# Patient Record
Sex: FEMALE | Race: BLACK OR AFRICAN AMERICAN | Employment: OTHER | URBAN - METROPOLITAN AREA
[De-identification: names, ages, dates, MRNs, and addresses within clinical notes are randomized per-mention and may not be internally consistent; named-entity substitution may affect disease eponyms.]

---

## 2017-01-31 ENCOUNTER — CARE COORDINATION (OUTPATIENT)
Dept: INTERNAL MEDICINE | Age: 82
End: 2017-01-31

## 2017-02-02 ENCOUNTER — OFFICE VISIT (OUTPATIENT)
Dept: INTERNAL MEDICINE | Age: 82
End: 2017-02-02

## 2017-02-02 ENCOUNTER — HOSPITAL ENCOUNTER (OUTPATIENT)
Dept: OTHER | Age: 82
Discharge: HOME | End: 2017-02-02
Attending: INTERNAL MEDICINE | Admitting: INTERNAL MEDICINE

## 2017-02-02 VITALS
HEART RATE: 63 BPM | DIASTOLIC BLOOD PRESSURE: 60 MMHG | SYSTOLIC BLOOD PRESSURE: 124 MMHG | BODY MASS INDEX: 26.93 KG/M2 | WEIGHT: 152 LBS | OXYGEN SATURATION: 85 %

## 2017-02-02 DIAGNOSIS — R44.1 VISUAL HALLUCINATIONS: Chronic | ICD-10-CM

## 2017-02-02 DIAGNOSIS — I10 ESSENTIAL HYPERTENSION: Primary | Chronic | ICD-10-CM

## 2017-02-02 DIAGNOSIS — F17.219 CIGARETTE NICOTINE DEPENDENCE WITH NICOTINE-INDUCED DISORDER: Chronic | ICD-10-CM

## 2017-02-02 DIAGNOSIS — M25.511 ACUTE PAIN OF RIGHT SHOULDER: ICD-10-CM

## 2017-02-02 DIAGNOSIS — R41.3 MEMORY LOSS: Chronic | ICD-10-CM

## 2017-02-02 DIAGNOSIS — R09.02 HYPOXIA: Chronic | ICD-10-CM

## 2017-02-02 DIAGNOSIS — E78.5 HYPERLIPIDEMIA, UNSPECIFIED HYPERLIPIDEMIA TYPE: Chronic | ICD-10-CM

## 2017-02-02 DIAGNOSIS — J43.9 PULMONARY EMPHYSEMA, UNSPECIFIED EMPHYSEMA TYPE (HCC): Chronic | ICD-10-CM

## 2017-02-02 PROCEDURE — G8420 CALC BMI NORM PARAMETERS: HCPCS | Performed by: INTERNAL MEDICINE

## 2017-02-02 PROCEDURE — 3023F SPIROM DOC REV: CPT | Performed by: INTERNAL MEDICINE

## 2017-02-02 PROCEDURE — 1123F ACP DISCUSS/DSCN MKR DOCD: CPT | Performed by: INTERNAL MEDICINE

## 2017-02-02 PROCEDURE — 4004F PT TOBACCO SCREEN RCVD TLK: CPT | Performed by: INTERNAL MEDICINE

## 2017-02-02 PROCEDURE — G8400 PT W/DXA NO RESULTS DOC: HCPCS | Performed by: INTERNAL MEDICINE

## 2017-02-02 PROCEDURE — G8484 FLU IMMUNIZE NO ADMIN: HCPCS | Performed by: INTERNAL MEDICINE

## 2017-02-02 PROCEDURE — 99214 OFFICE O/P EST MOD 30 MIN: CPT | Performed by: INTERNAL MEDICINE

## 2017-02-02 PROCEDURE — G8598 ASA/ANTIPLAT THER USED: HCPCS | Performed by: INTERNAL MEDICINE

## 2017-02-02 PROCEDURE — 1090F PRES/ABSN URINE INCON ASSESS: CPT | Performed by: INTERNAL MEDICINE

## 2017-02-02 PROCEDURE — G8427 DOCREV CUR MEDS BY ELIG CLIN: HCPCS | Performed by: INTERNAL MEDICINE

## 2017-02-02 PROCEDURE — G8926 SPIRO NO PERF OR DOC: HCPCS | Performed by: INTERNAL MEDICINE

## 2017-02-02 PROCEDURE — 4040F PNEUMOC VAC/ADMIN/RCVD: CPT | Performed by: INTERNAL MEDICINE

## 2017-02-02 ASSESSMENT — ENCOUNTER SYMPTOMS
BACK PAIN: 0
GASTROINTESTINAL NEGATIVE: 1
EYES NEGATIVE: 1
RESPIRATORY NEGATIVE: 1

## 2017-02-27 ENCOUNTER — CARE COORDINATION (OUTPATIENT)
Dept: INTERNAL MEDICINE | Age: 82
End: 2017-02-27

## 2017-03-06 RX ORDER — LORAZEPAM 2 MG/1
TABLET ORAL
Qty: 90 TABLET | Refills: 0 | OUTPATIENT
Start: 2017-03-06 | End: 2017-06-05 | Stop reason: DRUGHIGH

## 2017-03-06 RX ORDER — LORAZEPAM 2 MG/1
TABLET ORAL
Qty: 90 TABLET | Refills: 1 | OUTPATIENT
Start: 2017-03-06 | End: 2017-03-06 | Stop reason: SDUPTHER

## 2017-03-13 ENCOUNTER — CARE COORDINATION (OUTPATIENT)
Dept: INTERNAL MEDICINE | Age: 82
End: 2017-03-13

## 2017-03-21 ENCOUNTER — CARE COORDINATION (OUTPATIENT)
Dept: INTERNAL MEDICINE | Age: 82
End: 2017-03-21

## 2017-04-26 ENCOUNTER — CARE COORDINATION (OUTPATIENT)
Dept: INTERNAL MEDICINE | Age: 82
End: 2017-04-26

## 2017-06-05 ENCOUNTER — OFFICE VISIT (OUTPATIENT)
Dept: INTERNAL MEDICINE | Age: 82
End: 2017-06-05

## 2017-06-05 ENCOUNTER — CARE COORDINATOR VISIT (OUTPATIENT)
Dept: INTERNAL MEDICINE | Age: 82
End: 2017-06-05

## 2017-06-05 VITALS — BODY MASS INDEX: 25.86 KG/M2 | SYSTOLIC BLOOD PRESSURE: 132 MMHG | WEIGHT: 146 LBS | DIASTOLIC BLOOD PRESSURE: 68 MMHG

## 2017-06-05 DIAGNOSIS — I10 ESSENTIAL HYPERTENSION: Primary | Chronic | ICD-10-CM

## 2017-06-05 DIAGNOSIS — M15.9 PRIMARY OSTEOARTHRITIS INVOLVING MULTIPLE JOINTS: Chronic | ICD-10-CM

## 2017-06-05 DIAGNOSIS — F51.01 PRIMARY INSOMNIA: ICD-10-CM

## 2017-06-05 DIAGNOSIS — F02.818 LATE ONSET ALZHEIMER'S DISEASE WITH BEHAVIORAL DISTURBANCE (HCC): ICD-10-CM

## 2017-06-05 DIAGNOSIS — J43.2 CENTRILOBULAR EMPHYSEMA (HCC): ICD-10-CM

## 2017-06-05 DIAGNOSIS — G30.1 LATE ONSET ALZHEIMER'S DISEASE WITH BEHAVIORAL DISTURBANCE (HCC): ICD-10-CM

## 2017-06-05 DIAGNOSIS — F41.1 GENERALIZED ANXIETY DISORDER: ICD-10-CM

## 2017-06-05 LAB
SEDIMENTATION RATE, ERYTHROCYTE: 17 MM/HR (ref 0–30)
TSH SERPL DL<=0.05 MIU/L-ACNC: 2.39 UIU/ML (ref 0.27–4.2)
VITAMIN B-12: 294 PG/ML (ref 211–911)

## 2017-06-05 PROCEDURE — 4004F PT TOBACCO SCREEN RCVD TLK: CPT | Performed by: HOSPITALIST

## 2017-06-05 PROCEDURE — 99214 OFFICE O/P EST MOD 30 MIN: CPT | Performed by: HOSPITALIST

## 2017-06-05 PROCEDURE — 1090F PRES/ABSN URINE INCON ASSESS: CPT | Performed by: HOSPITALIST

## 2017-06-05 PROCEDURE — G8400 PT W/DXA NO RESULTS DOC: HCPCS | Performed by: HOSPITALIST

## 2017-06-05 PROCEDURE — G8926 SPIRO NO PERF OR DOC: HCPCS | Performed by: HOSPITALIST

## 2017-06-05 PROCEDURE — 4040F PNEUMOC VAC/ADMIN/RCVD: CPT | Performed by: HOSPITALIST

## 2017-06-05 PROCEDURE — 3023F SPIROM DOC REV: CPT | Performed by: HOSPITALIST

## 2017-06-05 PROCEDURE — G8598 ASA/ANTIPLAT THER USED: HCPCS | Performed by: HOSPITALIST

## 2017-06-05 PROCEDURE — G8420 CALC BMI NORM PARAMETERS: HCPCS | Performed by: HOSPITALIST

## 2017-06-05 PROCEDURE — 1123F ACP DISCUSS/DSCN MKR DOCD: CPT | Performed by: HOSPITALIST

## 2017-06-05 PROCEDURE — G8427 DOCREV CUR MEDS BY ELIG CLIN: HCPCS | Performed by: HOSPITALIST

## 2017-06-05 RX ORDER — LORAZEPAM 1 MG/1
1 TABLET ORAL EVERY 8 HOURS PRN
Qty: 90 TABLET | Refills: 0 | Status: SHIPPED | OUTPATIENT
Start: 2017-06-05 | End: 2017-06-12 | Stop reason: SDUPTHER

## 2017-06-05 ASSESSMENT — PATIENT HEALTH QUESTIONNAIRE - PHQ9
SUM OF ALL RESPONSES TO PHQ9 QUESTIONS 1 & 2: 2
SUM OF ALL RESPONSES TO PHQ QUESTIONS 1-9: 2
2. FEELING DOWN, DEPRESSED OR HOPELESS: 1
1. LITTLE INTEREST OR PLEASURE IN DOING THINGS: 1

## 2017-06-08 ENCOUNTER — TELEPHONE (OUTPATIENT)
Dept: INTERNAL MEDICINE | Age: 82
End: 2017-06-08

## 2017-06-12 DIAGNOSIS — F41.1 GENERALIZED ANXIETY DISORDER: ICD-10-CM

## 2017-06-12 RX ORDER — LORAZEPAM 1 MG/1
1 TABLET ORAL EVERY 8 HOURS PRN
Qty: 90 TABLET | Refills: 0 | Status: SHIPPED | OUTPATIENT
Start: 2017-06-12 | End: 2017-07-17 | Stop reason: SDUPTHER

## 2017-07-03 ENCOUNTER — OFFICE VISIT (OUTPATIENT)
Dept: INTERNAL MEDICINE | Age: 82
End: 2017-07-03

## 2017-07-03 VITALS
WEIGHT: 148 LBS | DIASTOLIC BLOOD PRESSURE: 58 MMHG | BODY MASS INDEX: 26.22 KG/M2 | HEART RATE: 82 BPM | SYSTOLIC BLOOD PRESSURE: 120 MMHG | OXYGEN SATURATION: 89 %

## 2017-07-03 DIAGNOSIS — J96.11 CHRONIC RESPIRATORY FAILURE WITH HYPOXIA (HCC): ICD-10-CM

## 2017-07-03 DIAGNOSIS — R25.2 CRAMP OF BOTH LOWER EXTREMITIES: ICD-10-CM

## 2017-07-03 DIAGNOSIS — R42 DIZZINESS: Primary | ICD-10-CM

## 2017-07-03 LAB
ANION GAP SERPL CALCULATED.3IONS-SCNC: 14 MMOL/L (ref 3–16)
CHLORIDE BLD-SCNC: 100 MMOL/L (ref 99–110)
CO2: 30 MMOL/L (ref 21–32)
MAGNESIUM: 2.2 MG/DL (ref 1.8–2.4)
POTASSIUM SERPL-SCNC: 3.2 MMOL/L (ref 3.5–5.1)
SODIUM BLD-SCNC: 144 MMOL/L (ref 136–145)

## 2017-07-03 PROCEDURE — 1123F ACP DISCUSS/DSCN MKR DOCD: CPT | Performed by: HOSPITALIST

## 2017-07-03 PROCEDURE — 1090F PRES/ABSN URINE INCON ASSESS: CPT | Performed by: HOSPITALIST

## 2017-07-03 PROCEDURE — G8400 PT W/DXA NO RESULTS DOC: HCPCS | Performed by: HOSPITALIST

## 2017-07-03 PROCEDURE — 4040F PNEUMOC VAC/ADMIN/RCVD: CPT | Performed by: HOSPITALIST

## 2017-07-03 PROCEDURE — G8598 ASA/ANTIPLAT THER USED: HCPCS | Performed by: HOSPITALIST

## 2017-07-03 PROCEDURE — 4004F PT TOBACCO SCREEN RCVD TLK: CPT | Performed by: HOSPITALIST

## 2017-07-03 PROCEDURE — G8419 CALC BMI OUT NRM PARAM NOF/U: HCPCS | Performed by: HOSPITALIST

## 2017-07-03 PROCEDURE — G8428 CUR MEDS NOT DOCUMENT: HCPCS | Performed by: HOSPITALIST

## 2017-07-03 PROCEDURE — 99214 OFFICE O/P EST MOD 30 MIN: CPT | Performed by: HOSPITALIST

## 2017-07-04 DIAGNOSIS — I10 ESSENTIAL HYPERTENSION: Chronic | ICD-10-CM

## 2017-07-04 DIAGNOSIS — E87.6 HYPOKALEMIA: Primary | ICD-10-CM

## 2017-07-04 RX ORDER — TRIAMTERENE AND HYDROCHLOROTHIAZIDE 37.5; 25 MG/1; MG/1
1 TABLET ORAL DAILY
Qty: 30 TABLET | Refills: 3 | Status: SHIPPED | OUTPATIENT
Start: 2017-07-04 | End: 2017-10-27 | Stop reason: SDUPTHER

## 2017-07-04 RX ORDER — POTASSIUM CHLORIDE 20 MEQ/1
20 TABLET, EXTENDED RELEASE ORAL DAILY
Qty: 30 TABLET | Refills: 3 | Status: SHIPPED | OUTPATIENT
Start: 2017-07-04 | End: 2017-11-06 | Stop reason: SDUPTHER

## 2017-07-17 ENCOUNTER — TELEPHONE (OUTPATIENT)
Dept: INTERNAL MEDICINE | Age: 82
End: 2017-07-17

## 2017-07-17 DIAGNOSIS — F41.1 GENERALIZED ANXIETY DISORDER: ICD-10-CM

## 2017-07-18 ENCOUNTER — CARE COORDINATION (OUTPATIENT)
Dept: INTERNAL MEDICINE | Age: 82
End: 2017-07-18

## 2017-07-18 RX ORDER — LORAZEPAM 1 MG/1
1 TABLET ORAL EVERY 8 HOURS PRN
Qty: 90 TABLET | Refills: 0 | Status: SHIPPED | OUTPATIENT
Start: 2017-07-18 | End: 2017-09-08 | Stop reason: SDUPTHER

## 2017-07-26 ENCOUNTER — TELEPHONE (OUTPATIENT)
Dept: INTERNAL MEDICINE | Age: 82
End: 2017-07-26

## 2017-08-02 ENCOUNTER — OFFICE VISIT (OUTPATIENT)
Dept: INTERNAL MEDICINE | Age: 82
End: 2017-08-02

## 2017-08-02 VITALS
WEIGHT: 149 LBS | DIASTOLIC BLOOD PRESSURE: 64 MMHG | BODY MASS INDEX: 26.4 KG/M2 | HEIGHT: 63 IN | SYSTOLIC BLOOD PRESSURE: 120 MMHG

## 2017-08-02 DIAGNOSIS — G30.1 LATE ONSET ALZHEIMER'S DISEASE WITHOUT BEHAVIORAL DISTURBANCE (HCC): ICD-10-CM

## 2017-08-02 DIAGNOSIS — M25.552 LEFT HIP PAIN: ICD-10-CM

## 2017-08-02 DIAGNOSIS — F17.200 TOBACCO DEPENDENCE: ICD-10-CM

## 2017-08-02 DIAGNOSIS — F02.80 LATE ONSET ALZHEIMER'S DISEASE WITHOUT BEHAVIORAL DISTURBANCE (HCC): ICD-10-CM

## 2017-08-02 DIAGNOSIS — M15.9 PRIMARY OSTEOARTHRITIS INVOLVING MULTIPLE JOINTS: Chronic | ICD-10-CM

## 2017-08-02 DIAGNOSIS — I10 ESSENTIAL HYPERTENSION: Primary | Chronic | ICD-10-CM

## 2017-08-02 PROCEDURE — 99214 OFFICE O/P EST MOD 30 MIN: CPT | Performed by: HOSPITALIST

## 2017-08-02 PROCEDURE — 4040F PNEUMOC VAC/ADMIN/RCVD: CPT | Performed by: HOSPITALIST

## 2017-08-02 PROCEDURE — G8427 DOCREV CUR MEDS BY ELIG CLIN: HCPCS | Performed by: HOSPITALIST

## 2017-08-02 PROCEDURE — 1123F ACP DISCUSS/DSCN MKR DOCD: CPT | Performed by: HOSPITALIST

## 2017-08-02 PROCEDURE — 1090F PRES/ABSN URINE INCON ASSESS: CPT | Performed by: HOSPITALIST

## 2017-08-02 PROCEDURE — G8400 PT W/DXA NO RESULTS DOC: HCPCS | Performed by: HOSPITALIST

## 2017-08-02 PROCEDURE — 4004F PT TOBACCO SCREEN RCVD TLK: CPT | Performed by: HOSPITALIST

## 2017-08-02 PROCEDURE — G8598 ASA/ANTIPLAT THER USED: HCPCS | Performed by: HOSPITALIST

## 2017-08-02 PROCEDURE — G8419 CALC BMI OUT NRM PARAM NOF/U: HCPCS | Performed by: HOSPITALIST

## 2017-08-02 RX ORDER — AMLODIPINE BESYLATE 2.5 MG/1
2.5 TABLET ORAL NIGHTLY
Qty: 30 TABLET | Refills: 3 | Status: SHIPPED | OUTPATIENT
Start: 2017-08-02 | End: 2017-10-23 | Stop reason: SDUPTHER

## 2017-08-02 RX ORDER — MELOXICAM 7.5 MG/1
7.5 TABLET ORAL DAILY
Qty: 30 TABLET | Refills: 3 | Status: SHIPPED | OUTPATIENT
Start: 2017-08-02 | End: 2017-09-10

## 2017-08-22 ENCOUNTER — CARE COORDINATION (OUTPATIENT)
Dept: INTERNAL MEDICINE | Age: 82
End: 2017-08-22

## 2017-09-07 DIAGNOSIS — F41.1 GENERALIZED ANXIETY DISORDER: ICD-10-CM

## 2017-09-07 RX ORDER — LORAZEPAM 1 MG/1
1 TABLET ORAL EVERY 8 HOURS PRN
Qty: 90 TABLET | Refills: 0 | Status: CANCELLED | OUTPATIENT
Start: 2017-09-07 | End: 2017-10-07

## 2017-09-08 ENCOUNTER — TELEPHONE (OUTPATIENT)
Dept: INTERNAL MEDICINE | Age: 82
End: 2017-09-08

## 2017-09-08 DIAGNOSIS — F41.1 GENERALIZED ANXIETY DISORDER: ICD-10-CM

## 2017-09-08 RX ORDER — LORAZEPAM 1 MG/1
1 TABLET ORAL EVERY 8 HOURS PRN
Qty: 90 TABLET | Refills: 0 | Status: SHIPPED | OUTPATIENT
Start: 2017-09-08 | End: 2017-10-06 | Stop reason: DRUGHIGH

## 2017-09-11 ENCOUNTER — TELEPHONE (OUTPATIENT)
Dept: INTERNAL MEDICINE | Age: 82
End: 2017-09-11

## 2017-09-13 ENCOUNTER — CARE COORDINATION (OUTPATIENT)
Dept: INTERNAL MEDICINE | Age: 82
End: 2017-09-13

## 2017-09-23 DIAGNOSIS — F32.A DEPRESSION: Chronic | ICD-10-CM

## 2017-09-26 RX ORDER — PAROXETINE HYDROCHLORIDE 20 MG/1
TABLET, FILM COATED ORAL
Qty: 30 TABLET | Refills: 5 | Status: SHIPPED | OUTPATIENT
Start: 2017-09-26 | End: 2017-10-23 | Stop reason: SDUPTHER

## 2017-10-02 RX ORDER — QUETIAPINE FUMARATE 50 MG/1
50 TABLET, FILM COATED ORAL NIGHTLY
Qty: 30 TABLET | Refills: 12 | Status: SHIPPED | OUTPATIENT
Start: 2017-10-02 | End: 2018-05-17 | Stop reason: SDUPTHER

## 2017-10-06 ENCOUNTER — OFFICE VISIT (OUTPATIENT)
Dept: INTERNAL MEDICINE | Age: 82
End: 2017-10-06

## 2017-10-06 ENCOUNTER — HOSPITAL ENCOUNTER (OUTPATIENT)
Dept: OTHER | Age: 82
Discharge: HOME | End: 2017-10-06
Attending: HOSPITALIST | Admitting: HOSPITALIST

## 2017-10-06 VITALS
BODY MASS INDEX: 26.08 KG/M2 | OXYGEN SATURATION: 98 % | RESPIRATION RATE: 18 BRPM | WEIGHT: 147.2 LBS | SYSTOLIC BLOOD PRESSURE: 152 MMHG | HEIGHT: 63 IN | HEART RATE: 78 BPM | DIASTOLIC BLOOD PRESSURE: 70 MMHG

## 2017-10-06 DIAGNOSIS — F41.1 GENERALIZED ANXIETY DISORDER: ICD-10-CM

## 2017-10-06 DIAGNOSIS — M70.62 TROCHANTERIC BURSITIS OF LEFT HIP: ICD-10-CM

## 2017-10-06 DIAGNOSIS — Z23 NEED FOR INFLUENZA VACCINATION: ICD-10-CM

## 2017-10-06 DIAGNOSIS — L30.9 ECZEMA, UNSPECIFIED TYPE: ICD-10-CM

## 2017-10-06 DIAGNOSIS — R10.30 LOWER ABDOMINAL PAIN: ICD-10-CM

## 2017-10-06 DIAGNOSIS — I10 ESSENTIAL HYPERTENSION: Chronic | ICD-10-CM

## 2017-10-06 DIAGNOSIS — R10.30 LOWER ABDOMINAL PAIN: Primary | ICD-10-CM

## 2017-10-06 PROCEDURE — 4040F PNEUMOC VAC/ADMIN/RCVD: CPT | Performed by: HOSPITALIST

## 2017-10-06 PROCEDURE — 90662 IIV NO PRSV INCREASED AG IM: CPT | Performed by: HOSPITALIST

## 2017-10-06 PROCEDURE — 4004F PT TOBACCO SCREEN RCVD TLK: CPT | Performed by: HOSPITALIST

## 2017-10-06 PROCEDURE — G8598 ASA/ANTIPLAT THER USED: HCPCS | Performed by: HOSPITALIST

## 2017-10-06 PROCEDURE — G8427 DOCREV CUR MEDS BY ELIG CLIN: HCPCS | Performed by: HOSPITALIST

## 2017-10-06 PROCEDURE — 99214 OFFICE O/P EST MOD 30 MIN: CPT | Performed by: HOSPITALIST

## 2017-10-06 PROCEDURE — 1090F PRES/ABSN URINE INCON ASSESS: CPT | Performed by: HOSPITALIST

## 2017-10-06 PROCEDURE — G8400 PT W/DXA NO RESULTS DOC: HCPCS | Performed by: HOSPITALIST

## 2017-10-06 PROCEDURE — G8417 CALC BMI ABV UP PARAM F/U: HCPCS | Performed by: HOSPITALIST

## 2017-10-06 PROCEDURE — G0008 ADMIN INFLUENZA VIRUS VAC: HCPCS | Performed by: HOSPITALIST

## 2017-10-06 PROCEDURE — 1123F ACP DISCUSS/DSCN MKR DOCD: CPT | Performed by: HOSPITALIST

## 2017-10-06 PROCEDURE — G8484 FLU IMMUNIZE NO ADMIN: HCPCS | Performed by: HOSPITALIST

## 2017-10-06 RX ORDER — LORAZEPAM 0.5 MG/1
0.5 TABLET ORAL EVERY 8 HOURS PRN
Qty: 42 TABLET | Refills: 0 | Status: SHIPPED | OUTPATIENT
Start: 2017-10-06 | End: 2017-10-30 | Stop reason: SDUPTHER

## 2017-10-06 NOTE — PROGRESS NOTES
Follow Up Visit Established Patient Visit    Patient:  Jennifer Reyes                                               : 1934  Age: 80 y.o. MRN: [id number]  Date : 10/6/2017    Jennifer Reyes is a 80 y.o. female who presents with complaints of abdominal pain and a rash. The pain starts in the right lower quadrant and moves to the left and down the back of her left leg. Last for about 10 minutes and occurs randomly and when eating. Pain is crampy and relieved with a BM. Experiencing this pain for about 3-4 weeks. Stool has been loose during this time. Denies any blood or mucous in stool. Denies nausea and vomiting. Denies weight loss. Also complaining of a erythematous rash on her lower abdomen that has been intermittent for a few years. Has used triamcinolone cream in the past that has helped but ran out. Doesn't check her BP at home. No headaches/blurred vision. Chief Complaint   Patient presents with    Hypertension    Abdominal Pain     x a few months. she states any time she eats/drinks, she has stomach pain.  Rash     on stomach x a few months.        Past Medical History:   Diagnosis Date    Abdominal pain, unspecified site     Adrenal hyperplasia ([address] 2013    Left - CT scan -     Ankle Fracture, Right     Anxiety     Aortic atherosclerosis ([address] 10/3/2015    Arteriosclerosis     Contusion of unspecified site     COPD (chronic obstructive pulmonary disease) ([address] 3/1/2013    Coronary artery disease     Depression 10/8/2010    DJD (degenerative joint disease)     Eczema 2013    Essential hypertension 2015    Fatty liver     Hyperlipidemia     Hypertension     Hypoxia 2015    Insomnia     Leukocytosis     Memory loss     Nicotine addiction     Osteoarthritis     Pain in joint, pelvic region and thigh     Primary osteoarthritis involving multiple joints 2015    Pulmonary hypertension 10/3/2015    Senile reticular degeneration of peripheral retina     Skin rash     Syncope     Thoracic or lumbosacral neuritis or radiculitis, unspecified     Urinary incontinence     Visual hallucinations 9/16/2014       Past Surgical History:   Procedure Laterality Date    CATARACT REMOVAL WITH IMPLANT Left December 5, 2012    Dr. Pam Hutson       Current Outpatient Prescriptions   Medication Sig Dispense Refill    LORazepam (ATIVAN) 0.5 MG tablet Take 1 tablet by mouth every 8 hours as needed for Anxiety 42 tablet 0    triamcinolone (KENALOG) 0.1 % ointment Apply topically 2 times daily for 7 days 30 g 2    QUEtiapine (SEROQUEL) 50 MG tablet Take 1 tablet by mouth nightly 30 tablet 12    PARoxetine (PAXIL) 20 MG tablet TAKE ONE TABLET BY MOUTH DAILY 30 tablet 5    amLODIPine (NORVASC) 2.5 MG tablet Take 1 tablet by mouth nightly 30 tablet 3    triamterene-hydrochlorothiazide (MAXZIDE-25) 37.5-25 MG per tablet Take 1 tablet by mouth daily 30 tablet 3    potassium chloride (KLOR-CON M) 20 MEQ extended release tablet Take 1 tablet by mouth daily 30 tablet 3    ondansetron (ZOFRAN ODT) 4 MG disintegrating tablet Take 1 tablet by mouth every 8 hours as needed for Nausea 20 tablet 0    simvastatin (ZOCOR) 40 MG tablet Take 1 tablet by mouth nightly 30 tablet 12    donepezil (ARICEPT) 10 MG tablet Take 1 tablet by mouth nightly 30 tablet 12    betamethasone dipropionate (DIPROLENE) 0.05 % ointment apply to the affected area twice a day until healed 45 g 3    Placebo CAPS Take 1 capsule by mouth daily 30 capsule 12    Umeclidinium-Vilanterol 62.5-25 MCG/INH AEPB Inhale 1 puff into the lungs daily 1 each 12    aspirin EC 81 MG EC tablet Take 1 tablet by mouth daily with food.  OXYGEN Inhale 2 L/min into the lungs continuous       No current facility-administered medications for this visit.       BP (!) 152/70  Pulse 78  Resp 18  Ht 5' 3\" (1.6 m)  Wt 147 lb 3.2 oz (66.8 kg)  SpO2 98%  BMI 26.08 kg/m2      Physical Exam   Constitutional: She is oriented to person, place, and time. She appears well-developed and well-nourished. HENT:   Head: Normocephalic and atraumatic. Cardiovascular: Normal rate, regular rhythm and normal heart sounds. Exam reveals no gallop and no friction rub. No murmur heard. Pulmonary/Chest: Effort normal and breath sounds normal.   Abdominal: Soft. Bowel sounds are normal. There is tenderness in the suprapubic area and left lower quadrant. Musculoskeletal: Normal range of motion. She exhibits tenderness (over L greater trochanter to deep palpation). Left hip: She exhibits tenderness. Neurological: She is alert and oriented to person, place, and time. Skin: Skin is warm and dry. Rash (large erythematous rash around the umbilicus) noted. Psychiatric: She has a normal mood and affect. Thought content normal.         Assessment/ plan:     Linda Washburn was seen today for hypertension, abdominal pain and rash. Diagnoses and all orders for this visit:    Lower abdominal pain  -     Possibly related to diet, advised pt to avoid soda and use imodium as needed. Will obtain xray, pt w/ surgical hx, concerned for adhesions. Will f/u in 2 weeks and refer to GI if not improved. -     XR ABDOMEN (complete, including decubitus and/or erect views); Future    Trochanteric bursitis of left hip        -     Point tenderness along trochanteric bursa, control pain w/ OTC meds        -     Local dry heat applications    Generalized anxiety disorder  -     Stable, tapering ativan down  -   Will change  LORazepam (ATIVAN) 0.5 MG tablet; Take 1 tablet by mouth every 8 hours as needed for Anxiety    Eczema, unspecified type  -     Present for a few weeks, relieved with kenalog cream before, will give another prescription  -     triamcinolone (KENALOG) 0.1 % ointment;  Apply topically 2 times daily for 7 days    Essential hypertension        -     Stable, continue current meds    Need for influenza

## 2017-10-10 ENCOUNTER — CARE COORDINATION (OUTPATIENT)
Dept: INTERNAL MEDICINE | Age: 82
End: 2017-10-10

## 2017-10-11 NOTE — CARE COORDINATION
Ambulatory Care Coordination Note  10/10/2017  CM Risk Score: 6  Jose Mortality Risk Score: 29.7    ACC: Merline Medico, RN     Hx: Hyperlipidemia, HTN, Pulm HTN, Aortic Atherosclerosis, Pulm Emphysema, Memory Loss, Osteoarthritis, Anxiety, CAD, Depression. COPD, Adrenal Hyperplasia,Hypoxia, DJD, Mood Disorder    Summary Note: The pt's daughter stated the pt's abdominal pain and diarrhea comes and goes. The pt is taking Imodium for the diarrhea. The daughter stated the pt still c/o of left leg pain but the daughter stated she believes it is arthritis. The daughter stated the pt takes Aleve once a day. The pt still has the rash on her abdomin but the pt has only been using the Kenalog for 1 day. Plan:  The daughter continues to wean the pt off the Lorazepam. The pt is now taking 1/2 tab 3 times a day. The pt was instructed to keep a journal of the foods that she eats when she experiences abdominal pain. The [address] instructed the daughter to have the pt continue with the Kenalog Cream to the pt's rash and to call if the rash does not resolve. The daughter stated she had faxed forms that need to be completed so the pt can go to Adult Day Care. The [address] will see if the PCP's MA received the forms and if they were faxed back to the Day Care. Care Coordination Interventions    Program Enrollment:  Rising Risk  Referral from Primary Care Provider:  Yes  Suggested Interventions and Community Resources  Adult Day Program:  In Process  Fall Risk Prevention: In Process  Home Health Services:  Completed  Medi Set or Pill Pack: In Process  Other Services: In Process  Zone Management Tools:  Not Started  Other Services or Interventions:  COA         Goals Addressed             Most Recent     Care Coordination Self Management   On track (10/10/2017)             CC Self Management Goal: Remain safely in her home  Patient Goal (What steps will patient take to achieve goal?): Pt has home health.  The pt live downstairs from her daughter. Patient is able to discuss self-management of condition(s):  Pt demonstrates adherence to medications  Pt demonstrates understanding of self-monitoring  Patient is able to identify Red Flags:  Alert to potential adverse drug reactions(s) or side effects and actions to take should they arise  Discuss target symptoms and actions to take should they arise  Identify problems that require immediate PCP or specialist visit  Patient demonstrates understanding of access to PCP/Specialist:  Understands about scheduling routine Follow Up appointments   Understands about sick day appointment options for worsening of symptoms/progression (Same Day, E Visits)       Wellness Goal   On track (10/10/2017)             Patient Self-Management Goal for Health Maintenance  Goal: Pt will go to Adult Day Care a few days a week  Barriers: impairment:  cognitive and financial  Plan for overcoming my barriers: Daughter assisting the pt in utilizing community resources. Confidence: 6/10  Anticipated Goal Completion Date: 08/30/17            Prior to Admission medications    Medication Sig Start Date End Date Taking?  Authorizing Provider   LORazepam (ATIVAN) 0.5 MG tablet Take 1 tablet by mouth every 8 hours as needed for Anxiety 10/6/17 11/5/17  Janet Rodriguez MD   triamcinolone (KENALOG) 0.1 % ointment Apply topically 2 times daily for 7 days 10/6/17 10/13/17  Janet Rodriguez MD   QUEtiapine (SEROQUEL) 50 MG tablet Take 1 tablet by mouth nightly 10/2/17   Janet Rodriguez MD   PARoxetine (PAXIL) 20 MG tablet TAKE ONE TABLET BY MOUTH DAILY 9/26/17   Janet Rodriguez MD   amLODIPine (NORVASC) 2.5 MG tablet Take 1 tablet by mouth nightly 8/2/17   Janet Rodriguez MD   triamterene-hydrochlorothiazide [facility]) 37.5-25 MG per tablet Take 1 tablet by mouth daily 7/4/17   Janet Rodriguez MD   potassium chloride (KLOR-CON M) 20 MEQ extended release tablet Take 1 tablet by mouth daily 7/4/17   Janet Rodriguez MD   ondansetron (ZOFRAN ODT) 4 MG disintegrating tablet Take 1 tablet by mouth every 8 hours as needed for Nausea 3/11/17   Efraín Marinelli MD   simvastatin (ZOCOR) 40 MG tablet Take 1 tablet by mouth nightly 9/27/16   Porfirio Ramos MD   donepezil (ARICEPT) 10 MG tablet Take 1 tablet by mouth nightly 9/27/16   Porfirio Ramos MD   betamethasone dipropionate (DIPROLENE) 0.05 % ointment apply to the affected area twice a day until healed 8/29/16   Porfirio Ramos MD   Placebo CAPS Take 1 capsule by mouth daily 6/7/16   Porfirio Ramos MD   Umeclidinium-Vilanterol 62.5-25 MCG/INH AEPB Inhale 1 puff into the lungs daily 1/28/16   Porfirio Ramos MD   aspirin EC 81 MG EC tablet Take 1 tablet by mouth daily with food.  9/9/15   Porfirio Ramos MD   OXYGEN Inhale 2 L/min into the lungs continuous 8/19/15   Porfirio Ramos MD       Future Appointments  Date Time Provider Melo Clemens   10/20/2017 9:15 AM Bertin Clark MD [facility]     General Assessment    Do you have any symptoms that are causing concern?:  Yes  Progression since Onset:  Gradually Improving  Reported Symptoms:  Rash, Abdominal Pain (Comment: Diarrhea)      and   COPD Assessment    Does the patient understand envrionmental exposure?:  Yes  Is the patient able to verbalize Rescue vs. Long Acting medications?:  No  Does the patient have a nebulizer?:  No  Does the patient use a space with inhaled medications?:  No     No patient-reported symptoms, Other symptoms causing concern         Symptoms:      Have you had a recent diagnosis of pneumonia either by PCP or at a hospital?:  No     ,

## 2017-10-20 ENCOUNTER — CARE COORDINATION (OUTPATIENT)
Dept: INTERNAL MEDICINE | Age: 82
End: 2017-10-20

## 2017-10-20 NOTE — CARE COORDINATION
Phone Note:  The pt's daughter called and stated the pt's pharmacy has been trying to get refills for the pt's Amlodipine and Paroxetine for about a week. The daughter stated the pt is now out of both medications.   The [facility] will forward this note to the PCP's MA

## 2017-10-23 ENCOUNTER — TELEPHONE (OUTPATIENT)
Dept: INTERNAL MEDICINE | Age: 82
End: 2017-10-23

## 2017-10-23 DIAGNOSIS — I10 ESSENTIAL HYPERTENSION: Chronic | ICD-10-CM

## 2017-10-23 DIAGNOSIS — F32.A DEPRESSION, UNSPECIFIED DEPRESSION TYPE: ICD-10-CM

## 2017-10-23 RX ORDER — AMLODIPINE BESYLATE 2.5 MG/1
2.5 TABLET ORAL NIGHTLY
Qty: 30 TABLET | Refills: 3 | Status: SHIPPED | OUTPATIENT
Start: 2017-10-23 | End: 2018-03-07 | Stop reason: SDUPTHER

## 2017-10-23 RX ORDER — PAROXETINE HYDROCHLORIDE 20 MG/1
TABLET, FILM COATED ORAL
Qty: 30 TABLET | Refills: 3 | Status: SHIPPED | OUTPATIENT
Start: 2017-10-23 | End: 2017-10-30 | Stop reason: SDUPTHER

## 2017-10-23 RX ORDER — PAROXETINE HYDROCHLORIDE 20 MG/1
TABLET, FILM COATED ORAL
Qty: 30 TABLET | Refills: 5 | Status: CANCELLED | OUTPATIENT
Start: 2017-10-23

## 2017-10-24 ENCOUNTER — TELEPHONE (OUTPATIENT)
Dept: INTERNAL MEDICINE | Age: 82
End: 2017-10-24

## 2017-10-25 NOTE — TELEPHONE ENCOUNTER
Spoke with patients EC. The medication listed were for refills. Medications were called in on 10/23/17. Aware pharmacy has refills.

## 2017-10-26 ENCOUNTER — TELEPHONE (OUTPATIENT)
Dept: INTERNAL MEDICINE | Age: 82
End: 2017-10-26

## 2017-10-26 DIAGNOSIS — R41.3 MEMORY LOSS: Chronic | ICD-10-CM

## 2017-10-26 DIAGNOSIS — I10 ESSENTIAL HYPERTENSION: Chronic | ICD-10-CM

## 2017-10-26 DIAGNOSIS — L30.9 ECZEMA, UNSPECIFIED TYPE: ICD-10-CM

## 2017-10-26 NOTE — TELEPHONE ENCOUNTER
Patient needs a refill on triamterene-hydrochlorothiazide (MAXZIDE-25) 37.5-25 MG per tablet     . They need a 90 day supply. Mail order or local pharmacy: local     Pharmacy: [facility]    Patient  or mail to patient(If mail order):    Patient needs a refill on LORazepam (ATIVAN) 0.5 MG tablet     . They need a 30 day supply. Mail order or local pharmacy: local     Pharmacy:     Patient  or mail to patient(If mail order):patient      Patient needs a refill on donepezil (ARICEPT) 10 MG tablet     . They need a 90 day supply.      Mail order or local pharmacy: local     Pharmacy: [facility]     Patient  or mail to patient(If mail order):  Needs refill on betamethasone dipropionate (DIPROLENE) 0.05 % ointment

## 2017-10-29 RX ORDER — DONEPEZIL HYDROCHLORIDE 10 MG/1
10 TABLET, FILM COATED ORAL NIGHTLY
Qty: 90 TABLET | Refills: 1 | Status: SHIPPED | OUTPATIENT
Start: 2017-10-29 | End: 2017-10-30 | Stop reason: SDUPTHER

## 2017-10-29 RX ORDER — BETAMETHASONE DIPROPIONATE 0.05 %
OINTMENT (GRAM) TOPICAL
Qty: 45 G | Refills: 3 | Status: SHIPPED | OUTPATIENT
Start: 2017-10-29 | End: 2018-07-31 | Stop reason: ALTCHOICE

## 2017-10-29 RX ORDER — TRIAMTERENE AND HYDROCHLOROTHIAZIDE 37.5; 25 MG/1; MG/1
1 TABLET ORAL DAILY
Qty: 90 TABLET | Refills: 1 | Status: SHIPPED | OUTPATIENT
Start: 2017-10-29 | End: 2018-06-11 | Stop reason: SDUPTHER

## 2017-10-30 ENCOUNTER — TELEPHONE (OUTPATIENT)
Dept: INTERNAL MEDICINE | Age: 82
End: 2017-10-30

## 2017-10-30 DIAGNOSIS — R41.3 MEMORY LOSS: Chronic | ICD-10-CM

## 2017-10-30 DIAGNOSIS — E78.5 HYPERLIPIDEMIA, UNSPECIFIED HYPERLIPIDEMIA TYPE: ICD-10-CM

## 2017-10-30 DIAGNOSIS — F32.A DEPRESSION, UNSPECIFIED DEPRESSION TYPE: ICD-10-CM

## 2017-10-30 DIAGNOSIS — F41.1 GENERALIZED ANXIETY DISORDER: ICD-10-CM

## 2017-10-30 RX ORDER — PAROXETINE HYDROCHLORIDE 20 MG/1
TABLET, FILM COATED ORAL
Qty: 90 TABLET | Refills: 3 | Status: SHIPPED | OUTPATIENT
Start: 2017-10-30 | End: 2018-11-26 | Stop reason: SDUPTHER

## 2017-10-30 RX ORDER — DONEPEZIL HYDROCHLORIDE 10 MG/1
10 TABLET, FILM COATED ORAL NIGHTLY
Qty: 90 TABLET | Refills: 3 | Status: SHIPPED | OUTPATIENT
Start: 2017-10-30 | End: 2018-11-26 | Stop reason: SDUPTHER

## 2017-10-30 RX ORDER — LORAZEPAM 0.5 MG/1
0.5 TABLET ORAL EVERY 8 HOURS PRN
Qty: 42 TABLET | Refills: 0 | Status: SHIPPED | OUTPATIENT
Start: 2017-10-30 | End: 2017-11-06 | Stop reason: SDUPTHER

## 2017-10-30 RX ORDER — SIMVASTATIN 40 MG
40 TABLET ORAL NIGHTLY
Qty: 90 TABLET | Refills: 3 | Status: SHIPPED | OUTPATIENT
Start: 2017-10-30 | End: 2018-11-04 | Stop reason: SDUPTHER

## 2017-11-03 ENCOUNTER — CARE COORDINATION (OUTPATIENT)
Dept: INTERNAL MEDICINE | Age: 82
End: 2017-11-03

## 2017-11-03 NOTE — CARE COORDINATION
ACC Note:  The RNCC called to check on the pt. The daughter stated she is bringing the pt for an appointment on 11/6/17. The daughter stated there is still one medication that has not been refilled. The daughter stated she would discuss this with the PCP. The [address] will meet with the pt and daughter when they are in for the [address]

## 2017-11-06 ENCOUNTER — OFFICE VISIT (OUTPATIENT)
Dept: INTERNAL MEDICINE | Age: 82
End: 2017-11-06

## 2017-11-06 VITALS — BODY MASS INDEX: 25.51 KG/M2 | SYSTOLIC BLOOD PRESSURE: 144 MMHG | WEIGHT: 144 LBS | DIASTOLIC BLOOD PRESSURE: 78 MMHG

## 2017-11-06 DIAGNOSIS — E87.6 HYPOKALEMIA: ICD-10-CM

## 2017-11-06 DIAGNOSIS — F17.200 TOBACCO DEPENDENCE: ICD-10-CM

## 2017-11-06 DIAGNOSIS — M25.552 LEFT HIP PAIN: ICD-10-CM

## 2017-11-06 DIAGNOSIS — I10 ESSENTIAL HYPERTENSION: Chronic | ICD-10-CM

## 2017-11-06 DIAGNOSIS — F41.1 GENERALIZED ANXIETY DISORDER: ICD-10-CM

## 2017-11-06 DIAGNOSIS — R10.13 EPIGASTRIC ABDOMINAL PAIN: Primary | ICD-10-CM

## 2017-11-06 LAB
ALBUMIN SERPL-MCNC: 4.1 G/DL (ref 3.4–5)
ANION GAP SERPL CALCULATED.3IONS-SCNC: 14 MMOL/L (ref 3–16)
BUN BLDV-MCNC: 17 MG/DL (ref 7–20)
CALCIUM SERPL-MCNC: 9.5 MG/DL (ref 8.3–10.6)
CHLORIDE BLD-SCNC: 102 MMOL/L (ref 99–110)
CO2: 29 MMOL/L (ref 21–32)
CREAT SERPL-MCNC: 1.1 MG/DL (ref 0.6–1.2)
GFR AFRICAN AMERICAN: 57
GFR NON-AFRICAN AMERICAN: 47
GLUCOSE BLD-MCNC: 95 MG/DL (ref 70–99)
PHOSPHORUS: 2.7 MG/DL (ref 2.5–4.9)
POTASSIUM SERPL-SCNC: 4 MMOL/L (ref 3.5–5.1)
SODIUM BLD-SCNC: 145 MMOL/L (ref 136–145)

## 2017-11-06 PROCEDURE — 1123F ACP DISCUSS/DSCN MKR DOCD: CPT | Performed by: HOSPITALIST

## 2017-11-06 PROCEDURE — 4004F PT TOBACCO SCREEN RCVD TLK: CPT | Performed by: HOSPITALIST

## 2017-11-06 PROCEDURE — G8484 FLU IMMUNIZE NO ADMIN: HCPCS | Performed by: HOSPITALIST

## 2017-11-06 PROCEDURE — G8400 PT W/DXA NO RESULTS DOC: HCPCS | Performed by: HOSPITALIST

## 2017-11-06 PROCEDURE — 1090F PRES/ABSN URINE INCON ASSESS: CPT | Performed by: HOSPITALIST

## 2017-11-06 PROCEDURE — 99214 OFFICE O/P EST MOD 30 MIN: CPT | Performed by: HOSPITALIST

## 2017-11-06 PROCEDURE — G8598 ASA/ANTIPLAT THER USED: HCPCS | Performed by: HOSPITALIST

## 2017-11-06 PROCEDURE — 4040F PNEUMOC VAC/ADMIN/RCVD: CPT | Performed by: HOSPITALIST

## 2017-11-06 PROCEDURE — G8417 CALC BMI ABV UP PARAM F/U: HCPCS | Performed by: HOSPITALIST

## 2017-11-06 PROCEDURE — G8427 DOCREV CUR MEDS BY ELIG CLIN: HCPCS | Performed by: HOSPITALIST

## 2017-11-06 RX ORDER — POTASSIUM CHLORIDE 20 MEQ/1
20 TABLET, EXTENDED RELEASE ORAL DAILY
Qty: 30 TABLET | Refills: 3 | Status: SHIPPED | OUTPATIENT
Start: 2017-11-06 | End: 2018-04-05 | Stop reason: SDUPTHER

## 2017-11-06 RX ORDER — LORAZEPAM 0.5 MG/1
0.5 TABLET ORAL 2 TIMES DAILY PRN
Qty: 60 TABLET | Refills: 0 | Status: SHIPPED | OUTPATIENT
Start: 2017-11-06 | End: 2017-12-18 | Stop reason: SDUPTHER

## 2017-11-06 NOTE — PROGRESS NOTES
discussed and strongly encouraged    Left hip/ lower back pain  - Recommended local dry heat applications  - Extra strength Tylenol 500 mg orally 3 times a day as needed  - Previous x-ray of lumbar spine from 2015 consistent with at least moderate degenerative disc disease    Follow-up in one month      Fito Sorto MD

## 2017-11-06 NOTE — PATIENT INSTRUCTIONS
Patient Education        Learning About Benefits From Quitting Smoking  How does quitting smoking make you healthier? If you're thinking about quitting smoking, you may have a few reasons to be smoke-free. Your health may be one of them. · When you quit smoking, you lower your risks for cancer, lung disease, heart attack, stroke, blood vessel disease, and blindness from macular degeneration. · When you're smoke-free, you get sick less often, and you heal faster. You are less likely to get colds, flu, bronchitis, and pneumonia. · As a nonsmoker, you may find that your mood is better and you are less stressed. When and how will you feel healthier? Quitting has real health benefits that start from day 1 of being smoke-free. And the longer you stay smoke-free, the healthier you get and the better you feel. The first hours  · After just 20 minutes, your blood pressure and heart rate go down. That means there's less stress on your heart and blood vessels. · Within 12 hours, the level of carbon monoxide in your blood drops back to normal. That makes room for more oxygen. With more oxygen in your body, you may notice that you have more energy than when you smoked. After 2 weeks  · Your lungs start to work better. · Your risk of heart attack starts to drop. After 1 month  · When your lungs are clear, you cough less and breathe deeper, so it's easier to be active. · Your sense of taste and smell return. That means you can enjoy food more than you have since you started smoking. Over the years  · After 1 year, your risk of heart disease is half what it would be if you kept smoking. · After 5 years, your risk of stroke starts to shrink. Within a few years after that, it's about the same as if you'd never smoked. · After 10 years, your risk of dying from lung cancer is cut by about half. And your risk for many other types of cancer is lower too. How would quitting help others in your life?   When you quit smoking, you improve the health of everyone who now breathes in your smoke. · Their heart, lung, and cancer risks drop, much like yours. · They are sick less. For babies and small children, living smoke-free means they're less likely to have ear infections, pneumonia, and bronchitis. · If you're a woman who is or will be pregnant someday, quitting smoking means a healthier . · Children who are close to you are less likely to become adult smokers. Where can you learn more? Go to https://[employer]rillophilia.com.[employer]idder. org and sign in to your [employer] account. Enter [phone] in the [facility] box to learn more about \"Learning About Benefits From Quitting Smoking. \"     If you do not have an account, please click on the \"Sign Up Now\" link. Current as of: 2017  Content Version: 11.3  © 2068-9811 [employer]. Care instructions adapted under license by [facility] ([facility]). If you have questions about a medical condition or this instruction, always ask your healthcare professional. [address] any warranty or liability for your use of this information.

## 2017-11-06 NOTE — CARE COORDINATION
to PCP/Specialist:  Understands about scheduling routine Follow Up appointments   Understands about sick day appointment options for worsening of symptoms/progression (Same Day, E Visits)       Wellness Goal   On track (11/3/2017)             Patient Self-Management Goal for Health Maintenance  Goal: Pt will go to Adult Day Care a few days a week  Barriers: impairment:  cognitive and financial  Plan for overcoming my barriers: Daughter assisting the pt in utilizing community resources. Confidence: 6/10  Anticipated Goal Completion Date: 08/30/17            Prior to Admission medications    Medication Sig Start Date End Date Taking?  Authorizing Provider   potassium chloride (KLOR-CON M) 20 MEQ extended release tablet Take 1 tablet by mouth daily 11/6/17   Nitin Hughes MD   LORazepam (ATIVAN) 0.5 MG tablet Take 1 tablet by mouth 2 times daily as needed for Anxiety 11/6/17   Nitin Hughes MD   PARoxetine (PAXIL) 20 MG tablet TAKE ONE TABLET BY MOUTH DAILY 10/30/17   Nitin Hughes MD   simvastatin (ZOCOR) 40 MG tablet Take 1 tablet by mouth nightly 10/30/17   Nitin Hughes MD   donepezil (ARICEPT) 10 MG tablet Take 1 tablet by mouth nightly 10/30/17   Nitin Hughes MD   triamterene-hydrochlorothiazide [facility]) 37.5-25 MG per tablet Take 1 tablet by mouth daily 10/29/17   Nitin Hughes MD   betamethasone dipropionate (DIPROLENE) 0.05 % ointment apply to the affected area twice a day until healed 10/29/17   Nitin Hughes MD   amLODIPine (NORVASC) 2.5 MG tablet Take 1 tablet by mouth nightly 10/23/17   Nitin Hughes MD   QUEtiapine (SEROQUEL) 50 MG tablet Take 1 tablet by mouth nightly 10/2/17   Nitin Hughes MD   ondansetron (ZOFRAN ODT) 4 MG disintegrating tablet Take 1 tablet by mouth every 8 hours as needed for Nausea 3/11/17   Madhavi Veronica MD   Placebo CAPS Take 1 capsule by mouth daily 6/7/16   Marilee Ghosh MD   Umeclidinium-Vilanterol 62.5-25 MCG/INH AEPB Inhale 1 puff into the lungs daily 1/28/16 Fabrice Moon MD   aspirin EC 81 MG EC tablet Take 1 tablet by mouth daily with food.  9/9/15   Fabrice Moon MD   OXYGEN Inhale 2 L/min into the lungs continuous 8/19/15   Fabrice Moon MD       Future Appointments  Date Time Provider Melo Clemens   12/6/2017 9:15 AM Steve James MD [facility]   ,   COPD Assessment    Does the patient understand envrionmental exposure?:  Yes  Is the patient able to verbalize Rescue vs. Long Acting medications?:  No  Does the patient have a nebulizer?:  No  Does the patient use a space with inhaled medications?:  No     No patient-reported symptoms         Symptoms:      Have you had a recent diagnosis of pneumonia either by PCP or at a hospital?:  No      and   General Assessment    Do you have any symptoms that are causing concern?:  Yes  Progression since Onset:  Gradually Worsening  Reported Symptoms:  Abdominal Pain

## 2017-12-08 ENCOUNTER — CARE COORDINATION (OUTPATIENT)
Dept: CARE COORDINATION | Age: 82
End: 2017-12-08

## 2017-12-08 NOTE — CARE COORDINATION
(ZOFRAN ODT) 4 MG disintegrating tablet Take 1 tablet by mouth every 8 hours as needed for Nausea 3/11/17   Meagan Roque MD   Placebo CAPS Take 1 capsule by mouth daily 6/7/16   Jennifer Fairchild MD   Umeclidinium-Vilanterol 62.5-25 MCG/INH AEPB Inhale 1 puff into the lungs daily 1/28/16   Jennifer Fairchild MD   aspirin EC 81 MG EC tablet Take 1 tablet by mouth daily with food.  9/9/15   Jennifer Fairchild MD   OXYGEN Inhale 2 L/min into the lungs continuous 8/19/15   Jennifer Fairchild MD       Future Appointments  Date Time Provider Melo Clemens   12/18/2017 9:00 AM Guilherme Cooper MD [facility]     General Assessment    Do you have any symptoms that are causing concern?:  Yes  Progression since Onset:  Unchanged  Reported Symptoms:  Abdominal Pain, Pain      and

## 2017-12-12 ENCOUNTER — TELEPHONE (OUTPATIENT)
Dept: INTERNAL MEDICINE | Age: 82
End: 2017-12-12

## 2017-12-12 DIAGNOSIS — L30.9 ECZEMA, UNSPECIFIED TYPE: ICD-10-CM

## 2017-12-12 NOTE — TELEPHONE ENCOUNTER
Pt is out of refills and would like a new rx for triamcinolone (KENALOG) 0.1 % ointment. Please send in a new rx or call pt with questions. Pharmacy info above.

## 2017-12-14 ENCOUNTER — TELEPHONE (OUTPATIENT)
Dept: INTERNAL MEDICINE | Age: 82
End: 2017-12-14

## 2017-12-14 NOTE — TELEPHONE ENCOUNTER
[facility] calling about a form for pt's oxygen that she states has been faxed over multiple times.  Asking for a call to discuss

## 2017-12-18 ENCOUNTER — OFFICE VISIT (OUTPATIENT)
Dept: INTERNAL MEDICINE | Age: 82
End: 2017-12-18

## 2017-12-18 ENCOUNTER — CARE COORDINATOR VISIT (OUTPATIENT)
Dept: CARE COORDINATION | Age: 82
End: 2017-12-18

## 2017-12-18 VITALS
DIASTOLIC BLOOD PRESSURE: 62 MMHG | HEIGHT: 63 IN | WEIGHT: 147 LBS | SYSTOLIC BLOOD PRESSURE: 122 MMHG | BODY MASS INDEX: 26.05 KG/M2

## 2017-12-18 DIAGNOSIS — F41.1 GENERALIZED ANXIETY DISORDER: ICD-10-CM

## 2017-12-18 DIAGNOSIS — G25.81 RESTLESS LEG SYNDROME: ICD-10-CM

## 2017-12-18 DIAGNOSIS — I10 ESSENTIAL HYPERTENSION: Primary | Chronic | ICD-10-CM

## 2017-12-18 PROCEDURE — G8417 CALC BMI ABV UP PARAM F/U: HCPCS | Performed by: HOSPITALIST

## 2017-12-18 PROCEDURE — 4040F PNEUMOC VAC/ADMIN/RCVD: CPT | Performed by: HOSPITALIST

## 2017-12-18 PROCEDURE — 99214 OFFICE O/P EST MOD 30 MIN: CPT | Performed by: HOSPITALIST

## 2017-12-18 PROCEDURE — 4004F PT TOBACCO SCREEN RCVD TLK: CPT | Performed by: HOSPITALIST

## 2017-12-18 PROCEDURE — G8400 PT W/DXA NO RESULTS DOC: HCPCS | Performed by: HOSPITALIST

## 2017-12-18 PROCEDURE — G8484 FLU IMMUNIZE NO ADMIN: HCPCS | Performed by: HOSPITALIST

## 2017-12-18 PROCEDURE — G8427 DOCREV CUR MEDS BY ELIG CLIN: HCPCS | Performed by: HOSPITALIST

## 2017-12-18 PROCEDURE — G8598 ASA/ANTIPLAT THER USED: HCPCS | Performed by: HOSPITALIST

## 2017-12-18 PROCEDURE — 1090F PRES/ABSN URINE INCON ASSESS: CPT | Performed by: HOSPITALIST

## 2017-12-18 PROCEDURE — 1123F ACP DISCUSS/DSCN MKR DOCD: CPT | Performed by: HOSPITALIST

## 2017-12-18 RX ORDER — LORAZEPAM 0.5 MG/1
0.5 TABLET ORAL DAILY PRN
Qty: 60 TABLET | Refills: 0 | Status: SHIPPED | OUTPATIENT
Start: 2017-12-18 | End: 2018-02-06 | Stop reason: SDUPTHER

## 2017-12-18 RX ORDER — ROPINIROLE 0.25 MG/1
0.25 TABLET, FILM COATED ORAL NIGHTLY
Qty: 30 TABLET | Refills: 3 | Status: SHIPPED | OUTPATIENT
Start: 2017-12-18 | End: 2018-04-05 | Stop reason: SDUPTHER

## 2017-12-18 ASSESSMENT — PATIENT HEALTH QUESTIONNAIRE - PHQ9
1. LITTLE INTEREST OR PLEASURE IN DOING THINGS: 0
SUM OF ALL RESPONSES TO PHQ9 QUESTIONS 1 & 2: 0
2. FEELING DOWN, DEPRESSED OR HOPELESS: 0
SUM OF ALL RESPONSES TO PHQ QUESTIONS 1-9: 0

## 2017-12-18 NOTE — CARE COORDINATION
Ambulatory Care Coordination Note  12/18/2017  CM Risk Score: 6  Jose Mortality Risk Score: 31.52    ACC: Abdirahman Lacey RN    Summary Note: RNCC met with patient and daughter during office visit today. Ms. Nichole Tabares continues to c/o L hip pain that radiates down her leg as previously described. Upon further assessment, this pain is also causing her to limp. Assessed by Dr. Nidhi Alarcon and is thought to be arthritis. No further questions/concerns at this time. Visit kept short as Ms. De La Vega's daughter expressed a sense of urgency, and they were already running behind this morning. RNCC reminded both the patient and the patient's daughter of when to contact the [address]. PLAN: Take both Ativan and Requip as prescribed. Continue with current medication and symptom management. RNCC will continue to follow and provide support as needed. Care Coordination Interventions    Program Enrollment:  Rising Risk  Referral from Primary Care Provider:  Yes  Suggested Interventions and Community Resources  Adult Day Program:  In Process  Fall Risk Prevention: In Process  Home Health Services:  Completed  Medi Set or Pill Pack: In Process  Other Services: In Process  Zone Management Tools:  Not Started  Other Services or Interventions:  COA         Goals Addressed             Most Recent     Care Coordination Self Management   On track (12/18/2017)             CC Self Management Goal: Remain safely in her home  Patient Goal (What steps will patient take to achieve goal?): Pt has home health. The pt live downstairs from her daughter.   Patient is able to discuss self-management of condition(s):  Pt demonstrates adherence to medications  Pt demonstrates understanding of self-monitoring  Patient is able to identify Red Flags:  Alert to potential adverse drug reactions(s) or side effects and actions to take should they arise  Discuss target symptoms and actions to take should they arise  Identify problems that require immediate PCP or specialist visit  Patient demonstrates understanding of access to PCP/Specialist:  Understands about scheduling routine Follow Up appointments   Understands about sick day appointment options for worsening of symptoms/progression (Same Day, E Visits)       Wellness Goal   On track (12/18/2017)             Patient Self-Management Goal for Health Maintenance  Goal: Pt will go to Adult Day Care a few days a week  Barriers: impairment:  cognitive and financial  Plan for overcoming my barriers: Daughter assisting the pt in utilizing community resources. Confidence: 6/10  Anticipated Goal Completion Date: 08/30/17            Prior to Admission medications    Medication Sig Start Date End Date Taking? Authorizing Provider   rOPINIRole (REQUIP) 0.25 MG tablet Take 1 tablet by mouth nightly 12/18/17   Elham Najera MD   LORazepam (ATIVAN) 0.5 MG tablet Take 1 tablet by mouth daily as needed for Anxiety .  12/18/17   Elham Najera MD   triamcinolone (KENALOG) 0.1 % ointment Apply topically 2 times daily for 7 days 12/13/17 12/20/17  Elham Najera MD   potassium chloride (KLOR-CON M) 20 MEQ extended release tablet Take 1 tablet by mouth daily 11/6/17   Elham Najera MD   PARoxetine (PAXIL) 20 MG tablet TAKE ONE TABLET BY MOUTH DAILY 10/30/17   Elham Najera MD   simvastatin (ZOCOR) 40 MG tablet Take 1 tablet by mouth nightly 10/30/17   Elham Najera MD   donepezil (ARICEPT) 10 MG tablet Take 1 tablet by mouth nightly 10/30/17   Elham Najera MD   triamterene-hydrochlorothiazide [facility]) 37.5-25 MG per tablet Take 1 tablet by mouth daily 10/29/17   Elham Najera MD   betamethasone dipropionate (DIPROLENE) 0.05 % ointment apply to the affected area twice a day until healed 10/29/17   Elham Najera MD   amLODIPine (NORVASC) 2.5 MG tablet Take 1 tablet by mouth nightly 10/23/17   Elham Najera MD   QUEtiapine (SEROQUEL) 50 MG tablet Take 1 tablet by mouth nightly 10/2/17   Elham Najera MD   ondansetron (ZOFRAN ODT) 4 MG disintegrating tablet Take 1 tablet by mouth every 8 hours as needed for Nausea 3/11/17   Kim Cochran MD   Placebo CAPS Take 1 capsule by mouth daily 6/7/16   Shahnaz Tolbert MD   Umeclidinium-Vilanterol 62.5-25 MCG/INH AEPB Inhale 1 puff into the lungs daily 1/28/16   Shahnaz Tolbert MD   aspirin EC 81 MG EC tablet Take 1 tablet by mouth daily with food.  9/9/15   Shahnaz Tolbert MD   OXYGEN Inhale 2 L/min into the lungs continuous 8/19/15   Shahnaz Tolbert MD       Future Appointments  Date Time Provider Melo Clemens   1/26/2018 9:00 AM Aundrea Barnett MD [facility]     General Assessment        and

## 2018-01-03 ENCOUNTER — TELEPHONE (OUTPATIENT)
Dept: INTERNAL MEDICINE | Age: 83
End: 2018-01-03

## 2018-01-03 NOTE — TELEPHONE ENCOUNTER
Kalyn Born [facility] [phone] ext [phone] [facility] calling about a form for pt's oxygen that she states has been faxed over multiple times.    Yearly script and request and fax was received at [phone]   MA was notified

## 2018-01-17 ENCOUNTER — CARE COORDINATION (OUTPATIENT)
Dept: CARE COORDINATION | Age: 83
End: 2018-01-17

## 2018-01-17 NOTE — CARE COORDINATION
Services: In Process  Zone Management Tools:  Not Started  Other Services or Interventions:  COA         Goals Addressed             Most Recent     Care Coordination Self Management   On track (1/17/2018)             CC Self Management Goal: Remain safely in her home  Patient Goal (What steps will patient take to achieve goal?): Pt has home health. The pt live downstairs from her daughter. Patient is able to discuss self-management of condition(s):  Pt demonstrates adherence to medications  Pt demonstrates understanding of self-monitoring  Patient is able to identify Red Flags:  Alert to potential adverse drug reactions(s) or side effects and actions to take should they arise  Discuss target symptoms and actions to take should they arise  Identify problems that require immediate PCP or specialist visit  Patient demonstrates understanding of access to PCP/Specialist:  Understands about scheduling routine Follow Up appointments   Understands about sick day appointment options for worsening of symptoms/progression (Same Day, E Visits)       Wellness Goal   No change (1/17/2018)             Patient Self-Management Goal for Health Maintenance  Goal: Pt will go to Adult Day Care a few days a week  Barriers: impairment:  cognitive and financial  Plan for overcoming my barriers: Daughter assisting the pt in utilizing community resources. Confidence: 6/10  Anticipated Goal Completion Date: 08/30/17            Prior to Admission medications    Medication Sig Start Date End Date Taking? Authorizing Provider   rOPINIRole (REQUIP) 0.25 MG tablet Take 1 tablet by mouth nightly 12/18/17   Artemio Erickson MD   LORazepam (ATIVAN) 0.5 MG tablet Take 1 tablet by mouth daily as needed for Anxiety .  12/18/17   Artemio Erickson MD   potassium chloride (KLOR-CON M) 20 MEQ extended release tablet Take 1 tablet by mouth daily 11/6/17   Artemio Erickson MD   PARoxetine (PAXIL) 20 MG tablet TAKE ONE TABLET BY MOUTH DAILY 10/30/17   Bunny Kan Isidro Richardson MD   simvastatin (ZOCOR) 40 MG tablet Take 1 tablet by mouth nightly 10/30/17   Jose Ramon Pineda MD   donepezil (ARICEPT) 10 MG tablet Take 1 tablet by mouth nightly 10/30/17   Jose Ramon Pineda MD   triamterene-hydrochlorothiazide [facility]) 37.5-25 MG per tablet Take 1 tablet by mouth daily 10/29/17   Jose Ramon Pineda MD   betamethasone dipropionate (DIPROLENE) 0.05 % ointment apply to the affected area twice a day until healed 10/29/17   Jose Ramon Pineda MD   amLODIPine (NORVASC) 2.5 MG tablet Take 1 tablet by mouth nightly 10/23/17   Jose Ramon Pineda MD   QUEtiapine (SEROQUEL) 50 MG tablet Take 1 tablet by mouth nightly 10/2/17   Jose Ramon Pineda MD   ondansetron (ZOFRAN ODT) 4 MG disintegrating tablet Take 1 tablet by mouth every 8 hours as needed for Nausea 3/11/17   Yanick Castellano MD   Placebo CAPS Take 1 capsule by mouth daily 6/7/16   Ganesh Morrow MD   Umeclidinium-Vilanterol 62.5-25 MCG/INH AEPB Inhale 1 puff into the lungs daily 1/28/16   Ganesh Morrow MD   aspirin EC 81 MG EC tablet Take 1 tablet by mouth daily with food.  9/9/15   Ganesh Morrow MD   OXYGEN Inhale 2 L/min into the lungs continuous 8/19/15   Ganesh Morrow MD       Future Appointments  Date Time Provider Melo Jayleen   1/26/2018 9:00 AM Jose [facility]     COPD Assessment    Does the patient understand envrionmental exposure?:  Yes  Is the patient able to verbalize Rescue vs. Long Acting medications?:  No  Does the patient have a nebulizer?:  No  Does the patient use a space with inhaled medications?:  No     No patient-reported symptoms         Symptoms:          and

## 2018-02-06 ENCOUNTER — TELEPHONE (OUTPATIENT)
Dept: INTERNAL MEDICINE | Age: 83
End: 2018-02-06

## 2018-02-06 ENCOUNTER — OFFICE VISIT (OUTPATIENT)
Dept: INTERNAL MEDICINE | Age: 83
End: 2018-02-06

## 2018-02-06 VITALS — BODY MASS INDEX: 26.04 KG/M2 | WEIGHT: 147 LBS | DIASTOLIC BLOOD PRESSURE: 80 MMHG | SYSTOLIC BLOOD PRESSURE: 166 MMHG

## 2018-02-06 DIAGNOSIS — I10 ESSENTIAL HYPERTENSION: Chronic | ICD-10-CM

## 2018-02-06 DIAGNOSIS — K59.01 SLOW TRANSIT CONSTIPATION: ICD-10-CM

## 2018-02-06 DIAGNOSIS — N30.00 ACUTE CYSTITIS WITHOUT HEMATURIA: ICD-10-CM

## 2018-02-06 DIAGNOSIS — R10.30 LOWER ABDOMINAL PAIN: Primary | ICD-10-CM

## 2018-02-06 DIAGNOSIS — F41.1 GENERALIZED ANXIETY DISORDER: ICD-10-CM

## 2018-02-06 LAB
BILIRUBIN, POC: ABNORMAL
BLOOD URINE, POC: ABNORMAL
CLARITY, POC: ABNORMAL
COLOR, POC: YELLOW
GLUCOSE URINE, POC: ABNORMAL
KETONES, POC: ABNORMAL
LEUKOCYTE EST, POC: ABNORMAL
NITRITE, POC: ABNORMAL
PH, POC: 7
PROTEIN, POC: ABNORMAL
SPECIFIC GRAVITY, POC: 1.02
UROBILINOGEN, POC: 2

## 2018-02-06 PROCEDURE — G8400 PT W/DXA NO RESULTS DOC: HCPCS | Performed by: HOSPITALIST

## 2018-02-06 PROCEDURE — G8417 CALC BMI ABV UP PARAM F/U: HCPCS | Performed by: HOSPITALIST

## 2018-02-06 PROCEDURE — 81002 URINALYSIS NONAUTO W/O SCOPE: CPT | Performed by: HOSPITALIST

## 2018-02-06 PROCEDURE — 99214 OFFICE O/P EST MOD 30 MIN: CPT | Performed by: HOSPITALIST

## 2018-02-06 PROCEDURE — 4004F PT TOBACCO SCREEN RCVD TLK: CPT | Performed by: HOSPITALIST

## 2018-02-06 PROCEDURE — 3288F FALL RISK ASSESSMENT DOCD: CPT | Performed by: HOSPITALIST

## 2018-02-06 PROCEDURE — G8484 FLU IMMUNIZE NO ADMIN: HCPCS | Performed by: HOSPITALIST

## 2018-02-06 PROCEDURE — G8510 SCR DEP NEG, NO PLAN REQD: HCPCS | Performed by: HOSPITALIST

## 2018-02-06 PROCEDURE — G8427 DOCREV CUR MEDS BY ELIG CLIN: HCPCS | Performed by: HOSPITALIST

## 2018-02-06 PROCEDURE — 4040F PNEUMOC VAC/ADMIN/RCVD: CPT | Performed by: HOSPITALIST

## 2018-02-06 PROCEDURE — 1123F ACP DISCUSS/DSCN MKR DOCD: CPT | Performed by: HOSPITALIST

## 2018-02-06 PROCEDURE — 1090F PRES/ABSN URINE INCON ASSESS: CPT | Performed by: HOSPITALIST

## 2018-02-06 PROCEDURE — G8598 ASA/ANTIPLAT THER USED: HCPCS | Performed by: HOSPITALIST

## 2018-02-06 RX ORDER — NITROFURANTOIN MACROCRYSTALS 100 MG/1
100 CAPSULE ORAL 2 TIMES DAILY
Qty: 14 CAPSULE | Refills: 0 | Status: SHIPPED | OUTPATIENT
Start: 2018-02-06 | End: 2018-02-13

## 2018-02-06 RX ORDER — POLYETHYLENE GLYCOL 3350 17 G/17G
17 POWDER, FOR SOLUTION ORAL DAILY
Qty: 510 G | Refills: 0 | Status: SHIPPED | OUTPATIENT
Start: 2018-02-06 | End: 2018-03-08

## 2018-02-06 RX ORDER — LORAZEPAM 0.5 MG/1
0.5 TABLET ORAL 2 TIMES DAILY PRN
Qty: 60 TABLET | Refills: 0 | Status: SHIPPED | OUTPATIENT
Start: 2018-02-06 | End: 2018-03-07 | Stop reason: SDUPTHER

## 2018-02-06 ASSESSMENT — PATIENT HEALTH QUESTIONNAIRE - PHQ9
1. LITTLE INTEREST OR PLEASURE IN DOING THINGS: 1
SUM OF ALL RESPONSES TO PHQ QUESTIONS 1-9: 2
SUM OF ALL RESPONSES TO PHQ9 QUESTIONS 1 & 2: 2
2. FEELING DOWN, DEPRESSED OR HOPELESS: 1

## 2018-02-06 NOTE — PROGRESS NOTES
Prescriptions   Medication Sig Dispense Refill    polyethylene glycol (MIRALAX) powder Take 17 g by mouth daily 510 g 0    LORazepam (ATIVAN) 0.5 MG tablet Take 1 tablet by mouth 2 times daily as needed for Anxiety for up to 30 doses. 60 tablet 0    nitrofurantoin (MACRODANTIN) 100 MG capsule Take 1 capsule by mouth 2 times daily for 7 days 14 capsule 0    rOPINIRole (REQUIP) 0.25 MG tablet Take 1 tablet by mouth nightly 30 tablet 3    potassium chloride (KLOR-CON M) 20 MEQ extended release tablet Take 1 tablet by mouth daily 30 tablet 3    PARoxetine (PAXIL) 20 MG tablet TAKE ONE TABLET BY MOUTH DAILY 90 tablet 3    simvastatin (ZOCOR) 40 MG tablet Take 1 tablet by mouth nightly 90 tablet 3    donepezil (ARICEPT) 10 MG tablet Take 1 tablet by mouth nightly 90 tablet 3    triamterene-hydrochlorothiazide (MAXZIDE-25) 37.5-25 MG per tablet Take 1 tablet by mouth daily 90 tablet 1    betamethasone dipropionate (DIPROLENE) 0.05 % ointment apply to the affected area twice a day until healed 45 g 3    amLODIPine (NORVASC) 2.5 MG tablet Take 1 tablet by mouth nightly 30 tablet 3    QUEtiapine (SEROQUEL) 50 MG tablet Take 1 tablet by mouth nightly 30 tablet 12    ondansetron (ZOFRAN ODT) 4 MG disintegrating tablet Take 1 tablet by mouth every 8 hours as needed for Nausea 20 tablet 0    Placebo CAPS Take 1 capsule by mouth daily 30 capsule 12    Umeclidinium-Vilanterol 62.5-25 MCG/INH AEPB Inhale 1 puff into the lungs daily 1 each 12    aspirin EC 81 MG EC tablet Take 1 tablet by mouth daily with food.  OXYGEN Inhale 2 L/min into the lungs continuous       No current facility-administered medications for this visit. BP (!) 166/80   Wt 147 lb (66.7 kg)   BMI 26.04 kg/m²     Physical Exam  Constitutional: She is oriented to person, place, and time. She appears well-developed and well-nourished.    Eyes: Conjunctivae and EOM are normal.   Oropharynx: Clear, no lesions  Cardiovascular: Normal rate,

## 2018-02-09 LAB
ORGANISM: ABNORMAL
URINE CULTURE, ROUTINE: ABNORMAL
URINE CULTURE, ROUTINE: ABNORMAL

## 2018-02-13 ENCOUNTER — CARE COORDINATION (OUTPATIENT)
Dept: CARE COORDINATION | Age: 83
End: 2018-02-13

## 2018-02-13 NOTE — CARE COORDINATION
Ambulatory Care Coordination Note  2/13/2018  CM Risk Score: 6  Jose Mortality Risk Score: 31.52    ACC: Torie Mcclure RN    Summary Note: [address] placed phone call to patient to follow-up on her symptoms. Ms Ramos Abdul reports that \"everything is fine. \" She was recently in to see Dr. Heaven Landa with c/o UTI - she was prescribed Abx and has not issues with compliance. She reports that her UTI symptoms have resolved. COPD - Ms. De La Vega reports that her COPD is at baseline. She denies any inc in SOB or sputum, and continues to wear her oxygen. Continues to use inhaler without difficulty. GI - Ms. De La Vega reports that she has been experiencing diarrhea since starting the Miralax daily, so she stopped taking it. She denies any abdominal pain, fever, chills, nausea. RNCC discussed that it was acceptable for her to not take the Miralax, and use as needed. RNCC attempted to contact [facility] for her input on how the patient is doing, but unable to reach/mailbox is full. PLAN: Patient was instructed to continue with current medication and symptom management. RNCC reminded patient when to contact clinic/RNCC in terms of symptoms and concerns. RNCC will continue to follow and provide support as needed. Care Coordination Interventions    Program Enrollment:  Rising Risk  Referral from Primary Care Provider:  Yes  Suggested Interventions and Community Resources  Adult Day Program:  In Process  Fall Risk Prevention: In Process  Home Health Services:  Completed  Medi Set or Pill Pack: In Process  Other Services: In Process  Zone Management Tools:  Not Started  Other Services or Interventions:  COA         Goals Addressed             Most Recent     COMPLETED: Care Coordination Self Management   On track (1/17/2018)             CC Self Management Goal: Remain safely in her home  Patient Goal (What steps will patient take to achieve goal?): Pt has home health. The pt live downstairs from her daughter.   Patient is able to discuss self-management of condition(s):  Pt demonstrates adherence to medications  Pt demonstrates understanding of self-monitoring  Patient is able to identify Red Flags:  Alert to potential adverse drug reactions(s) or side effects and actions to take should they arise  Discuss target symptoms and actions to take should they arise  Identify problems that require immediate PCP or specialist visit  Patient demonstrates understanding of access to PCP/Specialist:  Understands about scheduling routine Follow Up appointments   Understands about sick day appointment options for worsening of symptoms/progression (Same Day, E Visits)       Wellness Goal   No change (2/13/2018)             Patient Self-Management Goal for Health Maintenance  Goal: Pt will go to Adult Day Care a few days a week  Barriers: impairment:  cognitive and financial  Plan for overcoming my barriers: Daughter assisting the pt in utilizing community resources. Confidence: 6/10  Anticipated Goal Completion Date: 08/30/17            Prior to Admission medications    Medication Sig Start Date End Date Taking? Authorizing Provider   polyethylene glycol (MIRALAX) powder Take 17 g by mouth daily 2/6/18 3/8/18  Jose Ramon Pineda MD   LORazepam (ATIVAN) 0.5 MG tablet Take 1 tablet by mouth 2 times daily as needed for Anxiety for up to 30 doses.  2/6/18 3/8/18  Jose Ramon Pineda MD   nitrofurantoin (MACRODANTIN) 100 MG capsule Take 1 capsule by mouth 2 times daily for 7 days 2/6/18 2/13/18  Jose Ramon Pineda MD   rOPINIRole (REQUIP) 0.25 MG tablet Take 1 tablet by mouth nightly 12/18/17   Jose Ramon Pineda MD   potassium chloride (KLOR-CON M) 20 MEQ extended release tablet Take 1 tablet by mouth daily 11/6/17   Jose Ramon Pineda MD   PARoxetine (PAXIL) 20 MG tablet TAKE ONE TABLET BY MOUTH DAILY 10/30/17   Jose Ramon Pineda MD   simvastatin (ZOCOR) 40 MG tablet Take 1 tablet by mouth nightly 10/30/17   Jose Ramon Pineda MD   donepezil (ARICEPT) 10 MG tablet Take 1 tablet by mouth

## 2018-03-07 DIAGNOSIS — F41.1 GENERALIZED ANXIETY DISORDER: ICD-10-CM

## 2018-03-07 DIAGNOSIS — I10 ESSENTIAL HYPERTENSION: Chronic | ICD-10-CM

## 2018-03-08 ENCOUNTER — TELEPHONE (OUTPATIENT)
Dept: INTERNAL MEDICINE | Age: 83
End: 2018-03-08

## 2018-03-08 ENCOUNTER — CARE COORDINATION (OUTPATIENT)
Dept: CARE COORDINATION | Age: 83
End: 2018-03-08

## 2018-03-08 DIAGNOSIS — F32.A DEPRESSION, UNSPECIFIED DEPRESSION TYPE: ICD-10-CM

## 2018-03-08 DIAGNOSIS — I10 ESSENTIAL HYPERTENSION: Chronic | ICD-10-CM

## 2018-03-08 RX ORDER — TRAZODONE HYDROCHLORIDE 50 MG/1
50 TABLET ORAL NIGHTLY
Qty: 30 TABLET | Refills: 12 | Status: SHIPPED | OUTPATIENT
Start: 2018-03-08 | End: 2019-03-20 | Stop reason: SDUPTHER

## 2018-03-08 RX ORDER — AMLODIPINE BESYLATE 2.5 MG/1
TABLET ORAL
Qty: 30 TABLET | Refills: 2 | Status: SHIPPED | OUTPATIENT
Start: 2018-03-08 | End: 2018-05-17 | Stop reason: SDUPTHER

## 2018-03-08 RX ORDER — LORAZEPAM 0.5 MG/1
TABLET ORAL
Qty: 60 TABLET | Refills: 1 | Status: SHIPPED | OUTPATIENT
Start: 2018-03-08 | End: 2018-04-07

## 2018-03-08 NOTE — CARE COORDINATION
Tegan left a  for this writer requesting a return phone call r/t her mother needing refills. RNCC attempted to return phone call to [facility], Detailed message left that all refill requests need to be directed to the office staff. Phone number provided.

## 2018-03-11 RX ORDER — AMLODIPINE BESYLATE 2.5 MG/1
TABLET ORAL
Qty: 30 TABLET | Refills: 2 | Status: SHIPPED | OUTPATIENT
Start: 2018-03-11 | End: 2018-05-17 | Stop reason: SDUPTHER

## 2018-03-16 ENCOUNTER — CARE COORDINATION (OUTPATIENT)
Dept: INTERNAL MEDICINE | Age: 83
End: 2018-03-16

## 2018-04-05 DIAGNOSIS — E87.6 HYPOKALEMIA: ICD-10-CM

## 2018-04-05 DIAGNOSIS — G25.81 RESTLESS LEG SYNDROME: ICD-10-CM

## 2018-04-06 RX ORDER — ROPINIROLE 0.25 MG/1
TABLET, FILM COATED ORAL
Qty: 30 TABLET | Refills: 2 | Status: SHIPPED | OUTPATIENT
Start: 2018-04-06 | End: 2018-07-11 | Stop reason: SDUPTHER

## 2018-04-06 RX ORDER — POTASSIUM CHLORIDE 1500 MG/1
TABLET, FILM COATED, EXTENDED RELEASE ORAL
Qty: 30 TABLET | Refills: 2 | Status: SHIPPED | OUTPATIENT
Start: 2018-04-06 | End: 2018-07-11 | Stop reason: SDUPTHER

## 2018-04-13 ENCOUNTER — CARE COORDINATION (OUTPATIENT)
Dept: CARE COORDINATION | Age: 83
End: 2018-04-13

## 2018-04-27 ENCOUNTER — TELEPHONE (OUTPATIENT)
Dept: INTERNAL MEDICINE | Age: 83
End: 2018-04-27

## 2018-04-27 DIAGNOSIS — F41.1 GENERALIZED ANXIETY DISORDER: ICD-10-CM

## 2018-04-27 RX ORDER — LORAZEPAM 0.5 MG/1
0.5 TABLET ORAL 2 TIMES DAILY PRN
Qty: 180 TABLET | Refills: 0 | Status: SHIPPED | OUTPATIENT
Start: 2018-04-27 | End: 2018-05-17 | Stop reason: SDUPTHER

## 2018-05-08 ENCOUNTER — TELEPHONE (OUTPATIENT)
Dept: INTERNAL MEDICINE | Age: 83
End: 2018-05-08

## 2018-05-09 ENCOUNTER — CARE COORDINATION (OUTPATIENT)
Dept: CARE COORDINATION | Age: 83
End: 2018-05-09

## 2018-05-15 DIAGNOSIS — R41.3 MEMORY LOSS: Chronic | ICD-10-CM

## 2018-05-16 RX ORDER — DONEPEZIL HYDROCHLORIDE 10 MG/1
TABLET, FILM COATED ORAL
Qty: 90 TABLET | Refills: 2 | Status: SHIPPED | OUTPATIENT
Start: 2018-05-16 | End: 2018-05-17 | Stop reason: SDUPTHER

## 2018-05-17 ENCOUNTER — OFFICE VISIT (OUTPATIENT)
Dept: INTERNAL MEDICINE | Age: 83
End: 2018-05-17

## 2018-05-17 VITALS — WEIGHT: 149 LBS | DIASTOLIC BLOOD PRESSURE: 84 MMHG | BODY MASS INDEX: 26.39 KG/M2 | SYSTOLIC BLOOD PRESSURE: 140 MMHG

## 2018-05-17 DIAGNOSIS — I10 ESSENTIAL HYPERTENSION: Primary | Chronic | ICD-10-CM

## 2018-05-17 DIAGNOSIS — F41.1 GENERALIZED ANXIETY DISORDER: ICD-10-CM

## 2018-05-17 DIAGNOSIS — R10.30 LOWER ABDOMINAL PAIN: ICD-10-CM

## 2018-05-17 DIAGNOSIS — F51.01 PRIMARY INSOMNIA: Chronic | ICD-10-CM

## 2018-05-17 DIAGNOSIS — F17.219 CIGARETTE NICOTINE DEPENDENCE WITH NICOTINE-INDUCED DISORDER: Chronic | ICD-10-CM

## 2018-05-17 DIAGNOSIS — I10 ESSENTIAL HYPERTENSION: Chronic | ICD-10-CM

## 2018-05-17 LAB
A/G RATIO: 1.2 (ref 1.1–2.2)
ALBUMIN SERPL-MCNC: 4.2 G/DL (ref 3.4–5)
ALP BLD-CCNC: 61 U/L (ref 40–129)
ALT SERPL-CCNC: 12 U/L (ref 10–40)
ANION GAP SERPL CALCULATED.3IONS-SCNC: 14 MMOL/L (ref 3–16)
AST SERPL-CCNC: 15 U/L (ref 15–37)
BASOPHILS ABSOLUTE: 0 K/UL (ref 0–0.2)
BASOPHILS RELATIVE PERCENT: 0.3 %
BILIRUB SERPL-MCNC: 0.5 MG/DL (ref 0–1)
BUN BLDV-MCNC: 17 MG/DL (ref 7–20)
CALCIUM SERPL-MCNC: 9.3 MG/DL (ref 8.3–10.6)
CHLORIDE BLD-SCNC: 101 MMOL/L (ref 99–110)
CO2: 28 MMOL/L (ref 21–32)
CREAT SERPL-MCNC: 1 MG/DL (ref 0.6–1.2)
EOSINOPHILS ABSOLUTE: 0.1 K/UL (ref 0–0.6)
EOSINOPHILS RELATIVE PERCENT: 2 %
GFR AFRICAN AMERICAN: >60
GFR NON-AFRICAN AMERICAN: 53
GLOBULIN: 3.6 G/DL
GLUCOSE BLD-MCNC: 110 MG/DL (ref 70–99)
HCT VFR BLD CALC: 45.8 % (ref 36–48)
HEMOGLOBIN: 15.2 G/DL (ref 12–16)
LYMPHOCYTES ABSOLUTE: 1.9 K/UL (ref 1–5.1)
LYMPHOCYTES RELATIVE PERCENT: 27.3 %
MCH RBC QN AUTO: 28.5 PG (ref 26–34)
MCHC RBC AUTO-ENTMCNC: 33.2 G/DL (ref 31–36)
MCV RBC AUTO: 85.9 FL (ref 80–100)
MONOCYTES ABSOLUTE: 0.7 K/UL (ref 0–1.3)
MONOCYTES RELATIVE PERCENT: 10.5 %
NEUTROPHILS ABSOLUTE: 4.2 K/UL (ref 1.7–7.7)
NEUTROPHILS RELATIVE PERCENT: 59.9 %
PDW BLD-RTO: 16.4 % (ref 12.4–15.4)
PLATELET # BLD: 207 K/UL (ref 135–450)
PMV BLD AUTO: 9.4 FL (ref 5–10.5)
POTASSIUM SERPL-SCNC: 4.3 MMOL/L (ref 3.5–5.1)
RBC # BLD: 5.33 M/UL (ref 4–5.2)
SODIUM BLD-SCNC: 143 MMOL/L (ref 136–145)
TOTAL PROTEIN: 7.8 G/DL (ref 6.4–8.2)
TSH SERPL DL<=0.05 MIU/L-ACNC: 2.98 UIU/ML (ref 0.27–4.2)
WBC # BLD: 7 K/UL (ref 4–11)

## 2018-05-17 PROCEDURE — G8417 CALC BMI ABV UP PARAM F/U: HCPCS | Performed by: HOSPITALIST

## 2018-05-17 PROCEDURE — 4040F PNEUMOC VAC/ADMIN/RCVD: CPT | Performed by: HOSPITALIST

## 2018-05-17 PROCEDURE — G8427 DOCREV CUR MEDS BY ELIG CLIN: HCPCS | Performed by: HOSPITALIST

## 2018-05-17 PROCEDURE — 1090F PRES/ABSN URINE INCON ASSESS: CPT | Performed by: HOSPITALIST

## 2018-05-17 PROCEDURE — G8400 PT W/DXA NO RESULTS DOC: HCPCS | Performed by: HOSPITALIST

## 2018-05-17 PROCEDURE — 99214 OFFICE O/P EST MOD 30 MIN: CPT | Performed by: HOSPITALIST

## 2018-05-17 PROCEDURE — G8598 ASA/ANTIPLAT THER USED: HCPCS | Performed by: HOSPITALIST

## 2018-05-17 PROCEDURE — 1123F ACP DISCUSS/DSCN MKR DOCD: CPT | Performed by: HOSPITALIST

## 2018-05-17 PROCEDURE — 4004F PT TOBACCO SCREEN RCVD TLK: CPT | Performed by: HOSPITALIST

## 2018-05-17 RX ORDER — QUETIAPINE FUMARATE 50 MG/1
50 TABLET, FILM COATED ORAL NIGHTLY
Qty: 30 TABLET | Refills: 12 | Status: SHIPPED | OUTPATIENT
Start: 2018-05-17 | End: 2018-10-22 | Stop reason: SDUPTHER

## 2018-05-17 RX ORDER — AMLODIPINE BESYLATE 2.5 MG/1
2.5 TABLET ORAL 2 TIMES DAILY
Qty: 30 TABLET | Refills: 2 | Status: SHIPPED | OUTPATIENT
Start: 2018-05-17 | End: 2018-05-21 | Stop reason: SDUPTHER

## 2018-05-17 RX ORDER — LORAZEPAM 0.5 MG/1
0.5 TABLET ORAL 2 TIMES DAILY PRN
Qty: 180 TABLET | Refills: 0 | Status: SHIPPED | OUTPATIENT
Start: 2018-05-17 | End: 2018-06-16

## 2018-05-21 DIAGNOSIS — I10 ESSENTIAL HYPERTENSION: Chronic | ICD-10-CM

## 2018-05-21 RX ORDER — AMLODIPINE BESYLATE 2.5 MG/1
2.5 TABLET ORAL 2 TIMES DAILY
Qty: 60 TABLET | Refills: 3 | Status: SHIPPED | OUTPATIENT
Start: 2018-05-21 | End: 2018-11-26 | Stop reason: SDUPTHER

## 2018-05-31 ENCOUNTER — TELEPHONE (OUTPATIENT)
Dept: INTERNAL MEDICINE | Age: 83
End: 2018-05-31

## 2018-05-31 DIAGNOSIS — R10.30 LOWER ABDOMINAL PAIN: Primary | ICD-10-CM

## 2018-06-02 ENCOUNTER — HOSPITAL ENCOUNTER (OUTPATIENT)
Dept: CT IMAGING | Age: 83
Discharge: HOME | End: 2018-06-02
Attending: FAMILY MEDICINE | Admitting: FAMILY MEDICINE

## 2018-06-02 DIAGNOSIS — R10.30 LOWER ABDOMINAL PAIN: ICD-10-CM

## 2018-06-06 ENCOUNTER — TELEPHONE (OUTPATIENT)
Dept: INTERNAL MEDICINE | Age: 83
End: 2018-06-06

## 2018-06-08 ENCOUNTER — TELEPHONE (OUTPATIENT)
Dept: INTERNAL MEDICINE | Age: 83
End: 2018-06-08

## 2018-06-11 DIAGNOSIS — I10 ESSENTIAL HYPERTENSION: Chronic | ICD-10-CM

## 2018-06-12 ENCOUNTER — CARE COORDINATION (OUTPATIENT)
Dept: CARE COORDINATION | Age: 83
End: 2018-06-12

## 2018-06-12 RX ORDER — TRIAMTERENE AND HYDROCHLOROTHIAZIDE 37.5; 25 MG/1; MG/1
TABLET ORAL
Qty: 90 TABLET | Refills: 0 | Status: SHIPPED | OUTPATIENT
Start: 2018-06-12 | End: 2018-08-19 | Stop reason: SDUPTHER

## 2018-06-13 ENCOUNTER — TELEPHONE (OUTPATIENT)
Dept: SURGERY | Age: 83
End: 2018-06-13

## 2018-06-14 ENCOUNTER — OFFICE VISIT (OUTPATIENT)
Dept: SURGERY | Age: 83
End: 2018-06-14

## 2018-06-14 VITALS
RESPIRATION RATE: 20 BRPM | HEIGHT: 63 IN | BODY MASS INDEX: 26.75 KG/M2 | SYSTOLIC BLOOD PRESSURE: 133 MMHG | WEIGHT: 151 LBS | DIASTOLIC BLOOD PRESSURE: 75 MMHG

## 2018-06-14 DIAGNOSIS — K57.32 DIVERTICULITIS OF LARGE INTESTINE WITHOUT PERFORATION OR ABSCESS WITHOUT BLEEDING: Primary | ICD-10-CM

## 2018-06-14 DIAGNOSIS — J44.9 CHRONIC OBSTRUCTIVE PULMONARY DISEASE, UNSPECIFIED COPD TYPE (HCC): ICD-10-CM

## 2018-06-14 DIAGNOSIS — N32.1 CVF (COLOVESICAL FISTULA): ICD-10-CM

## 2018-06-14 DIAGNOSIS — N82.4 COLOVAGINAL FISTULA: ICD-10-CM

## 2018-06-14 PROCEDURE — G8417 CALC BMI ABV UP PARAM F/U: HCPCS | Performed by: SURGERY

## 2018-06-14 PROCEDURE — G8926 SPIRO NO PERF OR DOC: HCPCS | Performed by: SURGERY

## 2018-06-14 PROCEDURE — 3023F SPIROM DOC REV: CPT | Performed by: SURGERY

## 2018-06-14 PROCEDURE — G8427 DOCREV CUR MEDS BY ELIG CLIN: HCPCS | Performed by: SURGERY

## 2018-06-14 PROCEDURE — 4004F PT TOBACCO SCREEN RCVD TLK: CPT | Performed by: SURGERY

## 2018-06-14 PROCEDURE — G8598 ASA/ANTIPLAT THER USED: HCPCS | Performed by: SURGERY

## 2018-06-14 PROCEDURE — 99204 OFFICE O/P NEW MOD 45 MIN: CPT | Performed by: SURGERY

## 2018-06-14 PROCEDURE — 4040F PNEUMOC VAC/ADMIN/RCVD: CPT | Performed by: SURGERY

## 2018-06-14 PROCEDURE — 1123F ACP DISCUSS/DSCN MKR DOCD: CPT | Performed by: SURGERY

## 2018-06-14 PROCEDURE — G8400 PT W/DXA NO RESULTS DOC: HCPCS | Performed by: SURGERY

## 2018-06-14 PROCEDURE — 1090F PRES/ABSN URINE INCON ASSESS: CPT | Performed by: SURGERY

## 2018-06-14 RX ORDER — CIPROFLOXACIN 500 MG/1
500 TABLET, FILM COATED ORAL 2 TIMES DAILY
Qty: 14 TABLET | Refills: 0 | Status: SHIPPED | OUTPATIENT
Start: 2018-06-14 | End: 2018-06-21

## 2018-06-14 RX ORDER — METRONIDAZOLE 500 MG/1
500 TABLET ORAL 3 TIMES DAILY
Qty: 21 TABLET | Refills: 0 | Status: SHIPPED | OUTPATIENT
Start: 2018-06-14 | End: 2018-06-21

## 2018-06-20 ENCOUNTER — CARE COORDINATION (OUTPATIENT)
Dept: CARE COORDINATION | Age: 83
End: 2018-06-20

## 2018-06-25 ENCOUNTER — TELEPHONE (OUTPATIENT)
Dept: INTERNAL MEDICINE | Age: 83
End: 2018-06-25

## 2018-06-25 DIAGNOSIS — Z12.11 SCREENING FOR COLON CANCER: Primary | ICD-10-CM

## 2018-07-03 ENCOUNTER — CARE COORDINATION (OUTPATIENT)
Dept: CARE COORDINATION | Age: 83
End: 2018-07-03

## 2018-07-03 NOTE — CARE COORDINATION
visits, as directed by my provider. I will notify my provider of any barriers to my plan of care. I will notify my provider of any symptoms that indicate a worsening of my condition. Barriers: impairment:  cognitive and overwhelmed by complexity of regimen  Plan for overcoming my barriers: engagement with [facility] and Care Coordination team  Confidence: 8/10  Anticipated Goal Completion Date: 9/20/18         Wellness Goal   No change (7/3/2018)             Patient Self-Management Goal for Health Maintenance  Goal: Pt will go to Adult Day Care a few days a week  Barriers: impairment:  cognitive and financial  Plan for overcoming my barriers: Daughter assisting the pt in utilizing community resources. Confidence: 6/10  Anticipated Goal Completion Date: 08/30/17            Prior to Admission medications    Medication Sig Start Date End Date Taking?  Authorizing Provider   triamterene-hydrochlorothiazide (MAXZIDE-25) 37.5-25 MG per tablet TAKE ONE TABLET BY MOUTH DAILY 6/12/18   Jennifer Mcneal MD   amLODIPine (NORVASC) 2.5 MG tablet Take 1 tablet by mouth 2 times daily 5/21/18   Jennifer Mcneal MD   QUEtiapine (SEROQUEL) 50 MG tablet Take 1 tablet by mouth nightly 5/17/18   Jennifer Mcneal MD   rOPINIRole (REQUIP) 0.25 MG tablet TAKE ONE TABLET BY MOUTH ONCE NIGHTLY 4/6/18   Jennifer Mcneal MD   potassium chloride (KLOR-CON M) 20 MEQ TBCR extended release tablet TAKE ONE TABLET BY MOUTH DAILY 4/6/18   Jennifer Mcneal MD   traZODone (DESYREL) 50 MG tablet Take 1 tablet by mouth nightly 3/8/18   Jennifer Mcneal MD   PARoxetine (PAXIL) 20 MG tablet TAKE ONE TABLET BY MOUTH DAILY 10/30/17   Jennifer Mcneal MD   simvastatin (ZOCOR) 40 MG tablet Take 1 tablet by mouth nightly 10/30/17   Jennifer Mcneal MD   donepezil (ARICEPT) 10 MG tablet Take 1 tablet by mouth nightly 10/30/17   Jennifer Mcneal MD   betamethasone dipropionate (DIPROLENE) 0.05 % ointment apply to the affected area twice a day until healed 10/29/17   Jennifer Mcneal MD

## 2018-07-11 ENCOUNTER — TELEPHONE (OUTPATIENT)
Dept: INTERNAL MEDICINE | Age: 83
End: 2018-07-11

## 2018-07-11 DIAGNOSIS — E87.6 HYPOKALEMIA: ICD-10-CM

## 2018-07-11 DIAGNOSIS — G25.81 RESTLESS LEG SYNDROME: ICD-10-CM

## 2018-07-12 ENCOUNTER — TELEPHONE (OUTPATIENT)
Dept: INTERNAL MEDICINE | Age: 83
End: 2018-07-12

## 2018-07-12 RX ORDER — ROPINIROLE 0.25 MG/1
TABLET, FILM COATED ORAL
Qty: 30 TABLET | Refills: 1 | Status: SHIPPED | OUTPATIENT
Start: 2018-07-12 | End: 2018-09-09 | Stop reason: SDUPTHER

## 2018-07-12 RX ORDER — POTASSIUM CHLORIDE 1500 MG/1
TABLET, FILM COATED, EXTENDED RELEASE ORAL
Qty: 30 TABLET | Refills: 1 | Status: SHIPPED | OUTPATIENT
Start: 2018-07-12 | End: 2018-07-12 | Stop reason: RX

## 2018-07-12 RX ORDER — POTASSIUM CHLORIDE 750 MG/1
20 TABLET, EXTENDED RELEASE ORAL DAILY
Qty: 60 TABLET | Refills: 2 | Status: SHIPPED | OUTPATIENT
Start: 2018-07-12 | End: 2018-11-03 | Stop reason: SDUPTHER

## 2018-07-12 NOTE — TELEPHONE ENCOUNTER
[address] is calling to see if they change potassium chloride (KLOR-CON M) 20 MEQ TBCR extended release tablet  To 10 mg the 20 mg is on back order   Please call kyara on file

## 2018-07-31 ENCOUNTER — PAT TELEPHONE (OUTPATIENT)
Dept: PREADMISSION TESTING | Age: 83
End: 2018-07-31

## 2018-07-31 VITALS — WEIGHT: 151 LBS | HEIGHT: 63 IN | BODY MASS INDEX: 26.75 KG/M2

## 2018-07-31 RX ORDER — LORAZEPAM 0.5 MG/1
0.5 TABLET ORAL 2 TIMES DAILY
COMMUNITY
End: 2018-08-17 | Stop reason: SDUPTHER

## 2018-07-31 NOTE — PROGRESS NOTES
C-Difficile admission screening and protocol:     * Admitted with diarrhea? YES____    NO__X___     *Prior history of C-Diff. In last 3 months? YES____   NO_X____     *Antibiotic use in the past 6-8 weeks? NO______YES__X-stomach pain____                 If yes which  ANTIBIOTIC AND REASON______     *Prior hospitalization or nursing home in the last month?  YES____   NO__X__

## 2018-07-31 NOTE — PRE-PROCEDURE INSTRUCTIONS
[address] [facility] time____0700________        Surgery time__0830__________    Take the following medications with a sip of water:  Triamterene HCT, amlodipine    Do not eat or drink anything after 12:00 midnight prior to your surgery. This includes water chewing gum, mints and ice chips. You may brush your teeth and gargle the morning of your surgery, but do not swallow the water     Please see your family doctor/pediatrician for a history and physical and/or concerning medications. Bring any test results/reports from your physicians office. If you are under the care of a heart doctor or specialist doctor, please be aware that you may be asked to them for clearance    You may be asked to stop blood thinners such as Coumadin, Plavix, Fragmin, Lovenox, etc., or any anti-inflammatories such as:  Aspirin, Ibuprofen, Advil, Naproxen prior to your surgery. We also ask that you stop any OTC medications such as fish oil, vitamin E, glucosamine, garlic, Multivitamins, COQ 10, etc.    We ask that you do not smoke 24 hours prior to surgery  We ask that you do not  drink any alcoholic beverages 24 hours prior to surgery     You must make arrangements for a responsible adult to take you home after your surgery. For your safety you will not be allowed to leave alone or drive yourself home. Your surgery will be cancelled if you do not have a ride home. Also for your safety, it is strongly suggested that someone stay with you the first 24 hours after your surgery. A parent or legal guardian must accompany a child scheduled for surgery and plan to stay at the hospital until the child is discharged. Please do not bring other children with you. For your comfort, please wear simple loose fitting clothing to the hospital.  Please do not bring valuables.     Do not wear any make-up or nail polish on your fingers or toes      For your safety, please do not wear any

## 2018-08-01 ENCOUNTER — TELEPHONE (OUTPATIENT)
Dept: SURGERY | Age: 83
End: 2018-08-01

## 2018-08-01 ENCOUNTER — TELEPHONE (OUTPATIENT)
Dept: INTERNAL MEDICINE | Age: 83
End: 2018-08-01

## 2018-08-01 ENCOUNTER — HOSPITAL ENCOUNTER (OUTPATIENT)
Dept: ENDOSCOPY | Age: 83
Discharge: HOME | End: 2018-08-01
Attending: INTERNAL MEDICINE | Admitting: INTERNAL MEDICINE

## 2018-08-01 VITALS
TEMPERATURE: 98 F | WEIGHT: 149.69 LBS | OXYGEN SATURATION: 94 % | HEART RATE: 68 BPM | DIASTOLIC BLOOD PRESSURE: 72 MMHG | BODY MASS INDEX: 26.52 KG/M2 | SYSTOLIC BLOOD PRESSURE: 152 MMHG | RESPIRATION RATE: 14 BRPM

## 2018-08-01 RX ORDER — SODIUM CHLORIDE 0.9 % (FLUSH) 0.9 %
10 SYRINGE (ML) INJECTION PRN
Status: DISCONTINUED | OUTPATIENT
Start: 2018-08-01 | End: 2018-08-02 | Stop reason: HOSPADM

## 2018-08-01 RX ORDER — SODIUM CHLORIDE 9 MG/ML
INJECTION, SOLUTION INTRAVENOUS CONTINUOUS
Status: DISCONTINUED | OUTPATIENT
Start: 2018-08-01 | End: 2018-08-02 | Stop reason: HOSPADM

## 2018-08-01 RX ORDER — SODIUM CHLORIDE 0.9 % (FLUSH) 0.9 %
10 SYRINGE (ML) INJECTION EVERY 12 HOURS SCHEDULED
Status: DISCONTINUED | OUTPATIENT
Start: 2018-08-01 | End: 2018-08-02 | Stop reason: HOSPADM

## 2018-08-01 ASSESSMENT — PAIN - FUNCTIONAL ASSESSMENT: PAIN_FUNCTIONAL_ASSESSMENT: 0-10

## 2018-08-01 ASSESSMENT — PAIN SCALES - GENERAL
PAINLEVEL_OUTOF10: 0
PAINLEVEL_OUTOF10: 0

## 2018-08-01 NOTE — PROCEDURES
vagina. PLAN:  The patient will continue her antibiotics. This area will have to  be followed via serial CT scans. Marie Ferreira MD    D: 08/01/2018 9:08:30       T: 08/01/2018 9:10:35     JUAN [email or web address]  Job#: [id number]     Doc#: [id number]    CC:  Yesenia Hunter.  MD Cony Reese MD Justo Lick, MD

## 2018-08-01 NOTE — BRIEF OP NOTE
Brief Postoperative Note  Louis Stanley  1934  [id number]    Previous Colonoscopy: Yes  Date: 09/10/07  Greater than 3 years?  Yes    Pre-operative Diagnosis: Colovesical/colovaginal fistula    Post-operative Diagnosis: Same    Procedure: Colonoscopy    Anesthesia: MAC    Surgeons/Assistants: Apryl    Estimated Blood Loss: None    Complications: None    Specimens: Was Not Obtained    Findings: See dictated report    Electronically signed by Lisa Silva MD, on 8/1/2018, at 8:52 AM

## 2018-08-01 NOTE — ANESTHESIA POST-OP
[facility] Department of Anesthesiology  Post-Anesthesia Note       Name:  Evert Worthy                                  Age:  80 y.o. MRN:  [id number]     Last Vitals & Oxygen Saturation: BP (!) 152/72   Pulse 68   Temp 98 °F (36.7 °C) (Temporal)   Resp 14   Wt 149 lb 11.1 oz (67.9 kg)   SpO2 94%   BMI 26.52 kg/m²   Patient Vitals for the past 4 hrs:   BP Temp Temp src Pulse Resp SpO2   08/01/18 0959 (!) 152/72 - - 68 14 94 %   08/01/18 0925 (!) 167/77 98 °F (36.7 °C) Temporal 70 14 93 %   08/01/18 0915 - 98.8 °F (37.1 °C) Temporal - - -   08/01/18 0910 111/68 - - 69 17 91 %   08/01/18 0900 (!) 95/45 - - 65 22 96 %   08/01/18 0855 (!) 95/48 99.1 °F (37.3 °C) Temporal 91 16 98 %       Level of consciousness:  Awake, alert    Respiratory: Respirations easy, no distress. Stable. Cardiovascular: Hemodynamically stable. Hydration: Adequate. PONV: Adequately managed. Post-op pain: Adequately controlled. Post-op assessment: Tolerated anesthetic well without complication. Complications:  None.     Tracy Lr MD  August 1, 2018   12:42 PM

## 2018-08-01 NOTE — H&P
daily 60 tablet 3    QUEtiapine (SEROQUEL) 50 MG tablet Take 1 tablet by mouth nightly 30 tablet 12    traZODone (DESYREL) 50 MG tablet Take 1 tablet by mouth nightly 30 tablet 12    PARoxetine (PAXIL) 20 MG tablet TAKE ONE TABLET BY MOUTH DAILY 90 tablet 3    simvastatin (ZOCOR) 40 MG tablet Take 1 tablet by mouth nightly 90 tablet 3    donepezil (ARICEPT) 10 MG tablet Take 1 tablet by mouth nightly 90 tablet 3    OXYGEN Inhale 2 L/min into the lungs continuous      LORazepam (ATIVAN) 0.5 MG tablet Take 0.5 mg by mouth 2 times daily. [place] aspirin EC 81 MG EC tablet Take 81 mg by mouth nightly with food. No current facility-administered medications on file prior to encounter. Allergies:  Enalapril    Social History:      Social History     Social History    Marital status:       Spouse name: Manju Hernández Number of children: 2    Years of education: N/A     Occupational History    retired - 1999      Social History Main Topics    Smoking status: Current Every Day Smoker     Packs/day: 1.00     Years: 65.00     Types: Cigarettes    Smokeless tobacco: Never Used      Comment: Down to 3 cigarettes a week    Alcohol use 0.0 oz/week      Comment: social    Drug use: No    Sexual activity: No      Comment:  - 1993     Other Topics Concern    Not on file     Social History Narrative        Past Medical History     Coronary Artery Disease    Hyperlipidemia    Hypertension    fatty liver    Osteoarthritis    Anxiety    COPD    Pneumonia                                Last updated by Kobi Burns MD on 05/21/2009                          Past Surgical History     Hysterectomy: 1986    Salpingo-Oophorectomy: bilateral - December 29, 1995    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

## 2018-08-01 NOTE — ANESTHESIA PRE-OP
[facility] Department of Anesthesiology  Pre-Anesthesia Evaluation/Consultation       Name:  Samira Gillespie  : 1934  Age:  80 y.o.                                            MRN:  [id number]  Date: 2018           Procedure (Scheduled):  colonoscopy  Surgeon:  Dr. Giovanni Shea Enalapril Other (See Comments)     Patient unable to recall reaction     Patient Active Problem List   Diagnosis    Contusion    Pain in joint, pelvic region and thigh    Thoracic or lumbosacral neuritis or radiculitis, unspecified    Memory loss    Senile reticular degeneration of peripheral retina    Syncope    Nicotine addiction    Leukocytosis    Skin rash    Abdominal pain    Ankle Fracture, Right    DJD (degenerative joint disease)    Arteriosclerosis    Fatty liver    Osteoarthritis    Insomnia    Anxiety    Urinary incontinence    Coronary artery disease    Hyperlipidemia    Depression    Diarrhea    Need for pneumococcal vaccination    Decreased vision    Cataract    Preoperative examination    Adrenal hyperplasia ([address]    Eczema    Hypokalemia    Need for influenza vaccination    Low back pain    Right knee pain    Visual hallucinations    Anger    Mood disorder (HCC)    Lumbar radiculopathy    Hypoxia    Abnormal weight loss    Irritable bowel syndrome    Accelerated hypertension    Essential hypertension    Primary osteoarthritis involving multiple joints    Pulmonary hypertension    Aortic atherosclerosis (HCC)    Pulmonary emphysema ([address]    Left foot pain     Past Medical History:   Diagnosis Date    Abdominal pain, unspecified site     Adrenal hyperplasia ([address] 2013    Left - CT scan -     Ankle Fracture, Right     Anxiety and depression     Aortic atherosclerosis ([address] 10/3/2015    Contusion of unspecified site     COPD (chronic obstructive pulmonary disease) ([address] 3/1/2013    Coronary artery disease Arteriosclerosis    DJD (degenerative joint disease)     Eczema 8/9/2013    Essential hypertension 9/9/2015    Hyperlipidemia     Hypertension     Hypoxia 7/16/2015    Insomnia     Leukocytosis     Memory loss     Nicotine addiction     Osteoarthritis     Pain in joint, pelvic region and thigh     Primary osteoarthritis involving multiple joints 9/9/2015    Pulmonary hypertension 10/3/2015    Senile reticular degeneration of peripheral retina     Syncope     Thoracic or lumbosacral neuritis or radiculitis, unspecified     Urinary incontinence     Visual hallucinations 9/16/2014    Wears glasses      Past Surgical History:   Procedure Laterality Date    CATARACT REMOVAL WITH IMPLANT Left December 5, 2012    Dr. Viv Yousif    COLONOSCOPY      HYSTERECTOMY       Social History   Substance Use Topics    Smoking status: Current Every Day Smoker     Packs/day: 1.00     Years: 65.00     Types: Cigarettes    Smokeless tobacco: Never Used      Comment: Down to 3 cigarettes a week    Alcohol use 0.0 oz/week      Comment: social     Medications  Current Outpatient Prescriptions on File Prior to Encounter   Medication Sig Dispense Refill    rOPINIRole (REQUIP) 0.25 MG tablet TAKE ONE TABLET BY MOUTH ONCE NIGHTLY 30 tablet 1    potassium chloride (KLOR-CON M) 10 MEQ extended release tablet Take 2 tablets by mouth daily 60 tablet 2    triamterene-hydrochlorothiazide (MAXZIDE-25) 37.5-25 MG per tablet TAKE ONE TABLET BY MOUTH DAILY 90 tablet 0    amLODIPine (NORVASC) 2.5 MG tablet Take 1 tablet by mouth 2 times daily 60 tablet 3    QUEtiapine (SEROQUEL) 50 MG tablet Take 1 tablet by mouth nightly 30 tablet 12    traZODone (DESYREL) 50 MG tablet Take 1 tablet by mouth nightly 30 tablet 12    PARoxetine (PAXIL) 20 MG tablet TAKE ONE TABLET BY MOUTH DAILY 90 tablet 3    simvastatin (ZOCOR) 40 MG tablet Take 1 tablet by mouth nightly 90 tablet 3    donepezil

## 2018-08-08 ENCOUNTER — TELEPHONE (OUTPATIENT)
Dept: INTERNAL MEDICINE | Age: 83
End: 2018-08-08

## 2018-08-09 ENCOUNTER — CARE COORDINATION (OUTPATIENT)
Dept: CARE COORDINATION | Age: 83
End: 2018-08-09

## 2018-08-17 ENCOUNTER — OFFICE VISIT (OUTPATIENT)
Dept: INTERNAL MEDICINE | Age: 83
End: 2018-08-17

## 2018-08-17 VITALS — DIASTOLIC BLOOD PRESSURE: 70 MMHG | BODY MASS INDEX: 26.57 KG/M2 | SYSTOLIC BLOOD PRESSURE: 138 MMHG | WEIGHT: 150 LBS

## 2018-08-17 DIAGNOSIS — R10.30 LOWER ABDOMINAL PAIN: ICD-10-CM

## 2018-08-17 DIAGNOSIS — F17.200 TOBACCO DEPENDENCE: ICD-10-CM

## 2018-08-17 DIAGNOSIS — Z23 NEED FOR INFLUENZA VACCINATION: ICD-10-CM

## 2018-08-17 DIAGNOSIS — I10 ESSENTIAL HYPERTENSION: Chronic | ICD-10-CM

## 2018-08-17 DIAGNOSIS — F41.1 GENERALIZED ANXIETY DISORDER: Primary | ICD-10-CM

## 2018-08-17 PROCEDURE — 90662 IIV NO PRSV INCREASED AG IM: CPT | Performed by: HOSPITALIST

## 2018-08-17 PROCEDURE — 99214 OFFICE O/P EST MOD 30 MIN: CPT | Performed by: HOSPITALIST

## 2018-08-17 PROCEDURE — 1090F PRES/ABSN URINE INCON ASSESS: CPT | Performed by: HOSPITALIST

## 2018-08-17 PROCEDURE — G8598 ASA/ANTIPLAT THER USED: HCPCS | Performed by: HOSPITALIST

## 2018-08-17 PROCEDURE — G8417 CALC BMI ABV UP PARAM F/U: HCPCS | Performed by: HOSPITALIST

## 2018-08-17 PROCEDURE — G8427 DOCREV CUR MEDS BY ELIG CLIN: HCPCS | Performed by: HOSPITALIST

## 2018-08-17 PROCEDURE — 4040F PNEUMOC VAC/ADMIN/RCVD: CPT | Performed by: HOSPITALIST

## 2018-08-17 PROCEDURE — 1123F ACP DISCUSS/DSCN MKR DOCD: CPT | Performed by: HOSPITALIST

## 2018-08-17 PROCEDURE — G0008 ADMIN INFLUENZA VIRUS VAC: HCPCS | Performed by: HOSPITALIST

## 2018-08-17 PROCEDURE — 4004F PT TOBACCO SCREEN RCVD TLK: CPT | Performed by: HOSPITALIST

## 2018-08-17 PROCEDURE — G8400 PT W/DXA NO RESULTS DOC: HCPCS | Performed by: HOSPITALIST

## 2018-08-17 PROCEDURE — 1101F PT FALLS ASSESS-DOCD LE1/YR: CPT | Performed by: HOSPITALIST

## 2018-08-17 RX ORDER — LORAZEPAM 0.5 MG/1
0.5 TABLET ORAL 2 TIMES DAILY
Qty: 60 TABLET | Refills: 2 | Status: SHIPPED | OUTPATIENT
Start: 2018-09-04 | End: 2018-11-26 | Stop reason: SDUPTHER

## 2018-08-17 ASSESSMENT — PATIENT HEALTH QUESTIONNAIRE - PHQ9
2. FEELING DOWN, DEPRESSED OR HOPELESS: 1
SUM OF ALL RESPONSES TO PHQ9 QUESTIONS 1 & 2: 2
SUM OF ALL RESPONSES TO PHQ QUESTIONS 1-9: 2
SUM OF ALL RESPONSES TO PHQ QUESTIONS 1-9: 2
1. LITTLE INTEREST OR PLEASURE IN DOING THINGS: 1

## 2018-08-17 NOTE — PROGRESS NOTES
Right     Anxiety and depression     Aortic atherosclerosis ([facility] 10/3/2015    Contusion of unspecified site     COPD (chronic obstructive pulmonary disease) ([facility] 3/1/2013    Coronary artery disease     Arteriosclerosis    DJD (degenerative joint disease)     Eczema 8/9/2013    Essential hypertension 9/9/2015    Hyperlipidemia     Hypertension     Hypoxia 7/16/2015    Insomnia     Leukocytosis     Memory loss     Nicotine addiction     Osteoarthritis     Pain in joint, pelvic region and thigh     Primary osteoarthritis involving multiple joints 9/9/2015    Pulmonary hypertension ([facility] 10/3/2015    Senile reticular degeneration of peripheral retina     Syncope     Thoracic or lumbosacral neuritis or radiculitis, unspecified     Urinary incontinence     Visual hallucinations 9/16/2014    Wears glasses        Past Surgical History:   Procedure Laterality Date    CATARACT REMOVAL WITH IMPLANT Left December 5, 2012    Dr. Mar Reddy  08/01/2018       Current Outpatient Prescriptions   Medication Sig Dispense Refill    [START ON 9/4/2018] LORazepam (ATIVAN) 0.5 MG tablet Take 1 tablet by mouth 2 times daily for 30 days. . 60 tablet 2    rOPINIRole (REQUIP) 0.25 MG tablet TAKE ONE TABLET BY MOUTH ONCE NIGHTLY 30 tablet 1    potassium chloride (KLOR-CON M) 10 MEQ extended release tablet Take 2 tablets by mouth daily 60 tablet 2    triamterene-hydrochlorothiazide (MAXZIDE-25) 37.5-25 MG per tablet TAKE ONE TABLET BY MOUTH DAILY 90 tablet 0    amLODIPine (NORVASC) 2.5 MG tablet Take 1 tablet by mouth 2 times daily 60 tablet 3    QUEtiapine (SEROQUEL) 50 MG tablet Take 1 tablet by mouth nightly 30 tablet 12    traZODone (DESYREL) 50 MG tablet Take 1 tablet by mouth nightly 30 tablet 12    PARoxetine (PAXIL) 20 MG tablet TAKE ONE TABLET BY MOUTH DAILY 90 tablet 3    simvastatin (ZOCOR) 40 MG tablet Take 1 tablet by mouth nightly 90 tablet 3    donepezil (ARICEPT) 10 MG tablet Take 1 tablet by mouth nightly 90 tablet 3    aspirin EC 81 MG EC tablet Take 81 mg by mouth nightly with food.  OXYGEN Inhale 2 L/min into the lungs continuous       No current facility-administered medications for this visit. /70   Wt 150 lb (68 kg)   BMI 26.57 kg/m²      Physical Exam  Constitutional: She is oriented to person, place, and time. She appears well-developed and well-nourished. No distress. HENT:   Head: Normocephalic and atraumatic. Mouth/Throat: No oropharyngeal exudate. Eyes: Pupils are equal, round, and reactive to light. No scleral icterus. Neck: Normal range of motion. No JVD present. Cardiovascular: Normal rate, regular rhythm and normal heart sounds. Exam reveals no gallop and no friction rub. No murmur heard. Pulmonary/Chest: Effort normal and breath sounds normal. No respiratory distress. She has no wheezes. She has no rales. Abdominal: Soft. Bowel sounds are normal. She exhibits no distension. There is no tenderness. Mid abdominal scar below and above the umbilicus   Musculoskeletal: Normal range of motion. She exhibits no edema. Neurological: She is alert and oriented to person, place, and time. No cranial nerve deficit. Skin: Skin is warm and dry. Psychiatric: She has a normal mood and affect. Nursing note and vitals reviewed. Assessment/ plan:     Neli Cagle was seen today for hypertension and medication refill. Diagnoses and all orders for this visit:    Generalized anxiety disorder  -     LORazepam (ATIVAN) 0.5 MG tablet; Take 1 tablet by mouth 2 times daily for 30 days. .    Lower abdominal pain       -     Encouraged to follow up with Dr Johann Hamlin.   She, most probably, will benefit from surgical treatment    Essential hypertension, controlled       -     Continue current meds    Tobacco dependence       -     Cigarette smoking cessation was discussed and strongly encouraged    Need for influenza vaccination  -     INFLUENZA, HIGH DOSE, 65 YRS +, IM, PF, PREFILL SYR, 0.5ML (FLUZONE HD)      Follow up in 3 months      Ruslan Solano MD

## 2018-08-19 DIAGNOSIS — I10 ESSENTIAL HYPERTENSION: Chronic | ICD-10-CM

## 2018-08-20 ENCOUNTER — CARE COORDINATION (OUTPATIENT)
Dept: CARE COORDINATION | Age: 83
End: 2018-08-20

## 2018-08-20 NOTE — CARE COORDINATION
medications    Medication Sig Start Date End Date Taking? Authorizing Provider   LORazepam (ATIVAN) 0.5 MG tablet Take 1 tablet by mouth 2 times daily for 30 days. . 9/4/18 10/4/18  Veronica Gottlieb MD   rOPINIRole (REQUIP) 0.25 MG tablet TAKE ONE TABLET BY MOUTH ONCE NIGHTLY 7/12/18   Veronica Gottlieb MD   potassium chloride (KLOR-CON M) 10 MEQ extended release tablet Take 2 tablets by mouth daily 7/12/18   Veronica Gottlieb MD   triamterene-hydrochlorothiazide (ESCNEPX-12) 37.5-25 MG per tablet TAKE ONE TABLET BY MOUTH DAILY 6/12/18   Veronica Gottlieb MD   amLODIPine (NORVASC) 2.5 MG tablet Take 1 tablet by mouth 2 times daily 5/21/18   Veronica Gottlieb MD   QUEtiapine (SEROQUEL) 50 MG tablet Take 1 tablet by mouth nightly 5/17/18   Veronica Gottlieb MD   traZODone (DESYREL) 50 MG tablet Take 1 tablet by mouth nightly 3/8/18   Veronica Gottlieb MD   PARoxetine (PAXIL) 20 MG tablet TAKE ONE TABLET BY MOUTH DAILY 10/30/17   Veronica Gottlieb MD   simvastatin (ZOCOR) 40 MG tablet Take 1 tablet by mouth nightly 10/30/17   Veronica Gottlieb MD   donepezil (ARICEPT) 10 MG tablet Take 1 tablet by mouth nightly 10/30/17   Veronica Gottlieb MD   aspirin EC 81 MG EC tablet Take 81 mg by mouth nightly with food.  7/31/18   Mya Jose MD   OXYGEN Inhale 2 L/min into the lungs continuous 8/19/15   Mya Jose MD       Future Appointments  Date Time Provider Melo Clemens   11/19/2018 8:45 AM Veronica Gottlieb MD [facility]     ,   COPD Assessment    Does the patient understand envrionmental exposure?:  Yes  Is the patient able to verbalize Rescue vs. Long Acting medications?:  No  Does the patient have a nebulizer?:  No  Does the patient use a space with inhaled medications?:  No     No patient-reported symptoms         Symptoms:      Have you had a recent diagnosis of pneumonia either by PCP or at a hospital?:  No      and   General Assessment    Do you have any symptoms that are causing concern?:  No

## 2018-08-21 RX ORDER — TRIAMTERENE AND HYDROCHLOROTHIAZIDE 37.5; 25 MG/1; MG/1
TABLET ORAL
Qty: 90 TABLET | Refills: 2 | Status: SHIPPED | OUTPATIENT
Start: 2018-08-21 | End: 2019-03-20 | Stop reason: SDUPTHER

## 2018-09-04 ENCOUNTER — TELEPHONE (OUTPATIENT)
Dept: INTERNAL MEDICINE | Age: 83
End: 2018-09-04

## 2018-09-04 DIAGNOSIS — K62.89 RECTAL PAIN: Primary | ICD-10-CM

## 2018-09-04 DIAGNOSIS — R10.9 ABDOMINAL PAIN, UNSPECIFIED ABDOMINAL LOCATION: ICD-10-CM

## 2018-09-04 NOTE — TELEPHONE ENCOUNTER
Patients daughter called. Was told to call provider when patient was ready for colon surgery. Daughter says patient is ready to proceed.

## 2018-09-04 NOTE — TELEPHONE ENCOUNTER
Spoke to [facility] , info given     Daughter would like to  know if you can give pt something for pain?

## 2018-09-06 ENCOUNTER — TELEPHONE (OUTPATIENT)
Dept: SURGERY | Age: 83
End: 2018-09-06

## 2018-09-06 RX ORDER — TRAMADOL HYDROCHLORIDE 50 MG/1
50 TABLET ORAL 2 TIMES DAILY PRN
Qty: 14 TABLET | Refills: 0 | OUTPATIENT
Start: 2018-09-06 | End: 2018-11-29 | Stop reason: SDUPTHER

## 2018-09-09 DIAGNOSIS — G25.81 RESTLESS LEG SYNDROME: ICD-10-CM

## 2018-09-10 RX ORDER — ROPINIROLE 0.25 MG/1
TABLET, FILM COATED ORAL
Qty: 30 TABLET | Refills: 2 | Status: SHIPPED | OUTPATIENT
Start: 2018-09-10 | End: 2018-09-11

## 2018-09-11 ENCOUNTER — OFFICE VISIT (OUTPATIENT)
Dept: SURGERY | Age: 83
End: 2018-09-11

## 2018-09-11 VITALS
SYSTOLIC BLOOD PRESSURE: 132 MMHG | HEIGHT: 63 IN | WEIGHT: 152 LBS | BODY MASS INDEX: 26.93 KG/M2 | DIASTOLIC BLOOD PRESSURE: 70 MMHG | RESPIRATION RATE: 16 BRPM

## 2018-09-11 DIAGNOSIS — K57.92 DIVERTICULITIS: Primary | ICD-10-CM

## 2018-09-11 DIAGNOSIS — J44.9 CHRONIC OBSTRUCTIVE PULMONARY DISEASE, UNSPECIFIED COPD TYPE (HCC): ICD-10-CM

## 2018-09-11 DIAGNOSIS — F17.200 SMOKER: ICD-10-CM

## 2018-09-11 DIAGNOSIS — N82.4 COLOVAGINAL FISTULA: ICD-10-CM

## 2018-09-11 DIAGNOSIS — N32.1 COLOVESICAL FISTULA: ICD-10-CM

## 2018-09-11 PROCEDURE — G8926 SPIRO NO PERF OR DOC: HCPCS | Performed by: SURGERY

## 2018-09-11 PROCEDURE — 1090F PRES/ABSN URINE INCON ASSESS: CPT | Performed by: SURGERY

## 2018-09-11 PROCEDURE — G8400 PT W/DXA NO RESULTS DOC: HCPCS | Performed by: SURGERY

## 2018-09-11 PROCEDURE — 1101F PT FALLS ASSESS-DOCD LE1/YR: CPT | Performed by: SURGERY

## 2018-09-11 PROCEDURE — G8417 CALC BMI ABV UP PARAM F/U: HCPCS | Performed by: SURGERY

## 2018-09-11 PROCEDURE — 1123F ACP DISCUSS/DSCN MKR DOCD: CPT | Performed by: SURGERY

## 2018-09-11 PROCEDURE — 3023F SPIROM DOC REV: CPT | Performed by: SURGERY

## 2018-09-11 PROCEDURE — 4004F PT TOBACCO SCREEN RCVD TLK: CPT | Performed by: SURGERY

## 2018-09-11 PROCEDURE — G8427 DOCREV CUR MEDS BY ELIG CLIN: HCPCS | Performed by: SURGERY

## 2018-09-11 PROCEDURE — G8598 ASA/ANTIPLAT THER USED: HCPCS | Performed by: SURGERY

## 2018-09-11 PROCEDURE — 99215 OFFICE O/P EST HI 40 MIN: CPT | Performed by: SURGERY

## 2018-09-11 PROCEDURE — 4040F PNEUMOC VAC/ADMIN/RCVD: CPT | Performed by: SURGERY

## 2018-09-11 NOTE — PROGRESS NOTES
 Ankle Fracture, Right     Anxiety and depression     Aortic atherosclerosis (HCC) 10/3/2015    Contusion of unspecified site     COPD (chronic obstructive pulmonary disease) ([facility] 3/1/2013    Coronary artery disease     Arteriosclerosis    DJD (degenerative joint disease)     Eczema 8/9/2013    Essential hypertension 9/9/2015    Hyperlipidemia     Hypertension     Hypoxia 7/16/2015    Insomnia     Leukocytosis     Memory loss     Nicotine addiction     Osteoarthritis     Pain in joint, pelvic region and thigh     Primary osteoarthritis involving multiple joints 9/9/2015    Pulmonary hypertension ([facility] 10/3/2015    Senile reticular degeneration of peripheral retina     Syncope     Thoracic or lumbosacral neuritis or radiculitis, unspecified     Urinary incontinence     Visual hallucinations 9/16/2014    Wears glasses      Past Surgical History:   Procedure Laterality Date    CATARACT REMOVAL WITH IMPLANT Left December 5, 2012    Dr. Joanne Barillas  08/01/2018       Current Outpatient Prescriptions:     traMADol (ULTRAM) 50 MG tablet, Take 1 tablet by mouth 2 times daily as needed for Pain (ABDOMINAL PAIN) for up to 7 days. Intended supply: 7 days. Take lowest dose possible to manage pain., Disp: 14 tablet, Rfl: 0    triamterene-hydrochlorothiazide (MAXZIDE-25) 37.5-25 MG per tablet, TAKE ONE TABLET BY MOUTH DAILY, Disp: 90 tablet, Rfl: 2    LORazepam (ATIVAN) 0.5 MG tablet, Take 1 tablet by mouth 2 times daily for 30 days. ., Disp: 60 tablet, Rfl: 2    potassium chloride (KLOR-CON M) 10 MEQ extended release tablet, Take 2 tablets by mouth daily, Disp: 60 tablet, Rfl: 2    amLODIPine (NORVASC) 2.5 MG tablet, Take 1 tablet by mouth 2 times daily, Disp: 60 tablet, Rfl: 3    QUEtiapine (SEROQUEL) 50 MG tablet, Take 1 tablet by mouth nightly, Disp: 30 tablet, Rfl: 12    traZODone (DESYREL) 50 MG tablet, laparoscopic, possible open sigmoid colectomy and end colostomy. She is very disappointed to hear this as she does not want \"a bag\". At this point, the only situation I would consider not giving her colostomy and attempting anastomosis would be if she quit smoking for 6 weeks, and began a healthier lifestyle and began exercising for the next 6 weeks. I did tell her that if she chooses this route we will tobacco test her in 1 month, and on the day of surgery. I did  her and her family that symptoms may continue to progress during this time. Even to the point where she may need more urgent operation. I explained to her and her family the typical perioperative risks, expectations, hospital stay for sigmoid colectomy. I explained potential need for bladder and vaginal repair. I had a discussion with the patient, daughter, son regarding the risks, benefits, and alternatives of the procedure, including, but not limited to: bleeding, infection, anastomotic leak, poor wound healing or cosmetic result, hernia, bowel obstruction, fistula, need for reoperation or additional procedures, temporary or permanent ostomy, damage to other structures, such as bowel, bladder, ureter, stomach, liver, and neurovascular structures. Need for, and risks of general anesthesia discussed. Postoperative typical bowel function, urinary function, and sexual function was discussed. Likelihood of open operation was discussed as well. Prep, preoperative testing, typical hospital stay, and perioperative expectations were addressed, as well as typical expectations regarding pain control and time away from work and daily activities. All questions were answered to patient's satisfaction. Patient understands higher risk of perioperative morbidity and mortality given: COPD on 2L, current smoking, obesity, unhealthy diet, age, severe diverticulitis, malnutrition.     Throughout the interview while I was answering her and her family's

## 2018-09-11 NOTE — PATIENT INSTRUCTIONS
Learning About Diverticulosis and Diverticulitis    What are diverticulosis and diverticulitis? In diverticulosis and diverticulitis, pouches called diverticula form in the wall of the large intestine, or colon. · In diverticulosis, the pouches do not cause any pain or other symptoms. · In diverticulitis, the pouches get inflamed or infected and cause symptoms. Doctors aren't sure what causes these pouches in the colon. But they think that a low-fiber diet may play a role. Without fiber to add bulk to the stool, the colon has to work harder than normal to push the stool forward. The pressure from this may cause pouches to form in weak spots along the colon. Some people with diverticulosis get diverticulitis. But experts don't know why this happens. What are the symptoms? · In diverticulosis, most people don't have symptoms. But pouches sometimes bleed. · In diverticulitis, symptoms may last from a few hours to a week or more. They include:  ¨ Belly pain. This is usually in the lower left side. It is sometimes worse when you move. This is the most common symptom. ¨ Fever and chills. ¨ Bloating and gas. ¨ Diarrhea or constipation. ¨ Nausea and sometimes vomiting. ¨ Not feeling like eating. How can you prevent these problems? You may be able to lower your chance of getting diverticulitis. You can do this by taking steps to prevent constipation. · Eat fruits, vegetables, beans, and whole grains every day. These foods are high in fiber. · Drink plenty of fluids (enough so that your urine is light yellow or clear like water). If you have kidney, heart, or liver disease and have to limit fluids, talk with your doctor before you increase the amount of fluids you drink. · Get at least 30 minutes of exercise on most days of the week. Walking is a good choice. You also may want to do other activities, such as running, swimming, cycling, or playing tennis or team sports.   · Take a fiber the colon and the vagina, or the colon and the skin. Other complications include stricture or obstruction due to scarring and blockage. Next steps after being treated for diverticulitis:  It is very important that you obtain a colonoscopy after being diagnosed with diverticulitis. This is usually done 6-10 weeks after you recover, and before any planned surgery. In rare circumstances, cancer may be hidden by the diverticulitis, and only a colonoscopy will be able to differentiate the two. Surgery for diverticulitis:    Surgery can be done for patients with repeated attacks of diverticulitis, those with continued diverticulitis symptoms not responsive to antibiotics, and those who develop complications such as fistula or stricture. Surgery can be done robotically, laparoscopically (key-hole incisions), and open, depending on intra-operative findings and other technical factors. Typically only a segment of colon is resected (taken out) and the colon can be reconnected. Rarely is an ostomy (bag) needed. Ostomies may be temporary or permanent. Most patients spend anywhere from 3-7 days in the hospital, depending on various factors, such as age, health, and how the surgery is done.

## 2018-09-13 ENCOUNTER — TELEPHONE (OUTPATIENT)
Dept: INTERNAL MEDICINE | Age: 83
End: 2018-09-13

## 2018-09-17 ENCOUNTER — TELEPHONE (OUTPATIENT)
Dept: INTERNAL MEDICINE | Age: 83
End: 2018-09-17

## 2018-09-25 ENCOUNTER — TELEPHONE (OUTPATIENT)
Dept: INTERNAL MEDICINE CLINIC | Age: 83
End: 2018-09-25

## 2018-09-25 NOTE — TELEPHONE ENCOUNTER
Patients daughter Tariq Flores said she received a phone call from [facility] yesterday 9/24 and was returning the call. Please call on cell [phone].

## 2018-09-28 ENCOUNTER — CARE COORDINATION (OUTPATIENT)
Dept: CARE COORDINATION | Age: 83
End: 2018-09-28

## 2018-09-28 NOTE — CARE COORDINATION
Program Enrollment:  Rising Risk  Referral from Primary Care Provider:  Yes  Suggested Interventions and Community Resources  Fall Risk Prevention:  Completed  Home Health Services:  Completed  Medi Set or Pill Pack:  Completed  Smoking Cessation: In Process  Other Services or Interventions:  COA         Goals Addressed             Most Recent     Conditions and Symptoms   On track (9/28/2018)             I will schedule office visits, as directed by my provider. I will notify my provider of any barriers to my plan of care. I will notify my provider of any symptoms that indicate a worsening of my condition. Barriers: impairment:  cognitive and overwhelmed by complexity of regimen  Plan for overcoming my barriers: engagement with [facility] and Care Coordination team  Confidence: 8/10  Anticipated Goal Completion Date: 9/20/18         smoking cessation (pt-stated)   Improving (9/28/2018)             I want to quit smoking     Barriers: lack of motivation and stress  Plan for overcoming my barriers: review smoking cessation materials for support and strategies. Engage with [facility] and care coordination team.  Confidence: 5/10  Anticipated Goal Completion Date: 3 months - 12/28/18            Prior to Admission medications    Medication Sig Start Date End Date Taking? Authorizing Provider   triamterene-hydrochlorothiazide (MAXZIDE-25) 37.5-25 MG per tablet TAKE ONE TABLET BY MOUTH DAILY 8/21/18   Leonard Lomeli MD   LORazepam (ATIVAN) 0.5 MG tablet Take 1 tablet by mouth 2 times daily for 30 days. . 9/4/18 10/4/18  Leonard Lomeli MD   potassium chloride (KLOR-CON M) 10 MEQ extended release tablet Take 2 tablets by mouth daily 7/12/18   Leonard Lomeli MD   amLODIPine [facility]) 2.5 MG tablet Take 1 tablet by mouth 2 times daily 5/21/18   Leonard Lomeli MD   QUEtiapine (SEROQUEL) 50 MG tablet Take 1 tablet by mouth nightly 5/17/18   Leonard Lomeli MD   traZODone (DESYREL) 50 MG tablet Take 1 tablet by mouth nightly 3/8/18 Adwoa Willett MD   PARoxetine (PAXIL) 20 MG tablet TAKE ONE TABLET BY MOUTH DAILY 10/30/17   Adwoa Willett MD   simvastatin (ZOCOR) 40 MG tablet Take 1 tablet by mouth nightly 10/30/17   Adwoa Willett MD   donepezil (ARICEPT) 10 MG tablet Take 1 tablet by mouth nightly 10/30/17   Adwoa Willett MD   aspirin EC 81 MG EC tablet Take 81 mg by mouth nightly with food.  7/31/18   Kulwant Marinelli MD   OXYGEN Inhale 2 L/min into the lungs continuous 8/19/15   Kulwant Marinelli MD       Future Appointments  Date Time Provider Melo Clemens   10/9/2018 3:15 PM Graciela Traylor MD [facility]   11/19/2018 8:45 AM Adwoa Willett MD [address]     ,   COPD Assessment    Does the patient understand envrionmental exposure?:  Yes  Is the patient able to verbalize Rescue vs. Long Acting medications?:  No  Does the patient have a nebulizer?:  No  Does the patient use a space with inhaled medications?:  No     No patient-reported symptoms         Symptoms:      Have you had a recent diagnosis of pneumonia either by PCP or at a hospital?:  No      and   General Assessment    Do you have any symptoms that are causing concern?:  Yes  Progression since Onset:  Gradually Worsening  Reported Symptoms:  Abdominal Pain, Other (Comment: fecal incontinence)

## 2018-10-05 ENCOUNTER — CARE COORDINATION (OUTPATIENT)
Dept: CARE COORDINATION | Age: 83
End: 2018-10-05

## 2018-10-05 NOTE — CARE COORDINATION
PRIOR TO SENDING THIS REFERRAL IN THE MANNER NOTED BELOW, PLEASE ADD ALL AMBULATORY CARE COORDINATION SOCIAL WORKERS (ACC SW'S) TO THE PATIENT'S CARE TEAM.    PLEASE ANSWER \"YES\" TO AT LEAST ONE OF THE QUESTIONS BELOW, OR, IF DESIRED,  PROVIDE A NARRATIVE DESCRIPTION FOR WHY YOU ARE MAKING THIS REFERRAL    THIS SMARTPHRASE SHOULD BE IMBEDDED IN THE MOST RECENT ENCOUNTER IN WHICH YOU DISCUSSED SW REFERRAL WITH THE PATIENT. YOU DO NOT NEED TO ROUTE THIS ENCOUNTER OR A STAFF MESSAGE REGARDING THIS REFERRAL TO THE ACC SW'S (ALTHOUGH YOU MAY IF YOU WISH). YOU MUST ADD ALL OF THE [facility] SW'S TO THE CARE TEAM PRIOR TO COMPLETING THIS SMARTPHRASE, HOWEVER. This patient is referred this date to [facility] SW'S for review, assessment, and consultation as needed regarding the following presenting concern(s). Please indicate all that apply. No      [facility] SW assessment of patient's mental health diagnosis (if any)    is indicated and requested        No [facility] SW assessment of patient's substance use disorder, if any,    is indicated and requested. No Patient has concerns about apparently deteriorating mental status. No Patient desires assistance with locating an accessible behavioral    health  treatment provider. No        Patient has questions/concerns regarding application and/or    eligibility for Medicaid. No Patient has questions/concerns regarding application and/or       eligibility for a  Medicaid Waiver program ([place], Home    Care Waiver,  Assisted Living Waiver, etc.). No     Patient has questions/concerns about the cost of prescription    drugs. No      Patient has questions/concerns regarding Medicare coverage,           Including Part D prescription drug coverage. No  Patient desires supportive services in the home regarding     activities of daily living (homemaking, transferring, bathing,     Toileting, transportation, shopping, etc.).      No  Patient desires

## 2018-10-08 ENCOUNTER — CARE COORDINATION (OUTPATIENT)
Dept: CARE COORDINATION | Age: 83
End: 2018-10-08

## 2018-10-22 DIAGNOSIS — F51.01 PRIMARY INSOMNIA: Chronic | ICD-10-CM

## 2018-10-22 RX ORDER — QUETIAPINE FUMARATE 50 MG/1
50 TABLET, FILM COATED ORAL NIGHTLY
Qty: 30 TABLET | Refills: 3 | Status: SHIPPED | OUTPATIENT
Start: 2018-10-22 | End: 2019-05-30 | Stop reason: SDUPTHER

## 2018-10-30 ENCOUNTER — PREP FOR PROCEDURE (OUTPATIENT)
Dept: SURGERY | Age: 83
End: 2018-10-30

## 2018-10-30 ENCOUNTER — OFFICE VISIT (OUTPATIENT)
Dept: SURGERY | Age: 83
End: 2018-10-30
Payer: MEDICARE

## 2018-10-30 VITALS
SYSTOLIC BLOOD PRESSURE: 130 MMHG | WEIGHT: 151 LBS | HEIGHT: 63 IN | DIASTOLIC BLOOD PRESSURE: 64 MMHG | RESPIRATION RATE: 17 BRPM | BODY MASS INDEX: 26.75 KG/M2

## 2018-10-30 DIAGNOSIS — N82.4 COLOVAGINAL FISTULA: ICD-10-CM

## 2018-10-30 DIAGNOSIS — F17.200 SMOKER: ICD-10-CM

## 2018-10-30 DIAGNOSIS — K57.92 DIVERTICULITIS: ICD-10-CM

## 2018-10-30 DIAGNOSIS — J44.9 CHRONIC OBSTRUCTIVE PULMONARY DISEASE, UNSPECIFIED COPD TYPE (HCC): ICD-10-CM

## 2018-10-30 DIAGNOSIS — K57.92 DIVERTICULITIS: Primary | ICD-10-CM

## 2018-10-30 DIAGNOSIS — N32.1 COLOVESICAL FISTULA: ICD-10-CM

## 2018-10-30 PROCEDURE — 1101F PT FALLS ASSESS-DOCD LE1/YR: CPT | Performed by: SURGERY

## 2018-10-30 PROCEDURE — 1123F ACP DISCUSS/DSCN MKR DOCD: CPT | Performed by: SURGERY

## 2018-10-30 PROCEDURE — G8400 PT W/DXA NO RESULTS DOC: HCPCS | Performed by: SURGERY

## 2018-10-30 PROCEDURE — G8926 SPIRO NO PERF OR DOC: HCPCS | Performed by: SURGERY

## 2018-10-30 PROCEDURE — 99215 OFFICE O/P EST HI 40 MIN: CPT | Performed by: SURGERY

## 2018-10-30 PROCEDURE — 4040F PNEUMOC VAC/ADMIN/RCVD: CPT | Performed by: SURGERY

## 2018-10-30 PROCEDURE — G8598 ASA/ANTIPLAT THER USED: HCPCS | Performed by: SURGERY

## 2018-10-30 PROCEDURE — 4004F PT TOBACCO SCREEN RCVD TLK: CPT | Performed by: SURGERY

## 2018-10-30 PROCEDURE — G8482 FLU IMMUNIZE ORDER/ADMIN: HCPCS | Performed by: SURGERY

## 2018-10-30 PROCEDURE — 1090F PRES/ABSN URINE INCON ASSESS: CPT | Performed by: SURGERY

## 2018-10-30 PROCEDURE — G8417 CALC BMI ABV UP PARAM F/U: HCPCS | Performed by: SURGERY

## 2018-10-30 PROCEDURE — G8427 DOCREV CUR MEDS BY ELIG CLIN: HCPCS | Performed by: SURGERY

## 2018-10-30 PROCEDURE — 3023F SPIROM DOC REV: CPT | Performed by: SURGERY

## 2018-10-30 RX ORDER — SODIUM CHLORIDE 0.9 % (FLUSH) 0.9 %
10 SYRINGE (ML) INJECTION EVERY 12 HOURS SCHEDULED
Status: CANCELLED | OUTPATIENT
Start: 2018-10-30

## 2018-10-30 RX ORDER — SODIUM CHLORIDE 0.9 % (FLUSH) 0.9 %
10 SYRINGE (ML) INJECTION PRN
Status: CANCELLED | OUTPATIENT
Start: 2018-10-30

## 2018-10-30 RX ORDER — CIPROFLOXACIN 2 MG/ML
400 INJECTION, SOLUTION INTRAVENOUS
Status: CANCELLED | OUTPATIENT
Start: 2018-10-30 | End: 2018-10-30

## 2018-10-30 NOTE — PROGRESS NOTES
[address] [address] [address] [place] [address]  [facility] [employer] [facility]  Dept: [phone]  Dept Fax: [phone]  Loc: [phone]    Visit Date: 10/30/2018    Imani Mae is a 80 y.o. female who presents today for: Follow-up (discuss surgery)      HPI:       Imani Mae is a 80 y.o. 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 We had began surgical discussion and planning about 6 weeks ago, but given her heavy smoking and overall unhealthy diet and habits, she wished to quit smoking to make the situation improved for possible colon anastomosis. Since our office visit she states that she has quit smoking over the past month. She states she has been eating healthier with more fruits and vegetables. She continues to have colovaginal symptoms such as passing air per vagina. She also continues to have intermittent left lower quadrant abdominal pain. She denies fever, chills, nausea, vomiting, food intolerance, weight loss.     Past Medical History:   Diagnosis Date    Abdominal pain, unspecified site     Adrenal hyperplasia ([address] 6/11/2013    Left - CT scan - 2011    Ankle Fracture, Right     Anxiety and depression     Aortic atherosclerosis ([address] 10/3/2015    Contusion of unspecified site     COPD (chronic obstructive pulmonary disease) ([address] 3/1/2013    Coronary artery disease     Arteriosclerosis    DJD (degenerative joint disease)     Eczema 8/9/2013    Essential hypertension 9/9/2015    Hyperlipidemia     Hypertension     Hypoxia 7/16/2015    Insomnia     Leukocytosis     Memory loss     Nicotine addiction     Osteoarthritis     Pain in joint, pelvic region and thigh     Primary osteoarthritis involving multiple joints 9/9/2015    Pulmonary hypertension ([address] 10/3/2015    Senile reticular degeneration of peripheral retina     Syncope     Thoracic or lumbosacral

## 2018-11-01 ENCOUNTER — TELEPHONE (OUTPATIENT)
Dept: SURGERY | Age: 83
End: 2018-11-01

## 2018-11-01 NOTE — TELEPHONE ENCOUNTER
Planning surgery with Dr. Andrade Reyes on 11/30. Stents needed and urologist will need to be available on standby. I [facility] with [facility]. She has to confirm with Urologist that they are available on standby and will return call. Will speak with patient once urology confirms availability.

## 2018-11-02 LAB
3-OH-COTININE: <2 NG/ML
COTININE: <2 NG/ML
NICOTINE: <2 NG/ML

## 2018-11-04 DIAGNOSIS — E78.5 HYPERLIPIDEMIA, UNSPECIFIED HYPERLIPIDEMIA TYPE: ICD-10-CM

## 2018-11-05 RX ORDER — SIMVASTATIN 40 MG
TABLET ORAL
Qty: 90 TABLET | Refills: 2 | Status: ON HOLD | OUTPATIENT
Start: 2018-11-05 | End: 2018-12-07 | Stop reason: HOSPADM

## 2018-11-05 RX ORDER — POTASSIUM CHLORIDE 750 MG/1
TABLET, EXTENDED RELEASE ORAL
Qty: 60 TABLET | Refills: 1 | Status: ON HOLD | OUTPATIENT
Start: 2018-11-05 | End: 2018-12-07 | Stop reason: HOSPADM

## 2018-11-06 DIAGNOSIS — K57.92 DIVERTICULITIS: Primary | ICD-10-CM

## 2018-11-06 RX ORDER — METRONIDAZOLE 500 MG/1
500 TABLET ORAL 3 TIMES DAILY
Qty: 3 TABLET | Refills: 0 | Status: SHIPPED | OUTPATIENT
Start: 2018-11-06 | End: 2018-11-07

## 2018-11-06 RX ORDER — NEOMYCIN SULFATE 500 MG/1
1000 TABLET ORAL 3 TIMES DAILY
Qty: 6 TABLET | Refills: 0 | Status: SHIPPED | OUTPATIENT
Start: 2018-11-06 | End: 2018-11-07

## 2018-11-23 ENCOUNTER — CARE COORDINATION (OUTPATIENT)
Dept: CARE COORDINATION | Age: 83
End: 2018-11-23

## 2018-11-26 ENCOUNTER — OFFICE VISIT (OUTPATIENT)
Dept: INTERNAL MEDICINE CLINIC | Age: 83
End: 2018-11-26
Payer: MEDICARE

## 2018-11-26 VITALS
OXYGEN SATURATION: 83 % | BODY MASS INDEX: 27.46 KG/M2 | DIASTOLIC BLOOD PRESSURE: 70 MMHG | WEIGHT: 155 LBS | SYSTOLIC BLOOD PRESSURE: 142 MMHG

## 2018-11-26 DIAGNOSIS — I10 ESSENTIAL HYPERTENSION: Chronic | ICD-10-CM

## 2018-11-26 DIAGNOSIS — R41.3 MEMORY LOSS: Chronic | ICD-10-CM

## 2018-11-26 DIAGNOSIS — J43.2 CENTRILOBULAR EMPHYSEMA (HCC): Chronic | ICD-10-CM

## 2018-11-26 DIAGNOSIS — F41.1 GENERALIZED ANXIETY DISORDER: ICD-10-CM

## 2018-11-26 DIAGNOSIS — R06.09 DYSPNEA ON EXERTION: ICD-10-CM

## 2018-11-26 DIAGNOSIS — F32.A DEPRESSION, UNSPECIFIED DEPRESSION TYPE: ICD-10-CM

## 2018-11-26 DIAGNOSIS — E78.2 MIXED HYPERLIPIDEMIA: Chronic | ICD-10-CM

## 2018-11-26 DIAGNOSIS — Z01.818 PRE-OP EXAM: Primary | ICD-10-CM

## 2018-11-26 PROCEDURE — 1123F ACP DISCUSS/DSCN MKR DOCD: CPT | Performed by: HOSPITALIST

## 2018-11-26 PROCEDURE — G8417 CALC BMI ABV UP PARAM F/U: HCPCS | Performed by: HOSPITALIST

## 2018-11-26 PROCEDURE — G8482 FLU IMMUNIZE ORDER/ADMIN: HCPCS | Performed by: HOSPITALIST

## 2018-11-26 PROCEDURE — G8926 SPIRO NO PERF OR DOC: HCPCS | Performed by: HOSPITALIST

## 2018-11-26 PROCEDURE — 4004F PT TOBACCO SCREEN RCVD TLK: CPT | Performed by: HOSPITALIST

## 2018-11-26 PROCEDURE — 3023F SPIROM DOC REV: CPT | Performed by: HOSPITALIST

## 2018-11-26 PROCEDURE — 1101F PT FALLS ASSESS-DOCD LE1/YR: CPT | Performed by: HOSPITALIST

## 2018-11-26 PROCEDURE — 4040F PNEUMOC VAC/ADMIN/RCVD: CPT | Performed by: HOSPITALIST

## 2018-11-26 PROCEDURE — 1090F PRES/ABSN URINE INCON ASSESS: CPT | Performed by: HOSPITALIST

## 2018-11-26 PROCEDURE — G8427 DOCREV CUR MEDS BY ELIG CLIN: HCPCS | Performed by: HOSPITALIST

## 2018-11-26 PROCEDURE — G8400 PT W/DXA NO RESULTS DOC: HCPCS | Performed by: HOSPITALIST

## 2018-11-26 PROCEDURE — 99214 OFFICE O/P EST MOD 30 MIN: CPT | Performed by: HOSPITALIST

## 2018-11-26 PROCEDURE — G8598 ASA/ANTIPLAT THER USED: HCPCS | Performed by: HOSPITALIST

## 2018-11-26 RX ORDER — PAROXETINE HYDROCHLORIDE 20 MG/1
TABLET, FILM COATED ORAL
Qty: 90 TABLET | Refills: 3 | Status: SHIPPED | OUTPATIENT
Start: 2018-11-26 | End: 2019-09-13 | Stop reason: SDUPTHER

## 2018-11-26 RX ORDER — LORAZEPAM 0.5 MG/1
0.5 TABLET ORAL 2 TIMES DAILY
Qty: 60 TABLET | Refills: 2 | Status: SHIPPED | OUTPATIENT
Start: 2018-11-26 | End: 2018-12-26

## 2018-11-26 RX ORDER — DONEPEZIL HYDROCHLORIDE 10 MG/1
10 TABLET, FILM COATED ORAL NIGHTLY
Qty: 90 TABLET | Refills: 3 | Status: SHIPPED | OUTPATIENT
Start: 2018-11-26 | End: 2019-08-23

## 2018-11-26 RX ORDER — AMLODIPINE BESYLATE 2.5 MG/1
2.5 TABLET ORAL 2 TIMES DAILY
Qty: 60 TABLET | Refills: 3 | Status: SHIPPED | OUTPATIENT
Start: 2018-11-26 | End: 2019-08-06

## 2018-11-26 ASSESSMENT — PATIENT HEALTH QUESTIONNAIRE - PHQ9
2. FEELING DOWN, DEPRESSED OR HOPELESS: 1
SUM OF ALL RESPONSES TO PHQ QUESTIONS 1-9: 2
SUM OF ALL RESPONSES TO PHQ9 QUESTIONS 1 & 2: 2
SUM OF ALL RESPONSES TO PHQ QUESTIONS 1-9: 2
1. LITTLE INTEREST OR PLEASURE IN DOING THINGS: 1

## 2018-11-26 ASSESSMENT — ENCOUNTER SYMPTOMS
ABDOMINAL PAIN: 1
ALLERGIC/IMMUNOLOGIC NEGATIVE: 1
SHORTNESS OF BREATH: 1
EYES NEGATIVE: 1

## 2018-11-27 ENCOUNTER — HOSPITAL ENCOUNTER (OUTPATIENT)
Dept: NON INVASIVE DIAGNOSTICS | Age: 83
Discharge: HOME | End: 2018-11-27
Payer: MEDICARE

## 2018-11-27 DIAGNOSIS — Z01.818 PRE-OP EVALUATION: Primary | ICD-10-CM

## 2018-11-27 DIAGNOSIS — R06.09 DYSPNEA ON EXERTION: ICD-10-CM

## 2018-11-27 DIAGNOSIS — Z01.818 PRE-OP EVALUATION: ICD-10-CM

## 2018-11-27 LAB
A/G RATIO: 1.2 (ref 1.1–2.2)
ABO/RH: NORMAL
ALBUMIN SERPL-MCNC: 4.1 G/DL (ref 3.4–5)
ALP BLD-CCNC: 53 U/L (ref 40–129)
ALT SERPL-CCNC: 13 U/L (ref 10–40)
ANION GAP SERPL CALCULATED.3IONS-SCNC: 10 MMOL/L (ref 3–16)
ANTIBODY SCREEN: NORMAL
AST SERPL-CCNC: 17 U/L (ref 15–37)
BILIRUB SERPL-MCNC: 0.5 MG/DL (ref 0–1)
BUN BLDV-MCNC: 17 MG/DL (ref 7–20)
CALCIUM SERPL-MCNC: 9.2 MG/DL (ref 8.3–10.6)
CHLORIDE BLD-SCNC: 106 MMOL/L (ref 99–110)
CO2: 30 MMOL/L (ref 21–32)
CREAT SERPL-MCNC: 1.1 MG/DL (ref 0.6–1.2)
EKG ATRIAL RATE: 68 BPM
EKG DIAGNOSIS: NORMAL
EKG P AXIS: 82 DEGREES
EKG P-R INTERVAL: 176 MS
EKG Q-T INTERVAL: 404 MS
EKG QRS DURATION: 62 MS
EKG QTC CALCULATION (BAZETT): 429 MS
EKG R AXIS: 20 DEGREES
EKG T AXIS: 81 DEGREES
EKG VENTRICULAR RATE: 68 BPM
GFR AFRICAN AMERICAN: 57
GFR NON-AFRICAN AMERICAN: 47
GLOBULIN: 3.3 G/DL
GLUCOSE BLD-MCNC: 111 MG/DL (ref 70–99)
HCT VFR BLD CALC: 44.6 % (ref 36–48)
HEMOGLOBIN: 14.5 G/DL (ref 12–16)
LV EF: 75 %
LVEF MODALITY: NORMAL
MCH RBC QN AUTO: 28.9 PG (ref 26–34)
MCHC RBC AUTO-ENTMCNC: 32.5 G/DL (ref 31–36)
MCV RBC AUTO: 88.8 FL (ref 80–100)
PDW BLD-RTO: 16.2 % (ref 12.4–15.4)
PLATELET # BLD: 201 K/UL (ref 135–450)
PMV BLD AUTO: 8.9 FL (ref 5–10.5)
POTASSIUM REFLEX MAGNESIUM: 4.2 MMOL/L (ref 3.5–5.1)
RBC # BLD: 5.02 M/UL (ref 4–5.2)
SODIUM BLD-SCNC: 146 MMOL/L (ref 136–145)
TOTAL PROTEIN: 7.4 G/DL (ref 6.4–8.2)
WBC # BLD: 5.1 K/UL (ref 4–11)

## 2018-11-27 PROCEDURE — 6360000002 HC RX W HCPCS: Performed by: HOSPITALIST

## 2018-11-27 PROCEDURE — 86850 RBC ANTIBODY SCREEN: CPT

## 2018-11-27 PROCEDURE — 93017 CV STRESS TEST TRACING ONLY: CPT

## 2018-11-27 PROCEDURE — 86900 BLOOD TYPING SEROLOGIC ABO: CPT

## 2018-11-27 PROCEDURE — 80053 COMPREHEN METABOLIC PANEL: CPT

## 2018-11-27 PROCEDURE — 36415 COLL VENOUS BLD VENIPUNCTURE: CPT

## 2018-11-27 PROCEDURE — 78452 HT MUSCLE IMAGE SPECT MULT: CPT

## 2018-11-27 PROCEDURE — 3430000000 HC RX DIAGNOSTIC RADIOPHARMACEUTICAL: Performed by: HOSPITALIST

## 2018-11-27 PROCEDURE — 86901 BLOOD TYPING SEROLOGIC RH(D): CPT

## 2018-11-27 PROCEDURE — 85027 COMPLETE CBC AUTOMATED: CPT

## 2018-11-27 PROCEDURE — A9502 TC99M TETROFOSMIN: HCPCS | Performed by: HOSPITALIST

## 2018-11-27 PROCEDURE — G0480 DRUG TEST DEF 1-7 CLASSES: HCPCS

## 2018-11-27 RX ADMIN — TETROFOSMIN 10 MILLICURIE: 0.23 INJECTION, POWDER, LYOPHILIZED, FOR SOLUTION INTRAVENOUS at 10:46

## 2018-11-27 RX ADMIN — TETROFOSMIN 30 MILLICURIE: 0.23 INJECTION, POWDER, LYOPHILIZED, FOR SOLUTION INTRAVENOUS at 11:56

## 2018-11-27 RX ADMIN — REGADENOSON 0.4 MG: 0.08 INJECTION, SOLUTION INTRAVENOUS at 12:00

## 2018-11-28 ENCOUNTER — TELEPHONE (OUTPATIENT)
Dept: INTERNAL MEDICINE CLINIC | Age: 83
End: 2018-11-28

## 2018-11-28 DIAGNOSIS — R94.39 POSITIVE CARDIAC STRESS TEST: Primary | ICD-10-CM

## 2018-11-28 NOTE — TELEPHONE ENCOUNTER
Call returned to patient's daughter. Results discussed. Patient needs to see cardiologist to address positive stress test.    She will not be able to have surgery at this time. Given names of physicians Torito Villagomez.

## 2018-11-28 NOTE — TELEPHONE ENCOUNTER
Pt daughter calling wondering why Stress test was cancelled for pt?    Daughter is trying to be seen by a cardiologist but they need to see results from Stress test   pls advise

## 2018-11-29 ENCOUNTER — HOSPITAL ENCOUNTER (OUTPATIENT)
Dept: PREADMISSION TESTING | Age: 83
Discharge: HOME | End: 2018-11-29
Payer: MEDICARE

## 2018-11-29 ENCOUNTER — TELEPHONE (OUTPATIENT)
Dept: INTERNAL MEDICINE CLINIC | Age: 83
End: 2018-11-29

## 2018-11-29 ENCOUNTER — TELEPHONE (OUTPATIENT)
Dept: SURGERY | Age: 83
End: 2018-11-29

## 2018-11-29 DIAGNOSIS — K62.89 RECTAL PAIN: ICD-10-CM

## 2018-11-29 DIAGNOSIS — R10.9 ABDOMINAL PAIN, UNSPECIFIED ABDOMINAL LOCATION: ICD-10-CM

## 2018-11-29 RX ORDER — TRAMADOL HYDROCHLORIDE 50 MG/1
50 TABLET ORAL 2 TIMES DAILY PRN
Qty: 14 TABLET | Refills: 0 | Status: ON HOLD | OUTPATIENT
Start: 2018-11-29 | End: 2018-12-07 | Stop reason: HOSPADM

## 2018-11-30 LAB
3-OH-COTININE: <2 NG/ML
COTININE: <2 NG/ML
NICOTINE: <2 NG/ML

## 2018-12-04 ENCOUNTER — OFFICE VISIT (OUTPATIENT)
Dept: CARDIOLOGY CLINIC | Age: 83
End: 2018-12-04
Payer: MEDICARE

## 2018-12-04 VITALS
WEIGHT: 154.2 LBS | HEART RATE: 80 BPM | SYSTOLIC BLOOD PRESSURE: 152 MMHG | DIASTOLIC BLOOD PRESSURE: 60 MMHG | BODY MASS INDEX: 27.32 KG/M2

## 2018-12-04 DIAGNOSIS — R94.39 ABNORMAL NUCLEAR STRESS TEST: ICD-10-CM

## 2018-12-04 DIAGNOSIS — I25.10 CORONARY ARTERY DISEASE INVOLVING NATIVE CORONARY ARTERY OF NATIVE HEART WITHOUT ANGINA PECTORIS: Chronic | ICD-10-CM

## 2018-12-04 DIAGNOSIS — I10 ESSENTIAL HYPERTENSION: Primary | Chronic | ICD-10-CM

## 2018-12-04 DIAGNOSIS — F17.200 SMOKING: ICD-10-CM

## 2018-12-04 DIAGNOSIS — R06.09 EXERTIONAL DYSPNEA: ICD-10-CM

## 2018-12-04 DIAGNOSIS — J43.2 CENTRILOBULAR EMPHYSEMA (HCC): Chronic | ICD-10-CM

## 2018-12-04 PROCEDURE — G8417 CALC BMI ABV UP PARAM F/U: HCPCS | Performed by: INTERNAL MEDICINE

## 2018-12-04 PROCEDURE — G8926 SPIRO NO PERF OR DOC: HCPCS | Performed by: INTERNAL MEDICINE

## 2018-12-04 PROCEDURE — 1101F PT FALLS ASSESS-DOCD LE1/YR: CPT | Performed by: INTERNAL MEDICINE

## 2018-12-04 PROCEDURE — 1036F TOBACCO NON-USER: CPT | Performed by: INTERNAL MEDICINE

## 2018-12-04 PROCEDURE — G8598 ASA/ANTIPLAT THER USED: HCPCS | Performed by: INTERNAL MEDICINE

## 2018-12-04 PROCEDURE — G8482 FLU IMMUNIZE ORDER/ADMIN: HCPCS | Performed by: INTERNAL MEDICINE

## 2018-12-04 PROCEDURE — 99204 OFFICE O/P NEW MOD 45 MIN: CPT | Performed by: INTERNAL MEDICINE

## 2018-12-04 PROCEDURE — 3023F SPIROM DOC REV: CPT | Performed by: INTERNAL MEDICINE

## 2018-12-04 PROCEDURE — G8427 DOCREV CUR MEDS BY ELIG CLIN: HCPCS | Performed by: INTERNAL MEDICINE

## 2018-12-04 PROCEDURE — G8400 PT W/DXA NO RESULTS DOC: HCPCS | Performed by: INTERNAL MEDICINE

## 2018-12-04 PROCEDURE — 1123F ACP DISCUSS/DSCN MKR DOCD: CPT | Performed by: INTERNAL MEDICINE

## 2018-12-04 PROCEDURE — 1090F PRES/ABSN URINE INCON ASSESS: CPT | Performed by: INTERNAL MEDICINE

## 2018-12-04 PROCEDURE — 4040F PNEUMOC VAC/ADMIN/RCVD: CPT | Performed by: INTERNAL MEDICINE

## 2018-12-05 NOTE — PRE-PROCEDURE INSTRUCTIONS
Pre procedure phone call completed, spoke with pt asked that she arrive by [phone] for 1400 procedure, nothing to eat or drink after midnight except morning meds with sip of water as directed by physician, bring list of meds, have someone to drive home and stay with for 24 hours if she is to be discharged    Anatoliy Jacobs RN

## 2018-12-06 ENCOUNTER — HOSPITAL ENCOUNTER (OUTPATIENT)
Dept: CARDIAC CATH/INVASIVE PROCEDURES | Age: 83
LOS: 1 days | Discharge: HOME | End: 2018-12-07
Attending: INTERNAL MEDICINE | Admitting: INTERNAL MEDICINE
Payer: MEDICARE

## 2018-12-06 PROBLEM — I25.10 CAD IN NATIVE ARTERY: Status: ACTIVE | Noted: 2018-12-06

## 2018-12-06 LAB
CHOLESTEROL, TOTAL: 184 MG/DL (ref 0–199)
HDLC SERPL-MCNC: 42 MG/DL (ref 40–60)
LDL CHOLESTEROL CALCULATED: 126 MG/DL
TRIGL SERPL-MCNC: 79 MG/DL (ref 0–150)
VLDLC SERPL CALC-MCNC: 16 MG/DL

## 2018-12-06 PROCEDURE — C1769 GUIDE WIRE: HCPCS

## 2018-12-06 PROCEDURE — 2500000003 HC RX 250 WO HCPCS

## 2018-12-06 PROCEDURE — C1894 INTRO/SHEATH, NON-LASER: HCPCS

## 2018-12-06 PROCEDURE — 93005 ELECTROCARDIOGRAM TRACING: CPT | Performed by: INTERNAL MEDICINE

## 2018-12-06 PROCEDURE — C1725 CATH, TRANSLUMIN NON-LASER: HCPCS

## 2018-12-06 PROCEDURE — 99024 POSTOP FOLLOW-UP VISIT: CPT | Performed by: INTERNAL MEDICINE

## 2018-12-06 PROCEDURE — 85347 COAGULATION TIME ACTIVATED: CPT

## 2018-12-06 PROCEDURE — 2709999900 HC NON-CHARGEABLE SUPPLY

## 2018-12-06 PROCEDURE — 92928 PRQ TCAT PLMT NTRAC ST 1 LES: CPT | Performed by: INTERNAL MEDICINE

## 2018-12-06 PROCEDURE — 80061 LIPID PANEL: CPT

## 2018-12-06 PROCEDURE — 93458 L HRT ARTERY/VENTRICLE ANGIO: CPT | Performed by: INTERNAL MEDICINE

## 2018-12-06 PROCEDURE — 99152 MOD SED SAME PHYS/QHP 5/>YRS: CPT | Performed by: INTERNAL MEDICINE

## 2018-12-06 PROCEDURE — 6360000002 HC RX W HCPCS: Performed by: INTERNAL MEDICINE

## 2018-12-06 PROCEDURE — 99153 MOD SED SAME PHYS/QHP EA: CPT | Performed by: INTERNAL MEDICINE

## 2018-12-06 PROCEDURE — 6370000000 HC RX 637 (ALT 250 FOR IP): Performed by: INTERNAL MEDICINE

## 2018-12-06 PROCEDURE — C1876 STENT, NON-COA/NON-COV W/DEL: HCPCS

## 2018-12-06 PROCEDURE — 6360000002 HC RX W HCPCS

## 2018-12-06 PROCEDURE — 6370000000 HC RX 637 (ALT 250 FOR IP)

## 2018-12-06 PROCEDURE — 36415 COLL VENOUS BLD VENIPUNCTURE: CPT

## 2018-12-06 PROCEDURE — 2580000003 HC RX 258: Performed by: INTERNAL MEDICINE

## 2018-12-06 PROCEDURE — 85610 PROTHROMBIN TIME: CPT

## 2018-12-06 PROCEDURE — C1887 CATHETER, GUIDING: HCPCS

## 2018-12-06 RX ORDER — EPTIFIBATIDE 0.75 MG/ML
2 INJECTION, SOLUTION INTRAVENOUS CONTINUOUS
Status: DISCONTINUED | OUTPATIENT
Start: 2018-12-06 | End: 2018-12-06 | Stop reason: SDUPTHER

## 2018-12-06 RX ORDER — SODIUM CHLORIDE 9 MG/ML
INJECTION, SOLUTION INTRAVENOUS CONTINUOUS
Status: ACTIVE | OUTPATIENT
Start: 2018-12-06 | End: 2018-12-07

## 2018-12-06 RX ORDER — ACETAMINOPHEN 325 MG/1
650 TABLET ORAL EVERY 4 HOURS PRN
Status: DISCONTINUED | OUTPATIENT
Start: 2018-12-06 | End: 2018-12-07 | Stop reason: HOSPADM

## 2018-12-06 RX ORDER — MORPHINE SULFATE 2 MG/ML
2 INJECTION, SOLUTION INTRAMUSCULAR; INTRAVENOUS
Status: ACTIVE | OUTPATIENT
Start: 2018-12-06 | End: 2018-12-06

## 2018-12-06 RX ORDER — SODIUM CHLORIDE 0.9 % (FLUSH) 0.9 %
10 SYRINGE (ML) INJECTION EVERY 12 HOURS SCHEDULED
Status: DISCONTINUED | OUTPATIENT
Start: 2018-12-06 | End: 2018-12-07 | Stop reason: HOSPADM

## 2018-12-06 RX ORDER — ATORVASTATIN CALCIUM 40 MG/1
40 TABLET, FILM COATED ORAL NIGHTLY
Status: DISCONTINUED | OUTPATIENT
Start: 2018-12-06 | End: 2018-12-07 | Stop reason: HOSPADM

## 2018-12-06 RX ORDER — ASPIRIN 81 MG/1
81 TABLET, CHEWABLE ORAL DAILY
Status: DISCONTINUED | OUTPATIENT
Start: 2018-12-07 | End: 2018-12-07 | Stop reason: HOSPADM

## 2018-12-06 RX ORDER — SODIUM CHLORIDE 0.9 % (FLUSH) 0.9 %
10 SYRINGE (ML) INJECTION PRN
Status: DISCONTINUED | OUTPATIENT
Start: 2018-12-06 | End: 2018-12-07 | Stop reason: HOSPADM

## 2018-12-06 RX ORDER — ATROPINE SULFATE 0.1 MG/ML
0.5 INJECTION INTRAVENOUS
Status: ACTIVE | OUTPATIENT
Start: 2018-12-06 | End: 2018-12-06

## 2018-12-06 RX ORDER — CLOPIDOGREL BISULFATE 75 MG/1
75 TABLET ORAL DAILY
Status: DISCONTINUED | OUTPATIENT
Start: 2018-12-07 | End: 2018-12-07 | Stop reason: HOSPADM

## 2018-12-06 RX ORDER — ONDANSETRON 2 MG/ML
4 INJECTION INTRAMUSCULAR; INTRAVENOUS EVERY 6 HOURS PRN
Status: DISCONTINUED | OUTPATIENT
Start: 2018-12-06 | End: 2018-12-07 | Stop reason: HOSPADM

## 2018-12-06 RX ORDER — ATROPINE SULFATE 0.4 MG/ML
0.5 AMPUL (ML) INJECTION
Status: DISCONTINUED | OUTPATIENT
Start: 2018-12-06 | End: 2018-12-06

## 2018-12-06 RX ORDER — LISINOPRIL 2.5 MG/1
2.5 TABLET ORAL DAILY
Status: DISCONTINUED | OUTPATIENT
Start: 2018-12-06 | End: 2018-12-07

## 2018-12-06 RX ADMIN — Medication 10 ML: at 22:16

## 2018-12-06 RX ADMIN — SODIUM CHLORIDE: 9 INJECTION, SOLUTION INTRAVENOUS at 18:11

## 2018-12-06 RX ADMIN — METOPROLOL TARTRATE 25 MG: 25 TABLET ORAL at 22:16

## 2018-12-06 RX ADMIN — TIROFIBAN 0.15 MCG/KG/MIN: 5 INJECTION, SOLUTION INTRAVENOUS at 22:36

## 2018-12-06 RX ADMIN — ATORVASTATIN CALCIUM 40 MG: 40 TABLET, FILM COATED ORAL at 22:16

## 2018-12-06 ASSESSMENT — PAIN SCALES - GENERAL
PAINLEVEL_OUTOF10: 0
PAINLEVEL_OUTOF10: 0

## 2018-12-06 ASSESSMENT — PAIN - FUNCTIONAL ASSESSMENT: PAIN_FUNCTIONAL_ASSESSMENT: 0-10

## 2018-12-06 NOTE — ANESTHESIA PRE-OP
OXYGEN Inhale 2 L/min into the lungs continuous       No current facility-administered medications for this encounter. Pre-Sedation:    Pre-Sedation Documentation and Exam:  I have personally completed a history, physical exam & review of systems for this patient (see notes). Prior History of Anesthesia Complications:   none    Modified Mallampati:  I (soft palate, uvula, fauces, tonsillar pillars visible)    ASA Classification:  Class 2 - A normal healthy patient with mild systemic disease and Class 2 -- A normal healthy patient with mild systemic disease    Amalia Scale: Activity:  2 - Able to move 4 extremities voluntarily on command  Respiration:  2 - Able to breathe deeply and cough freely  Circulation:  2 - BP+/- 20mmHg of normal  Consciousness:  2 - Fully awake  Oxygen Saturation (color):  2 - Able to maintain oxygen saturation >92% on room air    Sedation/Anesthesia Plan:  Guard the patient's safety and welfare. Minimize physical discomfort and pain. Minimize negative psychological responses to treatment by providing sedation and analgesia and maximize the potential amnesia. Patient to meet pre-procedure discharge plan.     Medication Planned:  midazolam intravenously, fentanyl intravenously    Patient is an appropriate candidate for plan of sedation: yes      Electronically signed by Donald Toribio MD on 12/6/2018 at 2:28 PM

## 2018-12-06 NOTE — PROCEDURES
[id number] [facility]      Cardiology Procedure Note/ Cath and PCI  Karina Jackson,12/6/2018,4:48 PM        Patient ID  Frank Abbasi  80 y.o.  [id number]  Referring MD : Kalyan Richardson MD  12/6/2018 4:48 PM  Procedure : Cardiac Cath and PCI with stent to the LAD proximal and mid. Stents to the right coronary artery and proximal and distal.  All stents placed were bare metal in anticipation of future surgery. 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 Indication: Typical Angina with history of CAD, positive stress test.  Anesthesia: Moderate sedation with Versed and Fentanyl IV  Estimated blood loss :minimal  Specimen removed: None        After informed consent was obtained and  History and exam reviewed the patient was taken to the cardiac cath lab. The approach the patient from the right radial artery after a negative Gabriel's test.  We initially placed a sheath and followed with a Harshad catheter. The Harshad catheter was able to access the left coronary artery and we injected that impairs degrees of obliquity. We then removed that catheter and turned it and was able to came at the right coronary artery and injected that impairs degrees of obliquity. We then manipulated the Harshad catheter across the aortic valve into the left ventricle where he had a power injection of 24 cc of contrast was made. After pullback across the valve and finding there was no gradient we then made an attempt to perform intervention. At this time for the intervention we then placed in it to be 3.5 guide catheter through the right radial sheath. We accessed the left main coronary artery. We will then able to place a BMW guidewire down the left anterior descending vessel.   We did do right stenting

## 2018-12-07 VITALS
OXYGEN SATURATION: 94 % | BODY MASS INDEX: 27.29 KG/M2 | DIASTOLIC BLOOD PRESSURE: 77 MMHG | SYSTOLIC BLOOD PRESSURE: 154 MMHG | HEIGHT: 63 IN | WEIGHT: 154 LBS | RESPIRATION RATE: 16 BRPM | TEMPERATURE: 98 F | HEART RATE: 69 BPM

## 2018-12-07 PROBLEM — Z92.89 H/O ANGIOGRAPHY: Status: ACTIVE | Noted: 2018-12-07

## 2018-12-07 LAB
ANION GAP SERPL CALCULATED.3IONS-SCNC: 9 MMOL/L (ref 3–16)
BUN BLDV-MCNC: 14 MG/DL (ref 7–20)
CALCIUM SERPL-MCNC: 8.6 MG/DL (ref 8.3–10.6)
CHLORIDE BLD-SCNC: 103 MMOL/L (ref 99–110)
CO2: 27 MMOL/L (ref 21–32)
CREAT SERPL-MCNC: 0.8 MG/DL (ref 0.6–1.2)
EKG ATRIAL RATE: 66 BPM
EKG ATRIAL RATE: 72 BPM
EKG DIAGNOSIS: NORMAL
EKG DIAGNOSIS: NORMAL
EKG P AXIS: 65 DEGREES
EKG P AXIS: 68 DEGREES
EKG P-R INTERVAL: 178 MS
EKG P-R INTERVAL: 184 MS
EKG Q-T INTERVAL: 404 MS
EKG Q-T INTERVAL: 414 MS
EKG QRS DURATION: 66 MS
EKG QRS DURATION: 76 MS
EKG QTC CALCULATION (BAZETT): 423 MS
EKG QTC CALCULATION (BAZETT): 453 MS
EKG R AXIS: 66 DEGREES
EKG R AXIS: 77 DEGREES
EKG T AXIS: 211 DEGREES
EKG T AXIS: 68 DEGREES
EKG VENTRICULAR RATE: 66 BPM
EKG VENTRICULAR RATE: 72 BPM
GFR AFRICAN AMERICAN: >60
GFR NON-AFRICAN AMERICAN: >60
GLUCOSE BLD-MCNC: 118 MG/DL (ref 70–99)
INR BLD: 1.1 (ref 0.8–1.4)
MAGNESIUM: 1.9 MG/DL (ref 1.8–2.4)
POTASSIUM REFLEX MAGNESIUM: 3.8 MMOL/L (ref 3.5–5.1)
POTASSIUM SERPL-SCNC: 3.9 MMOL/L (ref 3.5–5.1)
SODIUM BLD-SCNC: 139 MMOL/L (ref 136–145)

## 2018-12-07 PROCEDURE — G0238 OTH RESP PROC, INDIV: HCPCS

## 2018-12-07 PROCEDURE — 93005 ELECTROCARDIOGRAM TRACING: CPT | Performed by: INTERNAL MEDICINE

## 2018-12-07 PROCEDURE — 80048 BASIC METABOLIC PNL TOTAL CA: CPT

## 2018-12-07 PROCEDURE — 83735 ASSAY OF MAGNESIUM: CPT

## 2018-12-07 PROCEDURE — 99217 PR OBSERVATION CARE DISCHARGE MANAGEMENT: CPT | Performed by: INTERNAL MEDICINE

## 2018-12-07 PROCEDURE — 6370000000 HC RX 637 (ALT 250 FOR IP): Performed by: INTERNAL MEDICINE

## 2018-12-07 PROCEDURE — 84132 ASSAY OF SERUM POTASSIUM: CPT

## 2018-12-07 PROCEDURE — 2580000003 HC RX 258: Performed by: INTERNAL MEDICINE

## 2018-12-07 PROCEDURE — 36415 COLL VENOUS BLD VENIPUNCTURE: CPT

## 2018-12-07 PROCEDURE — APPSS30 APP SPLIT SHARED TIME 16-30 MINUTES: Performed by: NURSE PRACTITIONER

## 2018-12-07 RX ORDER — ATORVASTATIN CALCIUM 40 MG/1
40 TABLET, FILM COATED ORAL NIGHTLY
Qty: 30 TABLET | Refills: 3 | Status: SHIPPED | OUTPATIENT
Start: 2018-12-07 | End: 2019-03-20 | Stop reason: SDUPTHER

## 2018-12-07 RX ORDER — CLOPIDOGREL BISULFATE 75 MG/1
75 TABLET ORAL DAILY
Qty: 30 TABLET | Refills: 3 | Status: ON HOLD | OUTPATIENT
Start: 2018-12-08 | End: 2019-03-06 | Stop reason: HOSPADM

## 2018-12-07 RX ORDER — LISINOPRIL 10 MG/1
10 TABLET ORAL DAILY
Status: DISCONTINUED | OUTPATIENT
Start: 2018-12-08 | End: 2018-12-07

## 2018-12-07 RX ADMIN — METOPROLOL TARTRATE 25 MG: 25 TABLET ORAL at 08:09

## 2018-12-07 RX ADMIN — Medication 10 ML: at 08:09

## 2018-12-07 RX ADMIN — LISINOPRIL 2.5 MG: 2.5 TABLET ORAL at 08:09

## 2018-12-07 RX ADMIN — ASPIRIN 81 MG: 81 TABLET, CHEWABLE ORAL at 08:09

## 2018-12-07 RX ADMIN — CLOPIDOGREL BISULFATE 75 MG: 75 TABLET ORAL at 08:09

## 2018-12-07 ASSESSMENT — PAIN SCALES - GENERAL
PAINLEVEL_OUTOF10: 0

## 2018-12-07 NOTE — CONSULTS
Pharmacy Progress Note    Admit date: 12/6/2018     Consult entered by Dr. Lauren Vega stating that patient will need Clopidogrel via the Meds To Beds program at discharge. Anticipated discharge date:  12/8/18    1)  Meds To Beds General Rules:   Can only be done Mon-Fri between 8:30am-5pm   Prescription(s) must be in pharmacy by 3pm to be filled the same day   Copy of patient's insurance / prescription drug card and patient Face Sheet must be sent along with the prescription   Cost of Rx cannot be added to the hospital bill. If financial assistance is required, Case Management / SW should be consulted.    Patients can then  the prescription on their way out of the hospital at discharge (payment due at time of pick-up or delivery--cash, check, or card accepted)    Tyshawn FernandezD, [facility]  Wireless: [id number]  or [phone]  12/7/2018 7:29 AM

## 2018-12-07 NOTE — DISCHARGE SUMMARY
Cardiology  Discharge Summary  Dr. Bella Curran MD  [facility] APR FN CVNP   12/7/18    Patient :Tavon Aguilar  Room/Bed: 3701/3099-56  Medical Record: [id number]  YOB: 1934    Admit date: 12/6/2018  Discharge date and time: 12/07/18     Admitting Physician: Catia Frazier MD   Discharge Physician: Catia Frazier MD    Admission Diagnoses: CAD in native artery [I25.10]  Discharge Diagnoses: CAD    Discharged Condition: good  Hospital Course:    Assessment:     CAD   12/6/18 LHC / PCI with stent to the LAD proximal and mid. Stents to the right coronary artery and proximal and distal.  All stents placed were bare metal in anticipation of future surgery. HTN:   On higher side   cont BB and CCB at home      LDL   Statin    12/6/18 / suboptimal     hosp card meds: lopressor  plavix asa Lipitor     Plan:   dc today / home care discussed   Stop lisinopril allergy in epic / on Norvasc at home   Fu with Dr. Jesus Diaz / Miriam Davis in approx a week     [facility] FN CVNP    Discharge Exam: see progress note     Disposition: home      Prior to Admission medications    Medication Sig Start Date End Date Taking? Authorizing Provider   atorvastatin (LIPITOR) 40 MG tablet Take 1 tablet by mouth nightly 12/7/18  Yes [facility], APRN - CNP   clopidogrel (PLAVIX) 75 MG tablet Take 1 tablet by mouth daily 12/8/18  Yes [facility], APRN - CNP   metoprolol tartrate (LOPRESSOR) 25 MG tablet Take 1 tablet by mouth 2 times daily 12/7/18  Yes [facility]kt [facility], APRN - CNP   amLODIPine (NORVASC) 2.5 MG tablet Take 1 tablet by mouth 2 times daily 11/26/18  Yes Gianni Uribe MD   PARoxetine (PAXIL) 20 MG tablet TAKE ONE TABLET BY MOUTH DAILY 11/26/18  Yes Gianni Uribe MD   donepezil (ARICEPT) 10 MG tablet Take 1 tablet by mouth nightly 11/26/18  Yes Gianni Uribe MD   LORazepam (ATIVAN) 0.5 MG tablet Take 1 tablet by mouth 2 times daily for 30 days. Fill the prescription on or after 12/04/18. suicidal attempt

## 2018-12-09 LAB
POC ACT LR: 269 SEC (ref 113–149)
POC ACT LR: 399 SEC (ref 113–149)

## 2018-12-10 ENCOUNTER — TELEPHONE (OUTPATIENT)
Dept: INTERNAL MEDICINE CLINIC | Age: 83
End: 2018-12-10

## 2018-12-10 ENCOUNTER — CARE COORDINATION (OUTPATIENT)
Dept: CARE COORDINATION | Age: 83
End: 2018-12-10

## 2018-12-11 ENCOUNTER — OFFICE VISIT (OUTPATIENT)
Dept: INTERNAL MEDICINE CLINIC | Age: 83
End: 2018-12-11
Payer: MEDICARE

## 2018-12-11 VITALS
WEIGHT: 150 LBS | HEIGHT: 63 IN | SYSTOLIC BLOOD PRESSURE: 112 MMHG | DIASTOLIC BLOOD PRESSURE: 60 MMHG | BODY MASS INDEX: 26.58 KG/M2

## 2018-12-11 DIAGNOSIS — R10.32 LLQ ABDOMINAL PAIN: ICD-10-CM

## 2018-12-11 DIAGNOSIS — I25.83 CORONARY ARTERY DISEASE DUE TO LIPID RICH PLAQUE: ICD-10-CM

## 2018-12-11 DIAGNOSIS — I25.10 CORONARY ARTERY DISEASE DUE TO LIPID RICH PLAQUE: ICD-10-CM

## 2018-12-11 DIAGNOSIS — E86.1 HYPOTENSION DUE TO HYPOVOLEMIA: Primary | ICD-10-CM

## 2018-12-11 DIAGNOSIS — I95.89 HYPOTENSION DUE TO HYPOVOLEMIA: Primary | ICD-10-CM

## 2018-12-11 PROCEDURE — 1111F DSCHRG MED/CURRENT MED MERGE: CPT | Performed by: HOSPITALIST

## 2018-12-11 PROCEDURE — 1101F PT FALLS ASSESS-DOCD LE1/YR: CPT | Performed by: HOSPITALIST

## 2018-12-11 PROCEDURE — 1036F TOBACCO NON-USER: CPT | Performed by: HOSPITALIST

## 2018-12-11 PROCEDURE — 1123F ACP DISCUSS/DSCN MKR DOCD: CPT | Performed by: HOSPITALIST

## 2018-12-11 PROCEDURE — G8598 ASA/ANTIPLAT THER USED: HCPCS | Performed by: HOSPITALIST

## 2018-12-11 PROCEDURE — G8482 FLU IMMUNIZE ORDER/ADMIN: HCPCS | Performed by: HOSPITALIST

## 2018-12-11 PROCEDURE — G8400 PT W/DXA NO RESULTS DOC: HCPCS | Performed by: HOSPITALIST

## 2018-12-11 PROCEDURE — G8417 CALC BMI ABV UP PARAM F/U: HCPCS | Performed by: HOSPITALIST

## 2018-12-11 PROCEDURE — 4040F PNEUMOC VAC/ADMIN/RCVD: CPT | Performed by: HOSPITALIST

## 2018-12-11 PROCEDURE — G8427 DOCREV CUR MEDS BY ELIG CLIN: HCPCS | Performed by: HOSPITALIST

## 2018-12-11 PROCEDURE — 99213 OFFICE O/P EST LOW 20 MIN: CPT | Performed by: HOSPITALIST

## 2018-12-11 PROCEDURE — 1090F PRES/ABSN URINE INCON ASSESS: CPT | Performed by: HOSPITALIST

## 2018-12-11 NOTE — PROGRESS NOTES
Follow Up Visit Established Patient Visit    Patient:  Nerissa Stevenson                                               : 1934  Age: 80 y.o. MRN: [id number]  Date : 2018    Nerissa Stevenson is a 80 y.o. female who presents for :  Follow-up appointment. Chief Complaint   Patient presents with    Diverticulitis     colon surgery scheduled    Nausea & Vomiting     started saturday     Reports nausea, vomiting, diarrhea, feeling dizzy x 3 days. She also reports increasing LLQ abdominal pain. She underwent coronary angiography with stent placements into mid and distal LAD and mid and distal RCA ( total 4 bare metal stents) on 18 by Dr Fransisco Dickinson. He didn't take any tramadol in the last 4 days. Doesn't report fevers/chills, no dysuria.     Past Medical History:   Diagnosis Date    Abdominal pain, unspecified site     Adrenal hyperplasia ([address] 2013    Left - CT scan -     Ankle Fracture, Right     Anxiety and depression     Aortic atherosclerosis ([address] 10/3/2015    Contusion of unspecified site     COPD (chronic obstructive pulmonary disease) ([address] 3/1/2013    Coronary artery disease     Arteriosclerosis    DJD (degenerative joint disease)     Eczema 2013    Essential hypertension 2015    Hyperlipidemia     Hypertension     Hypoxia 2015    Insomnia     Leukocytosis     Memory loss     Nicotine addiction     Osteoarthritis     Pain in joint, pelvic region and thigh     Primary osteoarthritis involving multiple joints 2015    Pulmonary hypertension ([address] 10/3/2015    Senile reticular degeneration of peripheral retina     Syncope     Thoracic or lumbosacral neuritis or radiculitis, unspecified     Urinary incontinence     Visual hallucinations 2014    Wears glasses        Past Surgical History:   Procedure Laterality Date    CATARACT REMOVAL WITH IMPLANT Left 2012    Dr. Gayle Alba

## 2018-12-17 ENCOUNTER — TELEPHONE (OUTPATIENT)
Dept: SURGERY | Age: 83
End: 2018-12-17

## 2018-12-17 NOTE — TELEPHONE ENCOUNTER
The patients daughter would like to discuss the appointment that is scheduled for tomorrow. She is not sure if she should keep this appointment. She mentioned that if she needs surgery it would not be able to take place for 4-6 weeks due to needing stents. Please call.

## 2018-12-20 DIAGNOSIS — G25.81 RESTLESS LEG SYNDROME: ICD-10-CM

## 2018-12-21 RX ORDER — ROPINIROLE 0.25 MG/1
TABLET, FILM COATED ORAL
Qty: 30 TABLET | Refills: 1 | Status: SHIPPED | OUTPATIENT
Start: 2018-12-21 | End: 2019-03-12 | Stop reason: SDUPTHER

## 2019-01-10 ENCOUNTER — TELEPHONE (OUTPATIENT)
Dept: INTERNAL MEDICINE CLINIC | Age: 84
End: 2019-01-10

## 2019-01-10 ENCOUNTER — OFFICE VISIT (OUTPATIENT)
Dept: CARDIOLOGY CLINIC | Age: 84
End: 2019-01-10
Payer: MEDICARE

## 2019-01-10 VITALS
WEIGHT: 152.6 LBS | BODY MASS INDEX: 27.03 KG/M2 | DIASTOLIC BLOOD PRESSURE: 58 MMHG | SYSTOLIC BLOOD PRESSURE: 120 MMHG | HEART RATE: 68 BPM

## 2019-01-10 DIAGNOSIS — J43.2 CENTRILOBULAR EMPHYSEMA (HCC): Chronic | ICD-10-CM

## 2019-01-10 DIAGNOSIS — I70.0 AORTIC ATHEROSCLEROSIS (HCC): Chronic | ICD-10-CM

## 2019-01-10 DIAGNOSIS — Z95.5 S/P BARE METAL CORONARY ARTERY STENT: ICD-10-CM

## 2019-01-10 DIAGNOSIS — I10 ESSENTIAL HYPERTENSION: Primary | Chronic | ICD-10-CM

## 2019-01-10 DIAGNOSIS — R10.32 ABDOMINAL PAIN, CHRONIC, LEFT LOWER QUADRANT: ICD-10-CM

## 2019-01-10 DIAGNOSIS — G89.29 ABDOMINAL PAIN, CHRONIC, LEFT LOWER QUADRANT: ICD-10-CM

## 2019-01-10 DIAGNOSIS — R10.9 ABDOMINAL PAIN, UNSPECIFIED ABDOMINAL LOCATION: Primary | ICD-10-CM

## 2019-01-10 DIAGNOSIS — I25.10 CORONARY ARTERY DISEASE DUE TO LIPID RICH PLAQUE: Chronic | ICD-10-CM

## 2019-01-10 DIAGNOSIS — I25.83 CORONARY ARTERY DISEASE DUE TO LIPID RICH PLAQUE: Chronic | ICD-10-CM

## 2019-01-10 PROCEDURE — G8427 DOCREV CUR MEDS BY ELIG CLIN: HCPCS | Performed by: INTERNAL MEDICINE

## 2019-01-10 PROCEDURE — G8417 CALC BMI ABV UP PARAM F/U: HCPCS | Performed by: INTERNAL MEDICINE

## 2019-01-10 PROCEDURE — 1090F PRES/ABSN URINE INCON ASSESS: CPT | Performed by: INTERNAL MEDICINE

## 2019-01-10 PROCEDURE — 1036F TOBACCO NON-USER: CPT | Performed by: INTERNAL MEDICINE

## 2019-01-10 PROCEDURE — 1101F PT FALLS ASSESS-DOCD LE1/YR: CPT | Performed by: INTERNAL MEDICINE

## 2019-01-10 PROCEDURE — 3023F SPIROM DOC REV: CPT | Performed by: INTERNAL MEDICINE

## 2019-01-10 PROCEDURE — G8598 ASA/ANTIPLAT THER USED: HCPCS | Performed by: INTERNAL MEDICINE

## 2019-01-10 PROCEDURE — 1123F ACP DISCUSS/DSCN MKR DOCD: CPT | Performed by: INTERNAL MEDICINE

## 2019-01-10 PROCEDURE — G8926 SPIRO NO PERF OR DOC: HCPCS | Performed by: INTERNAL MEDICINE

## 2019-01-10 PROCEDURE — 4040F PNEUMOC VAC/ADMIN/RCVD: CPT | Performed by: INTERNAL MEDICINE

## 2019-01-10 PROCEDURE — 99215 OFFICE O/P EST HI 40 MIN: CPT | Performed by: INTERNAL MEDICINE

## 2019-01-10 PROCEDURE — G8400 PT W/DXA NO RESULTS DOC: HCPCS | Performed by: INTERNAL MEDICINE

## 2019-01-10 PROCEDURE — G8482 FLU IMMUNIZE ORDER/ADMIN: HCPCS | Performed by: INTERNAL MEDICINE

## 2019-01-10 RX ORDER — PANTOPRAZOLE SODIUM 40 MG/1
40 TABLET, DELAYED RELEASE ORAL
Qty: 30 TABLET | Refills: 5 | Status: SHIPPED | OUTPATIENT
Start: 2019-01-10 | End: 2019-08-04 | Stop reason: SDUPTHER

## 2019-01-11 RX ORDER — HYDROCODONE BITARTRATE AND ACETAMINOPHEN 5; 325 MG/1; MG/1
1 TABLET ORAL 2 TIMES DAILY PRN
Qty: 60 TABLET | Refills: 0 | Status: SHIPPED | OUTPATIENT
Start: 2019-01-11 | End: 2019-02-13 | Stop reason: SDUPTHER

## 2019-01-15 ENCOUNTER — CARE COORDINATION (OUTPATIENT)
Dept: CARE COORDINATION | Age: 84
End: 2019-01-15

## 2019-02-01 ENCOUNTER — TELEPHONE (OUTPATIENT)
Dept: INTERNAL MEDICINE CLINIC | Age: 84
End: 2019-02-01

## 2019-02-01 DIAGNOSIS — R05.9 COUGH IN ADULT: Primary | ICD-10-CM

## 2019-02-01 RX ORDER — GUAIFENESIN AND CODEINE PHOSPHATE 100; 10 MG/5ML; MG/5ML
5 SOLUTION ORAL 4 TIMES DAILY PRN
Qty: 180 ML | Refills: 0 | Status: SHIPPED | OUTPATIENT
Start: 2019-02-01 | End: 2019-02-08

## 2019-02-13 DIAGNOSIS — R10.9 ABDOMINAL PAIN, UNSPECIFIED ABDOMINAL LOCATION: ICD-10-CM

## 2019-02-13 DIAGNOSIS — G89.29 ABDOMINAL PAIN, CHRONIC, LEFT LOWER QUADRANT: ICD-10-CM

## 2019-02-13 DIAGNOSIS — R10.32 ABDOMINAL PAIN, CHRONIC, LEFT LOWER QUADRANT: ICD-10-CM

## 2019-02-14 RX ORDER — HYDROCODONE BITARTRATE AND ACETAMINOPHEN 5; 325 MG/1; MG/1
1 TABLET ORAL 2 TIMES DAILY PRN
Qty: 60 TABLET | Refills: 0 | Status: SHIPPED | OUTPATIENT
Start: 2019-02-14 | End: 2019-03-20 | Stop reason: SDUPTHER

## 2019-02-15 ENCOUNTER — TELEPHONE (OUTPATIENT)
Dept: INTERNAL MEDICINE CLINIC | Age: 84
End: 2019-02-15

## 2019-02-18 ENCOUNTER — CARE COORDINATION (OUTPATIENT)
Dept: CARE COORDINATION | Age: 84
End: 2019-02-18

## 2019-02-19 ENCOUNTER — TELEPHONE (OUTPATIENT)
Dept: SURGERY | Age: 84
End: 2019-02-19

## 2019-02-21 ENCOUNTER — CARE COORDINATION (OUTPATIENT)
Dept: CARE COORDINATION | Age: 84
End: 2019-02-21

## 2019-03-04 ENCOUNTER — TELEPHONE (OUTPATIENT)
Dept: SURGERY | Age: 84
End: 2019-03-04

## 2019-03-04 ENCOUNTER — TELEPHONE (OUTPATIENT)
Dept: INTERNAL MEDICINE CLINIC | Age: 84
End: 2019-03-04

## 2019-03-04 ENCOUNTER — APPOINTMENT (OUTPATIENT)
Dept: GENERAL RADIOLOGY | Age: 84
DRG: 175 | End: 2019-03-04
Payer: MEDICARE

## 2019-03-04 ENCOUNTER — APPOINTMENT (OUTPATIENT)
Dept: CT IMAGING | Age: 84
DRG: 175 | End: 2019-03-04
Payer: MEDICARE

## 2019-03-04 ENCOUNTER — HOSPITAL ENCOUNTER (INPATIENT)
Age: 84
LOS: 2 days | Discharge: HOME | DRG: 175 | End: 2019-03-06
Attending: EMERGENCY MEDICINE | Admitting: EMERGENCY MEDICINE
Payer: MEDICARE

## 2019-03-04 DIAGNOSIS — I50.9 ACUTE CONGESTIVE HEART FAILURE, UNSPECIFIED HEART FAILURE TYPE (HCC): ICD-10-CM

## 2019-03-04 DIAGNOSIS — R06.09 DYSPNEA ON EXERTION: Primary | ICD-10-CM

## 2019-03-04 DIAGNOSIS — I26.99 OTHER ACUTE PULMONARY EMBOLISM WITHOUT ACUTE COR PULMONALE (HCC): ICD-10-CM

## 2019-03-04 LAB
A/G RATIO: 1 (ref 1.1–2.2)
ALBUMIN SERPL-MCNC: 3.4 G/DL (ref 3.4–5)
ALP BLD-CCNC: 56 U/L (ref 40–129)
ALT SERPL-CCNC: 35 U/L (ref 10–40)
ANION GAP SERPL CALCULATED.3IONS-SCNC: 12 MMOL/L (ref 3–16)
APTT: 36.6 SEC (ref 26–36)
APTT: 44.4 SEC (ref 26–36)
AST SERPL-CCNC: 31 U/L (ref 15–37)
BASOPHILS ABSOLUTE: 0 K/UL (ref 0–0.2)
BASOPHILS RELATIVE PERCENT: 0.6 %
BILIRUB SERPL-MCNC: 0.5 MG/DL (ref 0–1)
BILIRUBIN URINE: NEGATIVE
BLOOD, URINE: NEGATIVE
BUN BLDV-MCNC: 19 MG/DL (ref 7–20)
CALCIUM SERPL-MCNC: 8.8 MG/DL (ref 8.3–10.6)
CHLORIDE BLD-SCNC: 111 MMOL/L (ref 99–110)
CLARITY: CLEAR
CO2: 24 MMOL/L (ref 21–32)
COLOR: YELLOW
CREAT SERPL-MCNC: 1.1 MG/DL (ref 0.6–1.2)
D DIMER: 1900 NG/ML DDU (ref 0–229)
EOSINOPHILS ABSOLUTE: 0.2 K/UL (ref 0–0.6)
EOSINOPHILS RELATIVE PERCENT: 2.9 %
EPITHELIAL CELLS, UA: 0 /HPF (ref 0–5)
GFR AFRICAN AMERICAN: 57
GFR NON-AFRICAN AMERICAN: 47
GLOBULIN: 3.4 G/DL
GLUCOSE BLD-MCNC: 128 MG/DL (ref 70–99)
GLUCOSE URINE: NEGATIVE MG/DL
HCT VFR BLD CALC: 38.7 % (ref 36–48)
HCT VFR BLD CALC: 42 % (ref 36–48)
HEMOGLOBIN: 12.4 G/DL (ref 12–16)
HEMOGLOBIN: 13.9 G/DL (ref 12–16)
HYALINE CASTS: 0 /LPF (ref 0–8)
KETONES, URINE: NEGATIVE MG/DL
LACTIC ACID: 1.6 MMOL/L (ref 0.4–2)
LEUKOCYTE ESTERASE, URINE: ABNORMAL
LIPASE: 15 U/L (ref 13–60)
LYMPHOCYTES ABSOLUTE: 1.3 K/UL (ref 1–5.1)
LYMPHOCYTES RELATIVE PERCENT: 18.7 %
MCH RBC QN AUTO: 29 PG (ref 26–34)
MCH RBC QN AUTO: 29.7 PG (ref 26–34)
MCHC RBC AUTO-ENTMCNC: 32.2 G/DL (ref 31–36)
MCHC RBC AUTO-ENTMCNC: 33.1 G/DL (ref 31–36)
MCV RBC AUTO: 89.6 FL (ref 80–100)
MCV RBC AUTO: 90.1 FL (ref 80–100)
MICROSCOPIC EXAMINATION: YES
MONOCYTES ABSOLUTE: 0.6 K/UL (ref 0–1.3)
MONOCYTES RELATIVE PERCENT: 8 %
NEUTROPHILS ABSOLUTE: 5 K/UL (ref 1.7–7.7)
NEUTROPHILS RELATIVE PERCENT: 69.8 %
NITRITE, URINE: NEGATIVE
PDW BLD-RTO: 15.9 % (ref 12.4–15.4)
PDW BLD-RTO: 16.1 % (ref 12.4–15.4)
PH UA: 6 (ref 5–8)
PLATELET # BLD: 196 K/UL (ref 135–450)
PLATELET # BLD: 227 K/UL (ref 135–450)
PMV BLD AUTO: 8.9 FL (ref 5–10.5)
PMV BLD AUTO: 8.9 FL (ref 5–10.5)
POTASSIUM SERPL-SCNC: 3.7 MMOL/L (ref 3.5–5.1)
PRO-BNP: 5027 PG/ML (ref 0–449)
PROTEIN UA: NEGATIVE MG/DL
RBC # BLD: 4.29 M/UL (ref 4–5.2)
RBC # BLD: 4.69 M/UL (ref 4–5.2)
RBC UA: 1 /HPF (ref 0–4)
REASON FOR REJECTION: NORMAL
REASON FOR REJECTION: NORMAL
REJECTED TEST: NORMAL
REJECTED TEST: NORMAL
SODIUM BLD-SCNC: 147 MMOL/L (ref 136–145)
SPECIFIC GRAVITY UA: 1.01 (ref 1–1.03)
TOTAL PROTEIN: 6.8 G/DL (ref 6.4–8.2)
TROPONIN: <0.01 NG/ML
TSH SERPL DL<=0.05 MIU/L-ACNC: 1.41 UIU/ML (ref 0.27–4.2)
URINE REFLEX TO CULTURE: YES
URINE TYPE: ABNORMAL
UROBILINOGEN, URINE: 0.2 E.U./DL
WBC # BLD: 7.1 K/UL (ref 4–11)
WBC # BLD: 7.6 K/UL (ref 4–11)
WBC UA: 0 /HPF (ref 0–5)

## 2019-03-04 PROCEDURE — 1200000000 HC SEMI PRIVATE

## 2019-03-04 PROCEDURE — 6370000000 HC RX 637 (ALT 250 FOR IP): Performed by: NURSE PRACTITIONER

## 2019-03-04 PROCEDURE — 6360000002 HC RX W HCPCS: Performed by: INTERNAL MEDICINE

## 2019-03-04 PROCEDURE — 71260 CT THORAX DX C+: CPT

## 2019-03-04 PROCEDURE — 94664 DEMO&/EVAL PT USE INHALER: CPT

## 2019-03-04 PROCEDURE — 83880 ASSAY OF NATRIURETIC PEPTIDE: CPT

## 2019-03-04 PROCEDURE — 6360000002 HC RX W HCPCS: Performed by: EMERGENCY MEDICINE

## 2019-03-04 PROCEDURE — 2700000000 HC OXYGEN THERAPY PER DAY

## 2019-03-04 PROCEDURE — 80053 COMPREHEN METABOLIC PANEL: CPT

## 2019-03-04 PROCEDURE — 85025 COMPLETE CBC W/AUTO DIFF WBC: CPT

## 2019-03-04 PROCEDURE — 6360000004 HC RX CONTRAST MEDICATION: Performed by: EMERGENCY MEDICINE

## 2019-03-04 PROCEDURE — 36415 COLL VENOUS BLD VENIPUNCTURE: CPT

## 2019-03-04 PROCEDURE — 81001 URINALYSIS AUTO W/SCOPE: CPT

## 2019-03-04 PROCEDURE — 87086 URINE CULTURE/COLONY COUNT: CPT

## 2019-03-04 PROCEDURE — 93005 ELECTROCARDIOGRAM TRACING: CPT | Performed by: EMERGENCY MEDICINE

## 2019-03-04 PROCEDURE — 99285 EMERGENCY DEPT VISIT HI MDM: CPT

## 2019-03-04 PROCEDURE — 83605 ASSAY OF LACTIC ACID: CPT

## 2019-03-04 PROCEDURE — 6370000000 HC RX 637 (ALT 250 FOR IP): Performed by: INTERNAL MEDICINE

## 2019-03-04 PROCEDURE — 85027 COMPLETE CBC AUTOMATED: CPT

## 2019-03-04 PROCEDURE — 94760 N-INVAS EAR/PLS OXIMETRY 1: CPT

## 2019-03-04 PROCEDURE — 85379 FIBRIN DEGRADATION QUANT: CPT

## 2019-03-04 PROCEDURE — 83690 ASSAY OF LIPASE: CPT

## 2019-03-04 PROCEDURE — 2580000003 HC RX 258: Performed by: INTERNAL MEDICINE

## 2019-03-04 PROCEDURE — 84484 ASSAY OF TROPONIN QUANT: CPT

## 2019-03-04 PROCEDURE — 84443 ASSAY THYROID STIM HORMONE: CPT

## 2019-03-04 PROCEDURE — 85730 THROMBOPLASTIN TIME PARTIAL: CPT

## 2019-03-04 PROCEDURE — 6370000000 HC RX 637 (ALT 250 FOR IP): Performed by: EMERGENCY MEDICINE

## 2019-03-04 PROCEDURE — 71046 X-RAY EXAM CHEST 2 VIEWS: CPT

## 2019-03-04 PROCEDURE — 94640 AIRWAY INHALATION TREATMENT: CPT

## 2019-03-04 PROCEDURE — 96374 THER/PROPH/DIAG INJ IV PUSH: CPT

## 2019-03-04 RX ORDER — SODIUM CHLORIDE 0.9 % (FLUSH) 0.9 %
10 SYRINGE (ML) INJECTION EVERY 12 HOURS SCHEDULED
Status: DISCONTINUED | OUTPATIENT
Start: 2019-03-04 | End: 2019-03-06 | Stop reason: HOSPADM

## 2019-03-04 RX ORDER — METHYLPREDNISOLONE SODIUM SUCCINATE 40 MG/ML
40 INJECTION, POWDER, LYOPHILIZED, FOR SOLUTION INTRAMUSCULAR; INTRAVENOUS EVERY 8 HOURS
Status: DISCONTINUED | OUTPATIENT
Start: 2019-03-04 | End: 2019-03-05

## 2019-03-04 RX ORDER — HEPARIN SODIUM 10000 [USP'U]/100ML
11 INJECTION, SOLUTION INTRAVENOUS CONTINUOUS
Status: DISCONTINUED | OUTPATIENT
Start: 2019-03-04 | End: 2019-03-05 | Stop reason: ALTCHOICE

## 2019-03-04 RX ORDER — ASPIRIN 325 MG
325 TABLET ORAL ONCE
Status: DISCONTINUED | OUTPATIENT
Start: 2019-03-04 | End: 2019-03-04 | Stop reason: ALTCHOICE

## 2019-03-04 RX ORDER — HYDRALAZINE HYDROCHLORIDE 20 MG/ML
10 INJECTION INTRAMUSCULAR; INTRAVENOUS EVERY 6 HOURS PRN
Status: DISCONTINUED | OUTPATIENT
Start: 2019-03-04 | End: 2019-03-06 | Stop reason: HOSPADM

## 2019-03-04 RX ORDER — QUETIAPINE FUMARATE 25 MG/1
50 TABLET, FILM COATED ORAL NIGHTLY
Status: DISCONTINUED | OUTPATIENT
Start: 2019-03-04 | End: 2019-03-06 | Stop reason: HOSPADM

## 2019-03-04 RX ORDER — PAROXETINE HYDROCHLORIDE 20 MG/1
20 TABLET, FILM COATED ORAL DAILY
Status: DISCONTINUED | OUTPATIENT
Start: 2019-03-05 | End: 2019-03-06 | Stop reason: HOSPADM

## 2019-03-04 RX ORDER — SODIUM CHLORIDE 0.9 % (FLUSH) 0.9 %
10 SYRINGE (ML) INJECTION PRN
Status: DISCONTINUED | OUTPATIENT
Start: 2019-03-04 | End: 2019-03-06 | Stop reason: HOSPADM

## 2019-03-04 RX ORDER — ONDANSETRON 2 MG/ML
4 INJECTION INTRAMUSCULAR; INTRAVENOUS EVERY 6 HOURS PRN
Status: DISCONTINUED | OUTPATIENT
Start: 2019-03-04 | End: 2019-03-06 | Stop reason: HOSPADM

## 2019-03-04 RX ORDER — PANTOPRAZOLE SODIUM 40 MG/1
40 TABLET, DELAYED RELEASE ORAL
Status: DISCONTINUED | OUTPATIENT
Start: 2019-03-05 | End: 2019-03-06 | Stop reason: HOSPADM

## 2019-03-04 RX ORDER — DONEPEZIL HYDROCHLORIDE 10 MG/1
10 TABLET, FILM COATED ORAL NIGHTLY
Status: DISCONTINUED | OUTPATIENT
Start: 2019-03-04 | End: 2019-03-06 | Stop reason: HOSPADM

## 2019-03-04 RX ORDER — ASPIRIN 81 MG/1
81 TABLET ORAL DAILY
Status: DISCONTINUED | OUTPATIENT
Start: 2019-03-05 | End: 2019-03-06

## 2019-03-04 RX ORDER — ROPINIROLE 0.5 MG/1
0.25 TABLET, FILM COATED ORAL NIGHTLY
Status: DISCONTINUED | OUTPATIENT
Start: 2019-03-04 | End: 2019-03-06 | Stop reason: HOSPADM

## 2019-03-04 RX ORDER — ASPIRIN 81 MG/1
81 TABLET ORAL NIGHTLY
Status: DISCONTINUED | OUTPATIENT
Start: 2019-03-05 | End: 2019-03-04

## 2019-03-04 RX ORDER — FUROSEMIDE 10 MG/ML
40 INJECTION INTRAMUSCULAR; INTRAVENOUS 2 TIMES DAILY
Status: DISCONTINUED | OUTPATIENT
Start: 2019-03-04 | End: 2019-03-05

## 2019-03-04 RX ORDER — HEPARIN SODIUM 1000 [USP'U]/ML
4900 INJECTION, SOLUTION INTRAVENOUS; SUBCUTANEOUS ONCE
Status: COMPLETED | OUTPATIENT
Start: 2019-03-04 | End: 2019-03-04

## 2019-03-04 RX ORDER — HEPARIN SODIUM 1000 [USP'U]/ML
40 INJECTION, SOLUTION INTRAVENOUS; SUBCUTANEOUS PRN
Status: DISCONTINUED | OUTPATIENT
Start: 2019-03-04 | End: 2019-03-04 | Stop reason: CLARIF

## 2019-03-04 RX ORDER — CLOPIDOGREL BISULFATE 75 MG/1
75 TABLET ORAL DAILY
Status: DISCONTINUED | OUTPATIENT
Start: 2019-03-05 | End: 2019-03-05

## 2019-03-04 RX ORDER — FUROSEMIDE 10 MG/ML
40 INJECTION INTRAMUSCULAR; INTRAVENOUS ONCE
Status: DISCONTINUED | OUTPATIENT
Start: 2019-03-04 | End: 2019-03-04 | Stop reason: ALTCHOICE

## 2019-03-04 RX ORDER — FUROSEMIDE 10 MG/ML
40 INJECTION INTRAMUSCULAR; INTRAVENOUS ONCE
Status: COMPLETED | OUTPATIENT
Start: 2019-03-04 | End: 2019-03-04

## 2019-03-04 RX ORDER — IPRATROPIUM BROMIDE AND ALBUTEROL SULFATE 2.5; .5 MG/3ML; MG/3ML
1 SOLUTION RESPIRATORY (INHALATION) EVERY 4 HOURS PRN
Status: DISCONTINUED | OUTPATIENT
Start: 2019-03-04 | End: 2019-03-06 | Stop reason: HOSPADM

## 2019-03-04 RX ORDER — ATORVASTATIN CALCIUM 40 MG/1
40 TABLET, FILM COATED ORAL NIGHTLY
Status: DISCONTINUED | OUTPATIENT
Start: 2019-03-04 | End: 2019-03-06 | Stop reason: HOSPADM

## 2019-03-04 RX ORDER — IPRATROPIUM BROMIDE AND ALBUTEROL SULFATE 2.5; .5 MG/3ML; MG/3ML
1 SOLUTION RESPIRATORY (INHALATION) ONCE
Status: COMPLETED | OUTPATIENT
Start: 2019-03-04 | End: 2019-03-04

## 2019-03-04 RX ORDER — IPRATROPIUM BROMIDE AND ALBUTEROL SULFATE 2.5; .5 MG/3ML; MG/3ML
1 SOLUTION RESPIRATORY (INHALATION) EVERY 4 HOURS
Status: DISCONTINUED | OUTPATIENT
Start: 2019-03-04 | End: 2019-03-06 | Stop reason: HOSPADM

## 2019-03-04 RX ORDER — TRAZODONE HYDROCHLORIDE 50 MG/1
50 TABLET ORAL NIGHTLY
Status: DISCONTINUED | OUTPATIENT
Start: 2019-03-04 | End: 2019-03-06 | Stop reason: HOSPADM

## 2019-03-04 RX ORDER — LORAZEPAM 0.5 MG/1
0.5 TABLET ORAL 2 TIMES DAILY PRN
Status: DISCONTINUED | OUTPATIENT
Start: 2019-03-04 | End: 2019-03-06 | Stop reason: HOSPADM

## 2019-03-04 RX ORDER — HYDROCODONE BITARTRATE AND ACETAMINOPHEN 5; 325 MG/1; MG/1
1 TABLET ORAL 2 TIMES DAILY PRN
Status: DISCONTINUED | OUTPATIENT
Start: 2019-03-04 | End: 2019-03-06 | Stop reason: HOSPADM

## 2019-03-04 RX ORDER — HEPARIN SODIUM 1000 [USP'U]/ML
80 INJECTION, SOLUTION INTRAVENOUS; SUBCUTANEOUS PRN
Status: DISCONTINUED | OUTPATIENT
Start: 2019-03-04 | End: 2019-03-04 | Stop reason: CLARIF

## 2019-03-04 RX ORDER — AMLODIPINE BESYLATE 5 MG/1
2.5 TABLET ORAL 2 TIMES DAILY
Status: DISCONTINUED | OUTPATIENT
Start: 2019-03-04 | End: 2019-03-06 | Stop reason: HOSPADM

## 2019-03-04 RX ADMIN — HEPARIN SODIUM AND DEXTROSE 1100 UNITS/HR: 10000; 5 INJECTION INTRAVENOUS at 17:08

## 2019-03-04 RX ADMIN — LORAZEPAM 0.5 MG: 0.5 TABLET ORAL at 21:24

## 2019-03-04 RX ADMIN — FUROSEMIDE 40 MG: 10 INJECTION, SOLUTION INTRAMUSCULAR; INTRAVENOUS at 14:04

## 2019-03-04 RX ADMIN — ATORVASTATIN CALCIUM 40 MG: 40 TABLET, FILM COATED ORAL at 20:38

## 2019-03-04 RX ADMIN — IPRATROPIUM BROMIDE AND ALBUTEROL SULFATE 1 AMPULE: .5; 3 SOLUTION RESPIRATORY (INHALATION) at 20:42

## 2019-03-04 RX ADMIN — METHYLPREDNISOLONE SODIUM SUCCINATE 40 MG: 40 INJECTION, POWDER, FOR SOLUTION INTRAMUSCULAR; INTRAVENOUS at 17:08

## 2019-03-04 RX ADMIN — Medication 10 ML: at 18:07

## 2019-03-04 RX ADMIN — METHYLPREDNISOLONE SODIUM SUCCINATE 40 MG: 40 INJECTION, POWDER, FOR SOLUTION INTRAMUSCULAR; INTRAVENOUS at 22:00

## 2019-03-04 RX ADMIN — METOPROLOL TARTRATE 25 MG: 25 TABLET ORAL at 20:37

## 2019-03-04 RX ADMIN — FUROSEMIDE 40 MG: 10 INJECTION, SOLUTION INTRAMUSCULAR; INTRAVENOUS at 18:07

## 2019-03-04 RX ADMIN — IOPAMIDOL 75 ML: 755 INJECTION, SOLUTION INTRAVENOUS at 14:37

## 2019-03-04 RX ADMIN — IPRATROPIUM BROMIDE AND ALBUTEROL SULFATE 1 AMPULE: .5; 3 SOLUTION RESPIRATORY (INHALATION) at 13:02

## 2019-03-04 RX ADMIN — DONEPEZIL HYDROCHLORIDE 10 MG: 10 TABLET, FILM COATED ORAL at 20:37

## 2019-03-04 RX ADMIN — IPRATROPIUM BROMIDE AND ALBUTEROL SULFATE 1 AMPULE: .5; 3 SOLUTION RESPIRATORY (INHALATION) at 16:14

## 2019-03-04 RX ADMIN — AMLODIPINE BESYLATE 2.5 MG: 5 TABLET ORAL at 20:37

## 2019-03-04 RX ADMIN — TRAZODONE HYDROCHLORIDE 50 MG: 50 TABLET ORAL at 20:37

## 2019-03-04 RX ADMIN — QUETIAPINE FUMARATE 50 MG: 25 TABLET ORAL at 20:39

## 2019-03-04 RX ADMIN — ROPINIROLE HYDROCHLORIDE 0.25 MG: 0.5 TABLET, FILM COATED ORAL at 20:38

## 2019-03-04 RX ADMIN — HYDROCODONE BITARTRATE AND ACETAMINOPHEN 1 TABLET: 5; 325 TABLET ORAL at 21:24

## 2019-03-04 RX ADMIN — HEPARIN SODIUM 4900 UNITS: 1000 INJECTION INTRAVENOUS; SUBCUTANEOUS at 17:08

## 2019-03-04 ASSESSMENT — ENCOUNTER SYMPTOMS
COUGH: 0
VOMITING: 0
ABDOMINAL PAIN: 0
WHEEZING: 0
CHEST TIGHTNESS: 1
SHORTNESS OF BREATH: 1
NAUSEA: 0
PHOTOPHOBIA: 0
TROUBLE SWALLOWING: 0
COLOR CHANGE: 0

## 2019-03-04 ASSESSMENT — PAIN SCALES - GENERAL
PAINLEVEL_OUTOF10: 0
PAINLEVEL_OUTOF10: 0
PAINLEVEL_OUTOF10: 6

## 2019-03-05 LAB
ANION GAP SERPL CALCULATED.3IONS-SCNC: 16 MMOL/L (ref 3–16)
BASOPHILS ABSOLUTE: 0 K/UL (ref 0–0.2)
BASOPHILS RELATIVE PERCENT: 0.3 %
BUN BLDV-MCNC: 20 MG/DL (ref 7–20)
CALCIUM SERPL-MCNC: 9.3 MG/DL (ref 8.3–10.6)
CHLORIDE BLD-SCNC: 96 MMOL/L (ref 99–110)
CO2: 29 MMOL/L (ref 21–32)
CREAT SERPL-MCNC: 1.2 MG/DL (ref 0.6–1.2)
EOSINOPHILS ABSOLUTE: 0 K/UL (ref 0–0.6)
EOSINOPHILS RELATIVE PERCENT: 0 %
GFR AFRICAN AMERICAN: 52
GFR NON-AFRICAN AMERICAN: 43
GLUCOSE BLD-MCNC: 144 MG/DL (ref 70–99)
GLUCOSE BLD-MCNC: 147 MG/DL (ref 70–99)
HCT VFR BLD CALC: 40.2 % (ref 36–48)
HEMOGLOBIN: 13.2 G/DL (ref 12–16)
LV EF: 58 %
LVEF MODALITY: NORMAL
LYMPHOCYTES ABSOLUTE: 0.8 K/UL (ref 1–5.1)
LYMPHOCYTES RELATIVE PERCENT: 12.2 %
MAGNESIUM: 2 MG/DL (ref 1.8–2.4)
MCH RBC QN AUTO: 28.9 PG (ref 26–34)
MCHC RBC AUTO-ENTMCNC: 32.8 G/DL (ref 31–36)
MCV RBC AUTO: 88.2 FL (ref 80–100)
MONOCYTES ABSOLUTE: 0.2 K/UL (ref 0–1.3)
MONOCYTES RELATIVE PERCENT: 3.2 %
NEUTROPHILS ABSOLUTE: 5.8 K/UL (ref 1.7–7.7)
NEUTROPHILS RELATIVE PERCENT: 84.3 %
PDW BLD-RTO: 15.8 % (ref 12.4–15.4)
PERFORMED ON: ABNORMAL
PLATELET # BLD: 236 K/UL (ref 135–450)
PMV BLD AUTO: 8.7 FL (ref 5–10.5)
POTASSIUM REFLEX MAGNESIUM: 3.7 MMOL/L (ref 3.5–5.1)
RBC # BLD: 4.56 M/UL (ref 4–5.2)
SODIUM BLD-SCNC: 141 MMOL/L (ref 136–145)
WBC # BLD: 6.9 K/UL (ref 4–11)

## 2019-03-05 PROCEDURE — 80048 BASIC METABOLIC PNL TOTAL CA: CPT

## 2019-03-05 PROCEDURE — 6370000000 HC RX 637 (ALT 250 FOR IP): Performed by: NURSE PRACTITIONER

## 2019-03-05 PROCEDURE — 6370000000 HC RX 637 (ALT 250 FOR IP): Performed by: INTERNAL MEDICINE

## 2019-03-05 PROCEDURE — 99223 1ST HOSP IP/OBS HIGH 75: CPT | Performed by: INTERNAL MEDICINE

## 2019-03-05 PROCEDURE — 94640 AIRWAY INHALATION TREATMENT: CPT

## 2019-03-05 PROCEDURE — 36415 COLL VENOUS BLD VENIPUNCTURE: CPT

## 2019-03-05 PROCEDURE — 83735 ASSAY OF MAGNESIUM: CPT

## 2019-03-05 PROCEDURE — 93010 ELECTROCARDIOGRAM REPORT: CPT | Performed by: INTERNAL MEDICINE

## 2019-03-05 PROCEDURE — 1200000000 HC SEMI PRIVATE

## 2019-03-05 PROCEDURE — 85025 COMPLETE CBC W/AUTO DIFF WBC: CPT

## 2019-03-05 PROCEDURE — 93970 EXTREMITY STUDY: CPT

## 2019-03-05 PROCEDURE — 94760 N-INVAS EAR/PLS OXIMETRY 1: CPT

## 2019-03-05 PROCEDURE — 2700000000 HC OXYGEN THERAPY PER DAY

## 2019-03-05 PROCEDURE — 6360000002 HC RX W HCPCS: Performed by: INTERNAL MEDICINE

## 2019-03-05 PROCEDURE — 93306 TTE W/DOPPLER COMPLETE: CPT

## 2019-03-05 RX ORDER — HEPARIN SODIUM 1000 [USP'U]/ML
2400 INJECTION, SOLUTION INTRAVENOUS; SUBCUTANEOUS ONCE
Status: DISCONTINUED | OUTPATIENT
Start: 2019-03-05 | End: 2019-03-05

## 2019-03-05 RX ORDER — PREDNISONE 20 MG/1
40 TABLET ORAL DAILY
Status: DISCONTINUED | OUTPATIENT
Start: 2019-03-06 | End: 2019-03-06 | Stop reason: HOSPADM

## 2019-03-05 RX ORDER — FUROSEMIDE 10 MG/ML
40 INJECTION INTRAMUSCULAR; INTRAVENOUS ONCE
Status: DISCONTINUED | OUTPATIENT
Start: 2019-03-05 | End: 2019-03-05

## 2019-03-05 RX ORDER — FUROSEMIDE 40 MG/1
40 TABLET ORAL ONCE
Status: COMPLETED | OUTPATIENT
Start: 2019-03-05 | End: 2019-03-05

## 2019-03-05 RX ADMIN — DONEPEZIL HYDROCHLORIDE 10 MG: 10 TABLET, FILM COATED ORAL at 20:35

## 2019-03-05 RX ADMIN — RIVAROXABAN 15 MG: 15 TABLET, FILM COATED ORAL at 10:41

## 2019-03-05 RX ADMIN — IPRATROPIUM BROMIDE AND ALBUTEROL SULFATE 1 AMPULE: .5; 3 SOLUTION RESPIRATORY (INHALATION) at 17:15

## 2019-03-05 RX ADMIN — ASPIRIN 81 MG: 81 TABLET, COATED ORAL at 10:34

## 2019-03-05 RX ADMIN — AMLODIPINE BESYLATE 2.5 MG: 5 TABLET ORAL at 10:34

## 2019-03-05 RX ADMIN — IPRATROPIUM BROMIDE AND ALBUTEROL SULFATE 1 AMPULE: .5; 3 SOLUTION RESPIRATORY (INHALATION) at 04:08

## 2019-03-05 RX ADMIN — METOPROLOL TARTRATE 25 MG: 25 TABLET ORAL at 10:34

## 2019-03-05 RX ADMIN — ROPINIROLE HYDROCHLORIDE 0.25 MG: 0.5 TABLET, FILM COATED ORAL at 20:37

## 2019-03-05 RX ADMIN — QUETIAPINE FUMARATE 50 MG: 25 TABLET ORAL at 20:36

## 2019-03-05 RX ADMIN — METOPROLOL TARTRATE 25 MG: 25 TABLET ORAL at 20:36

## 2019-03-05 RX ADMIN — METHYLPREDNISOLONE SODIUM SUCCINATE 40 MG: 40 INJECTION, POWDER, FOR SOLUTION INTRAMUSCULAR; INTRAVENOUS at 06:24

## 2019-03-05 RX ADMIN — PANTOPRAZOLE SODIUM 40 MG: 40 TABLET, DELAYED RELEASE ORAL at 06:24

## 2019-03-05 RX ADMIN — IPRATROPIUM BROMIDE AND ALBUTEROL SULFATE 1 AMPULE: .5; 3 SOLUTION RESPIRATORY (INHALATION) at 11:37

## 2019-03-05 RX ADMIN — IPRATROPIUM BROMIDE AND ALBUTEROL SULFATE 1 AMPULE: .5; 3 SOLUTION RESPIRATORY (INHALATION) at 01:17

## 2019-03-05 RX ADMIN — IPRATROPIUM BROMIDE AND ALBUTEROL SULFATE 1 AMPULE: .5; 3 SOLUTION RESPIRATORY (INHALATION) at 20:59

## 2019-03-05 RX ADMIN — RIVAROXABAN 15 MG: 15 TABLET, FILM COATED ORAL at 17:34

## 2019-03-05 RX ADMIN — PAROXETINE HYDROCHLORIDE 20 MG: 20 TABLET, FILM COATED ORAL at 10:34

## 2019-03-05 RX ADMIN — IPRATROPIUM BROMIDE AND ALBUTEROL SULFATE 1 AMPULE: .5; 3 SOLUTION RESPIRATORY (INHALATION) at 08:04

## 2019-03-05 RX ADMIN — FUROSEMIDE 40 MG: 40 TABLET ORAL at 17:34

## 2019-03-05 RX ADMIN — LORAZEPAM 0.5 MG: 0.5 TABLET ORAL at 10:41

## 2019-03-05 RX ADMIN — ATORVASTATIN CALCIUM 40 MG: 40 TABLET, FILM COATED ORAL at 20:35

## 2019-03-05 RX ADMIN — CLOPIDOGREL BISULFATE 75 MG: 75 TABLET ORAL at 10:34

## 2019-03-05 RX ADMIN — TRAZODONE HYDROCHLORIDE 50 MG: 50 TABLET ORAL at 20:36

## 2019-03-05 RX ADMIN — AMLODIPINE BESYLATE 2.5 MG: 5 TABLET ORAL at 20:37

## 2019-03-05 ASSESSMENT — PAIN SCALES - GENERAL
PAINLEVEL_OUTOF10: 0

## 2019-03-06 VITALS
WEIGHT: 155.59 LBS | RESPIRATION RATE: 18 BRPM | HEART RATE: 61 BPM | TEMPERATURE: 97.6 F | HEIGHT: 63 IN | BODY MASS INDEX: 27.57 KG/M2 | OXYGEN SATURATION: 92 % | DIASTOLIC BLOOD PRESSURE: 60 MMHG | SYSTOLIC BLOOD PRESSURE: 145 MMHG

## 2019-03-06 LAB
HCT VFR BLD CALC: 41.8 % (ref 36–48)
HEMOGLOBIN: 13.8 G/DL (ref 12–16)
PLATELET # BLD: 250 K/UL (ref 135–450)
URINE CULTURE, ROUTINE: NORMAL

## 2019-03-06 PROCEDURE — 94640 AIRWAY INHALATION TREATMENT: CPT

## 2019-03-06 PROCEDURE — 36415 COLL VENOUS BLD VENIPUNCTURE: CPT

## 2019-03-06 PROCEDURE — 2700000000 HC OXYGEN THERAPY PER DAY

## 2019-03-06 PROCEDURE — 6370000000 HC RX 637 (ALT 250 FOR IP): Performed by: NURSE PRACTITIONER

## 2019-03-06 PROCEDURE — 85014 HEMATOCRIT: CPT

## 2019-03-06 PROCEDURE — 85049 AUTOMATED PLATELET COUNT: CPT

## 2019-03-06 PROCEDURE — 6370000000 HC RX 637 (ALT 250 FOR IP): Performed by: INTERNAL MEDICINE

## 2019-03-06 PROCEDURE — 2580000003 HC RX 258: Performed by: INTERNAL MEDICINE

## 2019-03-06 PROCEDURE — 85018 HEMOGLOBIN: CPT

## 2019-03-06 RX ADMIN — IPRATROPIUM BROMIDE AND ALBUTEROL SULFATE 1 AMPULE: .5; 3 SOLUTION RESPIRATORY (INHALATION) at 12:44

## 2019-03-06 RX ADMIN — PAROXETINE HYDROCHLORIDE 20 MG: 20 TABLET, FILM COATED ORAL at 09:47

## 2019-03-06 RX ADMIN — PANTOPRAZOLE SODIUM 40 MG: 40 TABLET, DELAYED RELEASE ORAL at 05:23

## 2019-03-06 RX ADMIN — PREDNISONE 40 MG: 20 TABLET ORAL at 09:47

## 2019-03-06 RX ADMIN — ASPIRIN 81 MG: 81 TABLET, COATED ORAL at 09:47

## 2019-03-06 RX ADMIN — IPRATROPIUM BROMIDE AND ALBUTEROL SULFATE 1 AMPULE: .5; 3 SOLUTION RESPIRATORY (INHALATION) at 08:41

## 2019-03-06 RX ADMIN — AMLODIPINE BESYLATE 2.5 MG: 5 TABLET ORAL at 09:47

## 2019-03-06 RX ADMIN — IPRATROPIUM BROMIDE AND ALBUTEROL SULFATE 1 AMPULE: .5; 3 SOLUTION RESPIRATORY (INHALATION) at 01:15

## 2019-03-06 RX ADMIN — RIVAROXABAN 15 MG: 15 TABLET, FILM COATED ORAL at 09:47

## 2019-03-06 RX ADMIN — IPRATROPIUM BROMIDE AND ALBUTEROL SULFATE 1 AMPULE: .5; 3 SOLUTION RESPIRATORY (INHALATION) at 04:12

## 2019-03-06 RX ADMIN — Medication 10 ML: at 09:48

## 2019-03-06 RX ADMIN — METOPROLOL TARTRATE 25 MG: 25 TABLET ORAL at 09:48

## 2019-03-06 ASSESSMENT — PULMONARY FUNCTION TESTS: PEFR_L/MIN: 18

## 2019-03-06 ASSESSMENT — PAIN SCALES - GENERAL: PAINLEVEL_OUTOF10: 0

## 2019-03-07 ENCOUNTER — CARE COORDINATION (OUTPATIENT)
Dept: CASE MANAGEMENT | Age: 84
End: 2019-03-07

## 2019-03-07 ENCOUNTER — CARE COORDINATION (OUTPATIENT)
Dept: CARE COORDINATION | Age: 84
End: 2019-03-07

## 2019-03-12 DIAGNOSIS — G25.81 RESTLESS LEG SYNDROME: ICD-10-CM

## 2019-03-12 DIAGNOSIS — G47.09 OTHER INSOMNIA: Primary | Chronic | ICD-10-CM

## 2019-03-13 RX ORDER — ROPINIROLE 0.25 MG/1
TABLET, FILM COATED ORAL
Qty: 30 TABLET | Refills: 0 | Status: SHIPPED | OUTPATIENT
Start: 2019-03-13 | End: 2019-03-20 | Stop reason: SDUPTHER

## 2019-03-13 RX ORDER — LORAZEPAM 0.5 MG/1
TABLET ORAL
Qty: 60 TABLET | Refills: 1 | Status: SHIPPED | OUTPATIENT
Start: 2019-03-13 | End: 2019-03-20 | Stop reason: SDUPTHER

## 2019-03-19 LAB
EKG ATRIAL RATE: 65 BPM
EKG DIAGNOSIS: NORMAL
EKG P AXIS: 55 DEGREES
EKG P-R INTERVAL: 168 MS
EKG Q-T INTERVAL: 448 MS
EKG QRS DURATION: 60 MS
EKG QTC CALCULATION (BAZETT): 465 MS
EKG R AXIS: 0 DEGREES
EKG T AXIS: -10 DEGREES
EKG VENTRICULAR RATE: 65 BPM

## 2019-03-20 ENCOUNTER — OFFICE VISIT (OUTPATIENT)
Dept: INTERNAL MEDICINE CLINIC | Age: 84
End: 2019-03-20
Payer: MEDICARE

## 2019-03-20 VITALS
HEIGHT: 63 IN | OXYGEN SATURATION: 90 % | HEART RATE: 57 BPM | SYSTOLIC BLOOD PRESSURE: 130 MMHG | DIASTOLIC BLOOD PRESSURE: 70 MMHG | WEIGHT: 155 LBS | BODY MASS INDEX: 27.46 KG/M2

## 2019-03-20 DIAGNOSIS — G89.29 ABDOMINAL PAIN, CHRONIC, LEFT LOWER QUADRANT: ICD-10-CM

## 2019-03-20 DIAGNOSIS — E78.2 MIXED HYPERLIPIDEMIA: ICD-10-CM

## 2019-03-20 DIAGNOSIS — G47.09 OTHER INSOMNIA: Chronic | ICD-10-CM

## 2019-03-20 DIAGNOSIS — F32.A DEPRESSION, UNSPECIFIED DEPRESSION TYPE: ICD-10-CM

## 2019-03-20 DIAGNOSIS — R10.32 ABDOMINAL PAIN, CHRONIC, LEFT LOWER QUADRANT: ICD-10-CM

## 2019-03-20 DIAGNOSIS — I10 ESSENTIAL HYPERTENSION: ICD-10-CM

## 2019-03-20 DIAGNOSIS — Z09 HOSPITAL DISCHARGE FOLLOW-UP: Primary | ICD-10-CM

## 2019-03-20 DIAGNOSIS — G25.81 RESTLESS LEG SYNDROME: ICD-10-CM

## 2019-03-20 PROCEDURE — 99214 OFFICE O/P EST MOD 30 MIN: CPT | Performed by: HOSPITALIST

## 2019-03-20 RX ORDER — ROPINIROLE 0.25 MG/1
TABLET, FILM COATED ORAL
Qty: 30 TABLET | Refills: 1 | Status: SHIPPED | OUTPATIENT
Start: 2019-03-20 | End: 2019-05-30 | Stop reason: SDUPTHER

## 2019-03-20 RX ORDER — LORAZEPAM 0.5 MG/1
TABLET ORAL
Qty: 60 TABLET | Refills: 1 | Status: SHIPPED | OUTPATIENT
Start: 2019-03-20 | End: 2019-04-25 | Stop reason: SDUPTHER

## 2019-03-20 RX ORDER — TRAZODONE HYDROCHLORIDE 50 MG/1
50 TABLET ORAL NIGHTLY
Qty: 30 TABLET | Refills: 12 | Status: SHIPPED | OUTPATIENT
Start: 2019-03-20 | End: 2019-10-22 | Stop reason: SDUPTHER

## 2019-03-20 RX ORDER — HYDROCODONE BITARTRATE AND ACETAMINOPHEN 5; 325 MG/1; MG/1
1 TABLET ORAL 2 TIMES DAILY PRN
Qty: 60 TABLET | Refills: 0 | Status: SHIPPED | OUTPATIENT
Start: 2019-03-20 | End: 2019-04-25 | Stop reason: SDUPTHER

## 2019-03-20 RX ORDER — TRIAMTERENE AND HYDROCHLOROTHIAZIDE 37.5; 25 MG/1; MG/1
TABLET ORAL
Qty: 90 TABLET | Refills: 2 | Status: SHIPPED | OUTPATIENT
Start: 2019-03-20 | End: 2019-07-05 | Stop reason: ALTCHOICE

## 2019-03-20 RX ORDER — ATORVASTATIN CALCIUM 40 MG/1
40 TABLET, FILM COATED ORAL NIGHTLY
Qty: 30 TABLET | Refills: 3 | Status: SHIPPED | OUTPATIENT
Start: 2019-03-20 | End: 2019-08-05 | Stop reason: SDUPTHER

## 2019-04-04 ENCOUNTER — CARE COORDINATION (OUTPATIENT)
Dept: CASE MANAGEMENT | Age: 84
End: 2019-04-04

## 2019-04-04 NOTE — CARE COORDINATION
[facility] Transitions Follow Up Call    2019    Patient: Gretchen Rg  Patient : 1934   MRN: [id number]  Reason for Admission:   Discharge Date: 3/6/19 RARS: Readmission Risk Score: 12         Spoke with: Gretchen Rg (patient)    Care Transitions Subsequent and Final Call    Subsequent and Final Calls  Do you have any ongoing symptoms?:  No  Have your medications changed?:  No  Do you have any questions related to your medications?:  No  Do you currently have any active services?:  No  Do you have any needs or concerns that I can assist you with?:  No  Identified Barriers:  None  Care Transitions Interventions  Other Interventions: Follow Up: Final CTC outreach call. Spoke with Milad Campo. She is extremely [place]. Milad Campo states she is doing fair. Her weight today = 153.00lbs - this is up from 151.00lbs yesterday. Milad Campo denies having any SOB, chest pain, or edema. She states she is still using her home oxygen therapy at 2 lpm continuously. Milad Campo denies any needs today. Informed her this would be the final CTC call. Encouraged her to call her PCP with any future issues or concerns.   Future Appointments   Date Time Provider Melo Clemens   2019  9:15 AM Wayne President, MD [facility] [address] [facility]       Harshad Valera RN

## 2019-04-12 ENCOUNTER — CARE COORDINATION (OUTPATIENT)
Dept: CARE COORDINATION | Age: 84
End: 2019-04-12

## 2019-04-12 NOTE — CARE COORDINATION
Ambulatory Care Coordination Note  4/12/2019  CM Risk Score: 3  Jose Mortality Risk Score: 34    ACC: Segundo Benjamin, RN    Summary Note: Nurse Care Coordinator called and spoke to pt on the phone today. She says she was napping, doing okay. Says she is using her O2 2 lpm continuously. States she is taking all prescribed medications, her daughter sets up her pill box. Patient denies worsening of breathing, SOB, Cough or chest pain. Her daughter drives to her appointments picks up her medication from pharmacy. Patient denies need for additional information or resources. Encouraged patient to call PCP office for any questions, concerns or worsening symptoms. PLAN: Patient was instructed to continue with current medication and symptom management. RNCC reminded patient when to contact clinic/RNCC in terms of symptoms and concerns. RNCC will follow-up next month to check on symptoms and provide support as needed in the interim.         Care Coordination Interventions    Program Enrollment:  Rising Risk  Referral from Primary Care Provider:  Yes  Suggested Interventions and Community Resources  Fall Risk Prevention:  Completed  Disease Specific Clinic:  In Process (Comment: colorectal surgery)  Home Health Services:  Completed  Medi Set or Pill Pack:  Completed  Smoking Cessation:  Completed (Comment: stopped smoking 9/12/18)  Other Services or Interventions:  COA         Goals Addressed                 This Visit's Progress     Conditions and Symptoms   Improving     I will schedule office visits, as directed by my provider. I will notify my provider of any barriers to my plan of care. I will notify my provider of any symptoms that indicate a worsening of my condition.     Barriers: impairment:  cognitive and overwhelmed by complexity of regimen  Plan for overcoming my barriers: engagement with [facility] and Care Coordination team  Confidence: 8/10  Anticipated Goal Completion Date: 6/20/19            Prior to Admission Assessment    Do you understand a low sodium diet?:  Yes  Do you understand how to read food labels?:  No  How many restaurant meals do you eat per week?:  0  Do you salt your food before tasting it?:  No     No patient-reported symptoms      Symptoms:   CHF associated dyspnea on exertion: Pos      Symptom course:  stable  Weight trend:  stable  Salt intake watch compared to last visit:  stable     ,   COPD Assessment    Does the patient understand envrionmental exposure?:  Yes  Is the patient able to verbalize Rescue vs. Long Acting medications?:  No  Does the patient have a nebulizer?:  No  Does the patient use a space with inhaled medications?:  No     No patient-reported symptoms         Symptoms:      Have you had a recent diagnosis of pneumonia either by PCP or at a hospital?:  No      and   General Assessment    Do you have any symptoms that are causing concern?:  No

## 2019-04-25 DIAGNOSIS — G89.29 ABDOMINAL PAIN, CHRONIC, LEFT LOWER QUADRANT: ICD-10-CM

## 2019-04-25 DIAGNOSIS — R10.32 ABDOMINAL PAIN, CHRONIC, LEFT LOWER QUADRANT: ICD-10-CM

## 2019-04-25 DIAGNOSIS — G47.09 OTHER INSOMNIA: Chronic | ICD-10-CM

## 2019-04-25 RX ORDER — LORAZEPAM 0.5 MG/1
TABLET ORAL
Qty: 60 TABLET | Refills: 0 | Status: SHIPPED | OUTPATIENT
Start: 2019-04-25 | End: 2019-05-31 | Stop reason: SDUPTHER

## 2019-04-25 RX ORDER — HYDROCODONE BITARTRATE AND ACETAMINOPHEN 5; 325 MG/1; MG/1
1 TABLET ORAL 2 TIMES DAILY PRN
Qty: 60 TABLET | Refills: 0 | Status: SHIPPED | OUTPATIENT
Start: 2019-04-25 | End: 2019-05-31 | Stop reason: SDUPTHER

## 2019-05-14 DIAGNOSIS — I10 ESSENTIAL HYPERTENSION: ICD-10-CM

## 2019-05-15 ENCOUNTER — CARE COORDINATION (OUTPATIENT)
Dept: INTERNAL MEDICINE CLINIC | Age: 84
End: 2019-05-15

## 2019-05-23 ENCOUNTER — CARE COORDINATION (OUTPATIENT)
Dept: INTERNAL MEDICINE CLINIC | Age: 84
End: 2019-05-23

## 2019-05-23 NOTE — CARE COORDINATION
Attempted care coordination follow up call to patient. Line rang several times then disconnected. Unable to leave a message. No other phone number for patient listed in [address] [place] [address] Rd.

## 2019-05-24 ENCOUNTER — OFFICE VISIT (OUTPATIENT)
Dept: CARDIOLOGY CLINIC | Age: 84
End: 2019-05-24
Payer: MEDICARE

## 2019-05-24 VITALS
HEART RATE: 58 BPM | DIASTOLIC BLOOD PRESSURE: 70 MMHG | HEIGHT: 63 IN | WEIGHT: 155.6 LBS | BODY MASS INDEX: 27.57 KG/M2 | OXYGEN SATURATION: 91 % | SYSTOLIC BLOOD PRESSURE: 130 MMHG

## 2019-05-24 DIAGNOSIS — J43.2 CENTRILOBULAR EMPHYSEMA (HCC): Chronic | ICD-10-CM

## 2019-05-24 DIAGNOSIS — E78.2 MIXED HYPERLIPIDEMIA: Chronic | ICD-10-CM

## 2019-05-24 DIAGNOSIS — I25.10 CAD IN NATIVE ARTERY: Primary | ICD-10-CM

## 2019-05-24 DIAGNOSIS — Z95.5 S/P BARE METAL CORONARY ARTERY STENT: ICD-10-CM

## 2019-05-24 DIAGNOSIS — I50.32 CHRONIC DIASTOLIC HEART FAILURE (HCC): ICD-10-CM

## 2019-05-24 DIAGNOSIS — I27.20 PULMONARY HYPERTENSION (HCC): Chronic | ICD-10-CM

## 2019-05-24 DIAGNOSIS — I10 ESSENTIAL HYPERTENSION: Chronic | ICD-10-CM

## 2019-05-24 DIAGNOSIS — I50.31 ACUTE DIASTOLIC HEART FAILURE (HCC): ICD-10-CM

## 2019-05-24 LAB
ANION GAP SERPL CALCULATED.3IONS-SCNC: 12 MMOL/L (ref 3–16)
BUN BLDV-MCNC: 18 MG/DL (ref 7–20)
CALCIUM SERPL-MCNC: 9.4 MG/DL (ref 8.3–10.6)
CHLORIDE BLD-SCNC: 104 MMOL/L (ref 99–110)
CO2: 28 MMOL/L (ref 21–32)
CREAT SERPL-MCNC: 1 MG/DL (ref 0.6–1.2)
GFR AFRICAN AMERICAN: >60
GFR NON-AFRICAN AMERICAN: 53
GLUCOSE BLD-MCNC: 123 MG/DL (ref 70–99)
POTASSIUM SERPL-SCNC: 3.7 MMOL/L (ref 3.5–5.1)
SODIUM BLD-SCNC: 144 MMOL/L (ref 136–145)

## 2019-05-24 PROCEDURE — 3023F SPIROM DOC REV: CPT | Performed by: INTERNAL MEDICINE

## 2019-05-24 PROCEDURE — G8926 SPIRO NO PERF OR DOC: HCPCS | Performed by: INTERNAL MEDICINE

## 2019-05-24 PROCEDURE — 4040F PNEUMOC VAC/ADMIN/RCVD: CPT | Performed by: INTERNAL MEDICINE

## 2019-05-24 PROCEDURE — G8400 PT W/DXA NO RESULTS DOC: HCPCS | Performed by: INTERNAL MEDICINE

## 2019-05-24 PROCEDURE — G8417 CALC BMI ABV UP PARAM F/U: HCPCS | Performed by: INTERNAL MEDICINE

## 2019-05-24 PROCEDURE — G8598 ASA/ANTIPLAT THER USED: HCPCS | Performed by: INTERNAL MEDICINE

## 2019-05-24 PROCEDURE — G8427 DOCREV CUR MEDS BY ELIG CLIN: HCPCS | Performed by: INTERNAL MEDICINE

## 2019-05-24 PROCEDURE — 1090F PRES/ABSN URINE INCON ASSESS: CPT | Performed by: INTERNAL MEDICINE

## 2019-05-24 PROCEDURE — 1123F ACP DISCUSS/DSCN MKR DOCD: CPT | Performed by: INTERNAL MEDICINE

## 2019-05-24 PROCEDURE — 99214 OFFICE O/P EST MOD 30 MIN: CPT | Performed by: INTERNAL MEDICINE

## 2019-05-24 PROCEDURE — 1036F TOBACCO NON-USER: CPT | Performed by: INTERNAL MEDICINE

## 2019-05-24 RX ORDER — FUROSEMIDE 20 MG/1
20 TABLET ORAL DAILY
Qty: 90 TABLET | Refills: 5 | Status: SHIPPED | OUTPATIENT
Start: 2019-05-24 | End: 2020-06-02 | Stop reason: SDUPTHER

## 2019-05-24 RX ORDER — ASPIRIN 81 MG/1
81 TABLET ORAL DAILY
Qty: 90 TABLET | Refills: 1 | COMMUNITY
Start: 2019-05-24 | End: 2019-10-22 | Stop reason: SDUPTHER

## 2019-05-24 NOTE — PROGRESS NOTES
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 She has no prior cardiac history but had exertional dyspnea and fatigue. As part of cardiac clearance for the anticipated colovesicular fistula repair by Dr. Julius Clemente, she had a lexiscan nuclear stress test on 11/27/18 which was abnormal (large, moderate intensity mixed defect in basal to mid inferolateral and apical inferior walls with normal LVEF).    Cath 12/6/18 c/w severe prox and mid LAD and prox and mid RCA stenoses s/p BMS x 4 (no RICHY due to urgent need for surgery). Normal LVEF and LVEDP. Per Dr. Gurwinder Bernstein, she needs to remain on Plavix for at least 6 weeks after PCI (at least till 1/17/19) and then may hold it for up to 7 days prior to surgery, restart as soon as possible after surgery per her surgeon. Obviously she needs to remain on asa 81 daily throughout her surgery unless significant bleeding or other contraindication. Patient was admitted at [facility] 3/4 - 3/6/19 for AHF and acute multifocal small PE's. She was started on Xarelto and her asa and plavix were stopped. Echo 03/2019: normal LV, EF 55-60%, mild HK of mid inferior wall, diastolic I. Patient returns to the clinic today reporting increasing dyspnea and weight increase from 151 lbs to 155 lbs, denies any chest pains. Her surgery to repair the fistula has been postponed for at least 6 months after her diagnosis of multifocal PE's for which she also follows with Hematology.      Histories     Past Medical History:   has a past medical history of Abdominal pain, unspecified site, Adrenal hyperplasia ([address] Ankle Fracture, Right, Anxiety and depression, Aortic atherosclerosis ([address] Contusion of unspecified site, COPD (chronic obstructive pulmonary disease) ([facility] Coronary artery disease, DJD (degenerative joint disease), Eczema, Essential hypertension, Hyperlipidemia, Hypertension, Hypoxia, Insomnia, Leukocytosis, Memory loss, Nicotine addiction, Osteoarthritis, Pain in joint, pelvic region and thigh, Primary osteoarthritis involving multiple joints, Pulmonary hypertension ([facility] [address] Senile reticular degeneration of peripheral retina, Syncope, Thoracic or lumbosacral neuritis or radiculitis, unspecified, Urinary incontinence, Visual hallucinations, and Wears glasses. Surgical History:   has a past surgical history that includes Cataract removal with implant (Left, December 5, 2012); Hysterectomy; Colonoscopy; and Sigmoidoscopy (08/01/2018). Social History:   reports that she quit smoking about 8 months ago. Her smoking use included cigarettes. She has a 65.00 pack-year smoking history. She has never used smokeless tobacco. She reports that she drinks alcohol. She reports that she does not use drugs. Family History:  No evidence for sudden cardiac death or premature CAD      Medications:     Home medications were reviewed and are listed below    Prior to Admission medications    Medication Sig Start Date End Date Taking? Authorizing Provider   aspirin EC 81 MG EC tablet Take 1 tablet by mouth daily 5/24/19  Yes Raisa Gaines MD   furosemide (LASIX) 20 MG tablet Take 1 tablet by mouth daily 5/24/19  Yes Raisa Gaines MD   metoprolol tartrate (LOPRESSOR) 25 MG tablet Take 1 tablet by mouth 2 times daily 5/14/19  Yes Komal Andino MD   LORazepam (ATIVAN) 0.5 MG tablet TAKE ONE TABLET BY MOUTH TWICE A DAY 4/25/19 6/5/19 Yes Komal Andino MD   HYDROcodone-acetaminophen (NORCO) 5-325 MG per tablet Take 1 tablet by mouth 2 times daily as needed for Pain for up to 30 days.  Take lowest dose possible to manage pain 4/25/19 5/25/19 Yes Komal Andino MD   rivaroxaban (XARELTO) 20 MG TABS tablet Take 1 tablet by mouth daily (with breakfast) 4/4/19  Yes Wayne Hernandez MD   traZODone (DESYREL) 50 MG tablet Take 1 tablet by mouth nightly 3/20/19  Yes Wayne Hernandez MD   rOPINIRole (REQUIP) 0.25 MG tablet TAKE ONE TABLET BY MOUTH ONCE NIGHTLY 3/20/19  Yes Wayne Hernandez MD   atorvastatin (LIPITOR) 40 MG tablet Take 1 tablet by mouth nightly 3/20/19  Yes Wayne Hernandez MD   triamterene-hydrochlorothiazide (MAXZIDE-25) 37.5-25 MG per tablet TAKE ONE TABLET BY MOUTH DAILY 3/20/19  Yes Wayne Hernandez MD   rivaroxaban (XARELTO STARTER PACK) 15 & 20 MG Starter Pack Pulmonary emboli 3/5/19  Yes JAME Isbell CNP   pantoprazole (PROTONIX) 40 MG tablet Take 1 tablet by mouth every morning (before breakfast) 1/10/19  Yes Quan Henning MD   amLODIPine (NORVASC) 2.5 MG tablet Take 1 tablet by mouth 2 times daily 11/26/18  Yes Wayne Hernandez MD   PARoxetine (PAXIL) 20 MG tablet TAKE ONE TABLET BY MOUTH DAILY 11/26/18  Yes Wayne Hernandez MD   donepezil (ARICEPT) 10 MG tablet Take 1 tablet by mouth nightly 11/26/18  Yes Wayne Hernandez MD   QUEtiapine (SEROQUEL) 50 MG tablet Take 1 tablet by mouth nightly 10/22/18  Yes Wayne Hernandez MD   OXYGEN Inhale 2 L/min into the lungs continuous 8/19/15  Yes Bharati Jose MD          Allergy:     Enalapril       Review of Systems:     All 12 point review of symptoms completed. Pertinent positives identified in the HPI, all other review of symptoms negative as below. CONSTITUTIONAL: + fatigue  SKIN: No rash or pruritis. EYES: No visual changes or diplopia. No scleral icterus. ENT: No Headaches, hearing loss or vertigo. No mouth sores or sore throat. CARDIOVASCULAR: No chest pain/chest pressure/chest discomfort. No palpitations. + edema. RESPIRATORY: + dyspnea. No cough or wheezing, no sputum production. GASTROINTESTINAL: No N/V/D. No abdominal pain, appetite loss, blood in stools. GENITOURINARY: No dysuria, trouble voiding, or hematuria.   MUSCULOSKELETAL:  No gait disturbance, weakness or joint complaints. NEUROLOGICAL: No headache, diplopia, change in muscle strength, numbness or tingling. No change in gait, balance, coordination, mood, affect, memory, mentation, behavior. PSHYCH: No anxiety, loss of interest, change in sexual behavior, feelings of self-harm, or confusion. ENDOCRINE: No excessive thirst, fluid intake, or urination. No tremor. HEMATOLOGIC: No abnormal bruising or bleeding. ALLERGY: No nasal congestion or hives.       Physical Examination:     Vitals:    05/24/19 0908   BP: 130/70   Site: Left Upper Arm   Position: Sitting   Cuff Size: Medium Adult   Pulse: 58   SpO2: 91%   Weight: 155 lb 9.6 oz (70.6 kg)   Height: 5' 3\" (1.6 m)       Wt Readings from Last 3 Encounters:   05/24/19 155 lb 9.6 oz (70.6 kg)   03/20/19 155 lb (70.3 kg)   03/06/19 155 lb 9.5 oz (70.6 kg)         General Appearance:  Alert, cooperative, no distress, appears stated age Appropriate weight   Head:  Normocephalic, without obvious abnormality, atraumatic   Eyes:  PERRL, conjunctiva/corneas clear EOM intact  Ears normal   Throat no lesions       Nose: Nares normal, no drainage or sinus tenderness   Throat: Lips, mucosa, and tongue normal   Neck: Supple, symmetrical, trachea midline, no adenopathy, thyroid: not enlarged, symmetric, no tenderness/mass/nodules, no carotid bruit       Lungs:   Clear to auscultation bilaterally, respirations unlabored   Chest Wall:  No tenderness or deformity   Heart:  Regular rhythm, rate is controlled, S1, S2 normal, there is no murmur, there is no rub or gallop, no jvd, 1+ bilateral lower extremity edema   Abdomen:   Soft, non-tender, bowel sounds active all four quadrants,  no masses, no organomegaly       Extremities: Extremities normal, atraumatic, no cyanosis   Pulses: 2+ and symmetric   Skin: Skin color, texture, turgor normal, no rashes or lesions   Pysch: Normal mood and affect   Neurologic: Normal gross motor and sensory exam.  Cranial nerves intact        Labs:     Lab Results   Component Value Date    WBC 6.9 03/05/2019    HGB 13.8 03/06/2019    HCT 41.8 03/06/2019    MCV 88.2 03/05/2019     03/06/2019     Lab Results   Component Value Date     03/05/2019    K 3.7 03/05/2019    CL 96 (L) 03/05/2019    CO2 29 03/05/2019    BUN 20 03/05/2019    CREATININE 1.2 03/05/2019    GLUCOSE 147 (H) 03/05/2019    CALCIUM 9.3 03/05/2019    PROT 6.8 03/04/2019    LABALBU 3.4 03/04/2019    BILITOT 0.5 03/04/2019    ALKPHOS 56 03/04/2019    AST 31 03/04/2019    ALT 35 03/04/2019    LABGLOM 43 (A) 03/05/2019    GFRAA 52 (A) 03/05/2019    AGRATIO 1.0 (L) 03/04/2019    GLOB 3.4 03/04/2019         Lab Results   Component Value Date    CHOL 184 12/06/2018    CHOL 150 10/27/2015    CHOL 127 06/22/2013     Lab Results   Component Value Date    TRIG 79 12/06/2018    TRIG 115 10/27/2015    TRIG 111 06/22/2013     Lab Results   Component Value Date    HDL 42 12/06/2018    HDL 55 10/27/2015    HDL 38 (L) 06/22/2013     Lab Results   Component Value Date    LDLCALC 126 (H) 12/06/2018    LDLCALC 72 10/27/2015    LDLCALC 67 06/22/2013     Lab Results   Component Value Date    LABVLDL 16 12/06/2018    LABVLDL 23 10/27/2015    LABVLDL 22 06/22/2013     No results found for: [facility]    Lab Results   Component Value Date    INR 1.1 12/06/2018    INR 1.03 03/11/2017    PROTIME 11.6 03/11/2017       The ASCVD Risk score (López Salguero., et al., 2013) failed to calculate for the following reasons: The 2013 ASCVD risk score is only valid for ages 36 to 78    Imaging:         Assessment / Plan:      Diagnosis Orders   1. CAD in native artery     2. Chronic diastolic heart failure (HCC)  Basic Metabolic Panel   3. Essential hypertension     4. Pulmonary hypertension ([address]     5. Mixed hyperlipidemia     6. Centrilobular emphysema ([address]     7. S/p bare metal coronary artery stent          1. Acute on chronic HFpEF:   Patient appears mildly volume overloaded.      -Will give lasix 20 daily and check a BMP today and in 2 weeks. -C/w other meds including Maxzide 37.5/25 one daily. 2. CAD s/p PCI with BMS:   Patient's symptoms are not likely due to new ischemia. -C/w lopressor 25 bid and lipitor 40.   -Will restart aspirin 81 daily; no plavix due to concomitant Xarelto for PE's.      3. HTN:   BP is controlled.      4. HLP:   On a statin. Return in about 3 weeks (around 6/14/2019). I have personally reviewed the reports and images of labs, radiological studies, cardiac studies including ECG's and telemetry, current and old medical records. The note was completed using EMR. Every effort was made to ensure accuracy; however, inadvertent computerized transcription errors may be present. All questions and concerns were addressed to the patient/family. Alternatives to my treatment were discussed. I would like to thank you for providing me the opportunity to participate in the care of your patient. If you have any questions, please do not hesitate to contact me.      Shelly Wynn MD, [facility] [place], [address]  The [address]  [address] [place] [address]  Ph: [phone]  Fax: [phone]

## 2019-05-30 DIAGNOSIS — G25.81 RESTLESS LEG SYNDROME: ICD-10-CM

## 2019-05-30 DIAGNOSIS — F51.01 PRIMARY INSOMNIA: Chronic | ICD-10-CM

## 2019-05-31 ENCOUNTER — CARE COORDINATION (OUTPATIENT)
Dept: INTERNAL MEDICINE CLINIC | Age: 84
End: 2019-05-31

## 2019-05-31 ENCOUNTER — TELEPHONE (OUTPATIENT)
Dept: INTERNAL MEDICINE CLINIC | Age: 84
End: 2019-05-31

## 2019-05-31 DIAGNOSIS — G89.29 ABDOMINAL PAIN, CHRONIC, LEFT LOWER QUADRANT: ICD-10-CM

## 2019-05-31 DIAGNOSIS — R10.32 ABDOMINAL PAIN, CHRONIC, LEFT LOWER QUADRANT: ICD-10-CM

## 2019-05-31 DIAGNOSIS — G47.09 OTHER INSOMNIA: Chronic | ICD-10-CM

## 2019-05-31 RX ORDER — QUETIAPINE FUMARATE 50 MG/1
TABLET, FILM COATED ORAL
Qty: 30 TABLET | Refills: 3 | Status: SHIPPED | OUTPATIENT
Start: 2019-05-31 | End: 2019-06-03 | Stop reason: SDUPTHER

## 2019-05-31 RX ORDER — LORAZEPAM 0.5 MG/1
TABLET ORAL
Qty: 60 TABLET | Refills: 0 | Status: SHIPPED | OUTPATIENT
Start: 2019-05-31 | End: 2019-07-05 | Stop reason: SDUPTHER

## 2019-05-31 RX ORDER — HYDROCODONE BITARTRATE AND ACETAMINOPHEN 5; 325 MG/1; MG/1
1 TABLET ORAL 2 TIMES DAILY PRN
Qty: 60 TABLET | Refills: 0 | Status: SHIPPED | OUTPATIENT
Start: 2019-05-31 | End: 2019-06-30

## 2019-05-31 RX ORDER — ROPINIROLE 0.25 MG/1
TABLET, FILM COATED ORAL
Qty: 30 TABLET | Refills: 3 | Status: SHIPPED | OUTPATIENT
Start: 2019-05-31 | End: 2019-09-02 | Stop reason: SDUPTHER

## 2019-05-31 NOTE — CARE COORDINATION
Ambulatory Care Coordination Note  5/31/2019  CM Risk Score: 6  Jose Mortality Risk Score: 41    ACC: Keren Rose RN    Summary Note: Nurse Care Coordinator called and spoke to pt on the phone today. She says she is doing \"pretty good\". States she gets dizzy sometimes when standing up. Encouraged her to change position slowly, sit on side of bed for few minutes before standing. She verbalized understanding. Patient states she is drinking water all day long, drinks OJ with breakfast. Her daughter prepares meals for her. Patient living in lower level of her daughters home. States her daughter takes care of her, gets her to her medical appointments and makes sure she has all of her medications. Patient denies need for additional information or resources. Encouraged patient to call PCP office for any questions, concerns or worsening symptoms. No further care coordination needs at this time. Patient agrees to call [facility] if needs change. Care Coordination Interventions    Program Enrollment:  Rising Risk  Referral from Primary Care Provider:  Yes  Suggested Interventions and Community Resources  Fall Risk Prevention:  Completed  Disease Specific Clinic:  Completed (Comment: colorectal surgery)  Home Health Services:  Completed  Medi Set or Pill Pack:  Completed  Smoking Cessation:  Completed (Comment: stopped smoking 9/12/18)  Other Services or Interventions:  COA         Goals Addressed                 This Visit's Progress     COMPLETED: Conditions and Symptoms   On track     I will schedule office visits, as directed by my provider. I will notify my provider of any barriers to my plan of care. I will notify my provider of any symptoms that indicate a worsening of my condition.     Barriers: impairment:  cognitive and overwhelmed by complexity of regimen  Plan for overcoming my barriers: engagement with [facility] and Care Coordination team  Confidence: 8/10  Anticipated Goal Completion Date: 6/20/19            Prior to Admission medications    Medication Sig Start Date End Date Taking? Authorizing Provider   QUEtiapine (SEROQUEL) 50 MG tablet TAKE ONE TABLET BY MOUTH ONCE NIGHTLY 5/31/19   Samara Mendes MD   rOPINIRole (REQUIP) 0.25 MG tablet TAKE ONE TABLET BY MOUTH ONCE NIGHTLY 5/31/19   Samara Mnedes MD   LORazepam (ATIVAN) 0.5 MG tablet TAKE ONE TABLET BY MOUTH TWICE A DAY 5/31/19 7/11/19  Samara Mendes MD   HYDROcodone-acetaminophen (NORCO) 5-325 MG per tablet Take 1 tablet by mouth 2 times daily as needed for Pain for up to 30 days.  Take lowest dose possible to manage pain 5/31/19 6/30/19  Samara Mendes MD   aspirin EC 81 MG EC tablet Take 1 tablet by mouth daily 5/24/19   Kenda Bumpers, MD   furosemide (LASIX) 20 MG tablet Take 1 tablet by mouth daily 5/24/19   Kenda Bumpers, MD   metoprolol tartrate (LOPRESSOR) 25 MG tablet Take 1 tablet by mouth 2 times daily 5/14/19   Samara Mendes MD   rivaroxaban Salvadore Hodgkin) 20 MG TABS tablet Take 1 tablet by mouth daily (with breakfast) 4/4/19   Samara Mendes MD   traZODone (DESYREL) 50 MG tablet Take 1 tablet by mouth nightly 3/20/19   Samara Mendes MD   atorvastatin (LIPITOR) 40 MG tablet Take 1 tablet by mouth nightly 3/20/19   Samara Mendes MD   triamterene-hydrochlorothiazide (MAXZIDE-25) 37.5-25 MG per tablet TAKE ONE TABLET BY MOUTH DAILY 3/20/19   Samara Mendes MD   rivaroxaban (XARELTO STARTER PACK) 15 & 20 MG Starter Pack Pulmonary emboli 3/5/19   JAME Valerio CNP   pantoprazole (PROTONIX) 40 MG tablet Take 1 tablet by mouth every morning (before breakfast) 1/10/19   Kenda Bumpers, MD   amLODIPine (NORVASC) 2.5 MG tablet Take 1 tablet by mouth 2 times daily 11/26/18   Samara Mendes MD   PARoxetine (PAXIL) 20 MG tablet TAKE ONE TABLET BY MOUTH DAILY 11/26/18   Samara Mendes MD   donepezil (ARICEPT) 10 MG tablet Take 1 tablet by mouth nightly 11/26/18   Samara Mendes MD   OXYGEN Inhale 2 L/min into the lungs continuous 8/19/15   Aleksander Boykin MD       Future Appointments   Date Time Provider Melo Clemens   6/14/2019  9:30 AM Danielle Prasad MD [place] [facility]   6/20/2019  9:15 AM [facility]     ,   Congestive Heart Failure Assessment    Do you understand a low sodium diet?:  Yes  Do you understand how to read food labels?:  No  How many restaurant meals do you eat per week?:  0  Do you salt your food before tasting it?:  No     No patient-reported symptoms      Symptoms:         ,   COPD Assessment    Does the patient understand envrionmental exposure?:  Yes  Is the patient able to verbalize Rescue vs. Long Acting medications?:  No  Does the patient have a nebulizer?:  No  Does the patient use a space with inhaled medications?:  No     No patient-reported symptoms         Symptoms:      Have you had a recent diagnosis of pneumonia either by PCP or at a hospital?:  No      and   General Assessment    Do you have any symptoms that are causing concern?:  No

## 2019-05-31 NOTE — TELEPHONE ENCOUNTER
LORazepam (ATIVAN) 0.5 MG tablet   HYDROcodone-acetaminophen (NORCO) 5-325 MG per tablet     Pt is completely out would like a call once ready for .  Would like to  the scripts today

## 2019-06-03 ENCOUNTER — TELEPHONE (OUTPATIENT)
Dept: INTERNAL MEDICINE CLINIC | Age: 84
End: 2019-06-03

## 2019-06-03 DIAGNOSIS — F51.01 PRIMARY INSOMNIA: Chronic | ICD-10-CM

## 2019-06-03 RX ORDER — QUETIAPINE FUMARATE 50 MG/1
TABLET, FILM COATED ORAL
Qty: 30 TABLET | Refills: 3 | Status: SHIPPED | OUTPATIENT
Start: 2019-06-03 | End: 2019-10-22 | Stop reason: SDUPTHER

## 2019-06-03 RX ORDER — MECLIZINE HCL 12.5 MG/1
12.5 TABLET ORAL 2 TIMES DAILY PRN
Qty: 60 TABLET | Refills: 0 | Status: SHIPPED | OUTPATIENT
Start: 2019-06-03 | End: 2019-06-13

## 2019-06-06 ENCOUNTER — OFFICE VISIT (OUTPATIENT)
Dept: INTERNAL MEDICINE CLINIC | Age: 84
End: 2019-06-06
Payer: MEDICARE

## 2019-06-06 VITALS
BODY MASS INDEX: 26.22 KG/M2 | OXYGEN SATURATION: 94 % | WEIGHT: 148 LBS | DIASTOLIC BLOOD PRESSURE: 62 MMHG | SYSTOLIC BLOOD PRESSURE: 107 MMHG | HEART RATE: 60 BPM

## 2019-06-06 DIAGNOSIS — J43.2 CENTRILOBULAR EMPHYSEMA (HCC): ICD-10-CM

## 2019-06-06 DIAGNOSIS — F41.1 GENERALIZED ANXIETY DISORDER: ICD-10-CM

## 2019-06-06 DIAGNOSIS — R42 ORTHOSTATIC DIZZINESS: Primary | ICD-10-CM

## 2019-06-06 PROCEDURE — 3023F SPIROM DOC REV: CPT | Performed by: HOSPITALIST

## 2019-06-06 PROCEDURE — G8598 ASA/ANTIPLAT THER USED: HCPCS | Performed by: HOSPITALIST

## 2019-06-06 PROCEDURE — G8926 SPIRO NO PERF OR DOC: HCPCS | Performed by: HOSPITALIST

## 2019-06-06 PROCEDURE — 99214 OFFICE O/P EST MOD 30 MIN: CPT | Performed by: HOSPITALIST

## 2019-06-06 PROCEDURE — 1036F TOBACCO NON-USER: CPT | Performed by: HOSPITALIST

## 2019-06-06 PROCEDURE — 4040F PNEUMOC VAC/ADMIN/RCVD: CPT | Performed by: HOSPITALIST

## 2019-06-06 PROCEDURE — 1123F ACP DISCUSS/DSCN MKR DOCD: CPT | Performed by: HOSPITALIST

## 2019-06-06 PROCEDURE — 1090F PRES/ABSN URINE INCON ASSESS: CPT | Performed by: HOSPITALIST

## 2019-06-06 PROCEDURE — G8427 DOCREV CUR MEDS BY ELIG CLIN: HCPCS | Performed by: HOSPITALIST

## 2019-06-06 PROCEDURE — G8417 CALC BMI ABV UP PARAM F/U: HCPCS | Performed by: HOSPITALIST

## 2019-06-06 PROCEDURE — G8400 PT W/DXA NO RESULTS DOC: HCPCS | Performed by: HOSPITALIST

## 2019-06-06 NOTE — PROGRESS NOTES
Follow Up Visit Established Patient Visit    Patient:  Rome Villareal                                               : 1934  Age: 80 y.o. MRN: [id number]  Date : 2019    Rome Villareal is a 80 y.o. female who presents for :      Chief Complaint   Patient presents with    Fatigue     Stopped Furosemide due to causing weakness and fatigue. Reports lightheadedness/ dizziness. Was recommended to start Lasix 20 mg PO daily by Dr Erik Damon, cardiology, on 19 because of SOB and leg swelling. Lasix helps to improve SOB and leg swelling, but after couple of day of using Lasix she became lightheaded and dizzy. She stopped taking Lasix last Monday and felt better. PAtient's anxiety is well controlled. May be depressed.  Will refer to psychologist.     Past Medical History:   Diagnosis Date    Abdominal pain, unspecified site     Adrenal hyperplasia ([address] 2013    Left - CT scan -     Ankle Fracture, Right     Anxiety and depression     Aortic atherosclerosis ([address] 10/3/2015    Contusion of unspecified site     COPD (chronic obstructive pulmonary disease) ([address] 3/1/2013    Coronary artery disease     Arteriosclerosis    DJD (degenerative joint disease)     Eczema 2013    Essential hypertension 2015    Hyperlipidemia     Hypertension     Hypoxia 2015    Insomnia     Leukocytosis     Memory loss     Nicotine addiction     Osteoarthritis     Pain in joint, pelvic region and thigh     Primary osteoarthritis involving multiple joints 2015    Pulmonary hypertension ([address] 10/3/2015    Senile reticular degeneration of peripheral retina     Syncope     Thoracic or lumbosacral neuritis or radiculitis, unspecified     Urinary incontinence     Visual hallucinations 2014    Wears glasses        Past Surgical History:   Procedure Laterality Date    CATARACT REMOVAL WITH IMPLANT Left 2012    Dr. Donte Montes Pulse 60   Wt 148 lb (67.1 kg)   SpO2 94%   BMI 26.22 kg/m²     Orthostatic vital signs were performed. Supine - /76 HR 58  Sitting - /61 HR 57  Standing - /62 HR 60    Physical Exam   Physical Exam   Constitutional: She is oriented to person, place, and time. She appears well-developed and well-nourished. No distress. HENT:   Head: Normocephalic and atraumatic. Eyes: Pupils are equal, round, and reactive to light. Neck: No JVD present. No tracheal deviation present. No thyromegaly present. Cardiovascular: Normal rate, regular rhythm, normal heart sounds and intact distal pulses. Pulmonary/Chest: Effort normal and breath sounds normal.   Abdominal: Soft. Bowel sounds are normal.   Musculoskeletal: She exhibits no edema. Neurological: She is alert and oriented to person, place, and time. Skin: Skin is warm and dry. Capillary refill takes less than 2 seconds. No rash noted. She is not diaphoretic. No erythema. No pallor. Vitals reviewed. Assessment/ plan:     Christine Mccullough was seen today for fatigue.     Diagnoses and all orders for this visit:    Orthostatic dizziness       -     Instructed patient to take amlodipine If SBP >130       -     Instructed When taking lasix - don't take maxide       -     Encouraged fluid intake        -     Take lasix 20mg daily if weight is >145lbs       -     Hold donepezil    Centrilobular emphysema ([address]       -     Encouraged regular use of oxygen     Generalized anxiety disorder       -     Continue ativan       -     Will refer to psychologist     Follow up in 1 month         Bernardino Wagner MD

## 2019-06-13 ENCOUNTER — TELEPHONE (OUTPATIENT)
Dept: INTERNAL MEDICINE CLINIC | Age: 84
End: 2019-06-13

## 2019-06-13 NOTE — TELEPHONE ENCOUNTER
Tonie from  [facility]  Pt complaining of being dizzy a lot would like to know if Cheyanne Or would like to prescribe Tonie can be reached at [phone]

## 2019-07-05 ENCOUNTER — OFFICE VISIT (OUTPATIENT)
Dept: INTERNAL MEDICINE CLINIC | Age: 84
End: 2019-07-05
Payer: MEDICARE

## 2019-07-05 VITALS — BODY MASS INDEX: 25.86 KG/M2 | SYSTOLIC BLOOD PRESSURE: 124 MMHG | DIASTOLIC BLOOD PRESSURE: 48 MMHG | WEIGHT: 146 LBS

## 2019-07-05 DIAGNOSIS — G89.29 ABDOMINAL PAIN, CHRONIC, LEFT LOWER QUADRANT: ICD-10-CM

## 2019-07-05 DIAGNOSIS — I10 ESSENTIAL HYPERTENSION: Primary | Chronic | ICD-10-CM

## 2019-07-05 DIAGNOSIS — R10.32 ABDOMINAL PAIN, CHRONIC, LEFT LOWER QUADRANT: ICD-10-CM

## 2019-07-05 DIAGNOSIS — F51.01 PRIMARY INSOMNIA: ICD-10-CM

## 2019-07-05 DIAGNOSIS — F41.9 ANXIETY: Chronic | ICD-10-CM

## 2019-07-05 PROCEDURE — G8427 DOCREV CUR MEDS BY ELIG CLIN: HCPCS | Performed by: HOSPITALIST

## 2019-07-05 PROCEDURE — G8417 CALC BMI ABV UP PARAM F/U: HCPCS | Performed by: HOSPITALIST

## 2019-07-05 PROCEDURE — 1036F TOBACCO NON-USER: CPT | Performed by: HOSPITALIST

## 2019-07-05 PROCEDURE — 4040F PNEUMOC VAC/ADMIN/RCVD: CPT | Performed by: HOSPITALIST

## 2019-07-05 PROCEDURE — 1090F PRES/ABSN URINE INCON ASSESS: CPT | Performed by: HOSPITALIST

## 2019-07-05 PROCEDURE — G8400 PT W/DXA NO RESULTS DOC: HCPCS | Performed by: HOSPITALIST

## 2019-07-05 PROCEDURE — 99214 OFFICE O/P EST MOD 30 MIN: CPT | Performed by: HOSPITALIST

## 2019-07-05 PROCEDURE — G8598 ASA/ANTIPLAT THER USED: HCPCS | Performed by: HOSPITALIST

## 2019-07-05 PROCEDURE — 1123F ACP DISCUSS/DSCN MKR DOCD: CPT | Performed by: HOSPITALIST

## 2019-07-05 RX ORDER — HYDROCODONE BITARTRATE AND ACETAMINOPHEN 5; 325 MG/1; MG/1
1 TABLET ORAL 2 TIMES DAILY PRN
Qty: 60 TABLET | Refills: 0 | Status: SHIPPED | OUTPATIENT
Start: 2019-07-05 | End: 2019-07-29 | Stop reason: SDUPTHER

## 2019-07-05 RX ORDER — LORAZEPAM 0.5 MG/1
TABLET ORAL
Qty: 60 TABLET | Refills: 0 | Status: SHIPPED | OUTPATIENT
Start: 2019-07-05 | End: 2019-07-29 | Stop reason: SDUPTHER

## 2019-07-05 NOTE — PROGRESS NOTES
oriented to person, place, and time. She appears well-developed and well-nourished. No distress. HENT:   Head: Normocephalic and atraumatic. Right Ear: External ear normal.   Left Ear: External ear normal.   Eyes: Pupils are equal, round, and reactive to light. EOM are normal.   Neck: Normal range of motion. No thyromegaly present. Cardiovascular: Normal rate, regular rhythm and normal heart sounds. Exam reveals no gallop and no friction rub. No murmur heard. Pulmonary/Chest: Effort normal and breath sounds normal. No respiratory distress. She has no wheezes. Abdominal: Soft. Bowel sounds are normal. She exhibits no distension. There is tenderness (Lower quadrants). Musculoskeletal: Normal range of motion. She exhibits no edema or deformity. Neurological: She is alert and oriented to person, place, and time. Assessment/ plan:     Saundra Laureano was seen today for blood pressure check. Diagnoses and all orders for this visit:    Essential hypertension        -     Continue Norvasc        -     Discussed taking lasix 20mg everyday        -     Patient was still taking Maxzide, discussed with patient and daughter to stop taking and start taking lasix    Anxiety  -     LORazepam (ATIVAN) 0.5 MG tablet; TAKE ONE TABLET BY MOUTH TWICE A DAY    Primary insomnia  -     LORazepam (ATIVAN) 0.5 MG tablet; TAKE ONE TABLET BY MOUTH TWICE A DAY    Abdominal pain, chronic, left lower quadrant  -     HYDROcodone-acetaminophen (NORCO) 5-325 MG per tablet; Take 1 tablet by mouth 2 times daily as needed for Pain for up to 30 days.  Take lowest dose possible to manage pain    Follow up in 3 months    Kike Crawford MD

## 2019-07-25 ENCOUNTER — TELEPHONE (OUTPATIENT)
Dept: INTERNAL MEDICINE CLINIC | Age: 84
End: 2019-07-25

## 2019-07-25 DIAGNOSIS — J43.2 CENTRILOBULAR EMPHYSEMA (HCC): Chronic | ICD-10-CM

## 2019-07-25 DIAGNOSIS — R10.32 ABDOMINAL PAIN, CHRONIC, LEFT LOWER QUADRANT: ICD-10-CM

## 2019-07-25 DIAGNOSIS — F41.9 ANXIETY: Chronic | ICD-10-CM

## 2019-07-25 DIAGNOSIS — F51.01 PRIMARY INSOMNIA: ICD-10-CM

## 2019-07-25 DIAGNOSIS — R09.02 HYPOXIA: Primary | Chronic | ICD-10-CM

## 2019-07-25 DIAGNOSIS — G89.29 ABDOMINAL PAIN, CHRONIC, LEFT LOWER QUADRANT: ICD-10-CM

## 2019-07-26 RX ORDER — HYDROCODONE BITARTRATE AND ACETAMINOPHEN 5; 325 MG/1; MG/1
1 TABLET ORAL 2 TIMES DAILY PRN
Qty: 60 TABLET | Refills: 0 | Status: CANCELLED | OUTPATIENT
Start: 2019-07-26 | End: 2019-08-25

## 2019-07-26 RX ORDER — LORAZEPAM 0.5 MG/1
TABLET ORAL
Qty: 60 TABLET | Refills: 0 | Status: CANCELLED | OUTPATIENT
Start: 2019-07-26 | End: 2019-09-05

## 2019-07-29 ENCOUNTER — TELEPHONE (OUTPATIENT)
Dept: INTERNAL MEDICINE CLINIC | Age: 84
End: 2019-07-29

## 2019-07-29 DIAGNOSIS — G89.29 ABDOMINAL PAIN, CHRONIC, LEFT LOWER QUADRANT: ICD-10-CM

## 2019-07-29 DIAGNOSIS — F51.01 PRIMARY INSOMNIA: ICD-10-CM

## 2019-07-29 DIAGNOSIS — F41.9 ANXIETY: Chronic | ICD-10-CM

## 2019-07-29 DIAGNOSIS — R10.32 ABDOMINAL PAIN, CHRONIC, LEFT LOWER QUADRANT: ICD-10-CM

## 2019-08-01 RX ORDER — LORAZEPAM 0.5 MG/1
TABLET ORAL
Qty: 60 TABLET | Refills: 0 | Status: SHIPPED | OUTPATIENT
Start: 2019-08-01 | End: 2019-10-09 | Stop reason: SDUPTHER

## 2019-08-01 RX ORDER — HYDROCODONE BITARTRATE AND ACETAMINOPHEN 5; 325 MG/1; MG/1
1 TABLET ORAL 2 TIMES DAILY PRN
Qty: 60 TABLET | Refills: 0 | Status: SHIPPED | OUTPATIENT
Start: 2019-08-01 | End: 2019-09-05 | Stop reason: SDUPTHER

## 2019-08-02 RX ORDER — MECLIZINE HCL 12.5 MG/1
12.5 TABLET ORAL 2 TIMES DAILY PRN
Qty: 60 TABLET | Refills: 1 | Status: CANCELLED | OUTPATIENT
Start: 2019-08-02 | End: 2019-08-12

## 2019-08-02 RX ORDER — PANTOPRAZOLE SODIUM 40 MG/1
40 TABLET, DELAYED RELEASE ORAL
Qty: 30 TABLET | Refills: 3 | Status: CANCELLED | OUTPATIENT
Start: 2019-08-02

## 2019-08-02 RX ORDER — ATORVASTATIN CALCIUM 40 MG/1
40 TABLET, FILM COATED ORAL NIGHTLY
Qty: 30 TABLET | Refills: 3 | Status: CANCELLED | OUTPATIENT
Start: 2019-08-02

## 2019-08-03 DIAGNOSIS — L30.9 ECZEMA, UNSPECIFIED TYPE: ICD-10-CM

## 2019-08-05 ENCOUNTER — TELEPHONE (OUTPATIENT)
Dept: INTERNAL MEDICINE CLINIC | Age: 84
End: 2019-08-05

## 2019-08-05 DIAGNOSIS — I10 ESSENTIAL HYPERTENSION: Chronic | ICD-10-CM

## 2019-08-05 RX ORDER — ATORVASTATIN CALCIUM 40 MG/1
TABLET, FILM COATED ORAL
Qty: 90 TABLET | Refills: 2 | Status: SHIPPED | OUTPATIENT
Start: 2019-08-05 | End: 2020-05-11

## 2019-08-05 RX ORDER — RIVAROXABAN 20 MG/1
TABLET, FILM COATED ORAL
Qty: 30 TABLET | Refills: 2 | Status: SHIPPED | OUTPATIENT
Start: 2019-08-05 | End: 2019-11-09 | Stop reason: SDUPTHER

## 2019-08-05 RX ORDER — MECLIZINE HCL 12.5 MG/1
TABLET ORAL
Qty: 60 TABLET | Refills: 0 | Status: SHIPPED | OUTPATIENT
Start: 2019-08-05 | End: 2019-09-15 | Stop reason: SDUPTHER

## 2019-08-06 RX ORDER — PANTOPRAZOLE SODIUM 40 MG/1
TABLET, DELAYED RELEASE ORAL
Qty: 30 TABLET | Refills: 1 | Status: SHIPPED | OUTPATIENT
Start: 2019-08-06 | End: 2019-10-05 | Stop reason: SDUPTHER

## 2019-08-06 RX ORDER — AMLODIPINE BESYLATE 2.5 MG/1
5 TABLET ORAL 2 TIMES DAILY
Qty: 60 TABLET | Refills: 3
Start: 2019-08-06 | End: 2019-12-05 | Stop reason: SDUPTHER

## 2019-08-06 NOTE — TELEPHONE ENCOUNTER
Patient has been out of Norco elevated B/P could be related to pain, prescription is ready to be picked per Dr. Arslan Russo , patient will continue norvasc 5mg BID and metoprolol

## 2019-08-13 ENCOUNTER — HOSPITAL ENCOUNTER (INPATIENT)
Age: 84
LOS: 2 days | Discharge: HOME | DRG: 690 | End: 2019-08-16
Attending: EMERGENCY MEDICINE | Admitting: INTERNAL MEDICINE
Payer: MEDICARE

## 2019-08-13 DIAGNOSIS — N17.9 AKI (ACUTE KIDNEY INJURY) (HCC): ICD-10-CM

## 2019-08-13 DIAGNOSIS — R31.9 URINARY TRACT INFECTION WITH HEMATURIA, SITE UNSPECIFIED: Primary | ICD-10-CM

## 2019-08-13 DIAGNOSIS — N39.0 URINARY TRACT INFECTION WITH HEMATURIA, SITE UNSPECIFIED: Primary | ICD-10-CM

## 2019-08-13 PROCEDURE — 99284 EMERGENCY DEPT VISIT MOD MDM: CPT

## 2019-08-14 PROBLEM — N39.0 UTI (URINARY TRACT INFECTION): Status: ACTIVE | Noted: 2019-08-14

## 2019-08-14 LAB
A/G RATIO: 0.9 (ref 1.1–2.2)
ALBUMIN SERPL-MCNC: 3.1 G/DL (ref 3.4–5)
ALP BLD-CCNC: 50 U/L (ref 40–129)
ALT SERPL-CCNC: 7 U/L (ref 10–40)
ANION GAP SERPL CALCULATED.3IONS-SCNC: 13 MMOL/L (ref 3–16)
AST SERPL-CCNC: 22 U/L (ref 15–37)
BACTERIA: ABNORMAL /HPF
BANDED NEUTROPHILS RELATIVE PERCENT: 12 % (ref 0–7)
BASOPHILS ABSOLUTE: 0 K/UL (ref 0–0.2)
BASOPHILS RELATIVE PERCENT: 0 %
BILIRUB SERPL-MCNC: 0.6 MG/DL (ref 0–1)
BILIRUBIN URINE: ABNORMAL
BLOOD, URINE: ABNORMAL
BUN BLDV-MCNC: 17 MG/DL (ref 7–20)
CALCIUM SERPL-MCNC: 8.5 MG/DL (ref 8.3–10.6)
CASTS: ABNORMAL /LPF
CHLORIDE BLD-SCNC: 102 MMOL/L (ref 99–110)
CLARITY: ABNORMAL
CO2: 23 MMOL/L (ref 21–32)
COLOR: ABNORMAL
COMMENT UA: ABNORMAL
CREAT SERPL-MCNC: 1.3 MG/DL (ref 0.6–1.2)
EOSINOPHILS ABSOLUTE: 0 K/UL (ref 0–0.6)
EOSINOPHILS RELATIVE PERCENT: 0 %
EPITHELIAL CELLS, UA: 6 /HPF (ref 0–5)
GFR AFRICAN AMERICAN: 47
GFR NON-AFRICAN AMERICAN: 39
GLOBULIN: 3.6 G/DL
GLUCOSE BLD-MCNC: 142 MG/DL (ref 70–99)
GLUCOSE URINE: NEGATIVE MG/DL
HCT VFR BLD CALC: 34.6 % (ref 36–48)
HEMOGLOBIN: 11.2 G/DL (ref 12–16)
KETONES, URINE: ABNORMAL MG/DL
LEUKOCYTE ESTERASE, URINE: ABNORMAL
LIPASE: 9 U/L (ref 13–60)
LYMPHOCYTES ABSOLUTE: 0.6 K/UL (ref 1–5.1)
LYMPHOCYTES RELATIVE PERCENT: 4 %
MCH RBC QN AUTO: 27 PG (ref 26–34)
MCHC RBC AUTO-ENTMCNC: 32.3 G/DL (ref 31–36)
MCV RBC AUTO: 83.5 FL (ref 80–100)
MICROSCOPIC EXAMINATION: YES
MONOCYTES ABSOLUTE: 0.4 K/UL (ref 0–1.3)
MONOCYTES RELATIVE PERCENT: 3 %
NEUTROPHILS ABSOLUTE: 13.5 K/UL (ref 1.7–7.7)
NEUTROPHILS RELATIVE PERCENT: 81 %
NITRITE, URINE: ABNORMAL
PDW BLD-RTO: 17.3 % (ref 12.4–15.4)
PH UA: 5.5 (ref 5–8)
PLATELET # BLD: 155 K/UL (ref 135–450)
PLATELET SLIDE REVIEW: ADEQUATE
PMV BLD AUTO: 9.2 FL (ref 5–10.5)
POTASSIUM REFLEX MAGNESIUM: 4.9 MMOL/L (ref 3.5–5.1)
PROTEIN UA: 100 MG/DL
RBC # BLD: 4.15 M/UL (ref 4–5.2)
RBC UA: >100 /HPF (ref 0–2)
SLIDE REVIEW: ABNORMAL
SODIUM BLD-SCNC: 138 MMOL/L (ref 136–145)
SPECIFIC GRAVITY UA: 1.02 (ref 1–1.03)
TOTAL PROTEIN: 6.7 G/DL (ref 6.4–8.2)
URINE REFLEX TO CULTURE: YES
URINE TYPE: ABNORMAL
UROBILINOGEN, URINE: 1 E.U./DL
WBC # BLD: 14.5 K/UL (ref 4–11)
WBC UA: >900 /HPF (ref 0–5)
YEAST: PRESENT /HPF

## 2019-08-14 PROCEDURE — 81001 URINALYSIS AUTO W/SCOPE: CPT

## 2019-08-14 PROCEDURE — 6370000000 HC RX 637 (ALT 250 FOR IP): Performed by: INTERNAL MEDICINE

## 2019-08-14 PROCEDURE — 1200000000 HC SEMI PRIVATE

## 2019-08-14 PROCEDURE — 87186 SC STD MICRODIL/AGAR DIL: CPT

## 2019-08-14 PROCEDURE — 96365 THER/PROPH/DIAG IV INF INIT: CPT

## 2019-08-14 PROCEDURE — 80053 COMPREHEN METABOLIC PANEL: CPT

## 2019-08-14 PROCEDURE — 87077 CULTURE AEROBIC IDENTIFY: CPT

## 2019-08-14 PROCEDURE — 87086 URINE CULTURE/COLONY COUNT: CPT

## 2019-08-14 PROCEDURE — 2580000003 HC RX 258: Performed by: NURSE PRACTITIONER

## 2019-08-14 PROCEDURE — 6360000002 HC RX W HCPCS: Performed by: NURSE PRACTITIONER

## 2019-08-14 PROCEDURE — 85025 COMPLETE CBC W/AUTO DIFF WBC: CPT

## 2019-08-14 PROCEDURE — 2700000000 HC OXYGEN THERAPY PER DAY

## 2019-08-14 PROCEDURE — 36415 COLL VENOUS BLD VENIPUNCTURE: CPT

## 2019-08-14 PROCEDURE — 94760 N-INVAS EAR/PLS OXIMETRY 1: CPT

## 2019-08-14 PROCEDURE — 2580000003 HC RX 258: Performed by: INTERNAL MEDICINE

## 2019-08-14 PROCEDURE — 83690 ASSAY OF LIPASE: CPT

## 2019-08-14 PROCEDURE — 6360000002 HC RX W HCPCS: Performed by: INTERNAL MEDICINE

## 2019-08-14 PROCEDURE — 6370000000 HC RX 637 (ALT 250 FOR IP): Performed by: NURSE PRACTITIONER

## 2019-08-14 RX ORDER — SODIUM CHLORIDE 9 MG/ML
INJECTION, SOLUTION INTRAVENOUS CONTINUOUS
Status: DISCONTINUED | OUTPATIENT
Start: 2019-08-14 | End: 2019-08-15

## 2019-08-14 RX ORDER — HYDROCODONE BITARTRATE AND ACETAMINOPHEN 5; 325 MG/1; MG/1
1 TABLET ORAL 2 TIMES DAILY PRN
Status: DISCONTINUED | OUTPATIENT
Start: 2019-08-14 | End: 2019-08-16 | Stop reason: HOSPADM

## 2019-08-14 RX ORDER — LACTOBACILLUS RHAMNOSUS GG 10B CELL
1 CAPSULE ORAL 2 TIMES DAILY WITH MEALS
Status: DISCONTINUED | OUTPATIENT
Start: 2019-08-14 | End: 2019-08-16 | Stop reason: HOSPADM

## 2019-08-14 RX ORDER — SODIUM CHLORIDE 0.9 % (FLUSH) 0.9 %
10 SYRINGE (ML) INJECTION PRN
Status: DISCONTINUED | OUTPATIENT
Start: 2019-08-14 | End: 2019-08-16 | Stop reason: HOSPADM

## 2019-08-14 RX ORDER — AMLODIPINE BESYLATE 5 MG/1
5 TABLET ORAL 2 TIMES DAILY
Status: DISCONTINUED | OUTPATIENT
Start: 2019-08-14 | End: 2019-08-16 | Stop reason: HOSPADM

## 2019-08-14 RX ORDER — ATORVASTATIN CALCIUM 40 MG/1
40 TABLET, FILM COATED ORAL NIGHTLY
Status: DISCONTINUED | OUTPATIENT
Start: 2019-08-14 | End: 2019-08-16 | Stop reason: HOSPADM

## 2019-08-14 RX ORDER — ONDANSETRON 2 MG/ML
4 INJECTION INTRAMUSCULAR; INTRAVENOUS EVERY 6 HOURS PRN
Status: DISCONTINUED | OUTPATIENT
Start: 2019-08-14 | End: 2019-08-16 | Stop reason: HOSPADM

## 2019-08-14 RX ORDER — PANTOPRAZOLE SODIUM 40 MG/1
40 TABLET, DELAYED RELEASE ORAL
Status: DISCONTINUED | OUTPATIENT
Start: 2019-08-14 | End: 2019-08-16 | Stop reason: HOSPADM

## 2019-08-14 RX ORDER — ASPIRIN 81 MG/1
81 TABLET ORAL DAILY
Status: DISCONTINUED | OUTPATIENT
Start: 2019-08-14 | End: 2019-08-16 | Stop reason: HOSPADM

## 2019-08-14 RX ORDER — QUETIAPINE FUMARATE 25 MG/1
50 TABLET, FILM COATED ORAL 2 TIMES DAILY
Status: DISCONTINUED | OUTPATIENT
Start: 2019-08-14 | End: 2019-08-14 | Stop reason: SDUPTHER

## 2019-08-14 RX ORDER — PAROXETINE HYDROCHLORIDE 20 MG/1
20 TABLET, FILM COATED ORAL DAILY
Status: DISCONTINUED | OUTPATIENT
Start: 2019-08-14 | End: 2019-08-16 | Stop reason: HOSPADM

## 2019-08-14 RX ORDER — QUETIAPINE FUMARATE 25 MG/1
50 TABLET, FILM COATED ORAL 2 TIMES DAILY
Status: DISCONTINUED | OUTPATIENT
Start: 2019-08-14 | End: 2019-08-16 | Stop reason: HOSPADM

## 2019-08-14 RX ORDER — LORAZEPAM 0.5 MG/1
0.5 TABLET ORAL 2 TIMES DAILY
Status: DISCONTINUED | OUTPATIENT
Start: 2019-08-14 | End: 2019-08-16 | Stop reason: HOSPADM

## 2019-08-14 RX ORDER — SODIUM CHLORIDE 0.9 % (FLUSH) 0.9 %
10 SYRINGE (ML) INJECTION EVERY 12 HOURS SCHEDULED
Status: DISCONTINUED | OUTPATIENT
Start: 2019-08-14 | End: 2019-08-16 | Stop reason: HOSPADM

## 2019-08-14 RX ORDER — ACETAMINOPHEN 325 MG/1
650 TABLET ORAL EVERY 6 HOURS PRN
Status: DISCONTINUED | OUTPATIENT
Start: 2019-08-14 | End: 2019-08-16 | Stop reason: HOSPADM

## 2019-08-14 RX ORDER — 0.9 % SODIUM CHLORIDE 0.9 %
500 INTRAVENOUS SOLUTION INTRAVENOUS ONCE
Status: COMPLETED | OUTPATIENT
Start: 2019-08-14 | End: 2019-08-14

## 2019-08-14 RX ORDER — DONEPEZIL HYDROCHLORIDE 10 MG/1
10 TABLET, FILM COATED ORAL NIGHTLY
Status: DISCONTINUED | OUTPATIENT
Start: 2019-08-14 | End: 2019-08-16 | Stop reason: HOSPADM

## 2019-08-14 RX ADMIN — AMLODIPINE BESYLATE 5 MG: 5 TABLET ORAL at 10:32

## 2019-08-14 RX ADMIN — Medication 10 ML: at 10:33

## 2019-08-14 RX ADMIN — Medication 1 CAPSULE: at 18:44

## 2019-08-14 RX ADMIN — LORAZEPAM 0.5 MG: 0.5 TABLET ORAL at 20:35

## 2019-08-14 RX ADMIN — Medication 10 ML: at 20:35

## 2019-08-14 RX ADMIN — PAROXETINE HYDROCHLORIDE 20 MG: 20 TABLET, FILM COATED ORAL at 10:31

## 2019-08-14 RX ADMIN — SODIUM CHLORIDE: 9 INJECTION, SOLUTION INTRAVENOUS at 04:48

## 2019-08-14 RX ADMIN — ENOXAPARIN SODIUM 40 MG: 40 INJECTION SUBCUTANEOUS at 10:32

## 2019-08-14 RX ADMIN — ACETAMINOPHEN 650 MG: 325 TABLET ORAL at 18:44

## 2019-08-14 RX ADMIN — DONEPEZIL HYDROCHLORIDE 10 MG: 10 TABLET, FILM COATED ORAL at 20:35

## 2019-08-14 RX ADMIN — CEFTRIAXONE 1 G: 1 INJECTION, POWDER, FOR SOLUTION INTRAMUSCULAR; INTRAVENOUS at 01:15

## 2019-08-14 RX ADMIN — PANTOPRAZOLE SODIUM 40 MG: 40 TABLET, DELAYED RELEASE ORAL at 05:03

## 2019-08-14 RX ADMIN — QUETIAPINE FUMARATE 50 MG: 25 TABLET ORAL at 10:32

## 2019-08-14 RX ADMIN — QUETIAPINE FUMARATE 50 MG: 25 TABLET ORAL at 20:35

## 2019-08-14 RX ADMIN — SODIUM CHLORIDE: 9 INJECTION, SOLUTION INTRAVENOUS at 20:35

## 2019-08-14 RX ADMIN — METOPROLOL TARTRATE 25 MG: 25 TABLET ORAL at 20:35

## 2019-08-14 RX ADMIN — METOPROLOL TARTRATE 25 MG: 25 TABLET ORAL at 10:32

## 2019-08-14 RX ADMIN — ASPIRIN 81 MG: 81 TABLET ORAL at 10:31

## 2019-08-14 RX ADMIN — AMLODIPINE BESYLATE 5 MG: 5 TABLET ORAL at 20:35

## 2019-08-14 RX ADMIN — SODIUM CHLORIDE 500 ML: 9 INJECTION, SOLUTION INTRAVENOUS at 01:16

## 2019-08-14 RX ADMIN — ATORVASTATIN CALCIUM 40 MG: 40 TABLET, FILM COATED ORAL at 20:35

## 2019-08-14 RX ADMIN — SODIUM CHLORIDE: 9 INJECTION, SOLUTION INTRAVENOUS at 14:33

## 2019-08-14 RX ADMIN — LORAZEPAM 0.5 MG: 0.5 TABLET ORAL at 10:32

## 2019-08-14 ASSESSMENT — PAIN SCALES - GENERAL: PAINLEVEL_OUTOF10: 0

## 2019-08-14 NOTE — PROGRESS NOTES
REMOVAL WITH IMPLANT Left December 5, 2012    Dr. Jeimy Mahoney  08/01/2018       Allergies:  Enalapril    Past medical and surgical history reviewed. Any changes have been noted. Scheduled and prn Medications:    Scheduled Meds:   amLODIPine  5 mg Oral BID    aspirin EC  81 mg Oral Daily    atorvastatin  40 mg Oral Nightly    donepezil  10 mg Oral Nightly    LORazepam  0.5 mg Oral BID    metoprolol tartrate  25 mg Oral BID    pantoprazole  40 mg Oral QAM AC    PARoxetine  20 mg Oral Daily    QUEtiapine  50 mg Oral BID    sodium chloride flush  10 mL Intravenous 2 times per day    enoxaparin  40 mg Subcutaneous Daily    [START ON 8/15/2019] cefTRIAXone (ROCEPHIN) IV  1 g Intravenous Q24H     Continuous Infusions:   sodium chloride 100 mL/hr at 08/14/19 0448     PRN Meds:. HYDROcodone-acetaminophen, sodium chloride flush, magnesium hydroxide, ondansetron    PHYSICAL EXAM:  BP (!) 137/59   Pulse 83   Temp 99.5 °F (37.5 °C) (Oral)   Resp 20   Ht 5' 3\" (1.6 m)   Wt 156 lb 4.9 oz (70.9 kg)   SpO2 91%   BMI 27.69 kg/m²       Intake/Output Summary (Last 24 hours) at 8/14/2019 0933  Last data filed at 8/14/2019 0530  Gross per 24 hour   Intake 120 ml   Output 200 ml   Net -80 ml       General: Alert and oriented. Resting in bed in no apparent distress. Pleasant and cooperative. HEENT: Normocephalic. Atraumatic. Pupils equal and reactive. EOM intact. Oral mucosa pink/moist/intact. Neck: Supple. Symmetrical. Trachea midline. Lungs: Clear to auscultation bilaterally. Respirations even and unlabored. Chest: Exam unremarkable. Cardiac: S1/S2 noted. Regular Rhythm and rate. Abdomen/GI: Soft. Non-tender. Non-distended. BS+. Extremities: PP+. Atraumatic. No redness/cyanosis/edema noted. Brisk cap refill. Skin: Dry and intact. No lesions noted. Neuro: Grossly intact. No focal deficits noted.      LABS:    Lab Results   Component Value Date    WBC 14.5 (H) 08/14/2019    HGB 11.2 (L) 08/14/2019    HCT 34.6 (L) 08/14/2019    MCV 83.5 08/14/2019     08/14/2019    LYMPHOPCT 4.0 08/14/2019    RBC 4.15 08/14/2019    MCH 27.0 08/14/2019    MCHC 32.3 08/14/2019    RDW 17.3 (H) 08/14/2019       Lab Results   Component Value Date    CREATININE 1.3 (H) 08/14/2019    BUN 17 08/14/2019     08/14/2019    K 4.9 08/14/2019     08/14/2019    CO2 23 08/14/2019       Lab Results   Component Value Date    MG 2.00 03/05/2019       Lab Results   Component Value Date    ALT 7 (L) 08/14/2019    AST 22 08/14/2019    ALKPHOS 50 08/14/2019    BILITOT 0.6 08/14/2019        No flowsheet data found. No results found for: LABA1C    Imaging:  No orders to display       Assessment & Plan:        Urinary Tract Infection, POA - organism unspecified  UA +. UC in process. Continue Rocephin. Follow UC results and adjust abx as indicated by culture results    Leukocytosis  2/2 the above  Treat underlying cause  Follow labs    Acute Kidney Injury - prerenal   2/2 dehydration   IVF  Avoid nephrotoxic medications as able. Renally dose medications. Monitor for electrolyte abnormalities   Follow labs    HTN  Continue current medications  Monitor BP  Titrate medications as needed. HLD  Statin  Monitor for S/S of Rhabdomyolysis     CAD  Continue medical management and risk factor modification    Tobacco abuse  Encourage smoking cessation. Health risks of smoking have been discussed with the patient. Provide supportive care    Dementia with behavioral disturbance   Aricept, paxil, seroquel    Body mass index is 27.69 kg/m². The patient and / or the family were informed of the results of any tests, a time was given to answer questions, a plan was proposed and they agreed with plan.     DVT prophylaxis: [x] Lovenox  [] SQ Heparin  [] SCDs because of  [] warfarin/oral direct thrombin inhibitor [] Encourage ambulation    GI

## 2019-08-14 NOTE — PLAN OF CARE
Problem: Safety:  Goal: Free from accidental physical injury  Description  Free from accidental physical injury  Outcome: Ongoing     Problem: Daily Care:  Goal: Daily care needs are met  Description  Daily care needs are met  Outcome: Ongoing     Problem: Pain:  Goal: Patient's pain/discomfort is manageable  Description  Patient's pain/discomfort is manageable  Outcome: Ongoing     Problem: Skin Integrity:  Goal: Skin integrity will stabilize  Description  Skin integrity will stabilize  Outcome: Ongoing     Problem: Falls - Risk of:  Goal: Will remain free from falls  Description  Will remain free from falls  Outcome: Ongoing   Patient remains free of falls, new skin issues, or any new injury. No complaints of pain or discomfort to this nurse. Resting quietly with eyes closed majority of shift.

## 2019-08-14 NOTE — PROGRESS NOTES
Attempted to contact the patient's daughter, Jean Bumpers, at the number listed in the chart to discuss whether or not she would feel comfortable with her mother discharging, as they live together. UC is still in process and therefore I am not yet certain what bacteria is growing or what the best antibiotic would be. I did find a prior UC from 2/2018 growing e coli, sensitive to cipro, levaquin, tetracycline, bactrim and nitrofurantoin. The patient is very anxious to be discharged TODAY and if her daughter felt comfortable with it, abx could be prescribed based on the prior culture, so long as she would f/u with her PCP ASAP and so long as she would have labs drawn to see if WBC is improving and if ANGEL is improving. Unfortunately I was unable to reach her daughter. For now, will continue the current plan of care. JAME Hunt NP  08/14/19  3:15 PM    Was notified by the patient's RN that the patient is now having fevers. Tylenol ordered. Vitals otherwise stable. Would prefer that the patient is afebrile for 24 hours prior to discharge.      JAME Hunt NP  08/14/19  4:31 PM

## 2019-08-14 NOTE — CARE COORDINATION
Reviewed chart, met with patient to assess possible discharge needs. Explained Case Management role/services. DEMOGRAPHICS: Verified as accurate    CURRENT TYPE OF DWELLING: First floor apartment     LIVING ARRANGEMENTS: Alone    LEVEL OF FUNCTION/SUPPORT: Pt stated that she is independent with all aspects of personal care. Pt stated that her dtr lives in the apartment building above her and will assist rula with her medication management/set up and transportation needs. PCP: Dr. Sam Barbosa PCP: 7/05/219    DME: When SW asked pt about DME, pt replied\"what do I need that for, aint nothing wrong with my body! \"  Pt did stated that she does have a shower chair as it is difficult to get down in the tub. OXYGEN/HD: Yes, [facility] is the provider and pt is on 3lpm    ACTIVE COMMUNITY RESOURCES/AGENCIES/SKILLED HOME CARE: Pt is active with [employer] and has an aide that comes to the home Q Friday to assist with the general cleaning. Pt also has home delivered meals and a lifeline. LIMITATIONS TO MEDICATION ADHERENCE: Pt stated that her dtr assists with setting up her medication in her [address] set. Pt stated that her meds are filled at [facility] on [address]. TRANSPORTATION IN THE COMMUNITY: Pt stated that her dtr transports her to where she needs to go. TRANSPORTATION UPON D/C: Pt stated that her dtr will transport her home upon d/c.    TENTATIVE D/C PLAN:  Pt plans to return home upon d/c and denied any needs at this time. SW provided contact information for patient or family to call with any questions. SW will follow and assist as needed.     OSMIN Andres    [phone]  Electronically signed by Nona Roger on 8/14/2019 at 10:13 AM

## 2019-08-14 NOTE — H&P
History and Physical    Chief Complaint:   dysurea  History of Present Illness:    80years old lady with history of CAD had a PTCA of RCA and LAD in the past, pulmonary hypertension, hypertension, COPD, and smoking history. Patient presented emergency room with suprapubic discomfort,  urinary frequency and lethargy denies any abdominal pain no fever chills       evaluation consistent with UTI and acute kidney injury creatinine 1.3 from baseline of 1.0 patient be admitted treated with the Rocephin and started on IV hydration. White count is 14. Has low-grade fever of 99   has a past medical history of Abdominal pain, unspecified site, Adrenal hyperplasia ([address] Ankle Fracture, Right, Anxiety and depression, Aortic atherosclerosis ([address] Contusion of unspecified site, COPD (chronic obstructive pulmonary disease) ([address] Coronary artery disease, DJD (degenerative joint disease), Eczema, Essential hypertension, Hyperlipidemia, Hypertension, Hypoxia, Insomnia, Leukocytosis, Memory loss, Nicotine addiction, Osteoarthritis, Pain in joint, pelvic region and thigh, Primary osteoarthritis involving multiple joints, Pulmonary hypertension ([address] Senile reticular degeneration of peripheral retina, Syncope, Thoracic or lumbosacral neuritis or radiculitis, unspecified, Urinary incontinence, Visual hallucinations, and Wears glasses. has a past surgical history that includes Cataract removal with implant (Left, December 5, 2012); Hysterectomy; Colonoscopy; and Sigmoidoscopy (08/01/2018). reports that she quit smoking about 11 months ago. Her smoking use included cigarettes. She has a 65.00 pack-year smoking history. She has never used smokeless tobacco. She reports that she drinks alcohol. She reports that she does not use drugs. family history includes Heart Attack in her brother and father.     Review of Systems:   · Constitutional: No Fever or Weight Loss  · Eyes: No Decreased Vision  · ENT: No Headaches, Hearing Loss

## 2019-08-15 ENCOUNTER — APPOINTMENT (OUTPATIENT)
Dept: GENERAL RADIOLOGY | Age: 84
DRG: 690 | End: 2019-08-15
Payer: MEDICARE

## 2019-08-15 LAB
A/G RATIO: 0.8 (ref 1.1–2.2)
ALBUMIN SERPL-MCNC: 2.9 G/DL (ref 3.4–5)
ALP BLD-CCNC: 48 U/L (ref 40–129)
ALT SERPL-CCNC: 7 U/L (ref 10–40)
ANION GAP SERPL CALCULATED.3IONS-SCNC: 11 MMOL/L (ref 3–16)
AST SERPL-CCNC: 11 U/L (ref 15–37)
BASOPHILS ABSOLUTE: 0 K/UL (ref 0–0.2)
BASOPHILS RELATIVE PERCENT: 0.2 %
BILIRUB SERPL-MCNC: 0.5 MG/DL (ref 0–1)
BUN BLDV-MCNC: 10 MG/DL (ref 7–20)
CALCIUM SERPL-MCNC: 8.5 MG/DL (ref 8.3–10.6)
CHLORIDE BLD-SCNC: 108 MMOL/L (ref 99–110)
CO2: 25 MMOL/L (ref 21–32)
CREAT SERPL-MCNC: 1 MG/DL (ref 0.6–1.2)
EOSINOPHILS ABSOLUTE: 0 K/UL (ref 0–0.6)
EOSINOPHILS RELATIVE PERCENT: 0.4 %
GFR AFRICAN AMERICAN: >60
GFR NON-AFRICAN AMERICAN: 53
GLOBULIN: 3.7 G/DL
GLUCOSE BLD-MCNC: 117 MG/DL (ref 70–99)
HCT VFR BLD CALC: 34.1 % (ref 36–48)
HEMOGLOBIN: 11.3 G/DL (ref 12–16)
LYMPHOCYTES ABSOLUTE: 0.8 K/UL (ref 1–5.1)
LYMPHOCYTES RELATIVE PERCENT: 8.6 %
MAGNESIUM: 1.8 MG/DL (ref 1.8–2.4)
MCH RBC QN AUTO: 27.5 PG (ref 26–34)
MCHC RBC AUTO-ENTMCNC: 33.2 G/DL (ref 31–36)
MCV RBC AUTO: 82.7 FL (ref 80–100)
MONOCYTES ABSOLUTE: 1.2 K/UL (ref 0–1.3)
MONOCYTES RELATIVE PERCENT: 12.3 %
NEUTROPHILS ABSOLUTE: 7.5 K/UL (ref 1.7–7.7)
NEUTROPHILS RELATIVE PERCENT: 78.5 %
PDW BLD-RTO: 16.8 % (ref 12.4–15.4)
PLATELET # BLD: 154 K/UL (ref 135–450)
PMV BLD AUTO: 9.1 FL (ref 5–10.5)
POTASSIUM REFLEX MAGNESIUM: 3.2 MMOL/L (ref 3.5–5.1)
RBC # BLD: 4.12 M/UL (ref 4–5.2)
SODIUM BLD-SCNC: 144 MMOL/L (ref 136–145)
TOTAL PROTEIN: 6.6 G/DL (ref 6.4–8.2)
WBC # BLD: 9.5 K/UL (ref 4–11)

## 2019-08-15 PROCEDURE — 6360000002 HC RX W HCPCS: Performed by: INTERNAL MEDICINE

## 2019-08-15 PROCEDURE — 36415 COLL VENOUS BLD VENIPUNCTURE: CPT

## 2019-08-15 PROCEDURE — 85025 COMPLETE CBC W/AUTO DIFF WBC: CPT

## 2019-08-15 PROCEDURE — 6360000002 HC RX W HCPCS: Performed by: NURSE PRACTITIONER

## 2019-08-15 PROCEDURE — 97161 PT EVAL LOW COMPLEX 20 MIN: CPT

## 2019-08-15 PROCEDURE — 71046 X-RAY EXAM CHEST 2 VIEWS: CPT

## 2019-08-15 PROCEDURE — 80053 COMPREHEN METABOLIC PANEL: CPT

## 2019-08-15 PROCEDURE — 83735 ASSAY OF MAGNESIUM: CPT

## 2019-08-15 PROCEDURE — 6370000000 HC RX 637 (ALT 250 FOR IP): Performed by: INTERNAL MEDICINE

## 2019-08-15 PROCEDURE — 2580000003 HC RX 258: Performed by: INTERNAL MEDICINE

## 2019-08-15 PROCEDURE — 6370000000 HC RX 637 (ALT 250 FOR IP): Performed by: NURSE PRACTITIONER

## 2019-08-15 PROCEDURE — 97530 THERAPEUTIC ACTIVITIES: CPT

## 2019-08-15 PROCEDURE — 1200000000 HC SEMI PRIVATE

## 2019-08-15 PROCEDURE — 94761 N-INVAS EAR/PLS OXIMETRY MLT: CPT

## 2019-08-15 PROCEDURE — 2700000000 HC OXYGEN THERAPY PER DAY

## 2019-08-15 RX ORDER — POTASSIUM CHLORIDE AND SODIUM CHLORIDE 450; 150 MG/100ML; MG/100ML
INJECTION, SOLUTION INTRAVENOUS CONTINUOUS
Status: DISCONTINUED | OUTPATIENT
Start: 2019-08-15 | End: 2019-08-16 | Stop reason: HOSPADM

## 2019-08-15 RX ADMIN — ATORVASTATIN CALCIUM 40 MG: 40 TABLET, FILM COATED ORAL at 20:36

## 2019-08-15 RX ADMIN — Medication 1 CAPSULE: at 08:40

## 2019-08-15 RX ADMIN — AMLODIPINE BESYLATE 5 MG: 5 TABLET ORAL at 08:40

## 2019-08-15 RX ADMIN — DONEPEZIL HYDROCHLORIDE 10 MG: 10 TABLET, FILM COATED ORAL at 20:36

## 2019-08-15 RX ADMIN — AMLODIPINE BESYLATE 5 MG: 5 TABLET ORAL at 20:36

## 2019-08-15 RX ADMIN — QUETIAPINE FUMARATE 50 MG: 25 TABLET ORAL at 20:36

## 2019-08-15 RX ADMIN — METOPROLOL TARTRATE 25 MG: 25 TABLET ORAL at 08:41

## 2019-08-15 RX ADMIN — Medication 10 ML: at 20:37

## 2019-08-15 RX ADMIN — METOPROLOL TARTRATE 25 MG: 25 TABLET ORAL at 20:36

## 2019-08-15 RX ADMIN — PANTOPRAZOLE SODIUM 40 MG: 40 TABLET, DELAYED RELEASE ORAL at 08:40

## 2019-08-15 RX ADMIN — PAROXETINE HYDROCHLORIDE 20 MG: 20 TABLET, FILM COATED ORAL at 08:40

## 2019-08-15 RX ADMIN — POTASSIUM CHLORIDE AND SODIUM CHLORIDE: 450; 150 INJECTION, SOLUTION INTRAVENOUS at 20:36

## 2019-08-15 RX ADMIN — LORAZEPAM 0.5 MG: 0.5 TABLET ORAL at 08:42

## 2019-08-15 RX ADMIN — QUETIAPINE FUMARATE 50 MG: 25 TABLET ORAL at 08:40

## 2019-08-15 RX ADMIN — LORAZEPAM 0.5 MG: 0.5 TABLET ORAL at 20:36

## 2019-08-15 RX ADMIN — POTASSIUM CHLORIDE AND SODIUM CHLORIDE: 450; 150 INJECTION, SOLUTION INTRAVENOUS at 10:37

## 2019-08-15 RX ADMIN — CEFTRIAXONE 1 G: 1 INJECTION, POWDER, FOR SOLUTION INTRAMUSCULAR; INTRAVENOUS at 00:19

## 2019-08-15 RX ADMIN — ENOXAPARIN SODIUM 40 MG: 40 INJECTION SUBCUTANEOUS at 08:41

## 2019-08-15 RX ADMIN — ASPIRIN 81 MG: 81 TABLET ORAL at 08:40

## 2019-08-15 RX ADMIN — Medication 1 CAPSULE: at 17:13

## 2019-08-15 ASSESSMENT — PAIN SCALES - GENERAL
PAINLEVEL_OUTOF10: 0

## 2019-08-15 NOTE — PROGRESS NOTES
Physical Therapy    Facility/Department: [address] PROGRESSIVE CARE  Initial Assessment/Treatment session  This note serves as D/C summary if patient is discharged prior to next treatment session. NAME: Zaina Knowles  : 1934  MRN: [id number]    Date of Service: 8/15/2019    Discharge Recommendations:  Patient would benefit from continued therapy after discharge, Home with Home health PT, 24 hour supervision or assist   PT Equipment Recommendations  Equipment Needed: No    Assessment   Body structures, Functions, Activity limitations: Decreased functional mobility   Assessment: Pt is an 80 y.o. F. admitted  for UTI. She presents oriented to self, place, and situation, and demonstrated functional (B) UE/LE strength/ROM. She did well to complete mobility tasks in room with CGA-SBA, no AD support. Family was present for evaluation, and stated that they will be able to provide initial 24/7 assist.  PT anticipates safe return home with this level of assist, and home PT level 1. Zaina Knowles scored a 18/24 on the AM-PAC short mobility form. Current research shows that an AM-PAC score of 18 or greater is typically associated with a discharge to the patient's home setting. Based on the patients AM-PAC score and their current functional mobility deficits, it is recommended that the patient have 2-3 sessions per week of Physical Therapy at d/c to increase the patients independence. Treatment Diagnosis: Decreased functional mobility  Specific instructions for Next Treatment: Practice stair climb when able; ambulate  Prognosis: Good  Decision Making: Low Complexity  History: See below  Exam: Strength; ROM; Balance;  Ambulation  Clinical Presentation: Stable  Barriers to Learning: None  REQUIRES PT FOLLOW UP: Yes  Activity Tolerance  Activity Tolerance: Patient Tolerated treatment well       Patient Diagnosis(es): The primary encounter diagnosis was Urinary tract infection with hematuria, site unspecified. A diagnosis of ANGEL (acute kidney injury) ([address] was also pertinent to this visit. has a past medical history of Abdominal pain, unspecified site, Adrenal hyperplasia ([address] Ankle Fracture, Right, Anxiety and depression, Aortic atherosclerosis ([address] Contusion of unspecified site, COPD (chronic obstructive pulmonary disease) ([address] Coronary artery disease, DJD (degenerative joint disease), Eczema, Essential hypertension, Hyperlipidemia, Hypertension, Hypoxia, Insomnia, Leukocytosis, Memory loss, Nicotine addiction, Osteoarthritis, Pain in joint, pelvic region and thigh, Primary osteoarthritis involving multiple joints, Pulmonary hypertension ([address] Senile reticular degeneration of peripheral retina, Syncope, Thoracic or lumbosacral neuritis or radiculitis, unspecified, Urinary incontinence, Visual hallucinations, and Wears glasses. has a past surgical history that includes Cataract removal with implant (Left, December 5, 2012); Hysterectomy; Colonoscopy; and Sigmoidoscopy (08/01/2018). Restrictions  Restrictions/Precautions  Restrictions/Precautions: Fall Risk     Vision/Hearing  Vision: Impaired  Vision Exceptions: Wears glasses at all times  Hearing: Within functional limits       Subjective  General  Chart Reviewed: Yes  Additional Pertinent Hx: Per JAME Sun NP, 8/14, \"The patient is an 80year old female with a history of CAD s/p PTCA of RCA and LAD in the past, pulmonary hypertension, hypertension, COPD, and tobacco abuse. She presented to [facility] ER for evaluation of suprapubic discomfort,  urinary frequency and increased fatigue. \"  Response To Previous Treatment: Not applicable  Referring Practitioner: JAME Sun NP  Referral Date : 08/15/19  Diagnosis: UTI  Follows Commands: Within Functional Limits  Subjective  Subjective: Pt on 3 L. O2 (same as baseline). Agreeable to therapy evaluation.    Pain Screening  Patient Currently in Pain: Denies

## 2019-08-15 NOTE — PLAN OF CARE
Problem: Infection:  Goal: Will remain free from infection  Description  Will remain free from infection  8/15/2019 1134 by Mindy Soler RN  Outcome: Ongoing  Note:   Labs will return to normal limits, patient will be free of painful urination     Problem: Safety:  Goal: Free from accidental physical injury  Description  Free from accidental physical injury  8/15/2019 1134 by Mindy Soler RN  Outcome: Ongoing  Note:   Patient educated on fall prevention. Call light is within reach, bed locked in lowest position, personal items within reach, and bed alarm is on. Will round on patient per unit guidelines. Problem: Safety:  Goal: Free from intentional harm  Description  Free from intentional harm  8/15/2019 1134 by Mindy Soler RN  Outcome: Ongoing  Note:   Patient remains free from intentional harm, no signs or symptoms of intention to harm noted. Will continue to monitor patient throughout shift. Problem: Daily Care:  Goal: Daily care needs are met  Description  Daily care needs are met  8/15/2019 1134 by Mindy Soler RN  Outcome: Ongoing  Note:   Patient assessed to determine ability to perform daily needs and ADLs. Patient assisted with any daily needs that were not able to be met individually by patient. Deuel encouraged, although patient educated to ask for assistance when needed. Will continue to monitor and assist patient with meeting daily care needs as needed. Problem: Pain:  Goal: Patient's pain/discomfort is manageable  Description  Patient's pain/discomfort is manageable  8/15/2019 1134 by Mindy Soler RN  Outcome: Ongoing  Note:   Educated patient on pain management. Will assess patients pain level per unit protocol, and provide pain management measures as needed.          Problem: Skin Integrity:  Goal: Skin integrity will stabilize  Description  Skin integrity will stabilize  8/15/2019 1134 by Mindy Soler RN  Outcome: Ongoing  Note:   Will monitor skin and mucous members. Will turn patient every 2 hours, monitor for friction and sheering, and change dressings as needed. Will preform skin assessment every shift. Problem: Discharge Planning:  Goal: Patients continuum of care needs are met  Description  Patients continuum of care needs are met  8/15/2019 1134 by Elpidio Sorenson RN  Outcome: Ongoing  Note:   Assessed patients knowledge of discharge. Will continue to work with patient on discharge planning and answer patient questions. Will consult case management and  as necessary. Problem: Falls - Risk of:  Goal: Will remain free from falls  Description  Will remain free from falls  8/15/2019 1134 by Elpidio Sorenson RN  Outcome: Ongoing  Note:   Patient educated on fall prevention. Call light is within reach, bed locked in lowest position, personal items within reach, and bed alarm is on. Will round on patient per unit guidelines. Problem: Falls - Risk of:  Goal: Absence of physical injury  Description  Absence of physical injury  8/15/2019 1134 by Elpidio Sorenson RN  Outcome: Ongoing  Note:   Pt assessed for fall risk and fall precautions put into place. Bed in lowest position and wheels locked, call light within reach. Nonskid footwear in place. Patient educated on appropriate method of transfer and to call for assistance. Problem: Sensory:  Goal: General experience of comfort will improve  Description  General experience of comfort will improve  8/15/2019 1134 by Elpidio Sorenson RN  Outcome: Ongoing  Note:   Patient will have no reports of pain while urinating, and urinary frequency will return to normal for patient.

## 2019-08-15 NOTE — PROGRESS NOTES
infection symptomatic  Was started on Rocephin and send urine culture and blood culture     2 leukocytosis secondary to UTI     3 CAD history of PTCA of RCA and LAD denies chest pain     4 hypertension well controlled resume home medication     5 acute kidney injury creatinine 1.3 from baseline of 1.0 likely due to hypovolemia and dehydration     6 COPD and smoking history no active wheezing resume home medication     7 hyperlipidemia resume home medication  Plan  Rocephin,  Iv  Hydration  Resume  Home  medications     Zunilda Jerry MD, 8/14/2019 3:38 AM     IMAGING-Relevant:    MY REVIEW:  80  Female  UTI  Leucocytosis  14.5  Hematuria  Anemia ? Blood loss 11.2  11.3  Renal dysfunction  1.3  CAD and procedures  COPD  On O2 2L for hypoxia  Hypertension  Hyperlipidemia on Rx  Hypoalbuminemia  3.1  2.9  Tobacco use  IV antibiotics  I V fluids    The Utilization Review Committee members, including its physician members, have reviewed this case and agree that this patient does NOT YET  meet evidence based criteria for inpatient services. Medical care will continue to be provided by Beverly Padilla MD, who concurs with this decision and has documented his/her concurrence in the patient's medical record. Casper Armas MD, ANIBAL, [place] [address], [address] [place] [address]  Cardiac, Vascular & Thoracic Surgeon  [address] [place] [address]    Office [phone]   [phone]  [email or web address]    8/15/2019  2:53 PM

## 2019-08-15 NOTE — PROGRESS NOTES
Patient's daughter, Reuben Corral, would like to speak to the hospitalist for updates and questions at [phone].

## 2019-08-15 NOTE — PROGRESS NOTES
LYMPHOPCT 8.6 08/15/2019    RBC 4.12 08/15/2019    MCH 27.5 08/15/2019    MCHC 33.2 08/15/2019    RDW 16.8 (H) 08/15/2019       Lab Results   Component Value Date    CREATININE 1.0 08/15/2019    BUN 10 08/15/2019     08/15/2019    K 3.2 (L) 08/15/2019     08/15/2019    CO2 25 08/15/2019       Lab Results   Component Value Date    MG 1.80 08/15/2019       Lab Results   Component Value Date    ALT 7 (L) 08/15/2019    AST 11 (L) 08/15/2019    ALKPHOS 48 08/15/2019    BILITOT 0.5 08/15/2019        No flowsheet data found. No results found for: LABA1C    Imaging:  No orders to display       Assessment & Plan:        Urinary Tract Infection, POA - organism unspecified  UA +. UC in process. Continue Rocephin. Follow UC results and adjust abx as indicated by culture results    Leukocytosis - resolved  2/2 the above  Treat underlying cause  Follow labs    Acute Kidney Injury - prerenal - resolved  2/2 dehydration   IVF  Avoid nephrotoxic medications as able. Renally dose medications. Monitor for electrolyte abnormalities   Follow labs    SOB/KENNEY/weakness  - Noted per the patient's daughter  Cxr negative. Did not appear fluid overloaded on exam. At the time of my exam, did not have increased WOB or dyspnea. I suspect that her SOB/KENNEY is 2/2 ongoing acute illness and some component of physical deconditioning from illness and hospitalization. PT/OT    Hypokalemia  2/2 poor PO intake  Replete mag as needed  IVFs with K+ ordered  Monitor labs and replete as needed    HTN  Continue current medications  Monitor BP  Titrate medications as needed. HLD  Statin  Monitor for S/S of Rhabdomyolysis     CAD  Continue medical management and risk factor modification    Tobacco abuse  Encourage smoking cessation. Health risks of smoking have been discussed with the patient.   Provide supportive care    Dementia with behavioral disturbance   Aricept, paxil, seroquel    I contacted the patient's daughter, Reuben Corral, today after examining her mother. Diane and I discussed in great detail the patient's current clinical presentation, diagnosis, treatment, and prognosis. Discussed treatment of UTI and that this can cause alterations in mental status, fatigue poor appetite, etc. The patient had evidently been eating very little for a handful of days prior to hospitalization. Discussed that urine cultures require that bacteria is grown, which is why they can take several days to result but that at least for the time being, she is on the correct abx. Discussed that SOB/KENNEY/Fatigue is probably the result of current illness. Cxr was checked to be certain there was not another process causing her SOB. PT/OT was initiated. The patient's daughter verbalized understanding and stated that she had no further questions. I am not entirely convinced she completely understands, however if needed, I will review again. Once the patient is afebrile x 24 hours and final UC is available, will plan for discharge. Body mass index is 27.65 kg/m². The patient and / or the family were informed of the results of any tests, a time was given to answer questions, a plan was proposed and they agreed with plan. DVT prophylaxis: [x] Lovenox  [] SQ Heparin  [] SCDs because of  [] warfarin/oral direct thrombin inhibitor [] Encourage ambulation    GI prophylaxis: [x] [id number]  [] not indicated    Probiotic if on abx: [x] Yes [] No [] Not Indicated    Diet: DIET GENERAL;    Consults:  None    Disposition:  [] Home  [] Home with home health [] Rehab [] Psych [] SNF  [] LTAC  [] Long term nursing home or group home [] Transfer to ICU  [] Transfer to PCU [x] Other: TBD    Code Status: Full Code    ELOS: Tomorrow?       JAME Morton NP  08/15/19

## 2019-08-16 VITALS
BODY MASS INDEX: 28.71 KG/M2 | HEART RATE: 63 BPM | OXYGEN SATURATION: 97 % | RESPIRATION RATE: 23 BRPM | HEIGHT: 63 IN | WEIGHT: 162.04 LBS | SYSTOLIC BLOOD PRESSURE: 152 MMHG | TEMPERATURE: 97.7 F | DIASTOLIC BLOOD PRESSURE: 66 MMHG

## 2019-08-16 LAB
ANION GAP SERPL CALCULATED.3IONS-SCNC: 11 MMOL/L (ref 3–16)
ATYPICAL LYMPHOCYTE RELATIVE PERCENT: 1 % (ref 0–6)
BANDED NEUTROPHILS RELATIVE PERCENT: 14 % (ref 0–7)
BASOPHILS ABSOLUTE: 0 K/UL (ref 0–0.2)
BASOPHILS RELATIVE PERCENT: 0 %
BUN BLDV-MCNC: 9 MG/DL (ref 7–20)
CALCIUM SERPL-MCNC: 8.1 MG/DL (ref 8.3–10.6)
CHLORIDE BLD-SCNC: 106 MMOL/L (ref 99–110)
CO2: 23 MMOL/L (ref 21–32)
CREAT SERPL-MCNC: 0.9 MG/DL (ref 0.6–1.2)
CRENATED RBC'S: ABNORMAL
EOSINOPHILS ABSOLUTE: 0.3 K/UL (ref 0–0.6)
EOSINOPHILS RELATIVE PERCENT: 4 %
GFR AFRICAN AMERICAN: >60
GFR NON-AFRICAN AMERICAN: 60
GLUCOSE BLD-MCNC: 104 MG/DL (ref 70–99)
HCT VFR BLD CALC: 31 % (ref 36–48)
HEMOGLOBIN: 10 G/DL (ref 12–16)
LYMPHOCYTES ABSOLUTE: 0.7 K/UL (ref 1–5.1)
LYMPHOCYTES RELATIVE PERCENT: 9 %
MAGNESIUM: 1.8 MG/DL (ref 1.8–2.4)
MCH RBC QN AUTO: 26.6 PG (ref 26–34)
MCHC RBC AUTO-ENTMCNC: 32.4 G/DL (ref 31–36)
MCV RBC AUTO: 82.3 FL (ref 80–100)
MONOCYTES ABSOLUTE: 0.6 K/UL (ref 0–1.3)
MONOCYTES RELATIVE PERCENT: 9 %
NEUTROPHILS ABSOLUTE: 5.5 K/UL (ref 1.7–7.7)
NEUTROPHILS RELATIVE PERCENT: 63 %
ORGANISM: ABNORMAL
OVALOCYTES: ABNORMAL
PDW BLD-RTO: 17.1 % (ref 12.4–15.4)
PLATELET # BLD: 150 K/UL (ref 135–450)
PLATELET SLIDE REVIEW: ADEQUATE
PMV BLD AUTO: 9.5 FL (ref 5–10.5)
POIKILOCYTES: ABNORMAL
POTASSIUM REFLEX MAGNESIUM: 3.1 MMOL/L (ref 3.5–5.1)
RBC # BLD: 3.76 M/UL (ref 4–5.2)
SLIDE REVIEW: ABNORMAL
SODIUM BLD-SCNC: 140 MMOL/L (ref 136–145)
URINE CULTURE, ROUTINE: ABNORMAL
WBC # BLD: 7.1 K/UL (ref 4–11)

## 2019-08-16 PROCEDURE — 97116 GAIT TRAINING THERAPY: CPT

## 2019-08-16 PROCEDURE — 85025 COMPLETE CBC W/AUTO DIFF WBC: CPT

## 2019-08-16 PROCEDURE — 80048 BASIC METABOLIC PNL TOTAL CA: CPT

## 2019-08-16 PROCEDURE — 36415 COLL VENOUS BLD VENIPUNCTURE: CPT

## 2019-08-16 PROCEDURE — 83735 ASSAY OF MAGNESIUM: CPT

## 2019-08-16 PROCEDURE — 94760 N-INVAS EAR/PLS OXIMETRY 1: CPT

## 2019-08-16 PROCEDURE — 2580000003 HC RX 258: Performed by: INTERNAL MEDICINE

## 2019-08-16 PROCEDURE — 97530 THERAPEUTIC ACTIVITIES: CPT

## 2019-08-16 PROCEDURE — 6360000002 HC RX W HCPCS: Performed by: INTERNAL MEDICINE

## 2019-08-16 PROCEDURE — 6360000002 HC RX W HCPCS: Performed by: NURSE PRACTITIONER

## 2019-08-16 PROCEDURE — 97166 OT EVAL MOD COMPLEX 45 MIN: CPT

## 2019-08-16 PROCEDURE — 6370000000 HC RX 637 (ALT 250 FOR IP): Performed by: NURSE PRACTITIONER

## 2019-08-16 PROCEDURE — 6370000000 HC RX 637 (ALT 250 FOR IP): Performed by: INTERNAL MEDICINE

## 2019-08-16 PROCEDURE — 2700000000 HC OXYGEN THERAPY PER DAY

## 2019-08-16 RX ORDER — POTASSIUM CHLORIDE 20 MEQ/1
40 TABLET, EXTENDED RELEASE ORAL
Qty: 60 TABLET | Refills: 0 | Status: SHIPPED | OUTPATIENT
Start: 2019-08-16 | End: 2019-08-23 | Stop reason: SDUPTHER

## 2019-08-16 RX ORDER — POTASSIUM CHLORIDE 20 MEQ/1
40 TABLET, EXTENDED RELEASE ORAL
Status: DISCONTINUED | OUTPATIENT
Start: 2019-08-16 | End: 2019-08-16 | Stop reason: HOSPADM

## 2019-08-16 RX ADMIN — CEFTRIAXONE 1 G: 1 INJECTION, POWDER, FOR SOLUTION INTRAMUSCULAR; INTRAVENOUS at 00:34

## 2019-08-16 RX ADMIN — LORAZEPAM 0.5 MG: 0.5 TABLET ORAL at 08:39

## 2019-08-16 RX ADMIN — QUETIAPINE FUMARATE 50 MG: 25 TABLET ORAL at 08:39

## 2019-08-16 RX ADMIN — POTASSIUM CHLORIDE 40 MEQ: 20 TABLET, EXTENDED RELEASE ORAL at 11:09

## 2019-08-16 RX ADMIN — ENOXAPARIN SODIUM 40 MG: 40 INJECTION SUBCUTANEOUS at 08:39

## 2019-08-16 RX ADMIN — METOPROLOL TARTRATE 25 MG: 25 TABLET ORAL at 08:39

## 2019-08-16 RX ADMIN — PAROXETINE HYDROCHLORIDE 20 MG: 20 TABLET, FILM COATED ORAL at 08:39

## 2019-08-16 RX ADMIN — AMLODIPINE BESYLATE 5 MG: 5 TABLET ORAL at 08:39

## 2019-08-16 RX ADMIN — POTASSIUM CHLORIDE AND SODIUM CHLORIDE: 450; 150 INJECTION, SOLUTION INTRAVENOUS at 08:39

## 2019-08-16 RX ADMIN — Medication 1 CAPSULE: at 08:39

## 2019-08-16 RX ADMIN — ASPIRIN 81 MG: 81 TABLET ORAL at 08:39

## 2019-08-16 RX ADMIN — PANTOPRAZOLE SODIUM 40 MG: 40 TABLET, DELAYED RELEASE ORAL at 05:44

## 2019-08-16 NOTE — PROGRESS NOTES
FOLLOW UP: Yes  Activity Tolerance  Activity Tolerance: Patient Tolerated treatment well;Patient limited by fatigue  Safety Devices  Safety Devices in place: Yes  Type of devices: Left in chair;Gait belt;Call light within reach;Nurse notified           Patient Diagnosis(es): The primary encounter diagnosis was Urinary tract infection with hematuria, site unspecified. A diagnosis of ANGEL (acute kidney injury) ([address] was also pertinent to this visit. has a past medical history of Abdominal pain, unspecified site, Adrenal hyperplasia ([address] Ankle Fracture, Right, Anxiety and depression, Aortic atherosclerosis ([address] Contusion of unspecified site, COPD (chronic obstructive pulmonary disease) ([address] Coronary artery disease, DJD (degenerative joint disease), Eczema, Essential hypertension, Hyperlipidemia, Hypertension, Hypoxia, Insomnia, Leukocytosis, Memory loss, Nicotine addiction, Osteoarthritis, Pain in joint, pelvic region and thigh, Primary osteoarthritis involving multiple joints, Pulmonary hypertension ([address] Senile reticular degeneration of peripheral retina, Syncope, Thoracic or lumbosacral neuritis or radiculitis, unspecified, Urinary incontinence, Visual hallucinations, and Wears glasses. has a past surgical history that includes Cataract removal with implant (Left, December 5, 2012); Hysterectomy; Colonoscopy; and Sigmoidoscopy (08/01/2018). Restrictions  Restrictions/Precautions  Restrictions/Precautions: Fall Risk  Position Activity Restriction  Other position/activity restrictions: IV; 02    Subjective   General  Chart Reviewed: Yes  Patient assessed for rehabilitation services?: Yes  Additional Pertinent Hx: Per H&P: \"80 years old lady with history of CAD had a PTCA of RCA and LAD in the past, pulmonary hypertension, hypertension, COPD, and smoking history.   Patient presented emergency room with suprapubic discomfort,  urinary frequency and lethargy denies any abdominal pain no fever

## 2019-08-16 NOTE — DISCHARGE SUMMARY
Hospital Medicine Discharge Summary      Patient ID: Riesa Ort      Patient's PCP: Mort Hodgkins, MD    Admit Date: 8/13/2019     Discharge Date: 8/16/19    Admitting Physician: Shannon Paul MD     Discharge Provider: JAME Ritchie NP     Discharge Diagnoses: Active Hospital Problems    Diagnosis Date Noted    UTI (urinary tract infection) [N39.0] 08/14/2019     Disposition:  [] Home  [x] Home with home health [] Rehab [] Psych [] SNF  [] LTAC  [] Long term nursing home or group home [] Transfer to ICU  [] Transfer to PCU [] Other:    Discharge Instructions/Follow-up:    Please call and schedule an appointment with your Primary Care Provider Dr. Mort Hodgkins, MD at [phone] for a follow up visit within 1 week. - Please check cbc, bmp    HH for continued PT/OT    Take medications as prescribed. - If you have questions about your medications and/or changes to your medications, please ask your hospital provider and/or your primary care provider. Return to the emergency room for fever, cough, chest pain or worsening symptoms.     PCP/SNF to follow up: As above    Discharge Condition: Stable      Code Status:  Full Code     Activity: activity as tolerated    Diet: DIET GENERAL;     Discharge Medications:     Current Discharge Medication List           Details   potassium chloride (KLOR-CON M) 20 MEQ extended release tablet Take 2 tablets by mouth daily (with breakfast)  Qty: 60 tablet, Refills: 0              Details   pantoprazole (PROTONIX) 40 MG tablet TAKE ONE TABLET BY MOUTH EVERY MORNING BEFORE BREAKFAST  Qty: 30 tablet, Refills: 1    Comments: Left message on patient's VM to make appt for future refills      amLODIPine (NORVASC) 2.5 MG tablet Take 2 tablets by mouth 2 times daily  Qty: 60 tablet, Refills: 3    Associated Diagnoses: Essential hypertension      triamcinolone (KENALOG) 0.1 % ointment APPLY TOPICALLY TWO TIMES A DAY FOR 7 DAYS  Qty: 1 Tube, Refills: 1

## 2019-08-16 NOTE — DISCHARGE INSTR - COC
Continuity of Care Form    Patient Name: Radha Shafer   :  1934  MRN:  [id number]    Admit date:  2019  Discharge date:  19    Code Status Order: Full Code   Advance Directives:   [facility] Documentation     Date/Time Healthcare Directive Type of Healthcare Directive Copy in [address] Agent's Name Healthcare Agent's Phone Number    19 [phone]  --  --  --  Healthcare power of    Gama Anabelle  [phone]    19 [phone]  Yes, patient has an advance directive for healthcare treatment  Living will;Health care treatment directive  Yes, copy in chart  --  --  --          Admitting Physician:  Zunilda Jerry MD  PCP: Gabby Ovalle MD    Discharging Nurse: [facility] Unit/Room#: I6M-2135/7126-20  Discharging Unit Phone Number: [phone]    Emergency Contact:   Extended Emergency Contact Information  Primary Emergency Contact: Diane Weeks   [address] Phone: [phone]  Mobile Phone: [phone]  Relation: Child    Past Surgical History:  Past Surgical History:   Procedure Laterality Date    CATARACT REMOVAL WITH IMPLANT Left 2012    Dr. Arteaga Ache  2018       Immunization History:   Immunization History   Administered Date(s) Administered    Influenza 10/18/2013    Influenza, High Dose (Fluzone 65 yrs and older) 10/05/2012, 2014, 2015, 10/06/2017, 2018    Pneumococcal Conjugate 13-valent ([id number]) 2015    Pneumococcal Polysaccharide ([id number]) 2012    Tetanus 2009       Active Problems:  Patient Active Problem List   Diagnosis Code    Contusion T14. 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    Syncope R55    Nicotine addiction F17.200    Skin rash R21    Abdominal pain R10.9    Ankle Fracture, Right S82.899A    DJD (degenerative joint disease) M19.90    Fatty liver K76.0    Osteoarthritis M19.90    Insomnia G47.00    Anxiety F41.9    Urinary incontinence R32    Coronary artery disease I25.10    Hyperlipidemia E78.5    Depression F32.9    Decreased vision H54.7    Cataract H26.9    Adrenal hyperplasia (HCC) E27.8    Eczema L30.9    Hypokalemia E87.6    Visual hallucinations R44.1    Anger R45.4    Mood disorder (HCC) F39    Lumbar radiculopathy M54.16    Hypoxia R09.02    Abnormal weight loss R63.4    Irritable bowel syndrome K58.9    Accelerated hypertension I10    Essential hypertension I10    Primary osteoarthritis involving multiple joints M15.0    Pulmonary hypertension ([facility]) I27.20    Aortic atherosclerosis ([facility]) I70.0    COPD (chronic obstructive pulmonary disease) ([facility]) J44.9    Abnormal nuclear stress test R94.39    Exertional dyspnea R06.09    Smoking F17.200    CAD in native artery I25.10    S/p bare metal coronary artery stent Z95.5    Heart failure ([facility]) I50.9    UTI (urinary tract infection) N39.0       Isolation/Infection:   Isolation          No Isolation            Nurse Assessment:  Last Vital Signs: /70   Pulse 68   Temp 98.4 °F (36.9 °C) (Oral)   Resp 18   Ht 5' 3\" (1.6 m)   Wt 162 lb 0.6 oz (73.5 kg)   SpO2 91%   BMI 28.70 kg/m²     Last documented pain score (0-10 scale): Pain Level: 0  Last Weight:   Wt Readings from Last 1 Encounters:   08/16/19 162 lb 0.6 oz (73.5 kg)     Mental Status:  oriented, alert, coherent and logical    IV Access:  - None    Nursing Mobility/ADLs:  Walking   Assisted  Transfer  Assisted  Bathing  Assisted  Dressing  Assisted  Toileting  Assisted  Feeding  Independent  Med Admin  Assisted  Med Delivery   whole    Wound Care Documentation and Therapy:        Elimination:  Continence:   · Bowel:  Yes  · Bladder: Yes  Urinary Catheter: None   Colostomy/Ileostomy/Ileal Conduit: No       Date of Last BM: 8/16/19    Intake/Output Summary (Last 24 hours) at 8/16/2019 0955  Last data filed at 8/16/2019 0600  Gross per 24 hour   Intake 1270 ml   Output 200 ml   Net 1070 ml     I/O last 3 completed shifts: In: 26 [P.O.:120; I.V.:1100; IV Piggyback:50]  Out: 200 [Urine:200]    Safety Concerns:     None    Impairments/Disabilities:      None    Nutrition Therapy:  Current Nutrition Therapy:   - Oral Diet:  General    Routes of Feeding: Oral  Liquids: Thin liquids  Daily Fluid Restriction: no  Last Modified Barium Swallow with Video (Video Swallowing Test): not done    Treatments at the Time of Hospital Discharge:   Respiratory Treatments: N/A  Oxygen Therapy:  Home 02 therapy at 2LPM nasal cannula. Ventilator:    - No ventilator support    Rehab Therapies: Physical Therapy and Occupational Therapy  Weight Bearing Status/Restrictions: No weight bearing restirctions  Other Medical Equipment (for information only, NOT a DME order):  walker  Other Treatments: N/A    Patient's personal belongings (please select all that are sent with patient):  None    RN SIGNATURE:  Electronically signed by Kendell Seay RN on 8/16/19 at 11:01 AM    CASE MANAGEMENT/SOCIAL WORK SECTION    Inpatient Status Date: ***    Readmission Risk Assessment Score:  Readmission Risk              Risk of Unplanned Readmission:        24           Discharging to Facility/ Agency   · Name:   · Address:  · Phone:  · Fax:    Dialysis Facility (if applicable)   · Name:  · Address:  · Dialysis Schedule:  · Phone:  · Fax:    / signature: {Esignature:[id number]}    PHYSICIAN SECTION    Prognosis: Fair    Condition at Discharge: Stable    Rehab Potential (if transferring to Rehab): Fair    Recommended Labs or Other Treatments After Discharge:     Please call and schedule an appointment with your Primary Care Provider Dr. Tanvi Tilley MD at [phone] for a follow up visit within 1 week.    - Please check cbc, bmp    HH for continued

## 2019-08-17 ENCOUNTER — CARE COORDINATION (OUTPATIENT)
Dept: CASE MANAGEMENT | Age: 84
End: 2019-08-17

## 2019-08-17 NOTE — CARE COORDINATION
[facility]    Call within 2 business days of discharge: Yes    Patient: Desiree Ortega Patient : 1934   MRN: [id number]  Reason for Admission: UTI with hematuria  Discharge Date: 19 RARS: Readmission Risk Score: 25      Last Discharge [address] [address]       Complaint Diagnosis Description Type Department Provider    19 Abdominal Pain Urinary tract infection with hematuria, site unspecified . .. ED to Hosp-Admission (Discharged) (ADMITTED) AV 5N Namrata Parr MD; Car Akhtar. .. Spoke with: Desiree Ortega (patient)    [facility]    Follow Up: Spoke very briefly with Sebastián Bryan. She states writer woke her up. She requests a return call later or tomorrow.   Future Appointments   Date Time Provider Melo Clemens   2019  8:15 AM Frantz Spencer MD [facility]   2019  8:45 AM MD CARLOS Rivera [address] [facility]   10/4/2019  8:45 AM MD CARLOS Rivera [address] [facility]       Kirti Bowman RN

## 2019-08-18 ENCOUNTER — CARE COORDINATION (OUTPATIENT)
Dept: CASE MANAGEMENT | Age: 84
End: 2019-08-18

## 2019-08-18 DIAGNOSIS — N39.0 URINARY TRACT INFECTION WITHOUT HEMATURIA, SITE UNSPECIFIED: Primary | ICD-10-CM

## 2019-08-18 PROCEDURE — 1111F DSCHRG MED/CURRENT MED MERGE: CPT | Performed by: HOSPITALIST

## 2019-08-23 ENCOUNTER — OFFICE VISIT (OUTPATIENT)
Dept: CARDIOLOGY CLINIC | Age: 84
End: 2019-08-23
Payer: MEDICARE

## 2019-08-23 VITALS
SYSTOLIC BLOOD PRESSURE: 138 MMHG | DIASTOLIC BLOOD PRESSURE: 66 MMHG | WEIGHT: 149.2 LBS | BODY MASS INDEX: 26.43 KG/M2 | HEART RATE: 80 BPM

## 2019-08-23 DIAGNOSIS — Z95.5 S/P BARE METAL CORONARY ARTERY STENT: Primary | ICD-10-CM

## 2019-08-23 DIAGNOSIS — I50.32 CHRONIC DIASTOLIC HEART FAILURE WITH PRESERVED EJECTION FRACTION (HCC): ICD-10-CM

## 2019-08-23 DIAGNOSIS — I10 ESSENTIAL HYPERTENSION: Chronic | ICD-10-CM

## 2019-08-23 PROCEDURE — 4040F PNEUMOC VAC/ADMIN/RCVD: CPT | Performed by: INTERNAL MEDICINE

## 2019-08-23 PROCEDURE — G8427 DOCREV CUR MEDS BY ELIG CLIN: HCPCS | Performed by: INTERNAL MEDICINE

## 2019-08-23 PROCEDURE — G8400 PT W/DXA NO RESULTS DOC: HCPCS | Performed by: INTERNAL MEDICINE

## 2019-08-23 PROCEDURE — G8417 CALC BMI ABV UP PARAM F/U: HCPCS | Performed by: INTERNAL MEDICINE

## 2019-08-23 PROCEDURE — 99213 OFFICE O/P EST LOW 20 MIN: CPT | Performed by: INTERNAL MEDICINE

## 2019-08-23 PROCEDURE — 1036F TOBACCO NON-USER: CPT | Performed by: INTERNAL MEDICINE

## 2019-08-23 PROCEDURE — 1111F DSCHRG MED/CURRENT MED MERGE: CPT | Performed by: INTERNAL MEDICINE

## 2019-08-23 PROCEDURE — G8598 ASA/ANTIPLAT THER USED: HCPCS | Performed by: INTERNAL MEDICINE

## 2019-08-23 PROCEDURE — 1123F ACP DISCUSS/DSCN MKR DOCD: CPT | Performed by: INTERNAL MEDICINE

## 2019-08-23 PROCEDURE — 1090F PRES/ABSN URINE INCON ASSESS: CPT | Performed by: INTERNAL MEDICINE

## 2019-08-23 RX ORDER — POTASSIUM CHLORIDE 20 MEQ/1
20 TABLET, EXTENDED RELEASE ORAL 2 TIMES DAILY
Qty: 180 TABLET | Refills: 3 | Status: SHIPPED | OUTPATIENT
Start: 2019-08-23 | End: 2020-09-18 | Stop reason: SDUPTHER

## 2019-08-23 NOTE — PROGRESS NOTES
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 She has no prior cardiac history but had exertional dyspnea and fatigue. As part of cardiac clearance for the anticipated colovesicular fistula repair by Dr. Christian Lezama, she had a lexiscan nuclear stress test on 11/27/18 which was abnormal (large, moderate intensity mixed defect in basal to mid inferolateral and apical inferior walls with normal LVEF).       Cath 12/6/18 c/w severe prox and mid LAD and prox and mid RCA stenoses s/p BMS x 4 (no RICHY due to urgent need for surgery). Normal LVEF and LVEDP. Per Dr. Fletcher Mckeon, she needs to remain on Plavix for at least 6 weeks after PCI (at least till 1/17/19) and then may hold it for up to 7 days prior to surgery, restart as soon as possible after surgery per her surgeon. Obviously she needs to remain on asa 81 daily throughout her surgery unless significant bleeding or other contraindication.      Patient was admitted at [facility] 3/4 - 3/6/19 for AHF and acute multifocal small PE's. She was started on Xarelto and her asa and plavix were stopped.      Echo 03/2019: normal LV, EF 55-60%, mild HK of mid inferior wall, diastolic I, mild AI.      Patient returns to clinic for a follow up. She was admitted at [facility] 8/13 - 8/16 with UTI. She takes lasix 20 daily only prn leg swelling. Her weight is stable around 148-149 lbs, reports no symptoms.        Histories     Past Medical History:   has a past medical history of Abdominal pain, unspecified site, Adrenal hyperplasia ([address] Ankle Fracture, Right, Anxiety and depression, Aortic atherosclerosis ([address] Contusion of unspecified site, COPD (chronic obstructive pulmonary (ATIVAN) 0.5 MG tablet TAKE ONE TABLET BY MOUTH TWICE A DAY 8/1/19 9/8/19 Yes Mark Strickland MD   HYDROcodone-acetaminophen (NORCO) 5-325 MG per tablet Take 1 tablet by mouth 2 times daily as needed for Pain for up to 30 days. Take lowest dose possible to manage pain 8/1/19 8/31/19 Yes Mark Strickland MD   QUEtiapine (SEROQUEL) 50 MG tablet TAKE ONE TABLET BY MOUTH ONCE NIGHTLY 6/3/19  Yes Mark Strickland MD   rOPINIRole (REQUIP) 0.25 MG tablet TAKE ONE TABLET BY MOUTH ONCE NIGHTLY 5/31/19  Yes Mark Strickland MD   aspirin EC 81 MG EC tablet Take 1 tablet by mouth daily 5/24/19  Yes Shanthi Draek MD   furosemide (LASIX) 20 MG tablet Take 1 tablet by mouth daily 5/24/19  Yes Shanthi Drake MD   metoprolol tartrate (LOPRESSOR) 25 MG tablet Take 1 tablet by mouth 2 times daily 5/14/19  Yes Mark Strickland MD   traZODone (DESYREL) 50 MG tablet Take 1 tablet by mouth nightly 3/20/19  Yes Mark Strickland MD   PARoxetine (PAXIL) 20 MG tablet TAKE ONE TABLET BY MOUTH DAILY 11/26/18  Yes Mark Strickland MD   OXYGEN Inhale 2 L/min into the lungs continuous 8/19/15  Yes Radha Garcia MD   triamcinolone (KENALOG) 0.1 % ointment APPLY TOPICALLY TWO TIMES A DAY FOR 7 DAYS 8/5/19   Mark Strickland MD          Allergy:     Enalapril       Review of Systems:     All 12 point review of symptoms completed. Pertinent positives identified in the HPI, all other review of symptoms negative as below. CONSTITUTIONAL: No fatigue  SKIN: No rash or pruritis. EYES: No visual changes or diplopia. No scleral icterus. ENT: No Headaches, hearing loss or vertigo. No mouth sores or sore throat. CARDIOVASCULAR: No chest pain/chest pressure/chest discomfort. No palpitations. No edema. RESPIRATORY: No dyspnea. No cough or wheezing, no sputum production. GASTROINTESTINAL: No N/V/D. No abdominal pain, appetite loss, blood in stools. GENITOURINARY: No dysuria, trouble voiding, or hematuria.   MUSCULOSKELETAL:  No gait disturbance, weakness or joint and lipitor 40.   -C/w aspirin 81 daily; no plavix due to concomitant Xarelto for PE's.      3. HTN:   BP is controlled.      4.  HLP:   On a statin.         Return in about 6 months (around 2/23/2020). I have personally reviewed the reports and images of labs, radiological studies, cardiac studies including ECG's and telemetry, current and old medical records. The note was completed using EMR. Every effort was made to ensure accuracy; however, inadvertent computerized transcription errors may be present. All questions and concerns were addressed to the patient/family. Alternatives to my treatment were discussed. I would like to thank you for providing me the opportunity to participate in the care of your patient. If you have any questions, please do not hesitate to contact me.      Lubna Dodd MD, [facility] - [address] [address] [id number] [address] Phone: [phone]  Heart Failure Hotline: [phone]  Fax: [phone]

## 2019-08-26 ENCOUNTER — CARE COORDINATION (OUTPATIENT)
Dept: CASE MANAGEMENT | Age: 84
End: 2019-08-26

## 2019-08-27 ENCOUNTER — TELEPHONE (OUTPATIENT)
Dept: CARDIOLOGY CLINIC | Age: 84
End: 2019-08-27

## 2019-08-27 ENCOUNTER — TELEPHONE (OUTPATIENT)
Dept: INTERNAL MEDICINE CLINIC | Age: 84
End: 2019-08-27

## 2019-09-02 DIAGNOSIS — G25.81 RESTLESS LEG SYNDROME: ICD-10-CM

## 2019-09-03 RX ORDER — ROPINIROLE 0.25 MG/1
TABLET, FILM COATED ORAL
Qty: 90 TABLET | Refills: 2 | Status: SHIPPED | OUTPATIENT
Start: 2019-09-03 | End: 2019-09-05 | Stop reason: SDUPTHER

## 2019-09-05 DIAGNOSIS — G89.29 ABDOMINAL PAIN, CHRONIC, LEFT LOWER QUADRANT: ICD-10-CM

## 2019-09-05 DIAGNOSIS — R10.32 ABDOMINAL PAIN, CHRONIC, LEFT LOWER QUADRANT: ICD-10-CM

## 2019-09-05 DIAGNOSIS — G25.81 RESTLESS LEG SYNDROME: ICD-10-CM

## 2019-09-10 RX ORDER — ROPINIROLE 0.25 MG/1
TABLET, FILM COATED ORAL
Qty: 90 TABLET | Refills: 0 | Status: SHIPPED | OUTPATIENT
Start: 2019-09-10 | End: 2021-07-26

## 2019-09-10 RX ORDER — HYDROCODONE BITARTRATE AND ACETAMINOPHEN 5; 325 MG/1; MG/1
1 TABLET ORAL 2 TIMES DAILY PRN
Qty: 60 TABLET | Refills: 0 | Status: SHIPPED | OUTPATIENT
Start: 2019-09-10 | End: 2019-10-09 | Stop reason: SDUPTHER

## 2019-09-13 DIAGNOSIS — F32.A DEPRESSION, UNSPECIFIED DEPRESSION TYPE: ICD-10-CM

## 2019-09-13 PROBLEM — N39.0 UTI (URINARY TRACT INFECTION): Status: RESOLVED | Noted: 2019-08-14 | Resolved: 2019-09-13

## 2019-09-13 RX ORDER — PAROXETINE HYDROCHLORIDE 20 MG/1
TABLET, FILM COATED ORAL
Qty: 90 TABLET | Refills: 3 | Status: SHIPPED | OUTPATIENT
Start: 2019-09-13 | End: 2019-10-22 | Stop reason: SDUPTHER

## 2019-09-16 RX ORDER — MECLIZINE HCL 12.5 MG/1
TABLET ORAL
Qty: 60 TABLET | Refills: 2 | Status: SHIPPED | OUTPATIENT
Start: 2019-09-16 | End: 2019-12-31

## 2019-09-25 ENCOUNTER — TELEPHONE (OUTPATIENT)
Dept: CARDIOLOGY CLINIC | Age: 84
End: 2019-09-25

## 2019-09-30 ENCOUNTER — CARE COORDINATION (OUTPATIENT)
Dept: CASE MANAGEMENT | Age: 84
End: 2019-09-30

## 2019-10-04 NOTE — PROGRESS NOTES
Follow Up Visit Established Patient Visit    Patient:  Morales Andre                                               : 1934  Age: 80 y.o. MRN: [id number]  Date : 2017    Morales Andre is a 80 y.o. female who presents for follow up of chronic medical conditions:    Anxiety- Lorazepam taper continues, she is now taking 0.5mg of Lorazepam twice daily. She would like to discontinue altogether eventually. Feels that her anxiety is well controlled on lorazepam and paxil. HTN-Well controlled on current regimen. Does not check BP at home. Her daughter helps her arrange her medications. Insomnia- Complains that her legs cramp up at night and when she gets up and walks she feels better and is then able to fall asleep.     Chief Complaint   Patient presents with    Hypertension    Hyperlipidemia       Past Medical History:   Diagnosis Date    Abdominal pain, unspecified site     Adrenal hyperplasia ([address] 2013    Left - CT scan -     Ankle Fracture, Right     Anxiety     Aortic atherosclerosis ([address] 10/3/2015    Arteriosclerosis     Contusion of unspecified site     COPD (chronic obstructive pulmonary disease) ([address] 3/1/2013    Coronary artery disease     Depression 10/8/2010    DJD (degenerative joint disease)     Eczema 2013    Essential hypertension 2015    Fatty liver     Hyperlipidemia     Hypertension     Hypoxia 2015    Insomnia     Leukocytosis     Memory loss     Nicotine addiction     Osteoarthritis     Pain in joint, pelvic region and thigh     Primary osteoarthritis involving multiple joints 2015    Pulmonary hypertension 10/3/2015    Senile reticular degeneration of peripheral retina     Skin rash     Syncope     Thoracic or lumbosacral neuritis or radiculitis, unspecified     Urinary incontinence     Visual hallucinations 2014       Past Surgical History:   Procedure Laterality Date    CATARACT REMOVAL WITH IMPLANT Left December Negative

## 2019-10-05 DIAGNOSIS — I10 ESSENTIAL HYPERTENSION: Chronic | ICD-10-CM

## 2019-10-08 RX ORDER — PANTOPRAZOLE SODIUM 40 MG/1
TABLET, DELAYED RELEASE ORAL
Qty: 90 TABLET | Refills: 3 | Status: SHIPPED | OUTPATIENT
Start: 2019-10-08 | End: 2021-07-26

## 2019-10-08 RX ORDER — AMLODIPINE BESYLATE 2.5 MG/1
TABLET ORAL
Qty: 180 TABLET | Refills: 2 | Status: SHIPPED | OUTPATIENT
Start: 2019-10-08 | End: 2019-12-05 | Stop reason: SDUPTHER

## 2019-10-09 ENCOUNTER — TELEPHONE (OUTPATIENT)
Dept: INTERNAL MEDICINE CLINIC | Age: 84
End: 2019-10-09

## 2019-10-09 DIAGNOSIS — F51.01 PRIMARY INSOMNIA: ICD-10-CM

## 2019-10-09 DIAGNOSIS — R10.32 ABDOMINAL PAIN, CHRONIC, LEFT LOWER QUADRANT: ICD-10-CM

## 2019-10-09 DIAGNOSIS — G89.29 ABDOMINAL PAIN, CHRONIC, LEFT LOWER QUADRANT: ICD-10-CM

## 2019-10-09 DIAGNOSIS — F41.9 ANXIETY: Chronic | ICD-10-CM

## 2019-10-11 RX ORDER — HYDROCODONE BITARTRATE AND ACETAMINOPHEN 5; 325 MG/1; MG/1
1 TABLET ORAL 2 TIMES DAILY PRN
Qty: 60 TABLET | Refills: 0 | Status: SHIPPED | OUTPATIENT
Start: 2019-10-11 | End: 2019-11-15 | Stop reason: SDUPTHER

## 2019-10-11 RX ORDER — LORAZEPAM 0.5 MG/1
TABLET ORAL
Qty: 60 TABLET | Refills: 0 | Status: SHIPPED | OUTPATIENT
Start: 2019-10-11 | End: 2019-11-03 | Stop reason: SDUPTHER

## 2019-10-11 RX ORDER — POTASSIUM CHLORIDE 20 MEQ/1
40 TABLET, EXTENDED RELEASE ORAL
Qty: 60 TABLET | Refills: 0 | Status: SHIPPED | OUTPATIENT
Start: 2019-10-11 | End: 2020-06-02

## 2019-10-22 ENCOUNTER — OFFICE VISIT (OUTPATIENT)
Dept: INTERNAL MEDICINE CLINIC | Age: 84
End: 2019-10-22
Payer: MEDICARE

## 2019-10-22 VITALS — BODY MASS INDEX: 26.93 KG/M2 | SYSTOLIC BLOOD PRESSURE: 178 MMHG | DIASTOLIC BLOOD PRESSURE: 68 MMHG | WEIGHT: 152 LBS

## 2019-10-22 DIAGNOSIS — Z00.00 ROUTINE GENERAL MEDICAL EXAMINATION AT A HEALTH CARE FACILITY: ICD-10-CM

## 2019-10-22 DIAGNOSIS — Z23 NEED FOR INFLUENZA VACCINATION: ICD-10-CM

## 2019-10-22 DIAGNOSIS — G89.29 ABDOMINAL PAIN, CHRONIC, LEFT LOWER QUADRANT: ICD-10-CM

## 2019-10-22 DIAGNOSIS — R10.32 ABDOMINAL PAIN, CHRONIC, LEFT LOWER QUADRANT: ICD-10-CM

## 2019-10-22 DIAGNOSIS — F51.01 PRIMARY INSOMNIA: Chronic | ICD-10-CM

## 2019-10-22 DIAGNOSIS — F32.A DEPRESSION, UNSPECIFIED DEPRESSION TYPE: ICD-10-CM

## 2019-10-22 DIAGNOSIS — E87.6 HYPOKALEMIA: Primary | ICD-10-CM

## 2019-10-22 DIAGNOSIS — F41.9 ANXIETY: Chronic | ICD-10-CM

## 2019-10-22 PROCEDURE — G0444 DEPRESSION SCREEN ANNUAL: HCPCS | Performed by: HOSPITALIST

## 2019-10-22 PROCEDURE — G0008 ADMIN INFLUENZA VIRUS VAC: HCPCS | Performed by: HOSPITALIST

## 2019-10-22 PROCEDURE — 1123F ACP DISCUSS/DSCN MKR DOCD: CPT | Performed by: HOSPITALIST

## 2019-10-22 PROCEDURE — 4040F PNEUMOC VAC/ADMIN/RCVD: CPT | Performed by: HOSPITALIST

## 2019-10-22 PROCEDURE — G8598 ASA/ANTIPLAT THER USED: HCPCS | Performed by: HOSPITALIST

## 2019-10-22 PROCEDURE — G8482 FLU IMMUNIZE ORDER/ADMIN: HCPCS | Performed by: HOSPITALIST

## 2019-10-22 PROCEDURE — G0439 PPPS, SUBSEQ VISIT: HCPCS | Performed by: HOSPITALIST

## 2019-10-22 PROCEDURE — 90653 IIV ADJUVANT VACCINE IM: CPT | Performed by: HOSPITALIST

## 2019-10-22 RX ORDER — PAROXETINE HYDROCHLORIDE 20 MG/1
TABLET, FILM COATED ORAL
Qty: 90 TABLET | Refills: 3 | Status: SHIPPED | OUTPATIENT
Start: 2019-10-22 | End: 2020-01-02 | Stop reason: SDUPTHER

## 2019-10-22 RX ORDER — ASPIRIN 81 MG/1
81 TABLET ORAL DAILY
Qty: 90 TABLET | Refills: 1 | Status: SHIPPED | OUTPATIENT
Start: 2019-10-22

## 2019-10-22 RX ORDER — QUETIAPINE FUMARATE 50 MG/1
TABLET, FILM COATED ORAL
Qty: 30 TABLET | Refills: 3 | Status: SHIPPED | OUTPATIENT
Start: 2019-10-22 | End: 2020-03-11

## 2019-10-22 RX ORDER — TRAZODONE HYDROCHLORIDE 50 MG/1
50 TABLET ORAL NIGHTLY
Qty: 30 TABLET | Refills: 12 | Status: SHIPPED | OUTPATIENT
Start: 2019-10-22 | End: 2021-06-15 | Stop reason: ALTCHOICE

## 2019-10-22 ASSESSMENT — PATIENT HEALTH QUESTIONNAIRE - PHQ9
SUM OF ALL RESPONSES TO PHQ QUESTIONS 1-9: 7
SUM OF ALL RESPONSES TO PHQ QUESTIONS 1-9: 6

## 2019-10-22 ASSESSMENT — LIFESTYLE VARIABLES
AUDIT TOTAL SCORE: 2
HOW OFTEN DURING THE LAST YEAR HAVE YOU FOUND THAT YOU WERE NOT ABLE TO STOP DRINKING ONCE YOU HAD STARTED: 0
HAS A RELATIVE, FRIEND, DOCTOR, OR ANOTHER HEALTH PROFESSIONAL EXPRESSED CONCERN ABOUT YOUR DRINKING OR SUGGESTED YOU CUT DOWN: 0
HOW MANY STANDARD DRINKS CONTAINING ALCOHOL DO YOU HAVE ON A TYPICAL DAY: 0
HOW OFTEN DURING THE LAST YEAR HAVE YOU NEEDED AN ALCOHOLIC DRINK FIRST THING IN THE MORNING TO GET YOURSELF GOING AFTER A NIGHT OF HEAVY DRINKING: 0
HOW OFTEN DURING THE LAST YEAR HAVE YOU HAD A FEELING OF GUILT OR REMORSE AFTER DRINKING: 0
HOW OFTEN DURING THE LAST YEAR HAVE YOU BEEN UNABLE TO REMEMBER WHAT HAPPENED THE NIGHT BEFORE BECAUSE YOU HAD BEEN DRINKING: 0
HAVE YOU OR SOMEONE ELSE BEEN INJURED AS A RESULT OF YOUR DRINKING: 0
HOW OFTEN DO YOU HAVE A DRINK CONTAINING ALCOHOL: 1
AUDIT-C TOTAL SCORE: 2
HOW OFTEN DURING THE LAST YEAR HAVE YOU FAILED TO DO WHAT WAS NORMALLY EXPECTED FROM YOU BECAUSE OF DRINKING: 0
HOW OFTEN DO YOU HAVE SIX OR MORE DRINKS ON ONE OCCASION: 1

## 2019-11-03 DIAGNOSIS — F41.9 ANXIETY: Chronic | ICD-10-CM

## 2019-11-03 DIAGNOSIS — F51.01 PRIMARY INSOMNIA: ICD-10-CM

## 2019-11-11 RX ORDER — RIVAROXABAN 20 MG/1
TABLET, FILM COATED ORAL
Qty: 15 TABLET | Refills: 1 | Status: SHIPPED | OUTPATIENT
Start: 2019-11-11 | End: 2020-01-02 | Stop reason: SDUPTHER

## 2019-11-12 ENCOUNTER — TELEPHONE (OUTPATIENT)
Dept: INTERNAL MEDICINE CLINIC | Age: 84
End: 2019-11-12

## 2019-11-13 RX ORDER — LORAZEPAM 0.5 MG/1
TABLET ORAL
Qty: 60 TABLET | Refills: 0 | Status: SHIPPED | OUTPATIENT
Start: 2019-11-13 | End: 2019-12-16 | Stop reason: SDUPTHER

## 2019-11-15 DIAGNOSIS — G89.29 ABDOMINAL PAIN, CHRONIC, LEFT LOWER QUADRANT: ICD-10-CM

## 2019-11-15 DIAGNOSIS — R10.32 ABDOMINAL PAIN, CHRONIC, LEFT LOWER QUADRANT: ICD-10-CM

## 2019-11-15 RX ORDER — HYDROCODONE BITARTRATE AND ACETAMINOPHEN 5; 325 MG/1; MG/1
1 TABLET ORAL 2 TIMES DAILY PRN
Qty: 60 TABLET | Refills: 0 | Status: SHIPPED | OUTPATIENT
Start: 2019-11-15 | End: 2019-12-16 | Stop reason: SDUPTHER

## 2019-12-05 RX ORDER — AMLODIPINE BESYLATE 5 MG/1
5 TABLET ORAL 2 TIMES DAILY
Qty: 60 TABLET | Refills: 2 | Status: SHIPPED | OUTPATIENT
Start: 2019-12-05 | End: 2020-03-09

## 2019-12-12 DIAGNOSIS — F41.9 ANXIETY: Chronic | ICD-10-CM

## 2019-12-12 DIAGNOSIS — F51.01 PRIMARY INSOMNIA: ICD-10-CM

## 2019-12-16 DIAGNOSIS — R10.32 ABDOMINAL PAIN, CHRONIC, LEFT LOWER QUADRANT: ICD-10-CM

## 2019-12-16 DIAGNOSIS — F41.9 ANXIETY: Chronic | ICD-10-CM

## 2019-12-16 DIAGNOSIS — F51.01 PRIMARY INSOMNIA: ICD-10-CM

## 2019-12-16 DIAGNOSIS — G89.29 ABDOMINAL PAIN, CHRONIC, LEFT LOWER QUADRANT: ICD-10-CM

## 2019-12-16 RX ORDER — HYDROCODONE BITARTRATE AND ACETAMINOPHEN 5; 325 MG/1; MG/1
1 TABLET ORAL 2 TIMES DAILY PRN
Qty: 60 TABLET | Refills: 0 | Status: SHIPPED | OUTPATIENT
Start: 2019-12-16 | End: 2020-01-13 | Stop reason: SDUPTHER

## 2019-12-16 RX ORDER — LORAZEPAM 0.5 MG/1
TABLET ORAL
Qty: 60 TABLET | Refills: 0 | Status: SHIPPED | OUTPATIENT
Start: 2019-12-16 | End: 2020-01-13 | Stop reason: SDUPTHER

## 2019-12-16 RX ORDER — LORAZEPAM 0.5 MG/1
TABLET ORAL
Qty: 60 TABLET | Refills: 0 | OUTPATIENT
Start: 2019-12-16 | End: 2020-01-17

## 2019-12-27 DIAGNOSIS — I10 ESSENTIAL HYPERTENSION: ICD-10-CM

## 2019-12-31 RX ORDER — MECLIZINE HCL 12.5 MG/1
TABLET ORAL
Qty: 60 TABLET | Refills: 1 | Status: SHIPPED | OUTPATIENT
Start: 2019-12-31 | End: 2020-01-02 | Stop reason: SDUPTHER

## 2020-01-02 ENCOUNTER — TELEPHONE (OUTPATIENT)
Dept: INTERNAL MEDICINE CLINIC | Age: 85
End: 2020-01-02

## 2020-01-02 RX ORDER — PAROXETINE HYDROCHLORIDE 20 MG/1
TABLET, FILM COATED ORAL
Qty: 90 TABLET | Refills: 3 | Status: SHIPPED | OUTPATIENT
Start: 2020-01-02 | End: 2020-10-16 | Stop reason: SDUPTHER

## 2020-01-02 RX ORDER — MECLIZINE HCL 12.5 MG/1
TABLET ORAL
Qty: 60 TABLET | Refills: 1 | Status: SHIPPED | OUTPATIENT
Start: 2020-01-02 | End: 2020-04-15 | Stop reason: SDUPTHER

## 2020-01-02 NOTE — TELEPHONE ENCOUNTER
Pt daughter calling to get refill for pt of       PARoxetine (PAXIL) 20 MG tablet     meclizine (ANTIVERT) 12.5 MG tablet     metoprolol tartrate (LOPRESSOR) 25 MG tablet      XARELTO 20 MG TABS tablet     Please advise   Pharmacy on file verified

## 2020-01-13 RX ORDER — HYDROCODONE BITARTRATE AND ACETAMINOPHEN 5; 325 MG/1; MG/1
1 TABLET ORAL 2 TIMES DAILY PRN
Qty: 60 TABLET | Refills: 0 | Status: SHIPPED | OUTPATIENT
Start: 2020-01-13 | End: 2020-02-13 | Stop reason: SDUPTHER

## 2020-01-13 RX ORDER — LORAZEPAM 0.5 MG/1
TABLET ORAL
Qty: 60 TABLET | Refills: 0 | Status: SHIPPED | OUTPATIENT
Start: 2020-01-13 | End: 2020-02-03

## 2020-01-15 ENCOUNTER — TELEPHONE (OUTPATIENT)
Dept: INTERNAL MEDICINE CLINIC | Age: 85
End: 2020-01-15

## 2020-01-16 NOTE — TELEPHONE ENCOUNTER
Dr. Eid July /cardilogist ordered Xarelto originally , patient has never been on coumadin, family was wondering if this could be an alternative due to cost , will address with Dr. Sahni Foil

## 2020-01-21 NOTE — TELEPHONE ENCOUNTER
Can order warfarin and do protimes at coumadin clinic , left message for daughter to call if she would like this set up

## 2020-01-22 NOTE — TELEPHONE ENCOUNTER
Pt's daughter would like a call from Rollo Pick to further discuss mothers medication.     Please advise

## 2020-01-23 ENCOUNTER — TELEPHONE (OUTPATIENT)
Dept: INTERNAL MEDICINE CLINIC | Age: 85
End: 2020-01-23

## 2020-01-29 NOTE — TELEPHONE ENCOUNTER
xarelto not covered and with taking coumadin instead , patient will not be able to get transportation for protime, any other suggestions?

## 2020-01-31 NOTE — TELEPHONE ENCOUNTER
Samples given , told daughter there is no other alternative other then the coumadin and doing protimes

## 2020-02-03 RX ORDER — LORAZEPAM 0.5 MG/1
TABLET ORAL
Qty: 60 TABLET | Refills: 0 | Status: SHIPPED | OUTPATIENT
Start: 2020-02-03 | End: 2020-02-13 | Stop reason: SDUPTHER

## 2020-02-12 ENCOUNTER — TELEPHONE (OUTPATIENT)
Dept: INTERNAL MEDICINE CLINIC | Age: 85
End: 2020-02-12

## 2020-02-12 NOTE — TELEPHONE ENCOUNTER
Patient's daughter calling in regards to patient's prescriptions for   LORazepam (ATIVAN) 0.5 MG tablet   HYDROcodone-acetaminophen (NORCO) 5-325 MG per tablet     Patient's daughter informed that the Lorazepam was sent electronically on 02/03/2020 but not the Gricelda Arrant.   Patient's daughter wanting to know if all prescriptions are being sent electronically now or if she still has to come in and get certain ones as hard copies. Request for      HYDROcodone-acetaminophen ([address]) 5-325 MG per tablet    to be sent to pharmacy as well.      [facility] [address] [place] [address] Drive

## 2020-02-13 RX ORDER — HYDROCODONE BITARTRATE AND ACETAMINOPHEN 5; 325 MG/1; MG/1
1 TABLET ORAL 2 TIMES DAILY PRN
Qty: 60 TABLET | Refills: 0 | Status: SHIPPED | OUTPATIENT
Start: 2020-02-13 | End: 2020-03-09 | Stop reason: SDUPTHER

## 2020-02-13 RX ORDER — LORAZEPAM 0.5 MG/1
TABLET ORAL
Qty: 60 TABLET | Refills: 0 | Status: SHIPPED | OUTPATIENT
Start: 2020-02-13 | End: 2020-03-09 | Stop reason: SDUPTHER

## 2020-02-13 NOTE — TELEPHONE ENCOUNTER
OARRS REVIEWED  LAST REFILL 01/14/20 on both medications  Can e-prescribe both per Dr. Jennifer Berrios , left message for daughter

## 2020-02-19 ENCOUNTER — OFFICE VISIT (OUTPATIENT)
Dept: INTERNAL MEDICINE CLINIC | Age: 85
End: 2020-02-19
Payer: MEDICARE

## 2020-02-19 VITALS
BODY MASS INDEX: 27.11 KG/M2 | HEIGHT: 63 IN | DIASTOLIC BLOOD PRESSURE: 58 MMHG | SYSTOLIC BLOOD PRESSURE: 144 MMHG | WEIGHT: 153 LBS

## 2020-02-19 DIAGNOSIS — D64.9 NORMOCYTIC ANEMIA: ICD-10-CM

## 2020-02-19 DIAGNOSIS — I10 ESSENTIAL HYPERTENSION: ICD-10-CM

## 2020-02-19 DIAGNOSIS — E87.6 HYPOKALEMIA: ICD-10-CM

## 2020-02-19 LAB
A/G RATIO: 1.2 (ref 1.1–2.2)
ALBUMIN SERPL-MCNC: 4 G/DL (ref 3.4–5)
ALP BLD-CCNC: 54 U/L (ref 40–129)
ALT SERPL-CCNC: 7 U/L (ref 10–40)
ANION GAP SERPL CALCULATED.3IONS-SCNC: 12 MMOL/L (ref 3–16)
AST SERPL-CCNC: 13 U/L (ref 15–37)
BASOPHILS ABSOLUTE: 0 K/UL (ref 0–0.2)
BASOPHILS RELATIVE PERCENT: 0.3 %
BILIRUB SERPL-MCNC: 0.4 MG/DL (ref 0–1)
BUN BLDV-MCNC: 13 MG/DL (ref 7–20)
CALCIUM SERPL-MCNC: 8.9 MG/DL (ref 8.3–10.6)
CHLORIDE BLD-SCNC: 106 MMOL/L (ref 99–110)
CHOLESTEROL, TOTAL: 127 MG/DL (ref 0–199)
CO2: 28 MMOL/L (ref 21–32)
CREAT SERPL-MCNC: 1 MG/DL (ref 0.6–1.2)
EOSINOPHILS ABSOLUTE: 0.1 K/UL (ref 0–0.6)
EOSINOPHILS RELATIVE PERCENT: 2 %
GFR AFRICAN AMERICAN: >60
GFR NON-AFRICAN AMERICAN: 53
GLOBULIN: 3.3 G/DL
GLUCOSE BLD-MCNC: 111 MG/DL (ref 70–99)
HCT VFR BLD CALC: 37.7 % (ref 36–48)
HDLC SERPL-MCNC: 39 MG/DL (ref 40–60)
HEMOGLOBIN: 12.4 G/DL (ref 12–16)
IRON SATURATION: 11 % (ref 15–50)
IRON: 43 UG/DL (ref 37–145)
LDL CHOLESTEROL CALCULATED: 63 MG/DL
LYMPHOCYTES ABSOLUTE: 1.6 K/UL (ref 1–5.1)
LYMPHOCYTES RELATIVE PERCENT: 24.1 %
MCH RBC QN AUTO: 27.1 PG (ref 26–34)
MCHC RBC AUTO-ENTMCNC: 32.9 G/DL (ref 31–36)
MCV RBC AUTO: 82.4 FL (ref 80–100)
MONOCYTES ABSOLUTE: 0.7 K/UL (ref 0–1.3)
MONOCYTES RELATIVE PERCENT: 10.6 %
NEUTROPHILS ABSOLUTE: 4.3 K/UL (ref 1.7–7.7)
NEUTROPHILS RELATIVE PERCENT: 63 %
PDW BLD-RTO: 17.7 % (ref 12.4–15.4)
PLATELET # BLD: 203 K/UL (ref 135–450)
PMV BLD AUTO: 9.4 FL (ref 5–10.5)
POTASSIUM SERPL-SCNC: 3.7 MMOL/L (ref 3.5–5.1)
RBC # BLD: 4.57 M/UL (ref 4–5.2)
SODIUM BLD-SCNC: 146 MMOL/L (ref 136–145)
TOTAL IRON BINDING CAPACITY: 380 UG/DL (ref 260–445)
TOTAL PROTEIN: 7.3 G/DL (ref 6.4–8.2)
TRIGL SERPL-MCNC: 123 MG/DL (ref 0–150)
TSH SERPL DL<=0.05 MIU/L-ACNC: 2.35 UIU/ML (ref 0.27–4.2)
VITAMIN B-12: 285 PG/ML (ref 211–911)
VLDLC SERPL CALC-MCNC: 25 MG/DL
WBC # BLD: 6.8 K/UL (ref 4–11)

## 2020-02-19 PROCEDURE — G8482 FLU IMMUNIZE ORDER/ADMIN: HCPCS | Performed by: HOSPITALIST

## 2020-02-19 PROCEDURE — G8428 CUR MEDS NOT DOCUMENT: HCPCS | Performed by: HOSPITALIST

## 2020-02-19 PROCEDURE — G8417 CALC BMI ABV UP PARAM F/U: HCPCS | Performed by: HOSPITALIST

## 2020-02-19 PROCEDURE — 1090F PRES/ABSN URINE INCON ASSESS: CPT | Performed by: HOSPITALIST

## 2020-02-19 PROCEDURE — G8400 PT W/DXA NO RESULTS DOC: HCPCS | Performed by: HOSPITALIST

## 2020-02-19 PROCEDURE — 99214 OFFICE O/P EST MOD 30 MIN: CPT | Performed by: HOSPITALIST

## 2020-02-19 PROCEDURE — 1123F ACP DISCUSS/DSCN MKR DOCD: CPT | Performed by: HOSPITALIST

## 2020-02-19 PROCEDURE — 1036F TOBACCO NON-USER: CPT | Performed by: HOSPITALIST

## 2020-02-19 PROCEDURE — G8926 SPIRO NO PERF OR DOC: HCPCS | Performed by: HOSPITALIST

## 2020-02-19 PROCEDURE — 4040F PNEUMOC VAC/ADMIN/RCVD: CPT | Performed by: HOSPITALIST

## 2020-02-19 PROCEDURE — 3023F SPIROM DOC REV: CPT | Performed by: HOSPITALIST

## 2020-02-19 NOTE — PROGRESS NOTES
Follow Up Visit Established Patient Visit    Patient:  Bakari Kahn                                               : 1934  Age: 80 y.o. MRN: <[id number]>  Date : 2020    Bakari Kahn is a 80 y.o. female who presents for : Follow-up appointment    Chief Complaint   Patient presents with    3 Month Follow-Up    Hypertension    Hyperlipidemia    Dizziness     Reports some dizziness and unsteadiness. Checks her BP occasionally. Doesn't adhere to low fat/ low cholesterol diet; doesn't watch sodium intake. Continues to smoke about 1 pack daily. On continuous oxygen at home 2 L/min via nasal cannula. Sleeps well overnight with use of trazodone and Seroquel. Chronic left lower quadrant abdominal pain well controlled with as needed use of Norco.  Takes potassium supplementation for history of hypokalemia. Does not report any heartburn. No nausea, no vomiting, no signs of indigestion.         Past Medical History:   Diagnosis Date    Abdominal pain, unspecified site     Adrenal hyperplasia ([address] 2013    Left - CT scan - 2011    Ankle Fracture, Right     Anxiety and depression     Aortic atherosclerosis ([address] 10/3/2015    Contusion of unspecified site     COPD (chronic obstructive pulmonary disease) ([address] 3/1/2013    Coronary artery disease     Arteriosclerosis    DJD (degenerative joint disease)     Eczema 2013    Essential hypertension 2015    Hyperlipidemia     Hypertension     Hypoxia 2015    Insomnia     Leukocytosis     Memory loss     Nicotine addiction     Osteoarthritis     Pain in joint, pelvic region and thigh     Primary osteoarthritis involving multiple joints 2015    Pulmonary hypertension ([address] 10/3/2015    Senile reticular degeneration of peripheral retina     Syncope     Thoracic or lumbosacral neuritis or radiculitis, unspecified     Urinary incontinence     Visual hallucinations 2014    Wears glasses        Past Surgical History:   Procedure Laterality Date    CATARACT REMOVAL WITH IMPLANT Left December 5, 2012    Dr. Pb Valle    COLONOSCOPY      HYSTERECTOMY      SIGMOIDOSCOPY  08/01/2018       Current Outpatient Medications   Medication Sig Dispense Refill    HYDROcodone-acetaminophen (NORCO) 5-325 MG per tablet Take 1 tablet by mouth 2 times daily as needed for Pain for up to 30 days.  Take lowest dose possible to manage pain 60 tablet 0    LORazepam (ATIVAN) 0.5 MG tablet TAKE ONE TABLET BY MOUTH TWICE A DAY AS NEEDED 60 tablet 0    meclizine (ANTIVERT) 12.5 MG tablet TAKE ONE TABLET BY MOUTH TWICE A DAY AS NEEDED FOR DIZZINESS 60 tablet 1    metoprolol tartrate (LOPRESSOR) 25 MG tablet TAKE ONE TABLET BY MOUTH TWICE A  tablet 2    PARoxetine (PAXIL) 20 MG tablet TAKE ONE TABLET BY MOUTH DAILY 90 tablet 3    rivaroxaban (XARELTO) 20 MG TABS tablet TAKE ONE TABLET BY MOUTH DAILY WITH BREAKFAST 90 tablet 0    amLODIPine (NORVASC) 5 MG tablet Take 1 tablet by mouth 2 times daily 60 tablet 2    QUEtiapine (SEROQUEL) 50 MG tablet TAKE ONE TABLET BY MOUTH ONCE NIGHTLY 30 tablet 3    aspirin EC 81 MG EC tablet Take 1 tablet by mouth daily 90 tablet 1    pantoprazole (PROTONIX) 40 MG tablet TAKE ONE TABLET BY MOUTH EVERY MORNING BEFORE BREAKFAST 90 tablet 3    atorvastatin (LIPITOR) 40 MG tablet TAKE ONE TABLET BY MOUTH NIGHTLY 90 tablet 2    furosemide (LASIX) 20 MG tablet Take 1 tablet by mouth daily 90 tablet 5    traZODone (DESYREL) 50 MG tablet Take 1 tablet by mouth nightly 30 tablet 12    potassium chloride (KLOR-CON M) 20 MEQ extended release tablet Take 2 tablets by mouth daily (with breakfast) 60 tablet 0    rOPINIRole (REQUIP) 0.25 MG tablet 1 tablet nightly 90 tablet 0    potassium chloride (KLOR-CON M) 20 MEQ extended release tablet Take 1 tablet by mouth 2 times daily 180 tablet 3    OXYGEN Inhale 2 L/min into the lungs continuous       No current

## 2020-02-20 ENCOUNTER — TELEPHONE (OUTPATIENT)
Dept: CARDIOLOGY CLINIC | Age: 85
End: 2020-02-20

## 2020-02-20 NOTE — TELEPHONE ENCOUNTER
Patient's Xarelto is too expensive for her at beginning of year per daughter. She would like to find out about how to get assistance with it, and in meantime would like to  samples (Xarelto 20 mg daily). Please call daughter, Katja Kennedy about this at [phone]. Thanks.

## 2020-02-26 ENCOUNTER — TELEPHONE (OUTPATIENT)
Dept: INTERNAL MEDICINE CLINIC | Age: 85
End: 2020-02-26

## 2020-02-26 NOTE — TELEPHONE ENCOUNTER
Patient daughter called back to left you know the name of her mother's oxygen company, its [employer], this is for a portable, lightweight O2 machine. Please call with any questions.

## 2020-03-09 ENCOUNTER — TELEPHONE (OUTPATIENT)
Dept: INTERNAL MEDICINE CLINIC | Age: 85
End: 2020-03-09

## 2020-03-09 RX ORDER — HYDROCODONE BITARTRATE AND ACETAMINOPHEN 5; 325 MG/1; MG/1
1 TABLET ORAL 2 TIMES DAILY PRN
Qty: 60 TABLET | Refills: 0 | Status: SHIPPED | OUTPATIENT
Start: 2020-03-09 | End: 2020-04-13 | Stop reason: SDUPTHER

## 2020-03-09 RX ORDER — LORAZEPAM 0.5 MG/1
TABLET ORAL
Qty: 60 TABLET | Refills: 0 | Status: SHIPPED | OUTPATIENT
Start: 2020-03-09 | End: 2020-04-13 | Stop reason: SDUPTHER

## 2020-03-09 RX ORDER — AMLODIPINE BESYLATE 5 MG/1
TABLET ORAL
Qty: 180 TABLET | Refills: 1 | Status: SHIPPED | OUTPATIENT
Start: 2020-03-09 | End: 2020-06-15 | Stop reason: SDUPTHER

## 2020-03-10 ENCOUNTER — TELEPHONE (OUTPATIENT)
Dept: INTERNAL MEDICINE CLINIC | Age: 85
End: 2020-03-10

## 2020-03-11 RX ORDER — QUETIAPINE FUMARATE 50 MG/1
TABLET, FILM COATED ORAL
Qty: 90 TABLET | Refills: 2 | Status: SHIPPED | OUTPATIENT
Start: 2020-03-11 | End: 2020-04-13 | Stop reason: SDUPTHER

## 2020-03-12 ENCOUNTER — TELEPHONE (OUTPATIENT)
Dept: INTERNAL MEDICINE CLINIC | Age: 85
End: 2020-03-12

## 2020-04-13 RX ORDER — QUETIAPINE FUMARATE 50 MG/1
TABLET, FILM COATED ORAL
Qty: 90 TABLET | Refills: 2 | Status: SHIPPED | OUTPATIENT
Start: 2020-04-13 | End: 2020-12-28 | Stop reason: SDUPTHER

## 2020-04-13 RX ORDER — HYDROCODONE BITARTRATE AND ACETAMINOPHEN 5; 325 MG/1; MG/1
1 TABLET ORAL 2 TIMES DAILY PRN
Qty: 60 TABLET | Refills: 0 | Status: SHIPPED | OUTPATIENT
Start: 2020-04-13 | End: 2020-04-14 | Stop reason: SDUPTHER

## 2020-04-13 RX ORDER — LORAZEPAM 0.5 MG/1
TABLET ORAL
Qty: 60 TABLET | Refills: 0 | Status: SHIPPED | OUTPATIENT
Start: 2020-04-13 | End: 2020-04-14 | Stop reason: SDUPTHER

## 2020-04-15 RX ORDER — LORAZEPAM 0.5 MG/1
TABLET ORAL
Qty: 60 TABLET | Refills: 0 | Status: SHIPPED | OUTPATIENT
Start: 2020-04-15 | End: 2020-05-13

## 2020-04-15 RX ORDER — HYDROCODONE BITARTRATE AND ACETAMINOPHEN 5; 325 MG/1; MG/1
1 TABLET ORAL 2 TIMES DAILY PRN
Qty: 60 TABLET | Refills: 0 | Status: SHIPPED | OUTPATIENT
Start: 2020-04-15 | End: 2020-06-16 | Stop reason: SDUPTHER

## 2020-04-15 RX ORDER — MECLIZINE HCL 12.5 MG/1
TABLET ORAL
Qty: 60 TABLET | Refills: 1 | Status: SHIPPED | OUTPATIENT
Start: 2020-04-15 | End: 2020-07-13

## 2020-04-20 ENCOUNTER — TELEPHONE (OUTPATIENT)
Dept: INTERNAL MEDICINE CLINIC | Age: 85
End: 2020-04-20

## 2020-04-27 ENCOUNTER — TELEPHONE (OUTPATIENT)
Dept: INTERNAL MEDICINE CLINIC | Age: 85
End: 2020-04-27

## 2020-05-11 RX ORDER — ATORVASTATIN CALCIUM 40 MG/1
TABLET, FILM COATED ORAL
Qty: 90 TABLET | Refills: 1 | Status: SHIPPED | OUTPATIENT
Start: 2020-05-11 | End: 2020-11-19 | Stop reason: SDUPTHER

## 2020-05-26 ENCOUNTER — TELEPHONE (OUTPATIENT)
Dept: INTERNAL MEDICINE CLINIC | Age: 85
End: 2020-05-26

## 2020-06-02 ENCOUNTER — OFFICE VISIT (OUTPATIENT)
Dept: CARDIOLOGY CLINIC | Age: 85
End: 2020-06-02
Payer: MEDICARE

## 2020-06-02 ENCOUNTER — OFFICE VISIT (OUTPATIENT)
Dept: INTERNAL MEDICINE CLINIC | Age: 85
End: 2020-06-02
Payer: MEDICARE

## 2020-06-02 ENCOUNTER — TELEPHONE (OUTPATIENT)
Dept: CARDIOLOGY CLINIC | Age: 85
End: 2020-06-02

## 2020-06-02 VITALS
SYSTOLIC BLOOD PRESSURE: 148 MMHG | HEIGHT: 63 IN | BODY MASS INDEX: 28.45 KG/M2 | HEART RATE: 79 BPM | OXYGEN SATURATION: 95 % | DIASTOLIC BLOOD PRESSURE: 79 MMHG

## 2020-06-02 VITALS
HEART RATE: 61 BPM | SYSTOLIC BLOOD PRESSURE: 138 MMHG | BODY MASS INDEX: 28.45 KG/M2 | DIASTOLIC BLOOD PRESSURE: 60 MMHG | WEIGHT: 160.6 LBS

## 2020-06-02 PROCEDURE — 1036F TOBACCO NON-USER: CPT | Performed by: HOSPITALIST

## 2020-06-02 PROCEDURE — 4040F PNEUMOC VAC/ADMIN/RCVD: CPT | Performed by: HOSPITALIST

## 2020-06-02 PROCEDURE — 99214 OFFICE O/P EST MOD 30 MIN: CPT | Performed by: HOSPITALIST

## 2020-06-02 PROCEDURE — 1090F PRES/ABSN URINE INCON ASSESS: CPT | Performed by: HOSPITALIST

## 2020-06-02 PROCEDURE — 93000 ELECTROCARDIOGRAM COMPLETE: CPT | Performed by: NURSE PRACTITIONER

## 2020-06-02 PROCEDURE — 99214 OFFICE O/P EST MOD 30 MIN: CPT | Performed by: NURSE PRACTITIONER

## 2020-06-02 PROCEDURE — G8400 PT W/DXA NO RESULTS DOC: HCPCS | Performed by: NURSE PRACTITIONER

## 2020-06-02 PROCEDURE — G8400 PT W/DXA NO RESULTS DOC: HCPCS | Performed by: HOSPITALIST

## 2020-06-02 PROCEDURE — 1123F ACP DISCUSS/DSCN MKR DOCD: CPT | Performed by: HOSPITALIST

## 2020-06-02 PROCEDURE — G8417 CALC BMI ABV UP PARAM F/U: HCPCS | Performed by: NURSE PRACTITIONER

## 2020-06-02 PROCEDURE — 1036F TOBACCO NON-USER: CPT | Performed by: NURSE PRACTITIONER

## 2020-06-02 PROCEDURE — G8417 CALC BMI ABV UP PARAM F/U: HCPCS | Performed by: HOSPITALIST

## 2020-06-02 PROCEDURE — G8427 DOCREV CUR MEDS BY ELIG CLIN: HCPCS | Performed by: HOSPITALIST

## 2020-06-02 PROCEDURE — 4040F PNEUMOC VAC/ADMIN/RCVD: CPT | Performed by: NURSE PRACTITIONER

## 2020-06-02 PROCEDURE — 1123F ACP DISCUSS/DSCN MKR DOCD: CPT | Performed by: NURSE PRACTITIONER

## 2020-06-02 PROCEDURE — 1090F PRES/ABSN URINE INCON ASSESS: CPT | Performed by: NURSE PRACTITIONER

## 2020-06-02 PROCEDURE — G8427 DOCREV CUR MEDS BY ELIG CLIN: HCPCS | Performed by: NURSE PRACTITIONER

## 2020-06-02 RX ORDER — FUROSEMIDE 20 MG/1
20 TABLET ORAL 2 TIMES DAILY
Qty: 180 TABLET | Refills: 5
Start: 2020-06-02 | End: 2020-06-04 | Stop reason: SDUPTHER

## 2020-06-02 NOTE — TELEPHONE ENCOUNTER
Pt called in with daughter asking if the office had any samples of  rivaroxaban (XARELTO) 20 MG TABS tablet that could be picked up for pt. Pt can be reached at [phone] if the office has and samples.  Thanks

## 2020-06-02 NOTE — PROGRESS NOTES
CC/HPI:  Pt and daughter in room. A lot of information obtained from daughter. Pt poor historian. 80 y.o. patient of Dr Naina Moya with HFpEF, CAD, HTN, HLD, and COPD who presented with SOB, 3 pillow orthopnea, abdominal bloating, edema and weight gain x 2-3 weeks. Daughter states she c/o left sided chest pressure last week but pt unable to give details. Pt c/o LH/dizziness and falling yesterday but denies syncope or LOC. Pt does not follow low salt diet and eats a lot of noonan. Pt denies fever, chills or cough. No GI/ bleeding. Daughter is interested in home oxygen d/t sob and copd. States oxygen sats are 90-92 with exertion. Tolerating medications.      Past Medical History:   Diagnosis Date    Abdominal pain, unspecified site     Adrenal hyperplasia ([address] 6/11/2013    Left - CT scan - 2011    Ankle Fracture, Right     Anxiety and depression     Aortic atherosclerosis ([address] 10/3/2015    Contusion of unspecified site     COPD (chronic obstructive pulmonary disease) ([address] 3/1/2013    Coronary artery disease     Arteriosclerosis    DJD (degenerative joint disease)     Eczema 8/9/2013    Essential hypertension 9/9/2015    Hyperlipidemia     Hypertension     Hypoxia 7/16/2015    Insomnia     Leukocytosis     Memory loss     Nicotine addiction     Osteoarthritis     Pain in joint, pelvic region and thigh     Primary osteoarthritis involving multiple joints 9/9/2015    Pulmonary hypertension ([address] 10/3/2015    Senile reticular degeneration of peripheral retina     Syncope     Thoracic or lumbosacral neuritis or radiculitis, unspecified     Urinary incontinence     Visual hallucinations 9/16/2014    Wears glasses      Past Surgical History:   Procedure Laterality Date    CATARACT REMOVAL WITH IMPLANT Left December 5, 2012    Dr. Alanna Swann  08/01/2018     Family History   Problem Relation Age of Onset    Coronary angiogram  PCI    prox/mid LAD: 3.0 x 12 mm BMS (mid) 3.5 x 15 mm BMS (proximal)   prox and distal RCA: 2.5 x 12 mm BMS (proximal); 2.5 x 15 mm BMS (distal)  EF 65%, LVEDP 18    2018 Nuc stress:      Abnormal study. There is a large sized, moderate intensity, partially    reversible defect of the basal to mid inferolateral wall which is consistent    with ischemia and the basal to apical inferior wall is primarily a fixed    defect.    Normal LV size and systolic function.    Overall findings represent an intermediate risk study. Medications:   Current Outpatient Medications   Medication Sig Dispense Refill    furosemide (LASIX) 20 MG tablet Take 1 tablet by mouth 2 times daily 180 tablet 5    atorvastatin (LIPITOR) 40 MG tablet TAKE ONE TABLET BY MOUTH NIGHTLY 90 tablet 1    meclizine (ANTIVERT) 12.5 MG tablet TAKE ONE TABLET BY MOUTH TWICE A DAY AS NEEDED FOR DIZZINESS 60 tablet 1    QUEtiapine (SEROQUEL) 50 MG tablet 1 tablet daily 90 tablet 2    amLODIPine (NORVASC) 5 MG tablet TAKE ONE TABLET BY MOUTH TWICE A  tablet 1    metoprolol tartrate (LOPRESSOR) 25 MG tablet TAKE ONE TABLET BY MOUTH TWICE A  tablet 2    PARoxetine (PAXIL) 20 MG tablet TAKE ONE TABLET BY MOUTH DAILY 90 tablet 3    rivaroxaban (XARELTO) 20 MG TABS tablet TAKE ONE TABLET BY MOUTH DAILY WITH BREAKFAST 90 tablet 0    aspirin EC 81 MG EC tablet Take 1 tablet by mouth daily 90 tablet 1    traZODone (DESYREL) 50 MG tablet Take 1 tablet by mouth nightly 30 tablet 12    pantoprazole (PROTONIX) 40 MG tablet TAKE ONE TABLET BY MOUTH EVERY MORNING BEFORE BREAKFAST 90 tablet 3    rOPINIRole (REQUIP) 0.25 MG tablet 1 tablet nightly 90 tablet 0    potassium chloride (KLOR-CON M) 20 MEQ extended release tablet Take 1 tablet by mouth 2 times daily 180 tablet 3    OXYGEN Inhale 2 L/min into the lungs continuous       No current facility-administered medications for this visit. Assessment:  1.  Acute

## 2020-06-04 RX ORDER — FUROSEMIDE 20 MG/1
20 TABLET ORAL 2 TIMES DAILY
Qty: 180 TABLET | Refills: 5 | Status: CANCELLED | OUTPATIENT
Start: 2020-06-04

## 2020-06-04 RX ORDER — FUROSEMIDE 20 MG/1
20 TABLET ORAL 2 TIMES DAILY
Qty: 180 TABLET | Refills: 0 | Status: SHIPPED | OUTPATIENT
Start: 2020-06-04 | End: 2021-07-01

## 2020-06-10 ENCOUNTER — TELEPHONE (OUTPATIENT)
Dept: INTERNAL MEDICINE CLINIC | Age: 85
End: 2020-06-10

## 2020-06-16 RX ORDER — HYDROCODONE BITARTRATE AND ACETAMINOPHEN 5; 325 MG/1; MG/1
1 TABLET ORAL 2 TIMES DAILY PRN
Qty: 60 TABLET | Refills: 0 | Status: SHIPPED | OUTPATIENT
Start: 2020-06-16 | End: 2020-07-16

## 2020-06-16 RX ORDER — AMLODIPINE BESYLATE 5 MG/1
TABLET ORAL
Qty: 180 TABLET | Refills: 1 | Status: SHIPPED | OUTPATIENT
Start: 2020-06-16 | End: 2021-07-01

## 2020-06-19 ENCOUNTER — TELEPHONE (OUTPATIENT)
Dept: INTERNAL MEDICINE CLINIC | Age: 85
End: 2020-06-19

## 2020-06-23 ENCOUNTER — TELEPHONE (OUTPATIENT)
Dept: INTERNAL MEDICINE CLINIC | Age: 85
End: 2020-06-23

## 2020-06-23 NOTE — TELEPHONE ENCOUNTER
Pt daughter would like to know if there is any samples of Xarelto.  Pt daughter states if there is no samples can dr call a prescription in for xarelto

## 2020-07-13 RX ORDER — ALBUTEROL SULFATE 90 UG/1
2 AEROSOL, METERED RESPIRATORY (INHALATION) 4 TIMES DAILY PRN
Qty: 3 INHALER | Refills: 1 | Status: SHIPPED | OUTPATIENT
Start: 2020-07-13

## 2020-07-13 RX ORDER — MECLIZINE HCL 12.5 MG/1
TABLET ORAL
Qty: 60 TABLET | Refills: 0 | Status: SHIPPED | OUTPATIENT
Start: 2020-07-13 | End: 2020-08-10

## 2020-07-13 RX ORDER — LORAZEPAM 0.5 MG/1
TABLET ORAL
Qty: 60 TABLET | Refills: 0 | Status: SHIPPED | OUTPATIENT
Start: 2020-07-13 | End: 2020-08-10

## 2020-07-13 NOTE — TELEPHONE ENCOUNTER
The patient's daughter is also requesting refill for additional rx for    HYDROcodone-acetaminophen ([place]) 5-325 MG per tablet   As well as the     meclizine (ANTIVERT) 12.5 MG tablet     LORazepam (ATIVAN) 0.5 MG tablet     [facility] [address] [facility] [phone] - F [phone]    The daughter is also asking for a return call about an inhaler to be order.

## 2020-07-16 RX ORDER — HYDROCODONE BITARTRATE AND ACETAMINOPHEN 5; 325 MG/1; MG/1
1 TABLET ORAL 2 TIMES DAILY PRN
Qty: 60 TABLET | Refills: 0 | Status: SHIPPED | OUTPATIENT
Start: 2020-07-16 | End: 2020-08-20 | Stop reason: SDUPTHER

## 2020-07-16 RX ORDER — HYDROCODONE BITARTRATE AND ACETAMINOPHEN 5; 325 MG/1; MG/1
1 TABLET ORAL 2 TIMES DAILY PRN
Qty: 60 TABLET | Refills: 0 | Status: CANCELLED | OUTPATIENT
Start: 2020-07-16 | End: 2020-08-15

## 2020-08-04 ENCOUNTER — OFFICE VISIT (OUTPATIENT)
Dept: INTERNAL MEDICINE CLINIC | Age: 85
End: 2020-08-04
Payer: MEDICARE

## 2020-08-04 ENCOUNTER — TELEPHONE (OUTPATIENT)
Dept: INTERNAL MEDICINE CLINIC | Age: 85
End: 2020-08-04

## 2020-08-04 VITALS
SYSTOLIC BLOOD PRESSURE: 130 MMHG | WEIGHT: 160 LBS | TEMPERATURE: 97.2 F | DIASTOLIC BLOOD PRESSURE: 64 MMHG | BODY MASS INDEX: 28.34 KG/M2

## 2020-08-04 DIAGNOSIS — R73.9 HYPERGLYCEMIA: ICD-10-CM

## 2020-08-04 DIAGNOSIS — I50.32 CHRONIC DIASTOLIC HEART FAILURE (HCC): ICD-10-CM

## 2020-08-04 DIAGNOSIS — E78.2 MIXED HYPERLIPIDEMIA: ICD-10-CM

## 2020-08-04 LAB
A/G RATIO: 1.2 (ref 1.1–2.2)
ALBUMIN SERPL-MCNC: 4.1 G/DL (ref 3.4–5)
ALP BLD-CCNC: 59 U/L (ref 40–129)
ALT SERPL-CCNC: 11 U/L (ref 10–40)
ANION GAP SERPL CALCULATED.3IONS-SCNC: 14 MMOL/L (ref 3–16)
AST SERPL-CCNC: 15 U/L (ref 15–37)
BASOPHILS ABSOLUTE: 0 K/UL (ref 0–0.2)
BASOPHILS RELATIVE PERCENT: 0.3 %
BILIRUB SERPL-MCNC: 0.6 MG/DL (ref 0–1)
BUN BLDV-MCNC: 16 MG/DL (ref 7–20)
CALCIUM SERPL-MCNC: 9.2 MG/DL (ref 8.3–10.6)
CHLORIDE BLD-SCNC: 106 MMOL/L (ref 99–110)
CHOLESTEROL, TOTAL: 125 MG/DL (ref 0–199)
CO2: 25 MMOL/L (ref 21–32)
CREAT SERPL-MCNC: 1.1 MG/DL (ref 0.6–1.2)
EOSINOPHILS ABSOLUTE: 0.1 K/UL (ref 0–0.6)
EOSINOPHILS RELATIVE PERCENT: 1.8 %
ESTIMATED AVERAGE GLUCOSE: 139.9 MG/DL
GFR AFRICAN AMERICAN: 57
GFR NON-AFRICAN AMERICAN: 47
GLOBULIN: 3.3 G/DL
GLUCOSE BLD-MCNC: 127 MG/DL (ref 70–99)
HBA1C MFR BLD: 6.5 %
HCT VFR BLD CALC: 39.4 % (ref 36–48)
HDLC SERPL-MCNC: 40 MG/DL (ref 40–60)
HEMOGLOBIN: 12.7 G/DL (ref 12–16)
LDL CHOLESTEROL CALCULATED: 60 MG/DL
LYMPHOCYTES ABSOLUTE: 1.4 K/UL (ref 1–5.1)
LYMPHOCYTES RELATIVE PERCENT: 23.5 %
MCH RBC QN AUTO: 26.9 PG (ref 26–34)
MCHC RBC AUTO-ENTMCNC: 32.2 G/DL (ref 31–36)
MCV RBC AUTO: 83.5 FL (ref 80–100)
MONOCYTES ABSOLUTE: 0.6 K/UL (ref 0–1.3)
MONOCYTES RELATIVE PERCENT: 9.8 %
NEUTROPHILS ABSOLUTE: 3.9 K/UL (ref 1.7–7.7)
NEUTROPHILS RELATIVE PERCENT: 64.6 %
PDW BLD-RTO: 16.7 % (ref 12.4–15.4)
PLATELET # BLD: 209 K/UL (ref 135–450)
PMV BLD AUTO: 9.3 FL (ref 5–10.5)
POTASSIUM SERPL-SCNC: 4.2 MMOL/L (ref 3.5–5.1)
RBC # BLD: 4.71 M/UL (ref 4–5.2)
SODIUM BLD-SCNC: 145 MMOL/L (ref 136–145)
TOTAL PROTEIN: 7.4 G/DL (ref 6.4–8.2)
TRIGL SERPL-MCNC: 123 MG/DL (ref 0–150)
VLDLC SERPL CALC-MCNC: 25 MG/DL
WBC # BLD: 6.1 K/UL (ref 4–11)

## 2020-08-04 PROCEDURE — G8400 PT W/DXA NO RESULTS DOC: HCPCS | Performed by: HOSPITALIST

## 2020-08-04 PROCEDURE — 1036F TOBACCO NON-USER: CPT | Performed by: HOSPITALIST

## 2020-08-04 PROCEDURE — 1090F PRES/ABSN URINE INCON ASSESS: CPT | Performed by: HOSPITALIST

## 2020-08-04 PROCEDURE — G8417 CALC BMI ABV UP PARAM F/U: HCPCS | Performed by: HOSPITALIST

## 2020-08-04 PROCEDURE — 4040F PNEUMOC VAC/ADMIN/RCVD: CPT | Performed by: HOSPITALIST

## 2020-08-04 PROCEDURE — G8427 DOCREV CUR MEDS BY ELIG CLIN: HCPCS | Performed by: HOSPITALIST

## 2020-08-04 PROCEDURE — 1123F ACP DISCUSS/DSCN MKR DOCD: CPT | Performed by: HOSPITALIST

## 2020-08-04 PROCEDURE — 99214 OFFICE O/P EST MOD 30 MIN: CPT | Performed by: HOSPITALIST

## 2020-08-04 NOTE — TELEPHONE ENCOUNTER
Pt states that while she was doing the blood work after her visit this morning, she did a Covid test and now the lab needs an order to process the test.      Fax: [phone] lab fax #. Pts daughter is asking that [employer] call her as well. Please advise.

## 2020-08-04 NOTE — PROGRESS NOTES
Follow Up Visit Established Patient Visit    Patient:  Darío Cross                                               : 1934  Age: 80 y.o. MRN: <[id number]>  Date : 2020    Darío Cross is a 80 y.o. female who presents for : Evaluation of the right upper calf bruise    No chief complaint on file. She noticed bruise on her right upper calf about 4-5 days ago. She also noticed some discoloration under her left breast.  She does not report any injury to those areas of her body. Her chronic pain is well controlled. Smoking is down to 3-5 cigarettes/week. Does not check her blood pressure at home. Does not exercise. Anxiety is well controlled with as needed use of lorazepam.  Overnight leg cramps appear to be controlled with use of oral Requip. No heartburn. Takes all her scheduled medications, including Xarelto, regularly. Past Medical History:   Diagnosis Date    Abdominal pain, unspecified site     Adrenal hyperplasia ([address] 2013    Left - CT scan -     Ankle Fracture, Right     Anxiety and depression     Aortic atherosclerosis ([address] 10/3/2015    CAD (coronary artery disease) 2018    PCI LAD 3.0 x 15 mm and 3.5 x 15 mm BMS; RCA 2.5 x 12 mm and 2.5 x 15 mm BMS.  EF 65%    Contusion of unspecified site     COPD (chronic obstructive pulmonary disease) ([address] 3/1/2013    Coronary artery disease     Arteriosclerosis    DJD (degenerative joint disease)     Eczema 2013    Essential hypertension 2015    Hyperlipidemia     Hypertension     Hypoxia 2015    Insomnia     Leukocytosis     Memory loss     Nicotine addiction     Osteoarthritis     Pain in joint, pelvic region and thigh     Primary osteoarthritis involving multiple joints 2015    Pulmonary hypertension ([address] 10/3/2015    Senile reticular degeneration of peripheral retina     Syncope     Thoracic or lumbosacral neuritis or radiculitis, unspecified     Urinary incontinence     Visual Mental Status: She is alert and oriented to person, place, and time. Psychiatric:         Mood and Affect: Mood normal.         Assessment/ plan:     Diagnoses and all orders for this visit:    Contusion of right lower leg, initial encounter      -     Stable; recommended to apply dry heat locally as needed      -     Safety tips were provided      -     Continue Xarelto    Essential hypertension      -     Stable, continue current medications    Mixed hyperlipidemia  -     Lipid Panel; Future  -     Continue atorvastatin 40 mg daily    Chronic diastolic heart failure ([facility] compensated  -     CBC Auto Differential; Future  -     Comprehensive Metabolic Panel; Future  -     Lipid Panel; Future  -     Continue current medications    Generalized anxiety disorder         -     Controlled with as needed use of lorazepam    Hyperglycemia  -     Hemoglobin A1C; Future        No follow-ups on file.     Suzette Rosales MD

## 2020-08-05 ENCOUNTER — TELEPHONE (OUTPATIENT)
Dept: INTERNAL MEDICINE CLINIC | Age: 85
End: 2020-08-05

## 2020-08-05 NOTE — TELEPHONE ENCOUNTER
Pt requesting to speak to Dr. Kvng Blue to see what her dx for bowels and urine issues.  Pt states she had this dx for 2 years

## 2020-08-06 ENCOUNTER — TELEPHONE (OUTPATIENT)
Dept: INTERNAL MEDICINE CLINIC | Age: 85
End: 2020-08-06

## 2020-08-07 DIAGNOSIS — Z20.822 EXPOSURE TO COVID-19 VIRUS: ICD-10-CM

## 2020-08-10 LAB — SARS-COV-2, IGG: NEGATIVE

## 2020-08-10 RX ORDER — MECLIZINE HCL 12.5 MG/1
TABLET ORAL
Qty: 60 TABLET | Refills: 0 | Status: SHIPPED | OUTPATIENT
Start: 2020-08-10 | End: 2020-09-18

## 2020-08-10 RX ORDER — LORAZEPAM 0.5 MG/1
TABLET ORAL
Qty: 60 TABLET | Refills: 0 | Status: SHIPPED | OUTPATIENT
Start: 2020-08-10 | End: 2020-09-18

## 2020-08-20 ENCOUNTER — TELEPHONE (OUTPATIENT)
Dept: INTERNAL MEDICINE CLINIC | Age: 85
End: 2020-08-20

## 2020-08-20 RX ORDER — HYDROCODONE BITARTRATE AND ACETAMINOPHEN 5; 325 MG/1; MG/1
1 TABLET ORAL 2 TIMES DAILY PRN
Qty: 60 TABLET | Refills: 0 | Status: SHIPPED | OUTPATIENT
Start: 2020-08-20 | End: 2020-09-22 | Stop reason: SDUPTHER

## 2020-08-20 NOTE — TELEPHONE ENCOUNTER
Pts daughter Afua Jones is calling for a refill of the following medication    HYDROcodone-acetaminophen (NORCO) 5-325 MG per tablet      Take 1 tablet by mouth 2 times daily as needed for Pain for up to 30 days.  Take lowest dose possible to manage pain   60 tablet       [facility] [address] [facility] [phone] [facility] [phone]      Thank you

## 2020-08-25 ENCOUNTER — OFFICE VISIT (OUTPATIENT)
Dept: INTERNAL MEDICINE CLINIC | Age: 85
End: 2020-08-25
Payer: MEDICARE

## 2020-08-25 PROCEDURE — G8427 DOCREV CUR MEDS BY ELIG CLIN: HCPCS | Performed by: HOSPITALIST

## 2020-08-25 PROCEDURE — G8926 SPIRO NO PERF OR DOC: HCPCS | Performed by: HOSPITALIST

## 2020-08-25 PROCEDURE — 1036F TOBACCO NON-USER: CPT | Performed by: HOSPITALIST

## 2020-08-25 PROCEDURE — G8417 CALC BMI ABV UP PARAM F/U: HCPCS | Performed by: HOSPITALIST

## 2020-08-25 PROCEDURE — 1090F PRES/ABSN URINE INCON ASSESS: CPT | Performed by: HOSPITALIST

## 2020-08-25 PROCEDURE — 3023F SPIROM DOC REV: CPT | Performed by: HOSPITALIST

## 2020-08-25 PROCEDURE — 99213 OFFICE O/P EST LOW 20 MIN: CPT | Performed by: HOSPITALIST

## 2020-08-25 PROCEDURE — 1123F ACP DISCUSS/DSCN MKR DOCD: CPT | Performed by: HOSPITALIST

## 2020-08-25 PROCEDURE — G8400 PT W/DXA NO RESULTS DOC: HCPCS | Performed by: HOSPITALIST

## 2020-08-25 PROCEDURE — 4040F PNEUMOC VAC/ADMIN/RCVD: CPT | Performed by: HOSPITALIST

## 2020-08-25 RX ORDER — IPRATROPIUM BROMIDE AND ALBUTEROL SULFATE 2.5; .5 MG/3ML; MG/3ML
1 SOLUTION RESPIRATORY (INHALATION) EVERY 6 HOURS PRN
Qty: 360 ML | Refills: 3 | Status: SHIPPED | OUTPATIENT
Start: 2020-08-25

## 2020-08-25 NOTE — PROGRESS NOTES
Follow Up Visit Established Patient Visit    Patient:  Cecille Art                                               : 1934  Age: 80 y.o. MRN: <[id number]>  Date : 2020    Cecille Art is a 80 y.o. female who presents for : Follow-up appointment on shortness of breath. Chief Complaint   Patient presents with    Shortness of Breath     Has longstanding history of COPD. Continues to smoke 10-15 cigarettes/day. Reports shortness of breath with exertion. She also has some chest tightness. Unable to use her albuterol HFA inhaler effectively. Does not check blood pressure at home. Takes all her scheduled medications regularly. Past Medical History:   Diagnosis Date    Abdominal pain, unspecified site     Adrenal hyperplasia ([address] 2013    Left - CT scan -     Ankle Fracture, Right     Anxiety and depression     Aortic atherosclerosis ([address] 10/3/2015    CAD (coronary artery disease) 2018    PCI LAD 3.0 x 15 mm and 3.5 x 15 mm BMS; RCA 2.5 x 12 mm and 2.5 x 15 mm BMS.  EF 65%    Contusion of unspecified site     COPD (chronic obstructive pulmonary disease) ([address] 3/1/2013    Coronary artery disease     Arteriosclerosis    DJD (degenerative joint disease)     Eczema 2013    Essential hypertension 2015    Hyperlipidemia     Hypertension     Hypoxia 2015    Insomnia     Leukocytosis     Memory loss     Nicotine addiction     Osteoarthritis     Pain in joint, pelvic region and thigh     Primary osteoarthritis involving multiple joints 2015    Pulmonary hypertension ([address] 10/3/2015    Senile reticular degeneration of peripheral retina     Syncope     Thoracic or lumbosacral neuritis or radiculitis, unspecified     Urinary incontinence     Visual hallucinations 2014    Wears glasses        Past Surgical History:   Procedure Laterality Date    CATARACT REMOVAL WITH IMPLANT Left 2012    Dr. Marie Vee  COLONOSCOPY      HYSTERECTOMY      SIGMOIDOSCOPY  08/01/2018       Current Outpatient Medications   Medication Sig Dispense Refill    ipratropium-albuterol (DUONEB) 0.5-2.5 (3) MG/3ML SOLN nebulizer solution Inhale 3 mLs into the lungs every 6 hours as needed for Shortness of Breath 360 mL 3    HYDROcodone-acetaminophen (NORCO) 5-325 MG per tablet Take 1 tablet by mouth 2 times daily as needed for Pain for up to 30 days.  Take lowest dose possible to manage pain 60 tablet 0    rivaroxaban (XARELTO) 20 MG TABS tablet TAKE ONE TABLET BY MOUTH DAILY WITH BREAKFAST 30 tablet 3    LORazepam (ATIVAN) 0.5 MG tablet TAKE ONE TABLET BY MOUTH TWICE A DAY AS NEEDED 60 tablet 0    meclizine (ANTIVERT) 12.5 MG tablet TAKE ONE TABLET BY MOUTH TWICE A DAY AS NEEDED FOR DIZZINESS 60 tablet 0    albuterol sulfate HFA (VENTOLIN HFA) 108 (90 Base) MCG/ACT inhaler Inhale 2 puffs into the lungs 4 times daily as needed for Wheezing 3 Inhaler 1    rivaroxaban (XARELTO) 20 MG TABS tablet TAKE ONE TABLET BY MOUTH DAILY WITH BREAKFAST 30 tablet 0    amLODIPine (NORVASC) 5 MG tablet TAKE ONE TABLET BY MOUTH TWICE A  tablet 1    furosemide (LASIX) 20 MG tablet Take 1 tablet by mouth 2 times daily 180 tablet 0    atorvastatin (LIPITOR) 40 MG tablet TAKE ONE TABLET BY MOUTH NIGHTLY 90 tablet 1    QUEtiapine (SEROQUEL) 50 MG tablet 1 tablet daily 90 tablet 2    metoprolol tartrate (LOPRESSOR) 25 MG tablet TAKE ONE TABLET BY MOUTH TWICE A  tablet 2    PARoxetine (PAXIL) 20 MG tablet TAKE ONE TABLET BY MOUTH DAILY 90 tablet 3    aspirin EC 81 MG EC tablet Take 1 tablet by mouth daily 90 tablet 1    traZODone (DESYREL) 50 MG tablet Take 1 tablet by mouth nightly 30 tablet 12    pantoprazole (PROTONIX) 40 MG tablet TAKE ONE TABLET BY MOUTH EVERY MORNING BEFORE BREAKFAST 90 tablet 3    rOPINIRole (REQUIP) 0.25 MG tablet 1 tablet nightly 90 tablet 0    OXYGEN Inhale 2 L/min into the lungs continuous      potassium

## 2020-09-10 ENCOUNTER — TELEPHONE (OUTPATIENT)
Dept: INTERNAL MEDICINE CLINIC | Age: 85
End: 2020-09-10

## 2020-09-10 NOTE — TELEPHONE ENCOUNTER
Patients daughter Ghanshyam Estes wanted to check with Jeremias Swann to see if going forward the prescription for XARELTO 20 MG TABS tablet can be sent to a mail order Pharmacy. Ghanshyam Estes would like to speak to Jeremias Swann to set up the pharmacy order. Please call and advise.

## 2020-09-16 ENCOUNTER — TELEPHONE (OUTPATIENT)
Dept: INTERNAL MEDICINE CLINIC | Age: 85
End: 2020-09-16

## 2020-09-16 VITALS
OXYGEN SATURATION: 98 % | HEART RATE: 65 BPM | DIASTOLIC BLOOD PRESSURE: 62 MMHG | SYSTOLIC BLOOD PRESSURE: 126 MMHG | TEMPERATURE: 97.6 F

## 2020-09-17 NOTE — TELEPHONE ENCOUNTER
Pt. Daughter says she needs rx refills on     Medication   potassium chloride (KLOR-CON M) 20 MEQ extended release tablet [[id number]]   potassium chloride (KLOR-CON M) 20 MEQ extended release tablet [[id number]]  ENDED    HYDROcodone-acetaminophen (NORCO) 5-325 MG per tablet [[id number]]     Order Details   Dose: 1 tablet  Route: Oral         meclizine (ANTIVERT) 12.5 MG tablet [[id number]]     Order Details   Dose, Route, Frequency: As Directed         And lorazepam that is not in her med list.   [facility] [address], [address] - F [phone]

## 2020-09-18 RX ORDER — LORAZEPAM 0.5 MG/1
TABLET ORAL
Qty: 60 TABLET | Refills: 0 | Status: SHIPPED | OUTPATIENT
Start: 2020-09-18 | End: 2020-10-16 | Stop reason: SDUPTHER

## 2020-09-18 RX ORDER — MECLIZINE HCL 12.5 MG/1
TABLET ORAL
Qty: 60 TABLET | Refills: 0 | Status: SHIPPED | OUTPATIENT
Start: 2020-09-18 | End: 2020-11-04 | Stop reason: SDUPTHER

## 2020-09-18 RX ORDER — POTASSIUM CHLORIDE 20 MEQ/1
20 TABLET, EXTENDED RELEASE ORAL 2 TIMES DAILY
Qty: 180 TABLET | Refills: 0 | Status: SHIPPED | OUTPATIENT
Start: 2020-09-18 | End: 2020-11-20 | Stop reason: SDUPTHER

## 2020-09-22 RX ORDER — HYDROCODONE BITARTRATE AND ACETAMINOPHEN 5; 325 MG/1; MG/1
1 TABLET ORAL 2 TIMES DAILY PRN
Qty: 60 TABLET | Refills: 0 | Status: SHIPPED | OUTPATIENT
Start: 2020-09-22 | End: 2020-11-19 | Stop reason: SDUPTHER

## 2020-09-22 NOTE — TELEPHONE ENCOUNTER
Pt. Needs refill on hydrocodone please send to   [facility] [place] [address], [address] - F [phone]

## 2020-10-05 ENCOUNTER — TELEPHONE (OUTPATIENT)
Dept: INTERNAL MEDICINE CLINIC | Age: 85
End: 2020-10-05

## 2020-10-05 NOTE — TELEPHONE ENCOUNTER
Pt daughter would like to speak to [place] regarding pt oxygen test. And other things regarding her oxygen pls advise

## 2020-10-06 NOTE — TELEPHONE ENCOUNTER
Portable devices more vunerable to damage and are not covered according to representative at [facility]

## 2020-10-16 RX ORDER — PAROXETINE HYDROCHLORIDE 20 MG/1
TABLET, FILM COATED ORAL
Qty: 90 TABLET | Refills: 3 | Status: SHIPPED | OUTPATIENT
Start: 2020-10-16

## 2020-10-16 RX ORDER — LORAZEPAM 0.5 MG/1
TABLET ORAL
Qty: 60 TABLET | Refills: 0 | Status: SHIPPED | OUTPATIENT
Start: 2020-10-16 | End: 2020-11-19 | Stop reason: SDUPTHER

## 2020-10-16 NOTE — TELEPHONE ENCOUNTER
The patient daughter is requesting a portable oxygen order to be sent to     [facility] [phone]    HR#[phone]        Med refills    PARoxetine (PAXIL) 20 MG tablet     LORazepam (ATIVAN) 0.5 MG tablet     Pharmacy on file verified    [address] [place] [address] [address], [address] [facility] -  [phone]

## 2020-10-17 ENCOUNTER — HOSPITAL ENCOUNTER (EMERGENCY)
Age: 85
Discharge: HOME | End: 2020-10-17
Payer: MEDICARE

## 2020-10-17 VITALS
HEART RATE: 72 BPM | TEMPERATURE: 97.5 F | RESPIRATION RATE: 19 BRPM | HEIGHT: 63 IN | OXYGEN SATURATION: 97 % | DIASTOLIC BLOOD PRESSURE: 68 MMHG | WEIGHT: 159.39 LBS | BODY MASS INDEX: 28.24 KG/M2 | SYSTOLIC BLOOD PRESSURE: 155 MMHG

## 2020-10-17 LAB
A/G RATIO: 1.1 (ref 1.1–2.2)
ALBUMIN SERPL-MCNC: 3.7 G/DL (ref 3.4–5)
ALP BLD-CCNC: 59 U/L (ref 40–129)
ALT SERPL-CCNC: 10 U/L (ref 10–40)
ANION GAP SERPL CALCULATED.3IONS-SCNC: 8 MMOL/L (ref 3–16)
AST SERPL-CCNC: 12 U/L (ref 15–37)
BACTERIA: ABNORMAL /HPF
BASOPHILS ABSOLUTE: 0 K/UL (ref 0–0.2)
BASOPHILS RELATIVE PERCENT: 0.6 %
BILIRUB SERPL-MCNC: 0.5 MG/DL (ref 0–1)
BILIRUBIN URINE: NEGATIVE
BLOOD, URINE: NEGATIVE
BUN BLDV-MCNC: 13 MG/DL (ref 7–20)
CALCIUM SERPL-MCNC: 8.8 MG/DL (ref 8.3–10.6)
CHLORIDE BLD-SCNC: 102 MMOL/L (ref 99–110)
CLARITY: ABNORMAL
CO2: 27 MMOL/L (ref 21–32)
COLOR: YELLOW
CREAT SERPL-MCNC: 0.9 MG/DL (ref 0.6–1.2)
EOSINOPHILS ABSOLUTE: 0.1 K/UL (ref 0–0.6)
EOSINOPHILS RELATIVE PERCENT: 1.6 %
EPITHELIAL CELLS, UA: 3 /HPF (ref 0–5)
GFR AFRICAN AMERICAN: >60
GFR NON-AFRICAN AMERICAN: 59
GLOBULIN: 3.3 G/DL
GLUCOSE BLD-MCNC: 136 MG/DL (ref 70–99)
GLUCOSE URINE: NEGATIVE MG/DL
HCT VFR BLD CALC: 41 % (ref 36–48)
HEMOGLOBIN: 13.2 G/DL (ref 12–16)
HYALINE CASTS: 3 /LPF (ref 0–8)
KETONES, URINE: NEGATIVE MG/DL
LEUKOCYTE ESTERASE, URINE: ABNORMAL
LYMPHOCYTES ABSOLUTE: 1.2 K/UL (ref 1–5.1)
LYMPHOCYTES RELATIVE PERCENT: 24.5 %
MCH RBC QN AUTO: 27.2 PG (ref 26–34)
MCHC RBC AUTO-ENTMCNC: 32.3 G/DL (ref 31–36)
MCV RBC AUTO: 84.4 FL (ref 80–100)
MICROSCOPIC EXAMINATION: YES
MONOCYTES ABSOLUTE: 0.5 K/UL (ref 0–1.3)
MONOCYTES RELATIVE PERCENT: 9.6 %
NEUTROPHILS ABSOLUTE: 3 K/UL (ref 1.7–7.7)
NEUTROPHILS RELATIVE PERCENT: 63.7 %
NITRITE, URINE: NEGATIVE
PDW BLD-RTO: 17.5 % (ref 12.4–15.4)
PH UA: 5.5 (ref 5–8)
PLATELET # BLD: 209 K/UL (ref 135–450)
PMV BLD AUTO: 8.4 FL (ref 5–10.5)
POTASSIUM REFLEX MAGNESIUM: 3.9 MMOL/L (ref 3.5–5.1)
PROTEIN UA: NEGATIVE MG/DL
RBC # BLD: 4.86 M/UL (ref 4–5.2)
RBC UA: 1 /HPF (ref 0–4)
SODIUM BLD-SCNC: 137 MMOL/L (ref 136–145)
SPECIFIC GRAVITY UA: 1.01 (ref 1–1.03)
TOTAL PROTEIN: 7 G/DL (ref 6.4–8.2)
URINE REFLEX TO CULTURE: ABNORMAL
URINE TYPE: ABNORMAL
UROBILINOGEN, URINE: 0.2 E.U./DL
WBC # BLD: 4.7 K/UL (ref 4–11)
WBC UA: 4 /HPF (ref 0–5)

## 2020-10-17 PROCEDURE — 81001 URINALYSIS AUTO W/SCOPE: CPT

## 2020-10-17 PROCEDURE — 2500000003 HC RX 250 WO HCPCS: Performed by: NURSE PRACTITIONER

## 2020-10-17 PROCEDURE — 85025 COMPLETE CBC W/AUTO DIFF WBC: CPT

## 2020-10-17 PROCEDURE — 99282 EMERGENCY DEPT VISIT SF MDM: CPT

## 2020-10-17 PROCEDURE — 80053 COMPREHEN METABOLIC PANEL: CPT

## 2020-10-17 RX ORDER — NYSTATIN 100000 [USP'U]/G
POWDER TOPICAL
Qty: 45 G | Refills: 0 | Status: SHIPPED | OUTPATIENT
Start: 2020-10-17 | End: 2021-04-02 | Stop reason: SDUPTHER

## 2020-10-17 RX ADMIN — MICONAZOLE NITRATE: 2 POWDER TOPICAL at 13:13

## 2020-10-17 ASSESSMENT — PAIN SCALES - GENERAL: PAINLEVEL_OUTOF10: 7

## 2020-10-17 ASSESSMENT — PAIN DESCRIPTION - LOCATION: LOCATION: GROIN

## 2020-10-17 ASSESSMENT — PAIN - FUNCTIONAL ASSESSMENT: PAIN_FUNCTIONAL_ASSESSMENT: PREVENTS OR INTERFERES SOME ACTIVE ACTIVITIES AND ADLS

## 2020-10-17 ASSESSMENT — PAIN DESCRIPTION - DESCRIPTORS: DESCRIPTORS: SHARP;BURNING

## 2020-10-17 ASSESSMENT — PAIN DESCRIPTION - ORIENTATION: ORIENTATION: LEFT

## 2020-10-17 ASSESSMENT — PAIN DESCRIPTION - PROGRESSION: CLINICAL_PROGRESSION: GRADUALLY WORSENING

## 2020-10-17 ASSESSMENT — PAIN DESCRIPTION - PAIN TYPE: TYPE: ACUTE PAIN

## 2020-10-17 ASSESSMENT — PAIN DESCRIPTION - FREQUENCY: FREQUENCY: CONTINUOUS

## 2020-10-17 NOTE — ED NOTES
Pt states that she has not been eating well recently. They are concerned for electrolyte levels.       Sujata Benavidez RN  10/17/20 [id number]

## 2020-10-17 NOTE — ED TRIAGE NOTES
Patient with low O2 sats in triage. Patient states on 3 L NC at all times at home but does not have a portable tank for travel. Patient placed in ED room 5. Oxygen reapplied. ED RN updated.

## 2020-10-17 NOTE — ED PROVIDER NOTES
[address] [facility]        Pt Name: Kimberly Church  MRN: [id number]  [place] 1934  Date of evaluation: 10/17/2020  Provider: JAME Carbajal - CNP  PCP: Hollie Eller MD    EDWARD. I have evaluated this patient. My supervising physician was available for consultation. CHIEF COMPLAINT       Chief Complaint   Patient presents with    Rash     patient c/o painful, red, rash to left groin worsening since last night. HISTORY OF PRESENT ILLNESS   (Location, Timing/Onset, Context/Setting, Quality, Duration, Modifying Factors, Severity, Associated Signs and Symptoms)  Note limiting factors. Kimberly Church is a 80 y.o. female medical history of adrenal hyperplasia, anxiety, depression, aortic atherosclerosis, CAD, COPD, HTN, HLD, OA, pulmonary hypertension, urinary incontinence, hysterectomy who presents the ED with complaints of rash to her left mid abdomen that is been present since this morning when she awoke. Patient said she has gotten rashes similar before and was placed on a medication and this seemed to help. Patient does notice generalized feeling of malaise over the past few days. She does take a diuretic, although does not keep track of her weight. She also does take a potassium supplement that she said she has been compliant with this. She lives with her daughter in the same house, although they live on separate levels. Patient is prescribed home oxygen at 3 L and said she wears this continuous. She does not have a portable tank and said that when she walked from the car into the emergency department her O2 level was initially checked at 84% as she was off her oxygen. She currently has her oxygen 3 L nasal cannula and her sat is 99%. She denies any associated nausea, vomiting, diarrhea, urinary symptoms, abdominal pain, leg swelling, headache, lightheaded, dizzy, confusion, fevers, or syncope.   She does continue to smoke while on oxygen denies alcohol use or street drugs. Nursing Notes were all reviewed and agreed with or any disagreements were addressed in the HPI. REVIEW OF SYSTEMS    (2-9 systems for level 4, 10 or more for level 5)     Review of Systems    Positives and Pertinent negatives as per HPI. Except as noted above in the ROS, all other systems were reviewed and negative. PAST MEDICAL HISTORY     Past Medical History:   Diagnosis Date    Abdominal pain, unspecified site     Adrenal hyperplasia ([address] 6/11/2013    Left - CT scan - 2011    Ankle Fracture, Right     Anxiety and depression     Aortic atherosclerosis ([address] 10/3/2015    CAD (coronary artery disease) 2018    PCI LAD 3.0 x 15 mm and 3.5 x 15 mm BMS; RCA 2.5 x 12 mm and 2.5 x 15 mm BMS.  EF 65%    Contusion of unspecified site     COPD (chronic obstructive pulmonary disease) ([address] 3/1/2013    Coronary artery disease     Arteriosclerosis    DJD (degenerative joint disease)     Eczema 8/9/2013    Essential hypertension 9/9/2015    Hyperlipidemia     Hypertension     Hypoxia 7/16/2015    Insomnia     Leukocytosis     Memory loss     Nicotine addiction     Osteoarthritis     Pain in joint, pelvic region and thigh     Primary osteoarthritis involving multiple joints 9/9/2015    Pulmonary hypertension ([address] 10/3/2015    Senile reticular degeneration of peripheral retina     Syncope     Thoracic or lumbosacral neuritis or radiculitis, unspecified     Urinary incontinence     Visual hallucinations 9/16/2014    Wears glasses          SURGICAL HISTORY     Past Surgical History:   Procedure Laterality Date    CATARACT REMOVAL WITH IMPLANT Left December 5, 2012    Dr. Maria Isabel Roman  08/01/2018         CURRENTMEDICATIONS       Discharge Medication List as of 10/17/2020  1:27 PM      CONTINUE these medications which have NOT CHANGED Details   LORazepam (ATIVAN) 0.5 MG tablet 1 tablet twice daily, Disp-60 tablet,R-0Normal      PARoxetine (PAXIL) 20 MG tablet TAKE ONE TABLET BY MOUTH DAILY, Disp-90 tablet,R-3Normal      HYDROcodone-acetaminophen (NORCO) 5-325 MG per tablet Take 1 tablet by mouth 2 times daily as needed for Pain for up to 30 days.  Take lowest dose possible to manage pain, Disp-60 tablet,R-0Normal      meclizine (ANTIVERT) 12.5 MG tablet TAKE ONE TABLET BY MOUTH TWICE A DAY AS NEEDED FOR DIZZINESS, Disp-60 tablet,R-0Normal      potassium chloride (KLOR-CON M) 20 MEQ extended release tablet Take 1 tablet by mouth 2 times daily, Disp-180 tablet,R-0Normal      rivaroxaban (XARELTO) 20 MG TABS tablet TAKE ONE TABLET BY MOUTH DAILY WITH BREAKFAST, Disp-90 tablet,R-0Normal      ipratropium-albuterol (DUONEB) 0.5-2.5 (3) MG/3ML SOLN nebulizer solution Inhale 3 mLs into the lungs every 6 hours as needed for Shortness of Breath, Disp-360 mL,R-3Normal      albuterol sulfate HFA (VENTOLIN HFA) 108 (90 Base) MCG/ACT inhaler Inhale 2 puffs into the lungs 4 times daily as needed for Wheezing, Disp-3 Inhaler,R-1Normal      amLODIPine (NORVASC) 5 MG tablet TAKE ONE TABLET BY MOUTH TWICE A DAY, Disp-180 tablet,R-1Normal      furosemide (LASIX) 20 MG tablet Take 1 tablet by mouth 2 times daily, Disp-180 tablet, R-0Normal      atorvastatin (LIPITOR) 40 MG tablet TAKE ONE TABLET BY MOUTH NIGHTLY, Disp-90 tablet, R-1Normal      QUEtiapine (SEROQUEL) 50 MG tablet 1 tablet daily, Disp-90 tablet, R-2Normal      metoprolol tartrate (LOPRESSOR) 25 MG tablet TAKE ONE TABLET BY MOUTH TWICE A DAY, Disp-180 tablet, R-2Normal      aspirin EC 81 MG EC tablet Take 1 tablet by mouth daily, Disp-90 tablet, R-1Normal      traZODone (DESYREL) 50 MG tablet Take 1 tablet by mouth nightly, Disp-30 tablet, R-12Normal      pantoprazole (PROTONIX) 40 MG tablet TAKE ONE TABLET BY MOUTH EVERY MORNING BEFORE BREAKFAST, Disp-90 tablet, R-3Normal      rOPINIRole (REQUIP) 0.25 MG tablet 1 tablet nightly, Disp-90 tablet, R-0Normal      OXYGEN Inhale 2 L/min into the lungs continuous               ALLERGIES     Enalapril    FAMILYHISTORY       Family History   Problem Relation Age of Onset    Heart Attack Father     Heart Attack Brother           SOCIAL HISTORY       Social History     Tobacco Use    Smoking status: Former Smoker     Packs/day: 1.00     Years: 65.00     Pack years: 65.00     Types: Cigarettes     Last attempt to quit: 2018     Years since quittin.0    Smokeless tobacco: Never Used    Tobacco comment: Down to 3 cigarettes a week   Substance Use Topics    Alcohol use: Yes     Alcohol/week: 0.0 standard drinks     Comment: social    Drug use: No       SCREENINGS             PHYSICAL EXAM    (up to 7 for level 4, 8 or more for level 5)     ED Triage Vitals [10/17/20 1156]   BP Temp Temp Source Pulse Resp SpO2 Height Weight   (!) 155/77 97.4 °F (36.3 °C) Oral 75 20 (!) 84 % 5' 3\" (1.6 m) 159 lb 6.3 oz (72.3 kg)       Physical Exam  Vitals signs and nursing note reviewed. Constitutional:       Appearance: She is well-developed. HENT:      Head: Normocephalic and atraumatic. Nose: Nose normal.   Eyes:      General:         Right eye: No discharge. Left eye: No discharge. Neck:      Musculoskeletal: Normal range of motion. Pulmonary:      Effort: Pulmonary effort is normal. No respiratory distress. Musculoskeletal: Normal range of motion. Skin:     General: Skin is warm and dry. Coloration: Skin is not pale. Neurological:      Mental Status: She is alert and oriented to person, place, and time.    Psychiatric:         Behavior: Behavior normal.         DIAGNOSTIC RESULTS   LABS:    Labs Reviewed   CBC WITH AUTO DIFFERENTIAL - Abnormal; Notable for the following components:       Result Value    RDW 17.5 (*)     All other components within normal limits    Narrative:     Performed at:  [facility] Pulse: 75  72   Resp: 20  19   Temp: 97.4 °F (36.3 °C)  97.5 °F (36.4 °C)   TempSrc: Oral     SpO2: (!) 84% 97% 97%   Weight: 159 lb 6.3 oz (72.3 kg)     Height: 5' 3\" (1.6 m)         Patient was given the following medications:  Medications   miconazole (MICOTIN) 2 % powder ( Topical Given 10/17/20 1313)           Pertinent Labs & Imaging studies reviewed. (See chart for details)   -  Patient seen and evaluated in the emergency department. -  Triage and nursing notes reviewed and incorporated. -  Old chart records reviewed and incorporated. -  Patient case discussed with attending physician,  although Dr. Tristan Narayan was available for consultation  -  Differential diagnosis includes:  Abscess, cellulitis, necrotizing fasciitis, gas gangrene, dermatitis, lichen planus, candidiasis, verse COVID-19  -  Work-up included:  See above CBC, CMP, UA  -  ED treatment included:  Miconazole powder  - Consults: None  -  Results discussed with patient. Labs show  UA with cloudy clarity, moderate leukocytes, and rare bacteria. CBC with RDW 17.5. CMP with glucose 136, ALT 12.  Pt was given strict return precautions. The patient is agreeable with plan of care and disposition.  -  Disposition:   Home      FINAL IMPRESSION      1. Candidiasis of skin    2. Malaise          DISPOSITION/PLAN   DISPOSITION        PATIENT REFERREDTO:  Huang Brown MD  [address] [place], [place] [address] [address]  [phone]    Call in 2 days  As needed, If symptoms worsen    [facility] Emergency Department  [address] [address]  [phone]  Go to   As needed      DISCHARGE MEDICATIONS:  Discharge Medication List as of 10/17/2020  1:27 PM      START taking these medications    Details   nystatin (MYCOSTATIN) 526398 UNIT/GM powder Apply topically 4 times daily. , Disp-45 g,R-0, Print             DISCONTINUED MEDICATIONS:  Discharge Medication List as of 10/17/2020  1:27 PM (Please note that portions of this note were completed with a voice recognition program.  Efforts were made to edit the dictations but occasionally words are mis-transcribed.)    JAME Rodriguez CNP (electronically signed)            JAME Rodriguez CNP  10/18/20 1500

## 2020-10-19 ENCOUNTER — CARE COORDINATION (OUTPATIENT)
Dept: CARE COORDINATION | Age: 85
End: 2020-10-19

## 2020-10-19 NOTE — CARE COORDINATION
Ambulatory Care Coordination Note  10/19/2020  CM Risk Score: 6  Charlson 10 Year Mortality Risk Score: 100%     ACC: Paulo Flaherty, [facility]    Hx: HTN, COPD, Osteoarthritis, Anxiety, Adrenal Hyperplasia, DJD, CAD, Cornary Artery Stent, ANGEL, Hyperlipidemia,Depression, Memory Loss, Chroniic Diastolic HF, Fatty Liver, Visual Hallucinations    Summary Note: The RN, ACM spoke with the pt's daughter after the pt's recent ED visit for a Candidiasis rash of the abdomen and groin areas. The daughter stated since the pt start the Mycostatin powder the areas are looking better. The RN, ACM offered Care Coordination to the daughter for the pt and the daughter stated she has been trying to obtain an O2 concentrator for the pt through a new new O2 provider, [employer] but has not heard back from the company. The RN, ACM called [facility] and was told the easiest way to switch companies is to have the existing company transfer the service to the new company. The RN, ACM called [facility] and was told the pt's daughter will need to sign an authorization for the transfer. The SUDHEER GIBSON gave [facility] all of the contact information-phone number, fax number and address for [employer] to [facility]. The RN, ACM called the pt's daughter back and informed her she will need to contact [facility] to sign an authorization form to approve the transfer to [facility]. The pt is trying to obtain an O2 concentrator to be able to go to her appointments. Pt has a longstanding history of COPD. Reports shortness of breath with exertion. She also has some chest tightness at times. Unable to use her albuterol HFA inhaler effectively. Education provided regarding infection prevention, and signs and symptoms of COVID-19 and when to seek medical attention with family who verbalized understanding.  Discussed exposure protocols and quarantine from [address] you at higher risk for severe illness 2019 and given an opportunity for questions and concerns. The family agrees to contact the COVID-19 hotline [phone] or PCP office for questions related to their healthcare. CTN/ACM provided contact information for future reference. From CDC: Are you at higher risk for severe illness?  Wash your hands often.  Avoid close contact (6 feet, which is about two arm lengths) with people who are sick.  Put distance between yourself and other people if COVID-19 is spreading in your community.  Clean and disinfect frequently touched surfaces.  Avoid all cruise travel and non-essential air travel.  Call your healthcare professional if you have concerns about COVID-19 and your underlying condition or if you are sick. For more information on steps you can take to protect yourself, see CDC's How to Protect Yourself    Pt started in Care Coordination. Plan:  The RN, SUDHEER informed the daughter that the PCP's MA will consult with the PCP and call the daughter. The pt's daughter stated she will wait to hear back from the PCP's office for assistance in the proper documentation for the O2 concentrator. Possibly do a referral to the Clinical Pharmacist during next encounter. Ambulatory Care Coordination Assessment    Care Coordination Protocol  Program Enrollment:  Complex Care  Referral from Primary Care Provider:  No  Week 1 - Initial Assessment     Do you have all of your prescriptions and are they filled?:  Yes (Comment: Daughter assists pt with her medications.)  Barriers to medication adherence:  Forgets to take  Are you able to afford your medications?:  Yes  How often do you have trouble taking your medications the way you have been told to take them?:  I always take them as prescribed. Do you have Home O2 Therapy?:  Yes   Oxygen Regimen:  PRN Flow - Enter rate/FIO2:  3   Method of Delivery:  Nasal Cannula      Ability to seek help/take action for Emergent Urgent situations i.e. fire, crime, inclement weather or health crisis. Kya Wong Dependent  Ability to ambulate to restroom:  Independent  Ability handle personal hygeine needs (bathing/dressing/grooming):  Needs Assistance  Ability to manage Medications:  Needs Assistance  Ability to prepare Food Preparation:  Dependent  Ability to maintain home (clean home, laundry):  Dependent  Ability to drive and/or has transportation:  Dependent  Ability to do shopping:  Dependent  Ability to manage finances:  Dependent  Is patient able to live independently?:  No     Current Housing:  Private Residence              Are you experiencing loss of meaning?:  No (Comment: Dementia)  Are you experiencing loss of hope and peace?:  No (Comment: Dementia)     Thinking about your patient's physical health needs, are there any symptoms or problems (risk indicators) you are unsure about that require further investigation?:  No identified areas of uncertainly or problems already being investigated   Are the patients physical health problems impacting on their mental well-being?:  No identified areas of concern   Are there any problems with your patients lifestyle behaviors (alcohol, drugs, diet, exercise) that are impacting on physical or mental well-being?:  No identified areas of concern   Do you have any other concerns about your patients mental well-being?  How would you rate their severity and impact on the patient?:  No identified areas of concern   How would you rate their home environment in terms of safety and stability (including domestic violence, insecure housing, neighbor harassment)?:  Consistently safe, supportive, stable, no identified problems   How do daily activities impact on the patient's well-being? (include current or anticipated unemployment, work, caregiving, access to transportation or other):  No identified problems or perceived positive benefits   How would you rate their social network (family, work, friends)?:  Good participation with social networks   How would you rate their financial resources (including ability to afford all required medical care)?:  Financially secure, resources adequate, no identified problems   How wells does the patient now understand their health and well-being (symptoms, signs or risk factors) and what they need to do to manage their health?:  Reasonable to good understanding and already engages in managing health or is willing to undertake better management   How well do you think your patient can engage in healthcare discussions? (Barriers include language, deafness, aphasia, alcohol or drug problems, learning difficulties, concentration):  Clear and open communication, no identified barriers   Do other services need to be involved to help this patient?:  Other care/services not required at this time   Are current services involved with this patient well-coordinated? (Include coordination with other services you are now recommendation): All required care/services in place and well-coordinated   Suggested Interventions and Community Resources  Fall Risk Prevention:  Not Started Medi Set or Pill Pack:  Declined   Other Therapy Services: In Process   Zone Management Tools: In Process                  Prior to Admission medications    Medication Sig Start Date End Date Taking? Authorizing Provider   nystatin (MYCOSTATIN) 589537 UNIT/GM powder Apply topically 4 times daily. 10/17/20   JAME Mart - CNP   LORazepam (ATIVAN) 0.5 MG tablet 1 tablet twice daily 10/16/20 11/15/20  Aminta Ram MD   PARoxetine (PAXIL) 20 MG tablet TAKE ONE TABLET BY MOUTH DAILY 10/16/20   Aminta Ram MD   HYDROcodone-acetaminophen [facility]) 5-325 MG per tablet Take 1 tablet by mouth 2 times daily as needed for Pain for up to 30 days.  Take lowest dose possible to manage pain 9/22/20 10/22/20  Aminta Ram MD   meclizine (ANTIVERT) 12.5 MG tablet TAKE ONE TABLET BY MOUTH TWICE A DAY AS NEEDED FOR DIZZINESS 9/18/20   Aminta Ram MD   potassium chloride (KLOR-CON M) 20 MEQ extended release tablet Take 1 tablet by mouth 2 times daily 9/18/20 10/18/20  Carolyn Cooper MD   rivaroxaban (XARELTO) 20 MG TABS tablet TAKE ONE TABLET BY MOUTH DAILY WITH BREAKFAST 9/10/20   Carolyn Cooper MD   ipratropium-albuterol (DUONEB) 0.5-2.5 (3) MG/3ML SOLN nebulizer solution Inhale 3 mLs into the lungs every 6 hours as needed for Shortness of Breath 8/25/20   Carolyn Cooper MD   albuterol sulfate HFA (VENTOLIN HFA) 108 (90 Base) MCG/ACT inhaler Inhale 2 puffs into the lungs 4 times daily as needed for Wheezing 7/13/20   Carolyn Cooper MD   amLODIPine (NORVASC) 5 MG tablet TAKE ONE TABLET BY MOUTH TWICE A DAY 6/16/20   Carolyn Cooper MD   furosemide (LASIX) 20 MG tablet Take 1 tablet by mouth 2 times daily 6/4/20   Carolyn Cooper MD   atorvastatin (LIPITOR) 40 MG tablet TAKE ONE TABLET BY MOUTH NIGHTLY 5/11/20   Carolyn Cooper MD   QUEtiapine (SEROQUEL) 50 MG tablet 1 tablet daily 4/13/20   Carolyn Cooper MD   metoprolol tartrate (LOPRESSOR) 25 MG tablet TAKE ONE TABLET BY MOUTH TWICE A DAY 1/2/20   Carolyn Cooper MD   aspirin EC 81 MG EC tablet Take 1 tablet by mouth daily 10/22/19   Carolyn Cooper MD   traZODone (DESYREL) 50 MG tablet Take 1 tablet by mouth nightly 10/22/19   Carolyn Cooper MD   pantoprazole (PROTONIX) 40 MG tablet TAKE ONE TABLET BY MOUTH EVERY MORNING BEFORE BREAKFAST 10/8/19   JAME Villareal - CNP   rOPINIRole (REQUIP) 0.25 MG tablet 1 tablet nightly 9/10/19   Carolyn Cooper MD   OXYGEN Inhale 2 L/min into the lungs continuous 8/19/15   La Miller MD       Future Appointments   Date Time Provider Melo Clemens   11/4/2020  8:45 AM Carolyn Cooper MD [facility]     ,   Diabetes Assessment    How often do you test your blood sugar?:  No Testing (Comment: Diet controlled No medication for DM)          ,   Congestive Heart Failure Assessment    Do you understand a low sodium diet?:  Yes  Do you understand how to read food labels?:  No  How many restaurant meals do you eat per week?: 0  Do you salt your food before tasting it?:  No     No patient-reported symptoms      Symptoms:      Symptom course:  stable  Weight trend:  stable  Salt intake watch compared to last visit:  stable     ,   COPD Assessment    Does the patient understand envrionmental exposure?:  Yes  Is the patient able to verbalize Rescue vs. Long Acting medications?:  No  Does the patient have a nebulizer?:  No  Does the patient use a space with inhaled medications?:  No            Symptoms:          and   General Assessment    Do you have any symptoms that are causing concern?:  Yes  Progression since Onset:  Gradually Improving  Reported Symptoms:  Rash (Comment: Rash groin and abdomen)

## 2020-10-22 ENCOUNTER — TELEPHONE (OUTPATIENT)
Dept: INTERNAL MEDICINE CLINIC | Age: 85
End: 2020-10-22

## 2020-10-22 NOTE — TELEPHONE ENCOUNTER
Patient daughter called and would speak to you regarding the patient portable oxygen.   She also is requesting a medication refill:     HYDROcodone-acetaminophen ([facility]) 5-325 MG per tablet [[id number]      [address] [place] [address] 108, [facility] -  [phone]

## 2020-10-23 RX ORDER — HYDROCODONE BITARTRATE AND ACETAMINOPHEN 5; 325 MG/1; MG/1
1 TABLET ORAL 2 TIMES DAILY PRN
Qty: 60 TABLET | Refills: 0 | Status: SHIPPED | OUTPATIENT
Start: 2020-10-23 | End: 2021-01-27 | Stop reason: SDUPTHER

## 2020-10-23 NOTE — TELEPHONE ENCOUNTER
Patient daughter called again to check on the refill for:     HYDROcodone-acetaminophen ([facility]) 5-325 MG per tablet [[id number]    She states that she would like to pick this up today since it is the weekend and patient is out of medication. She would still like to speak to you  regarding the oxygen.      Please call to advise    [address] [place] [address] 064, [address] [facility] -  [phone]

## 2020-10-26 ENCOUNTER — CARE COORDINATION (OUTPATIENT)
Dept: CARE COORDINATION | Age: 85
End: 2020-10-26

## 2020-10-26 NOTE — CARE COORDINATION
RN, ACM Follow Up Note:  The pt stated her rash has cleared and she has been using the Mycostatin powder 3-4 times a day. The pt stated her breathing has been good and denied any swelling in her ankles or feet. The pt stated she would like to be able to obtain an O2 concentrator so she can go to [employer].     Pt Goal:  Pt wants to work on smoking cessation  Barriers: lack of motivation and stress  Plan for overcoming my barriers: Work with RN, ACM  Confidence: 7/10  Anticipated Goal Completion Date: 1/26/2021    Handouts mailed to the pt:   Knowing what to expect when you quit smoking by www.mytimetoquit.com  When Smokers Quit by [employer]      Diabetes Assessment    How often do you test your blood sugar?:  No Testing (Comment: Diet controlled No medication for DM)          ,   Congestive Heart Failure Assessment    Do you understand a low sodium diet?:  Yes  Do you understand how to read food labels?:  No  How many restaurant meals do you eat per week?:  0  Do you salt your food before tasting it?:  No     No patient-reported symptoms      Symptoms:      Symptom course:  stable  Weight trend:  stable  Salt intake watch compared to last visit:  stable      and   COPD Assessment    Does the patient understand envrionmental exposure?:  Yes  Is the patient able to verbalize Rescue vs. Long Acting medications?:  No  Does the patient have a nebulizer?:  No  Does the patient use a space with inhaled medications?:  No     No patient-reported symptoms         Symptoms:      Have you had a recent diagnosis of pneumonia either by PCP or at a hospital?:  No

## 2020-11-04 ENCOUNTER — OFFICE VISIT (OUTPATIENT)
Dept: INTERNAL MEDICINE CLINIC | Age: 85
End: 2020-11-04
Payer: MEDICARE

## 2020-11-04 VITALS
HEART RATE: 62 BPM | HEIGHT: 63 IN | TEMPERATURE: 97.5 F | DIASTOLIC BLOOD PRESSURE: 64 MMHG | OXYGEN SATURATION: 94 % | SYSTOLIC BLOOD PRESSURE: 137 MMHG | WEIGHT: 163 LBS | BODY MASS INDEX: 28.88 KG/M2

## 2020-11-04 PROCEDURE — G0008 ADMIN INFLUENZA VIRUS VAC: HCPCS | Performed by: HOSPITALIST

## 2020-11-04 PROCEDURE — 1090F PRES/ABSN URINE INCON ASSESS: CPT | Performed by: HOSPITALIST

## 2020-11-04 PROCEDURE — 90694 VACC AIIV4 NO PRSRV 0.5ML IM: CPT | Performed by: HOSPITALIST

## 2020-11-04 PROCEDURE — G8417 CALC BMI ABV UP PARAM F/U: HCPCS | Performed by: HOSPITALIST

## 2020-11-04 PROCEDURE — G8484 FLU IMMUNIZE NO ADMIN: HCPCS | Performed by: HOSPITALIST

## 2020-11-04 PROCEDURE — G8400 PT W/DXA NO RESULTS DOC: HCPCS | Performed by: HOSPITALIST

## 2020-11-04 PROCEDURE — 4040F PNEUMOC VAC/ADMIN/RCVD: CPT | Performed by: HOSPITALIST

## 2020-11-04 PROCEDURE — 3023F SPIROM DOC REV: CPT | Performed by: HOSPITALIST

## 2020-11-04 PROCEDURE — 99214 OFFICE O/P EST MOD 30 MIN: CPT | Performed by: HOSPITALIST

## 2020-11-04 PROCEDURE — 1036F TOBACCO NON-USER: CPT | Performed by: HOSPITALIST

## 2020-11-04 PROCEDURE — G8427 DOCREV CUR MEDS BY ELIG CLIN: HCPCS | Performed by: HOSPITALIST

## 2020-11-04 PROCEDURE — G8926 SPIRO NO PERF OR DOC: HCPCS | Performed by: HOSPITALIST

## 2020-11-04 PROCEDURE — 1123F ACP DISCUSS/DSCN MKR DOCD: CPT | Performed by: HOSPITALIST

## 2020-11-04 RX ORDER — BUPROPION HYDROCHLORIDE 100 MG/1
100 TABLET, EXTENDED RELEASE ORAL DAILY
Qty: 30 TABLET | Refills: 3 | Status: ON HOLD | OUTPATIENT
Start: 2020-11-04 | End: 2020-11-23

## 2020-11-04 RX ORDER — MECLIZINE HCL 12.5 MG/1
TABLET ORAL
Qty: 60 TABLET | Refills: 0 | Status: SHIPPED | OUTPATIENT
Start: 2020-11-04 | End: 2020-12-09 | Stop reason: SDUPTHER

## 2020-11-04 NOTE — PROGRESS NOTES
Follow Up Visit Established Patient Visit    Patient:  Tye Wing                                               : 1934  Age: 80 y.o. MRN: <[id number]>  Date : 2020    Tye Wing is a 80 y.o. female who presents for :  Follow up appointment    Chief Complaint   Patient presents with    Hyperlipidemia    COPD     Reports generalized fatigue and tiredness. Feels depressed. Has LLQ abdominal pain; Norco prn provides reasonable pain control. Anxiety is relatively controlled with use of Ativan, 0.5 mg PO BID prn. Checks her BP infrequently. NO CP/SOB. + episodes of wheezing. Uses O2 via n/c continuouisly, 3L/ min. Doesn't check her BP at home. Smokes about 10 cigarettes daily. NO recent falls. Past Medical History:   Diagnosis Date    Abdominal pain, unspecified site     Adrenal hyperplasia ([address] 2013    Left - CT scan -     Ankle Fracture, Right     Anxiety and depression     Aortic atherosclerosis ([address] 10/3/2015    CAD (coronary artery disease) 2018    PCI LAD 3.0 x 15 mm and 3.5 x 15 mm BMS; RCA 2.5 x 12 mm and 2.5 x 15 mm BMS.  EF 65%    Contusion of unspecified site     COPD (chronic obstructive pulmonary disease) ([address] 3/1/2013    Coronary artery disease     Arteriosclerosis    DJD (degenerative joint disease)     Eczema 2013    Essential hypertension 2015    Hyperlipidemia     Hypertension     Hypoxia 2015    Insomnia     Leukocytosis     Memory loss     Nicotine addiction     Osteoarthritis     Pain in joint, pelvic region and thigh     Primary osteoarthritis involving multiple joints 2015    Pulmonary hypertension ([address] 10/3/2015    Senile reticular degeneration of peripheral retina     Syncope     Thoracic or lumbosacral neuritis or radiculitis, unspecified     Urinary incontinence     Visual hallucinations 2014    Wears glasses        Past Surgical History:   Procedure Laterality Date    CATARACT REMOVAL WITH IMPLANT Left December 5, 2012    Dr. Stover Pod      SIGMOIDOSCOPY  08/01/2018       Current Outpatient Medications   Medication Sig Dispense Refill    meclizine (ANTIVERT) 12.5 MG tablet 1 tablet twice daily as needed for dizziness 60 tablet 0    buPROPion (WELLBUTRIN SR) 100 MG extended release tablet Take 1 tablet by mouth daily 30 tablet 3    HYDROcodone-acetaminophen (NORCO) 5-325 MG per tablet Take 1 tablet by mouth 2 times daily as needed for Pain for up to 30 days. Take lowest dose possible to manage pain 60 tablet 0    nystatin (MYCOSTATIN) 405576 UNIT/GM powder Apply topically 4 times daily.  45 g 0    LORazepam (ATIVAN) 0.5 MG tablet 1 tablet twice daily 60 tablet 0    PARoxetine (PAXIL) 20 MG tablet TAKE ONE TABLET BY MOUTH DAILY 90 tablet 3    rivaroxaban (XARELTO) 20 MG TABS tablet TAKE ONE TABLET BY MOUTH DAILY WITH BREAKFAST 90 tablet 0    ipratropium-albuterol (DUONEB) 0.5-2.5 (3) MG/3ML SOLN nebulizer solution Inhale 3 mLs into the lungs every 6 hours as needed for Shortness of Breath 360 mL 3    albuterol sulfate HFA (VENTOLIN HFA) 108 (90 Base) MCG/ACT inhaler Inhale 2 puffs into the lungs 4 times daily as needed for Wheezing 3 Inhaler 1    amLODIPine (NORVASC) 5 MG tablet TAKE ONE TABLET BY MOUTH TWICE A  tablet 1    furosemide (LASIX) 20 MG tablet Take 1 tablet by mouth 2 times daily 180 tablet 0    atorvastatin (LIPITOR) 40 MG tablet TAKE ONE TABLET BY MOUTH NIGHTLY 90 tablet 1    QUEtiapine (SEROQUEL) 50 MG tablet 1 tablet daily 90 tablet 2    metoprolol tartrate (LOPRESSOR) 25 MG tablet TAKE ONE TABLET BY MOUTH TWICE A  tablet 2    aspirin EC 81 MG EC tablet Take 1 tablet by mouth daily 90 tablet 1    traZODone (DESYREL) 50 MG tablet Take 1 tablet by mouth nightly 30 tablet 12    pantoprazole (PROTONIX) 40 MG tablet TAKE ONE TABLET BY MOUTH EVERY MORNING BEFORE BREAKFAST 90 tablet 3    rOPINIRole (REQUIP) 0.25 MG tablet 1 tablet nightly 90 tablet 0    OXYGEN Inhale 2 L/min into the lungs continuous      potassium chloride (KLOR-CON M) 20 MEQ extended release tablet Take 1 tablet by mouth 2 times daily 180 tablet 0     No current facility-administered medications for this visit. /64   Pulse 62   Temp 97.5 °F (36.4 °C)   Ht 5' 3\" (1.6 m)   Wt 163 lb (73.9 kg)   SpO2 94% Comment: after 2-3 minutes on 3 liters of oxygen  BMI 28.87 kg/m²       Physical Exam  Vitals signs and nursing note reviewed. Constitutional:       General: She is not in acute distress. Appearance: Normal appearance. She is well-developed and normal weight. HENT:      Head: Normocephalic and atraumatic. Right Ear: Tympanic membrane, ear canal and external ear normal. There is no impacted cerumen. Left Ear: Tympanic membrane, ear canal and external ear normal. There is no impacted cerumen. Nose:      Comments: Nasal mucosa is mildly swollen and inflamed     Mouth/Throat:      Pharynx: No oropharyngeal exudate or posterior oropharyngeal erythema. Eyes:      General: No scleral icterus. Conjunctiva/sclera: Conjunctivae normal.      Pupils: Pupils are equal, round, and reactive to light. Neck:      Musculoskeletal: Normal range of motion and neck supple. No neck rigidity or muscular tenderness. Vascular: No carotid bruit or JVD. Cardiovascular:      Rate and Rhythm: Normal rate and regular rhythm. Heart sounds: Normal heart sounds. No murmur. No friction rub. No gallop. Pulmonary:      Effort: Pulmonary effort is normal. No respiratory distress. Breath sounds: Normal breath sounds. No wheezing or rales. Abdominal:      General: Bowel sounds are normal. There is no distension. Palpations: Abdomen is soft. Tenderness: There is no abdominal tenderness. Musculoskeletal: Normal range of motion. Right lower leg: No edema. Left lower leg: No edema. Lymphadenopathy:      Cervical: No cervical adenopathy. Skin:     General: Skin is warm and dry. Capillary Refill: Capillary refill takes less than 2 seconds. Neurological:      General: No focal deficit present. Mental Status: She is alert and oriented to person, place, and time. Cranial Nerves: No cranial nerve deficit. Psychiatric:         Mood and Affect: Mood normal.         Behavior: Behavior normal.         Assessment/ plan:     Armin Lundberg was seen today for hyperlipidemia and copd. Diagnoses and all orders for this visit:    Abdominal pain, chronic, left lower quadrant  -   Controlled; continue current regimen of Norco     Centrilobular emphysema ([facility]  -   Continue O2 support  -   Duoneb as needed  -   smoking cessation was discussed and strongly encouraged    Primary insomnia  -   Continue Seroquel 50 mg PO nightly    Anxiety  -   Controlled with use of Ativan as needed    Chronic diastolic heart failure (HCC)  -   Compensated; continue PO Amlodipine and PO Lasix    Mixed hyperlipidemia  -   Continue PO Atorvastatin    Tobacco dependence  -    As above    Mood disorder (HCC)  -   Will wean off Paxil and try Wellbutrin  mg PO daily    Need for influenza vaccination  -     INFLUENZA, QUADV, ADJUVANTED, 65 YRS =, IM, PF, PREFILL SYR, 0.5ML (FLUAD)    Other orders  -     meclizine (ANTIVERT) 12.5 MG tablet; 1 tablet twice daily as needed for dizziness  -     buPROPion (WELLBUTRIN SR) 100 MG extended release tablet; Take 1 tablet by mouth daily        Return in about 3 months (around 2/4/2021) for HTN, CHF, anxiety, insomnia.     Kristin Alcaraz MD

## 2020-11-04 NOTE — PROGRESS NOTES
Vaccine Information Sheet, \"Influenza - Inactivated\"  given to Kiley Jimenez, or parent/legal guardian of  Kiley Jimenez and verbalized understanding. Patient responses:    Have you ever had a reaction to a flu vaccine? No  Do you have any current illness? No  Have you ever had Guillian Tijeras Syndrome? No  Do you have a serious allergy to any of the follow: Neomycin, Polymyxin, Thimerosal, eggs or egg products? No    Flu vaccine given per order. Please see immunization tab. Risks and benefits explained. Current VIS given.

## 2020-11-19 ENCOUNTER — TELEPHONE (OUTPATIENT)
Dept: INTERNAL MEDICINE CLINIC | Age: 85
End: 2020-11-19

## 2020-11-20 RX ORDER — LORAZEPAM 0.5 MG/1
TABLET ORAL
Qty: 60 TABLET | Refills: 0 | Status: SHIPPED | OUTPATIENT
Start: 2020-11-24 | End: 2020-12-28 | Stop reason: SDUPTHER

## 2020-11-20 RX ORDER — HYDROCODONE BITARTRATE AND ACETAMINOPHEN 5; 325 MG/1; MG/1
1 TABLET ORAL 2 TIMES DAILY PRN
Qty: 60 TABLET | Refills: 0 | Status: SHIPPED | OUTPATIENT
Start: 2020-11-20 | End: 2021-02-17 | Stop reason: SDUPTHER

## 2020-11-20 RX ORDER — ATORVASTATIN CALCIUM 40 MG/1
TABLET, FILM COATED ORAL
Qty: 90 TABLET | Refills: 0 | Status: SHIPPED | OUTPATIENT
Start: 2020-11-20 | End: 2021-02-15

## 2020-11-20 RX ORDER — POTASSIUM CHLORIDE 20 MEQ/1
20 TABLET, EXTENDED RELEASE ORAL 2 TIMES DAILY
Qty: 180 TABLET | Refills: 0 | Status: SHIPPED | OUTPATIENT
Start: 2020-11-20 | End: 2021-08-11

## 2020-11-23 ENCOUNTER — HOSPITAL ENCOUNTER (INPATIENT)
Age: 85
LOS: 5 days | Discharge: HOME | DRG: 177 | End: 2020-11-28
Attending: EMERGENCY MEDICINE | Admitting: HOSPITALIST
Payer: MEDICARE

## 2020-11-23 ENCOUNTER — APPOINTMENT (OUTPATIENT)
Dept: CT IMAGING | Age: 85
DRG: 177 | End: 2020-11-23
Payer: MEDICARE

## 2020-11-23 ENCOUNTER — TELEPHONE (OUTPATIENT)
Dept: INTERNAL MEDICINE CLINIC | Age: 85
End: 2020-11-23

## 2020-11-23 ENCOUNTER — APPOINTMENT (OUTPATIENT)
Dept: GENERAL RADIOLOGY | Age: 85
DRG: 177 | End: 2020-11-23
Payer: MEDICARE

## 2020-11-23 PROBLEM — J44.1 COPD EXACERBATION (HCC): Status: ACTIVE | Noted: 2020-11-23

## 2020-11-23 LAB
A/G RATIO: 0.9 (ref 1.1–2.2)
ALBUMIN SERPL-MCNC: 3.4 G/DL (ref 3.4–5)
ALP BLD-CCNC: 49 U/L (ref 40–129)
ALT SERPL-CCNC: 14 U/L (ref 10–40)
ANION GAP SERPL CALCULATED.3IONS-SCNC: 11 MMOL/L (ref 3–16)
AST SERPL-CCNC: 26 U/L (ref 15–37)
BACTERIA: ABNORMAL /HPF
BASOPHILS ABSOLUTE: 0 K/UL (ref 0–0.2)
BASOPHILS RELATIVE PERCENT: 0.8 %
BILIRUB SERPL-MCNC: 0.5 MG/DL (ref 0–1)
BILIRUBIN URINE: ABNORMAL
BLOOD, URINE: ABNORMAL
BUN BLDV-MCNC: 13 MG/DL (ref 7–20)
CALCIUM SERPL-MCNC: 8.4 MG/DL (ref 8.3–10.6)
CHLORIDE BLD-SCNC: 102 MMOL/L (ref 99–110)
CLARITY: ABNORMAL
CO2: 23 MMOL/L (ref 21–32)
COARSE CASTS, UA: ABNORMAL /LPF (ref 0–2)
COLOR: ABNORMAL
COMMENT UA: ABNORMAL
CREAT SERPL-MCNC: 1.1 MG/DL (ref 0.6–1.2)
D DIMER: 572 NG/ML DDU (ref 0–229)
EKG ATRIAL RATE: 79 BPM
EKG DIAGNOSIS: NORMAL
EKG P AXIS: 59 DEGREES
EKG P-R INTERVAL: 156 MS
EKG Q-T INTERVAL: 380 MS
EKG QRS DURATION: 68 MS
EKG QTC CALCULATION (BAZETT): 435 MS
EKG R AXIS: -5 DEGREES
EKG T AXIS: 82 DEGREES
EKG VENTRICULAR RATE: 79 BPM
EOSINOPHILS ABSOLUTE: 0 K/UL (ref 0–0.6)
EOSINOPHILS RELATIVE PERCENT: 0.2 %
EPITHELIAL CELLS, UA: 1 /HPF (ref 0–5)
GFR AFRICAN AMERICAN: 57
GFR NON-AFRICAN AMERICAN: 47
GLOBULIN: 3.8 G/DL
GLUCOSE BLD-MCNC: 128 MG/DL (ref 70–99)
GLUCOSE URINE: NEGATIVE MG/DL
HCT VFR BLD CALC: 40.7 % (ref 36–48)
HEMOGLOBIN: 13 G/DL (ref 12–16)
HYALINE CASTS: ABNORMAL /LPF (ref 0–2)
KETONES, URINE: ABNORMAL MG/DL
LACTIC ACID: 1.7 MMOL/L (ref 0.4–2)
LEUKOCYTE ESTERASE, URINE: ABNORMAL
LYMPHOCYTES ABSOLUTE: 0.7 K/UL (ref 1–5.1)
LYMPHOCYTES RELATIVE PERCENT: 19.5 %
MCH RBC QN AUTO: 27.2 PG (ref 26–34)
MCHC RBC AUTO-ENTMCNC: 32 G/DL (ref 31–36)
MCV RBC AUTO: 84.9 FL (ref 80–100)
MICROSCOPIC EXAMINATION: YES
MONOCYTES ABSOLUTE: 0.3 K/UL (ref 0–1.3)
MONOCYTES RELATIVE PERCENT: 10.1 %
NEUTROPHILS ABSOLUTE: 2.4 K/UL (ref 1.7–7.7)
NEUTROPHILS RELATIVE PERCENT: 69.4 %
NITRITE, URINE: NEGATIVE
PDW BLD-RTO: 17.4 % (ref 12.4–15.4)
PH UA: 6 (ref 5–8)
PLATELET # BLD: 167 K/UL (ref 135–450)
PMV BLD AUTO: 8.5 FL (ref 5–10.5)
POTASSIUM REFLEX MAGNESIUM: 3.8 MMOL/L (ref 3.5–5.1)
PRO-BNP: 129 PG/ML (ref 0–449)
PROTEIN UA: 100 MG/DL
RBC # BLD: 4.79 M/UL (ref 4–5.2)
RBC UA: 7 /HPF (ref 0–4)
SARS-COV-2, NAAT: DETECTED
SODIUM BLD-SCNC: 136 MMOL/L (ref 136–145)
SPECIFIC GRAVITY UA: 1.02 (ref 1–1.03)
TOTAL PROTEIN: 7.2 G/DL (ref 6.4–8.2)
TROPONIN: <0.01 NG/ML
URINE TYPE: ABNORMAL
UROBILINOGEN, URINE: 1 E.U./DL
WBC # BLD: 3.4 K/UL (ref 4–11)
WBC UA: 114 /HPF (ref 0–5)

## 2020-11-23 PROCEDURE — 99285 EMERGENCY DEPT VISIT HI MDM: CPT

## 2020-11-23 PROCEDURE — 94640 AIRWAY INHALATION TREATMENT: CPT

## 2020-11-23 PROCEDURE — 85025 COMPLETE CBC W/AUTO DIFF WBC: CPT

## 2020-11-23 PROCEDURE — 80053 COMPREHEN METABOLIC PANEL: CPT

## 2020-11-23 PROCEDURE — 6370000000 HC RX 637 (ALT 250 FOR IP): Performed by: HOSPITALIST

## 2020-11-23 PROCEDURE — U0002 COVID-19 LAB TEST NON-CDC: HCPCS

## 2020-11-23 PROCEDURE — 94761 N-INVAS EAR/PLS OXIMETRY MLT: CPT

## 2020-11-23 PROCEDURE — 83605 ASSAY OF LACTIC ACID: CPT

## 2020-11-23 PROCEDURE — 96361 HYDRATE IV INFUSION ADD-ON: CPT

## 2020-11-23 PROCEDURE — 2580000003 HC RX 258: Performed by: HOSPITALIST

## 2020-11-23 PROCEDURE — 81001 URINALYSIS AUTO W/SCOPE: CPT

## 2020-11-23 PROCEDURE — 85379 FIBRIN DEGRADATION QUANT: CPT

## 2020-11-23 PROCEDURE — 71045 X-RAY EXAM CHEST 1 VIEW: CPT

## 2020-11-23 PROCEDURE — 96365 THER/PROPH/DIAG IV INF INIT: CPT

## 2020-11-23 PROCEDURE — 2700000000 HC OXYGEN THERAPY PER DAY

## 2020-11-23 PROCEDURE — 1200000000 HC SEMI PRIVATE

## 2020-11-23 PROCEDURE — 2060000000 HC ICU INTERMEDIATE R&B

## 2020-11-23 PROCEDURE — 93005 ELECTROCARDIOGRAM TRACING: CPT | Performed by: EMERGENCY MEDICINE

## 2020-11-23 PROCEDURE — 83880 ASSAY OF NATRIURETIC PEPTIDE: CPT

## 2020-11-23 PROCEDURE — 93010 ELECTROCARDIOGRAM REPORT: CPT | Performed by: INTERNAL MEDICINE

## 2020-11-23 PROCEDURE — 6360000002 HC RX W HCPCS: Performed by: EMERGENCY MEDICINE

## 2020-11-23 PROCEDURE — 6360000004 HC RX CONTRAST MEDICATION: Performed by: EMERGENCY MEDICINE

## 2020-11-23 PROCEDURE — 6360000002 HC RX W HCPCS: Performed by: HOSPITALIST

## 2020-11-23 PROCEDURE — 71260 CT THORAX DX C+: CPT

## 2020-11-23 PROCEDURE — 84484 ASSAY OF TROPONIN QUANT: CPT

## 2020-11-23 PROCEDURE — 2580000003 HC RX 258: Performed by: EMERGENCY MEDICINE

## 2020-11-23 RX ORDER — ACETAMINOPHEN 325 MG/1
650 TABLET ORAL EVERY 6 HOURS PRN
Status: DISCONTINUED | OUTPATIENT
Start: 2020-11-23 | End: 2020-11-28 | Stop reason: HOSPADM

## 2020-11-23 RX ORDER — PAROXETINE HYDROCHLORIDE 20 MG/1
20 TABLET, FILM COATED ORAL DAILY
Status: DISCONTINUED | OUTPATIENT
Start: 2020-11-24 | End: 2020-11-28 | Stop reason: HOSPADM

## 2020-11-23 RX ORDER — ROPINIROLE 0.5 MG/1
0.25 TABLET, FILM COATED ORAL NIGHTLY
Status: DISCONTINUED | OUTPATIENT
Start: 2020-11-23 | End: 2020-11-28 | Stop reason: HOSPADM

## 2020-11-23 RX ORDER — AMLODIPINE BESYLATE 5 MG/1
5 TABLET ORAL 2 TIMES DAILY
Status: DISCONTINUED | OUTPATIENT
Start: 2020-11-23 | End: 2020-11-28 | Stop reason: HOSPADM

## 2020-11-23 RX ORDER — IPRATROPIUM BROMIDE AND ALBUTEROL SULFATE 2.5; .5 MG/3ML; MG/3ML
1 SOLUTION RESPIRATORY (INHALATION)
Status: DISCONTINUED | OUTPATIENT
Start: 2020-11-24 | End: 2020-11-24

## 2020-11-23 RX ORDER — PREDNISONE 20 MG/1
40 TABLET ORAL DAILY
Status: DISCONTINUED | OUTPATIENT
Start: 2020-11-26 | End: 2020-11-28 | Stop reason: HOSPADM

## 2020-11-23 RX ORDER — POLYETHYLENE GLYCOL 3350 17 G/17G
17 POWDER, FOR SOLUTION ORAL DAILY PRN
Status: DISCONTINUED | OUTPATIENT
Start: 2020-11-23 | End: 2020-11-28 | Stop reason: HOSPADM

## 2020-11-23 RX ORDER — SODIUM CHLORIDE 9 MG/ML
INJECTION, SOLUTION INTRAVENOUS CONTINUOUS
Status: DISCONTINUED | OUTPATIENT
Start: 2020-11-23 | End: 2020-11-25

## 2020-11-23 RX ORDER — TRAZODONE HYDROCHLORIDE 50 MG/1
50 TABLET ORAL NIGHTLY
Status: DISCONTINUED | OUTPATIENT
Start: 2020-11-23 | End: 2020-11-28 | Stop reason: HOSPADM

## 2020-11-23 RX ORDER — METHYLPREDNISOLONE SODIUM SUCCINATE 125 MG/2ML
125 INJECTION, POWDER, LYOPHILIZED, FOR SOLUTION INTRAMUSCULAR; INTRAVENOUS ONCE
Status: COMPLETED | OUTPATIENT
Start: 2020-11-23 | End: 2020-11-23

## 2020-11-23 RX ORDER — SODIUM CHLORIDE 0.9 % (FLUSH) 0.9 %
10 SYRINGE (ML) INJECTION PRN
Status: DISCONTINUED | OUTPATIENT
Start: 2020-11-23 | End: 2020-11-28 | Stop reason: HOSPADM

## 2020-11-23 RX ORDER — 0.9 % SODIUM CHLORIDE 0.9 %
1000 INTRAVENOUS SOLUTION INTRAVENOUS ONCE
Status: COMPLETED | OUTPATIENT
Start: 2020-11-23 | End: 2020-11-23

## 2020-11-23 RX ORDER — SODIUM CHLORIDE 0.9 % (FLUSH) 0.9 %
10 SYRINGE (ML) INJECTION EVERY 12 HOURS SCHEDULED
Status: DISCONTINUED | OUTPATIENT
Start: 2020-11-23 | End: 2020-11-28 | Stop reason: HOSPADM

## 2020-11-23 RX ORDER — DOXYCYCLINE HYCLATE 100 MG
100 TABLET ORAL EVERY 12 HOURS
Status: DISCONTINUED | OUTPATIENT
Start: 2020-11-23 | End: 2020-11-28 | Stop reason: HOSPADM

## 2020-11-23 RX ORDER — ONDANSETRON 2 MG/ML
4 INJECTION INTRAMUSCULAR; INTRAVENOUS EVERY 6 HOURS PRN
Status: DISCONTINUED | OUTPATIENT
Start: 2020-11-23 | End: 2020-11-28 | Stop reason: HOSPADM

## 2020-11-23 RX ORDER — METHYLPREDNISOLONE SODIUM SUCCINATE 40 MG/ML
40 INJECTION, POWDER, LYOPHILIZED, FOR SOLUTION INTRAMUSCULAR; INTRAVENOUS EVERY 8 HOURS
Status: COMPLETED | OUTPATIENT
Start: 2020-11-23 | End: 2020-11-25

## 2020-11-23 RX ORDER — ALBUTEROL SULFATE 2.5 MG/3ML
5 SOLUTION RESPIRATORY (INHALATION) ONCE
Status: COMPLETED | OUTPATIENT
Start: 2020-11-23 | End: 2020-11-23

## 2020-11-23 RX ORDER — ATORVASTATIN CALCIUM 40 MG/1
40 TABLET, FILM COATED ORAL NIGHTLY
Status: DISCONTINUED | OUTPATIENT
Start: 2020-11-23 | End: 2020-11-28 | Stop reason: HOSPADM

## 2020-11-23 RX ORDER — ACETAMINOPHEN 650 MG/1
650 SUPPOSITORY RECTAL EVERY 6 HOURS PRN
Status: DISCONTINUED | OUTPATIENT
Start: 2020-11-23 | End: 2020-11-28 | Stop reason: HOSPADM

## 2020-11-23 RX ORDER — POTASSIUM CHLORIDE 20 MEQ/1
20 TABLET, EXTENDED RELEASE ORAL 2 TIMES DAILY
Status: CANCELLED | OUTPATIENT
Start: 2020-11-23

## 2020-11-23 RX ORDER — QUETIAPINE FUMARATE 25 MG/1
50 TABLET, FILM COATED ORAL NIGHTLY
Status: DISCONTINUED | OUTPATIENT
Start: 2020-11-23 | End: 2020-11-28 | Stop reason: HOSPADM

## 2020-11-23 RX ORDER — ASPIRIN 81 MG/1
81 TABLET ORAL DAILY
Status: DISCONTINUED | OUTPATIENT
Start: 2020-11-24 | End: 2020-11-28 | Stop reason: HOSPADM

## 2020-11-23 RX ORDER — PANTOPRAZOLE SODIUM 40 MG/1
40 TABLET, DELAYED RELEASE ORAL
Status: DISCONTINUED | OUTPATIENT
Start: 2020-11-24 | End: 2020-11-28 | Stop reason: HOSPADM

## 2020-11-23 RX ORDER — PROMETHAZINE HYDROCHLORIDE 25 MG/1
12.5 TABLET ORAL EVERY 6 HOURS PRN
Status: DISCONTINUED | OUTPATIENT
Start: 2020-11-23 | End: 2020-11-28 | Stop reason: HOSPADM

## 2020-11-23 RX ORDER — FAMOTIDINE 20 MG/1
20 TABLET, FILM COATED ORAL 2 TIMES DAILY
Status: CANCELLED | OUTPATIENT
Start: 2020-11-23

## 2020-11-23 RX ORDER — FUROSEMIDE 40 MG/1
20 TABLET ORAL 2 TIMES DAILY
Status: CANCELLED | OUTPATIENT
Start: 2020-11-23

## 2020-11-23 RX ADMIN — AZITHROMYCIN MONOHYDRATE 500 MG: 500 INJECTION, POWDER, LYOPHILIZED, FOR SOLUTION INTRAVENOUS at 18:17

## 2020-11-23 RX ADMIN — SODIUM CHLORIDE: 9 INJECTION, SOLUTION INTRAVENOUS at 22:06

## 2020-11-23 RX ADMIN — METHYLPREDNISOLONE SODIUM SUCCINATE 125 MG: 125 INJECTION, POWDER, FOR SOLUTION INTRAMUSCULAR; INTRAVENOUS at 18:16

## 2020-11-23 RX ADMIN — METHYLPREDNISOLONE SODIUM SUCCINATE 40 MG: 40 INJECTION, POWDER, FOR SOLUTION INTRAMUSCULAR; INTRAVENOUS at 22:05

## 2020-11-23 RX ADMIN — TRAZODONE HYDROCHLORIDE 50 MG: 50 TABLET ORAL at 22:05

## 2020-11-23 RX ADMIN — QUETIAPINE FUMARATE 50 MG: 25 TABLET ORAL at 22:06

## 2020-11-23 RX ADMIN — IOPAMIDOL 75 ML: 755 INJECTION, SOLUTION INTRAVENOUS at 14:37

## 2020-11-23 RX ADMIN — ROPINIROLE HYDROCHLORIDE 0.25 MG: 0.5 TABLET, FILM COATED ORAL at 22:06

## 2020-11-23 RX ADMIN — SODIUM CHLORIDE 1000 ML: 9 INJECTION, SOLUTION INTRAVENOUS at 13:06

## 2020-11-23 RX ADMIN — AMLODIPINE BESYLATE 5 MG: 5 TABLET ORAL at 22:06

## 2020-11-23 RX ADMIN — CEFTRIAXONE 1 G: 1 INJECTION, POWDER, FOR SOLUTION INTRAMUSCULAR; INTRAVENOUS at 14:55

## 2020-11-23 RX ADMIN — METOPROLOL TARTRATE 25 MG: 25 TABLET, FILM COATED ORAL at 22:06

## 2020-11-23 RX ADMIN — DOXYCYCLINE HYCLATE 100 MG: 100 TABLET, COATED ORAL at 22:06

## 2020-11-23 RX ADMIN — ALBUTEROL SULFATE 5 MG: 2.5 SOLUTION RESPIRATORY (INHALATION) at 17:05

## 2020-11-23 RX ADMIN — ATORVASTATIN CALCIUM 40 MG: 40 TABLET, FILM COATED ORAL at 22:05

## 2020-11-23 RX ADMIN — SODIUM CHLORIDE, PRESERVATIVE FREE 10 ML: 5 INJECTION INTRAVENOUS at 22:30

## 2020-11-23 ASSESSMENT — PAIN SCALES - GENERAL
PAINLEVEL_OUTOF10: 0
PAINLEVEL_OUTOF10: 0

## 2020-11-23 NOTE — ED PROVIDER NOTES
[facility]      Pt Name: Holger Santiago  MRN: [id number]  [place] 1934  Date of evaluation: 11/23/2020  Provider: Jayda Lopez MD    CHIEF COMPLAINT       Chief Complaint   Patient presents with    Fatigue     no appetite for 3-4 days, \"all I want to do is sleep\"         HISTORY OF PRESENT ILLNESS   (Location/Symptom, Timing/Onset,Context/Setting, Quality, Duration, Modifying Factors, Severity)  Note limiting factors. Holger Santiago is a 80 y.o. female who presents to the emergency department for evaluation of fatigue and poor appetite. Patient reports that she has been feeling ill for the past 5 days or so. She states that she has had increased shortness of breath and cough productive of green sputum. She typically wears 3 L nasal cannula oxygen at home, and did not have her oxygen on at the time of presentation so was saturating in the 70s room air. When placed on her home 3 L nasal cannula, oxygen saturation improved to 85%. She denies any chest pain over the past few days but reports she had some abdominal discomfort 4 days ago which is since resolved. She has not had any nausea, vomiting, or diarrhea. She states that she has been self isolating at home, and has not had exposures to sick persons. She denies known fever at home. NursingNotes were reviewed. REVIEW OF SYSTEMS    (2-9 systems for level 4, 10 or more for level 5)       Constitutional: No fever or chills. Fatigue, poor appetite. Eye: No visual disturbances. No eye pain. Ear/Nose/Mouth/Throat: No nasal congestion. No sore throat. Respiratory: Productive cough, shortness of breath, green sputum production. Cardiovascular: No chest pain. No palpitations. Gastrointestinal: No abdominal pain. No nausea or vomiting  Genitourinary: No dysuria. No hematuria. Hematology/Lymphatics: No bleeding or bruising tendency. Immunologic: Generalized malaise.  No TAKE ONE TABLET BY MOUTH NIGHTLY    BUPROPION (WELLBUTRIN SR) 100 MG EXTENDED RELEASE TABLET    Take 1 tablet by mouth daily    FUROSEMIDE (LASIX) 20 MG TABLET    Take 1 tablet by mouth 2 times daily    HYDROCODONE-ACETAMINOPHEN (NORCO) 5-325 MG PER TABLET    Take 1 tablet by mouth 2 times daily as needed for Pain for up to 30 days. Take lowest dose possible to manage pain    IPRATROPIUM-ALBUTEROL (DUONEB) 0.5-2.5 (3) MG/3ML SOLN NEBULIZER SOLUTION    Inhale 3 mLs into the lungs every 6 hours as needed for Shortness of Breath    LORAZEPAM (ATIVAN) 0.5 MG TABLET    1 tablet twice daily    MECLIZINE (ANTIVERT) 12.5 MG TABLET    1 tablet twice daily as needed for dizziness    METOPROLOL TARTRATE (LOPRESSOR) 25 MG TABLET    TAKE ONE TABLET BY MOUTH TWICE A DAY    NYSTATIN (MYCOSTATIN) 759845 UNIT/GM POWDER    Apply topically 4 times daily. OXYGEN    Inhale 2 L/min into the lungs continuous    PANTOPRAZOLE (PROTONIX) 40 MG TABLET    TAKE ONE TABLET BY MOUTH EVERY MORNING BEFORE BREAKFAST    PAROXETINE (PAXIL) 20 MG TABLET    TAKE ONE TABLET BY MOUTH DAILY    POTASSIUM CHLORIDE (KLOR-CON M) 20 MEQ EXTENDED RELEASE TABLET    Take 1 tablet by mouth 2 times daily    QUETIAPINE (SEROQUEL) 50 MG TABLET    1 tablet daily    RIVAROXABAN (XARELTO) 20 MG TABS TABLET    TAKE 1 TABLET BY MOUTH EVERY DAY WITH BREAKFAST    ROPINIROLE (REQUIP) 0.25 MG TABLET    1 tablet nightly    TRAZODONE (DESYREL) 50 MG TABLET    Take 1 tablet by mouth nightly       ALLERGIES     Enalapril    FAMILY HISTORY       Family History   Problem Relation Age of Onset    Heart Attack Father     Heart Attack Brother           SOCIAL HISTORY       Social History     Socioeconomic History    Marital status:       Spouse name: Hanane Felix Number of children: 2    Years of education: None    Highest education level: None   Occupational History    Occupation: retired - 1999   Social Needs    Financial resource strain: None    Food insecurity Worry: None     Inability: None    Transportation needs     Medical: None     Non-medical: None   Tobacco Use    Smoking status: Former Smoker     Packs/day: 1.00     Years: 65.00     Pack years: 65.00     Types: Cigarettes     Last attempt to quit: 2018     Years since quittin.1    Smokeless tobacco: Never Used    Tobacco comment: Down to 3 cigarettes a week   Substance and Sexual Activity    Alcohol use: Yes     Alcohol/week: 0.0 standard drinks     Comment: social    Drug use: No    Sexual activity: Never     Comment:  -    Lifestyle    Physical activity     Days per week: None     Minutes per session: None    Stress: None   Relationships    Social connections     Talks on phone: None     Gets together: None     Attends Taoism service: None     Active member of club or organization: None     Attends meetings of clubs or organizations: None     Relationship status: None    Intimate partner violence     Fear of current or ex partner: None     Emotionally abused: None     Physically abused: None     Forced sexual activity: None   Other Topics Concern    None   Social History Narrative        Past Medical History     Coronary Artery Disease    Hyperlipidemia    Hypertension    fatty liver    Osteoarthritis    Anxiety    COPD    Pneumonia                                Last updated by Gem Rosado MD on 2009                          Past Surgical History     Hysterectomy:     Salpingo-Oophorectomy: bilateral - 1995    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                                                     Last updated by Sarah Victoria MA on 2008                         Social History     Marital Status:  -     Spouse: Deloris Hansen: 2    Children's Names: Mallorie Page    Employment Status: retired -       Employer: [employer] ([address])      Occupation: unit clerk Alcohol Use: none    Drug Use: none    Tobacco Usage: prior smoker    Cigarettes - Year Started:  Shaun Stewart Quit:        Cigarettes - Years smoked - 14    Cigarettes - Packs per Day - 1       Pack/Years - 14    quit smoking 2006 but she resumed and currently smokes one pack per week                                            Last updated by Nasrin Rocha MA on 2008                              Family History     mother -  - age 80 - dementia, pneumonia, coronary artery disease, heart attack    father -  - age 62 - coronary artery disease, heart attack        brother -  - age 55 - coronary artery disease, heart attack        sister -  - age 58 - diabetes, ESRD    sister -  - age 64 - hepatitis C, lung cancer, post-op complications    sister -  - age 52 - coronary artery disease, heart attack    sister - [de-identified] -    sister - Katerin -    sister - Otilia Arroyo -        son - Javier Hatch -        daughter - Pat Colunga -                                 Last updated by Nasrin Rocha MA on 2008           SCREENINGS             PHYSICAL EXAM    (up to 7 for level 4, 8 or more for level 5)     ED Triage Vitals [20 1217]   BP Temp Temp Source Pulse Resp SpO2 Height Weight   94/61 98.4 °F (36.9 °C) Oral 92 24 (!) 80 % 5' 2.5\" (1.588 m) --       General: Alert and oriented, No acute distress. Eye: Normal conjunctiva. Pupils equal and reactive. HENT: Oral mucosa is moist.  Respiratory: Diffuse crackles and wheezing on lung auscultation, Respirations are non-labored. Cardiovascular: Normal rate, Regular rhythm. Gastrointestinal: Soft, Non-tender, Non-distended. Musculoskeletal: No lower extremity swelling. Integumentary: Warm, Dry. Neurologic: Alert, Oriented, No focal defects. Psychiatric: Cooperative.     DIAGNOSTIC RESULTS     EKG: All EKG's are interpreted by the Emergency Department Physician who either signs or Co-signsthis chart in the absence of a cardiologist.    Per my interpretation:    Electrocardiogram (ECG) 1/23/2020 12:51 PM  RATE: normal  RHYTHM: normal sinus  AXIS: normal  INTERVALS: normal  ST-T WAVE CHANGES: No evidence of ST segment elevation or T-wave inversion, ST segment depression V4 through V6 not changed from prior  Prior for comparison 6/2/2020    RADIOLOGY:   Non-plain filmimages such as CT, Ultrasound and MRI are read by the radiologist. Plain radiographic images are visualized and preliminarily interpreted by the emergency physician with the below findings:      Interpretation per the Radiologist below, if available at the time ofthis note:    CT CHEST PULMONARY EMBOLISM W CONTRAST   Final Result   No evidence of pulmonary embolism or acute pulmonary abnormality. Small   amount of scattered bilateral atelectasis. Severe emphysematous changes. 15 mm lobular subpleural nodule in the mid left lower lobe suspicious for   primary bronchogenic carcinoma. RECOMMENDATIONS:   PET-CT could be performed for further evaluation of the left lower lobe   pulmonary nodule. CT-guided biopsy could also be easily performed for   histologic diagnosis. XR CHEST PORTABLE   Final Result   No acute cardiopulmonary disease. COPD.                ED BEDSIDE ULTRASOUND:   Performed by ED Physician - none    LABS:  Labs Reviewed   CBC WITH AUTO DIFFERENTIAL - Abnormal; Notable for the following components:       Result Value    WBC 3.4 (*)     RDW 17.4 (*)     Lymphocytes Absolute 0.7 (*)     All other components within normal limits    Narrative:     Performed at:  [address]   Phone [phone]   COMPREHENSIVE METABOLIC PANEL W/ REFLEX TO MG FOR LOW K - Abnormal; Notable for the following components:    Glucose 128 (*)     GFR Non- 47 (*)     GFR African American 57 (*)     Albumin/Globulin Ratio 0.9 (*)     All other components within normal limits Patient had no imaging findings consistent with Covid pneumonia however her rapid Covid test was positive. I do feel that patient's increased oxygen requirement is somewhat related to COPD exacerbation, possibly secondary to Covid infection and therefore patient was treated with albuterol, azithromycin, and Solu-Medrol. Given increased oxygen requirement, she will be admitted for further management. Patient and daughter agreeable with plan of care. Stable at the time of admission. CRITICAL CARE TIME   Total Critical Care time was 0 minutes, excluding separately reportable procedures. There was a high probability of clinically significant/life threatening deterioration in the patient's condition which required my urgent intervention. CONSULTS:  IP CONSULT TO PULMONOLOGY    PROCEDURES:  Unless otherwise noted below, none         FINAL IMPRESSION      1. COPD exacerbation ([facility] [address]    2. Acute cystitis without hematuria    3. Pulmonary nodule    4. COVID-19 virus infection          DISPOSITION/PLAN   DISPOSITION Admitted 11/23/2020 05:12:48 PM      PATIENT REFERRED TO:  No follow-up provider specified.     DISCHARGE MEDICATIONS:  New Prescriptions    No medications on file          (Please note that portions of this note were completed with a voice recognition program.Efforts were made to edit the dictations but occasionally words are mis-transcribed.)    Roly Narayan MD (electronically signed)  Attending Emergency Physician        Roly Narayan MD  11/23/20 2828

## 2020-11-23 NOTE — TELEPHONE ENCOUNTER
FYI- the patient daughter is reporting that she is taking her to [facility] because patient is not eating, and BP too low and oxygen 80.  The daughter is requesting a return call

## 2020-11-24 LAB
ABO/RH: NORMAL
ANTIBODY SCREEN: NORMAL
BASOPHILS ABSOLUTE: 0 K/UL (ref 0–0.2)
BASOPHILS RELATIVE PERCENT: 0.3 %
BLOOD BANK DISPENSE STATUS: NORMAL
BLOOD BANK PRODUCT CODE: NORMAL
BPU ID: NORMAL
DESCRIPTION BLOOD BANK: NORMAL
EOSINOPHILS ABSOLUTE: 0 K/UL (ref 0–0.6)
EOSINOPHILS RELATIVE PERCENT: 0 %
HCT VFR BLD CALC: 38.8 % (ref 36–48)
HEMOGLOBIN: 12.4 G/DL (ref 12–16)
LYMPHOCYTES ABSOLUTE: 0.4 K/UL (ref 1–5.1)
LYMPHOCYTES RELATIVE PERCENT: 20.3 %
MCH RBC QN AUTO: 27.1 PG (ref 26–34)
MCHC RBC AUTO-ENTMCNC: 31.9 G/DL (ref 31–36)
MCV RBC AUTO: 84.8 FL (ref 80–100)
MONOCYTES ABSOLUTE: 0.1 K/UL (ref 0–1.3)
MONOCYTES RELATIVE PERCENT: 4.3 %
NEUTROPHILS ABSOLUTE: 1.6 K/UL (ref 1.7–7.7)
NEUTROPHILS RELATIVE PERCENT: 75.1 %
PDW BLD-RTO: 17.1 % (ref 12.4–15.4)
PLATELET # BLD: 187 K/UL (ref 135–450)
PMV BLD AUTO: 8.7 FL (ref 5–10.5)
PROCALCITONIN: 0.05 NG/ML (ref 0–0.15)
RBC # BLD: 4.58 M/UL (ref 4–5.2)
WBC # BLD: 2.1 K/UL (ref 4–11)

## 2020-11-24 PROCEDURE — 6360000002 HC RX W HCPCS: Performed by: HOSPITALIST

## 2020-11-24 PROCEDURE — 2060000000 HC ICU INTERMEDIATE R&B

## 2020-11-24 PROCEDURE — 2500000003 HC RX 250 WO HCPCS: Performed by: INTERNAL MEDICINE

## 2020-11-24 PROCEDURE — 2700000000 HC OXYGEN THERAPY PER DAY

## 2020-11-24 PROCEDURE — 36415 COLL VENOUS BLD VENIPUNCTURE: CPT

## 2020-11-24 PROCEDURE — 99223 1ST HOSP IP/OBS HIGH 75: CPT | Performed by: INTERNAL MEDICINE

## 2020-11-24 PROCEDURE — XW13325 TRANSFUSION OF CONVALESCENT PLASMA (NONAUTOLOGOUS) INTO PERIPHERAL VEIN, PERCUTANEOUS APPROACH, NEW TECHNOLOGY GROUP 5: ICD-10-PCS | Performed by: INTERNAL MEDICINE

## 2020-11-24 PROCEDURE — 86900 BLOOD TYPING SEROLOGIC ABO: CPT

## 2020-11-24 PROCEDURE — 6370000000 HC RX 637 (ALT 250 FOR IP): Performed by: INTERNAL MEDICINE

## 2020-11-24 PROCEDURE — 94640 AIRWAY INHALATION TREATMENT: CPT

## 2020-11-24 PROCEDURE — 84145 PROCALCITONIN (PCT): CPT

## 2020-11-24 PROCEDURE — 94761 N-INVAS EAR/PLS OXIMETRY MLT: CPT

## 2020-11-24 PROCEDURE — 86850 RBC ANTIBODY SCREEN: CPT

## 2020-11-24 PROCEDURE — XW033E5 INTRODUCTION OF REMDESIVIR ANTI-INFECTIVE INTO PERIPHERAL VEIN, PERCUTANEOUS APPROACH, NEW TECHNOLOGY GROUP 5: ICD-10-PCS | Performed by: INTERNAL MEDICINE

## 2020-11-24 PROCEDURE — 6370000000 HC RX 637 (ALT 250 FOR IP): Performed by: HOSPITALIST

## 2020-11-24 PROCEDURE — 2580000003 HC RX 258: Performed by: INTERNAL MEDICINE

## 2020-11-24 PROCEDURE — 2580000003 HC RX 258: Performed by: HOSPITALIST

## 2020-11-24 PROCEDURE — 86901 BLOOD TYPING SEROLOGIC RH(D): CPT

## 2020-11-24 PROCEDURE — 85025 COMPLETE CBC W/AUTO DIFF WBC: CPT

## 2020-11-24 RX ORDER — NICOTINE 21 MG/24HR
1 PATCH, TRANSDERMAL 24 HOURS TRANSDERMAL DAILY
Status: DISCONTINUED | OUTPATIENT
Start: 2020-11-24 | End: 2020-11-28 | Stop reason: HOSPADM

## 2020-11-24 RX ORDER — 0.9 % SODIUM CHLORIDE 0.9 %
20 INTRAVENOUS SOLUTION INTRAVENOUS ONCE
Status: DISCONTINUED | OUTPATIENT
Start: 2020-11-24 | End: 2020-11-28 | Stop reason: HOSPADM

## 2020-11-24 RX ORDER — 0.9 % SODIUM CHLORIDE 0.9 %
30 INTRAVENOUS SOLUTION INTRAVENOUS PRN
Status: DISCONTINUED | OUTPATIENT
Start: 2020-11-24 | End: 2020-11-28 | Stop reason: HOSPADM

## 2020-11-24 RX ORDER — BUDESONIDE AND FORMOTEROL FUMARATE DIHYDRATE 160; 4.5 UG/1; UG/1
2 AEROSOL RESPIRATORY (INHALATION) 2 TIMES DAILY
Status: DISCONTINUED | OUTPATIENT
Start: 2020-11-24 | End: 2020-11-28 | Stop reason: HOSPADM

## 2020-11-24 RX ADMIN — Medication 2 PUFF: at 10:17

## 2020-11-24 RX ADMIN — QUETIAPINE FUMARATE 50 MG: 25 TABLET ORAL at 21:04

## 2020-11-24 RX ADMIN — AMLODIPINE BESYLATE 5 MG: 5 TABLET ORAL at 09:25

## 2020-11-24 RX ADMIN — TRAZODONE HYDROCHLORIDE 50 MG: 50 TABLET ORAL at 21:02

## 2020-11-24 RX ADMIN — ASPIRIN 81 MG: 81 TABLET, FILM COATED ORAL at 09:25

## 2020-11-24 RX ADMIN — SODIUM CHLORIDE, PRESERVATIVE FREE 10 ML: 5 INJECTION INTRAVENOUS at 09:26

## 2020-11-24 RX ADMIN — DOXYCYCLINE HYCLATE 100 MG: 100 TABLET, COATED ORAL at 21:05

## 2020-11-24 RX ADMIN — METHYLPREDNISOLONE SODIUM SUCCINATE 40 MG: 40 INJECTION, POWDER, FOR SOLUTION INTRAMUSCULAR; INTRAVENOUS at 05:42

## 2020-11-24 RX ADMIN — IPRATROPIUM BROMIDE AND ALBUTEROL 1 PUFF: 20; 100 SPRAY, METERED RESPIRATORY (INHALATION) at 11:45

## 2020-11-24 RX ADMIN — ROPINIROLE HYDROCHLORIDE 0.25 MG: 0.5 TABLET, FILM COATED ORAL at 21:03

## 2020-11-24 RX ADMIN — SODIUM CHLORIDE, PRESERVATIVE FREE 10 ML: 5 INJECTION INTRAVENOUS at 20:05

## 2020-11-24 RX ADMIN — METHYLPREDNISOLONE SODIUM SUCCINATE 40 MG: 40 INJECTION, POWDER, FOR SOLUTION INTRAMUSCULAR; INTRAVENOUS at 21:05

## 2020-11-24 RX ADMIN — PANTOPRAZOLE SODIUM 40 MG: 40 TABLET, DELAYED RELEASE ORAL at 05:43

## 2020-11-24 RX ADMIN — SODIUM CHLORIDE: 9 INJECTION, SOLUTION INTRAVENOUS at 11:32

## 2020-11-24 RX ADMIN — DOXYCYCLINE HYCLATE 100 MG: 100 TABLET, COATED ORAL at 09:25

## 2020-11-24 RX ADMIN — REMDESIVIR 200 MG: 100 INJECTION, POWDER, LYOPHILIZED, FOR SOLUTION INTRAVENOUS at 11:59

## 2020-11-24 RX ADMIN — Medication 2 PUFF: at 21:11

## 2020-11-24 RX ADMIN — ATORVASTATIN CALCIUM 40 MG: 40 TABLET, FILM COATED ORAL at 21:06

## 2020-11-24 RX ADMIN — METOPROLOL TARTRATE 25 MG: 25 TABLET, FILM COATED ORAL at 09:25

## 2020-11-24 RX ADMIN — AMLODIPINE BESYLATE 5 MG: 5 TABLET ORAL at 21:06

## 2020-11-24 RX ADMIN — IPRATROPIUM BROMIDE AND ALBUTEROL 1 PUFF: 20; 100 SPRAY, METERED RESPIRATORY (INHALATION) at 16:04

## 2020-11-24 RX ADMIN — METOPROLOL TARTRATE 25 MG: 25 TABLET, FILM COATED ORAL at 21:04

## 2020-11-24 RX ADMIN — PAROXETINE HYDROCHLORIDE 20 MG: 20 TABLET, FILM COATED ORAL at 09:26

## 2020-11-24 RX ADMIN — METHYLPREDNISOLONE SODIUM SUCCINATE 40 MG: 40 INJECTION, POWDER, FOR SOLUTION INTRAMUSCULAR; INTRAVENOUS at 12:58

## 2020-11-24 RX ADMIN — RIVAROXABAN 20 MG: 20 TABLET, FILM COATED ORAL at 09:26

## 2020-11-24 RX ADMIN — IPRATROPIUM BROMIDE AND ALBUTEROL 1 PUFF: 20; 100 SPRAY, METERED RESPIRATORY (INHALATION) at 21:10

## 2020-11-24 ASSESSMENT — PAIN SCALES - GENERAL
PAINLEVEL_OUTOF10: 0

## 2020-11-24 NOTE — CARE COORDINATION
[facility] rep notified by [facility] that this [facility] patient was admitted. [facility] will follow for home respiratory needs.     Thank you for the referral.  Electronically signed by Nat Chappell on 11/24/2020 at 1:03 PM Cell ph# [phone]

## 2020-11-24 NOTE — ACP (ADVANCE CARE PLANNING)
SW called to speak with patient to complete ACP. Patient declined discussion at this time. SW/CM will attempt to contact patient again to complete discussion. SW/CM will follow and assist as needed. Electronically signed by SHITAL Vargas LSW on 11/24/2020 at 3:25 PM    Advance Care Planning     Advance Care Planning Activator (Inpatient)  Conversation Note      Date of ACP Conversation: 11/25/2020    Conversation Conducted with: Patient with Decision Making Capacity    ACP Activator: C/ [address] Decision Maker:     Current Designated Health Care Decision Maker:   Primary Decision Maker: RocklinDiane denise - Child - [phone]    Care Preferences    Ventilation: \"If you were in your present state of health and suddenly became very ill and were unable to breathe on your own, what would your preference be about the use of a ventilator (breathing machine) if it were available to you? \"      Would the patient desire the use of ventilator (breathing machine)?: yes    \"If your health worsens and it becomes clear that your chance of recovery is unlikely, what would your preference be about the use of a ventilator (breathing machine) if it were available to you? \"     Would the patient desire the use of ventilator (breathing machine)?: Yes      Resuscitation  \"CPR works best to restart the heart when there is a sudden event, like a heart attack, in someone who is otherwise healthy. Unfortunately, CPR does not typically restart the heart for people who have serious health conditions or who are very sick. \"    \"In the event your heart stopped as a result of an underlying serious health condition, would you want attempts to be made to restart your heart (answer \"yes\" for attempt to resuscitate) or would you prefer a natural death (answer \"no\" for do not attempt to resuscitate)? \" yes     [] Yes   [x] No   Educated Patient / Nuzhat Cruz regarding differences between Advance Directives and portable DNR orders.     Length of ACP Conversation in minutes:  10    Conversation Outcomes:  [x] ACP discussion completed  [] Existing advance directive reviewed with patient; no changes to patient's previously recorded wishes  [] New Advance Directive completed  [] Portable Do Not Rescitate prepared for Provider review and signature  [] POLST/POST/MOLST/MOST prepared for Provider review and signature      Follow-up plan:    [] Schedule follow-up conversation to continue planning  [] Referred individual to Provider for additional questions/concerns   [] Advised patient/agent/surrogate to review completed ACP document and update if needed with changes in condition, patient preferences or care setting    [x] This note routed to one or more involved healthcare providers

## 2020-11-24 NOTE — PROGRESS NOTES
Comprehensive Nutrition Assessment    Type and Reason for Visit:  Initial, Positive Nutrition Screen    Nutrition Recommendations/Plan:   Low Na/2000 ml fl res  Ensure BID  Will monitor nutritional adequacy, nutrition-related labs, weights, BMs, and clinical progress     Nutrition Assessment:  + Screen for RD consult needed. Pt presented with weakness, fatigue, and poor appetite for last 5 days. Pt with Hx of COPD. Found to have Covid-19. Also with new pulmonary nodule. Low Na/2000 ml fl res diet ordered, no intakes recorded. Appears  lb, currently 153 lb (though weights flucuate this admission). Will trial ONS. Malnutrition Assessment:  Malnutrition Status:  Insufficient data      Due to current CDC guidelines recommending 6-ft distancing for social isolation for COVID19 prevention, NFPE/malnutrition assessment was deferred at this time. Estimated Daily Nutrient Needs:  Energy (kcal):  4863-0769 kcal (20-25 kcal/kg ABW); Weight Used for Energy Requirements:  Current     Protein (g):  69-83 gm (1-1.2 gm/kg ABW); Weight Used for Protein Requirements:  Current        Fluid (ml/day):   ; Method Used for Fluid Requirements:  1 ml/kcal      Nutrition Related Findings:  trace edema to BLE; reviewed labs      Wounds:  None       Current Nutrition Therapies:    DIET GENERAL; Low Sodium (2 GM); Daily Fluid Restriction: 2000 ml    Anthropometric Measures:  · Height: 5' 3\" (160 cm)  · Current Body Weight: 153 lb (69.4 kg)   · Admission Body Weight: 169 lb (76.7 kg)    · Ideal Body Weight: 115 lbs; % Ideal Body Weight 133 %   · BMI: 27.1  · Adjusted Body Weight:  ; No Adjustment     · BMI Categories: Overweight (BMI 25.0-29. 9)       Nutrition Diagnosis:   · Inadequate oral intake related to (acute illness) as evidenced by poor intake prior to admission      Nutrition Interventions:   Food and/or Nutrient Delivery:  Continue Current Diet, Start Oral Nutrition Supplement  Nutrition Education/Counseling:  No recommendation at this time   Coordination of Nutrition Care:  Continue to monitor while inpatient    Goals: Tolerate diet and consume greater than 50% of meals and supplements this admission       Nutrition Monitoring and Evaluation:   Behavioral-Environmental Outcomes:  None Identified   Food/Nutrient Intake Outcomes:  Food and Nutrient Intake, Supplement Intake  Physical Signs/Symptoms Outcomes:  Biochemical Data, Hemodynamic Status, Nutrition Focused Physical Findings, Skin, Weight     Discharge Planning:     Too soon to determine     Electronically signed by Moira Bowen RD, LD on 11/24/20 at 1:40 PM EST    Contact: [phone]

## 2020-11-24 NOTE — PROGRESS NOTES
Daughter Loyd Urbina updated on Pt condition. She requested a call from the doctor for more specific questions. Dr Vasques Loud notified. Consent signed for plasma.

## 2020-11-24 NOTE — H&P
Hospitalist  History and Physical    Patient:  Zulma Mckay  MRN: [id number]  PCP: Keisha Mckee MD    CHIEF COMPLAINT:  Fatigue      HISTORY OF PRESENT ILLNESS:   The patient Zulma Mckay is a 80 y. o.female With medical history significant for anxiety depression coronary artery disease COPD and degenerative disc disease. Patient presented to the emergency room with generalized fatigue poor appetite and not feeling well. Patient reports that she has not been feeling well for the last 5 days. Patient reports increasing shortness of breath cough productive of green to yellow sputum. Patient has a history of COPD and she veers a 3 L nasal cannula oxygen at home. At the time of presentation patient's oxygen saturation was 85% on 3 L nasal cannula oxygen patient denies chest pain. Patient denied fever no history of nausea vomiting or diarrhea. Patient denies any urinary symptoms            Past Medical History:        Diagnosis Date    Abdominal pain, unspecified site     Adrenal hyperplasia ([address] 6/11/2013    Left - CT scan - 2011    Ankle Fracture, Right     Anxiety and depression     Aortic atherosclerosis ([address] 10/3/2015    CAD (coronary artery disease) 2018    PCI LAD 3.0 x 15 mm and 3.5 x 15 mm BMS; RCA 2.5 x 12 mm and 2.5 x 15 mm BMS.  EF 65%    Contusion of unspecified site     COPD (chronic obstructive pulmonary disease) ([address] 3/1/2013    Coronary artery disease     Arteriosclerosis    DJD (degenerative joint disease)     Eczema 8/9/2013    Essential hypertension 9/9/2015    Hyperlipidemia     Hypertension     Hypoxia 7/16/2015    Insomnia     Leukocytosis     Memory loss     Nicotine addiction     Osteoarthritis     Pain in joint, pelvic region and thigh     Primary osteoarthritis involving multiple joints 9/9/2015    Pulmonary hypertension ([address] 10/3/2015    Senile reticular degeneration of peripheral retina     Syncope     Thoracic or lumbosacral neuritis or radiculitis, HFA) 108 (90 Base) MCG/ACT inhaler Inhale 2 puffs into the lungs 4 times daily as needed for Wheezing 7/13/20  Yes Haung Brown MD   amLODIPine (NORVASC) 5 MG tablet TAKE ONE TABLET BY MOUTH TWICE A DAY 6/16/20  Yes Huang Brown MD   furosemide (LASIX) 20 MG tablet Take 1 tablet by mouth 2 times daily 6/4/20  Yes Huang Brown MD   QUEtiapine (SEROQUEL) 50 MG tablet 1 tablet daily 4/13/20  Yes Huang Brown MD   aspirin EC 81 MG EC tablet Take 1 tablet by mouth daily 10/22/19  Yes Huang Brown MD   traZODone (DESYREL) 50 MG tablet Take 1 tablet by mouth nightly 10/22/19  Yes Huang Brown MD   pantoprazole (PROTONIX) 40 MG tablet TAKE ONE TABLET BY MOUTH EVERY MORNING BEFORE BREAKFAST 10/8/19  Yes JAME Ross - CNP   rOPINIRole (REQUIP) 0.25 MG tablet 1 tablet nightly 9/10/19  Yes Huang Brown MD   OXYGEN Inhale 2 L/min into the lungs continuous 8/19/15  Yes Gab Godoy MD       Allergies:  Enalapril      Social History:   TOBACCO:   reports that she quit smoking about 2 years ago. Her smoking use included cigarettes. She has a 65.00 pack-year smoking history. She has never used smokeless tobacco.  ETOH:   reports current alcohol use. Family History:       Problem Relation Age of Onset    Heart Attack Father     Heart Attack Brother            REVIEW OF SYSTEMS:     patients reported symptoms are in BOLD all other symptoms are negative. CONSTITUTIONAL:      fatigue, fever, chills or night sweats, recent weight gain, recent wt loss, insomnia,  General weakness, poor appetite, muscle aches and pains    HEAD: headache, dizziness    EYES:      blurriness,  double vision, dryness,  discharge, irritation,diplopia    EARS:      hearing loss, vertigo, ear discharge,  Earache. Ringing in the ears. NOSE:      Rhinorrhea, sneezing, epistaxis.  Discharge, sinusitis,     MOUTH/THROAT:         sore throat, mouth ulcers, Hoarseness    RESPIRATORY:        Shortness of breath, wheezing,  cough, sputum, hemoptysis, obstructive sleep apnea,    CARDIOVASCULAR :      chest pain, palpitations, dyspnea on exercise, Lower extrimity edema (swelling),     GASTROINTESTINAL:       Dysphagia, Poor appetite,  Nausea, Vomiting, diarrhea, heartburn, abdominal pain. Blood in the stools, hematemesis. Pain with swallowing, constipation    GENITOURINARY:       Urinary frequency, hesitancy,  urgency, Dysuria, hematuria,  Urinary Incontinence. Urinary Retention. GYNECOLOGICAL: vaginal bleeding , vaginal discharge, menopause    MUSCULOSKELETAL:       joint swelling or stiffness, joint pain, muscle pain, balance problems, low back pain. NEUROLOGICAL:      Gait problems. Tremor. Dizziness. Pain and paresthesias, weakness in extremities. Seizures, memory loss,Generalized malaise. PSYCHLOGICAL:        Anxiety, depression    SKIN :      Rashes ulcers, skin color changes, easy bruisability, lymphadenopathy      Physical Exam:      Vitals: BP (!) 162/79   Pulse 100   Temp 98.2 °F (36.8 °C) (Oral)   Resp 18   Ht 5' 3\" (1.6 m)   Wt 172 lb 9.9 oz (78.3 kg)   SpO2 91%   BMI 30.58 kg/m²     Gen:          Alert and oriented x 3  Eyes: PERRL. No sclera icterus. No conjunctival injection. ENT: No discharge. Pharynx clear. External appearance of ears and nose normal.  Neck: Trachea midline. No obvious mass. Resp: Expiratory rhonchi  CV: Regular rate. Regular rhythm. No murmur or rub. No edema. GI: Non-tender. Non-distended. No hernia. Skin: Warm, dry, normal texture and turgor. Lymph: No cervical LAD. No supraclavicular LAD. M/S: / Ext. No cyanosis. No clubbing. No joint deformity. Neuro: Moves all four extremities. CN 2-12 tested, no deficits noted. Peripheral pulses and capillary refill is intact.       CBC:   Recent Labs     11/23/20  1259   WBC 3.4*   HGB 13.0        BMP:    Recent Labs     11/23/20  1300      K 3.8      CO2 23   BUN 13   CREATININE 1.1   GLUCOSE 128*     Hepatic:   Recent Labs 11/23/20  1300   AST 26   ALT 14   BILITOT 0.5   ALKPHOS 49     Troponin:   Recent Labs     11/23/20  1300   TROPONINI <0.01     BNP: No results for input(s): BNP in the last 72 hours. INR: No results for input(s): INR in the last 72 hours. Lab Results   Component Value Date    LABA1C 6.5 08/04/2020           No results for input(s): CKTOTAL in the last 72 hours. -----------------------------------------------------------------  XR CHEST PORTABLE   No acute cardiopulmonary disease.    COPD. CT CHEST PULMONARY EMBOLISM W CONTRAST   No evidence of pulmonary embolism or acute pulmonary abnormality.  Small    amount of scattered bilateral atelectasis.  Severe emphysematous changes.      15 mm lobular subpleural nodule in the mid left lower lobe suspicious for    primary bronchogenic carcinoma.           EKG  Normal sinus rhythmSeptal infarct , age undetermined  possiblyST-t abnormalities non-specific      ECHO   Normal left ventricle size, wall thickness, and systolic function with an   estimated ejection fraction of 55-60%. (2019)      Assessment / Plan     COPD Exacerbation J44.1  Steroids and bronchodilators and antibiotics  bronchial higiene  Consult to pulmonary      Pulmonary nodule  In the mid left lower lobe subpleural  Suspicion for bronchogenic carcinoma  Consult to pulmonary    Essential primary hypertension I10  Continue patient on home medication      Anxiety depression F 41.9  Continue on home medication    Continue other home medication        DVT and GI prophylaxis      Full Code      Deonte Avitia M.D    This note was transcribed using [id number] [facility]. Please disregard any translational errors.

## 2020-11-24 NOTE — PROGRESS NOTES
Per pt's daughter, her admission weight was too high. During last time pt was up, RN re-zeroed the bed and obtained a new weight. The new weight is closer to what the pt's daughter said she weighed.

## 2020-11-24 NOTE — CONSULTS
REASON FOR CONSULTATION/CC: copd      Consult at request of Emmei Queen MD for :     PCP: Harshad Sharif MD  Established Pulmonologist:  None    HISTORY OF PRESENT ILLNESS: Cathie Wong is a 80y.o. year old female with a history of     She presented yesterday with 5 days of symptoms of feeling shortness of breath cough with green production, fatigue and worsening hypoxemia. She is on 3 L nasal cannula at home secondary to COPD. PAST MEDICAL HISTORY:  Past Medical History:   Diagnosis Date    Abdominal pain, unspecified site     Adrenal hyperplasia ([address] 6/11/2013    Left - CT scan - 2011    Ankle Fracture, Right     Anxiety and depression     Aortic atherosclerosis ([address] 10/3/2015    CAD (coronary artery disease) 2018    PCI LAD 3.0 x 15 mm and 3.5 x 15 mm BMS; RCA 2.5 x 12 mm and 2.5 x 15 mm BMS. EF 65%    Contusion of unspecified site     COPD (chronic obstructive pulmonary disease) ([address] 3/1/2013    Coronary artery disease     Arteriosclerosis    DJD (degenerative joint disease)     Eczema 8/9/2013    Essential hypertension 9/9/2015    Hyperlipidemia     Hypertension     Hypoxia 7/16/2015    Insomnia     Leukocytosis     Memory loss     Nicotine addiction     Osteoarthritis     Pain in joint, pelvic region and thigh     Primary osteoarthritis involving multiple joints 9/9/2015    Pulmonary hypertension ([address] 10/3/2015    Senile reticular degeneration of peripheral retina     Syncope     Thoracic or lumbosacral neuritis or radiculitis, unspecified     Urinary incontinence     Visual hallucinations 9/16/2014    Wears glasses        PAST SURGICAL HISTORY:  Past Surgical History:   Procedure Laterality Date    CATARACT REMOVAL WITH IMPLANT Left December 5, 2012    Dr. Ozzy Godinez  08/01/2018       FAMILY HISTORY:  family history includes Heart Attack in her brother and father. SOCIAL HISTORY:   reports that she quit smoking about 2 years ago. Her smoking use included cigarettes. She has a 65.00 pack-year smoking history. She has never used smokeless tobacco.    Scheduled Meds:   amLODIPine  5 mg Oral BID    aspirin EC  81 mg Oral Daily    atorvastatin  40 mg Oral Nightly    metoprolol tartrate  25 mg Oral BID    pantoprazole  40 mg Oral QAM AC    PARoxetine  20 mg Oral Daily    QUEtiapine  50 mg Oral Nightly    rivaroxaban  20 mg Oral Daily with breakfast    rOPINIRole  0.25 mg Oral Nightly    traZODone  50 mg Oral Nightly    sodium chloride flush  10 mL Intravenous 2 times per day    ipratropium-albuterol  1 ampule Inhalation Q4H WA    methylPREDNISolone  40 mg Intravenous Q8H    Followed by   Stephanie Ambrose ON 11/26/2020] predniSONE  40 mg Oral Daily    doxycycline hyclate  100 mg Oral Q12H       Continuous Infusions:   sodium chloride 75 mL/hr at 11/23/20 2206       PRN Meds:  sodium chloride flush, acetaminophen **OR** acetaminophen, polyethylene glycol, promethazine **OR** ondansetron    ALLERGIES:  Patient is allergic to enalapril. REVIEW OF SYSTEMS:  Constitutional: Negative for fever   HENT: Negative for sore throat  Eyes: Negative for redness   Respiratory: ++ for dyspnea,+  cough  Cardiovascular: Negative for chest pain  Gastrointestinal: Negative for vomiting, diarrhea   Genitourinary: Negative for hematuria   Musculoskeletal: Negative for arthralgias   Skin: Negative for rash  Neurological: Negative for syncope  Hematological: Negative for adenopathy  Psychiatric/Behavorial: Negative for anxiety    Objective:   PHYSICAL EXAM:  Blood pressure (!) 142/58, pulse 75, temperature 97.9 °F (36.6 °C), temperature source Oral, resp. rate 14, height 5' 3\" (1.6 m), weight 153 lb (69.4 kg), SpO2 93 %, not currently breastfeeding.'  Gen: No distress. Eyes: PERRL. No sclera icterus. No conjunctival injection. ENT: No discharge. Pharynx clear.  External appearance of ears and nose normal.  Neck: Trachea midline. No obvious mass. Resp: No accessory muscle use. No crackles. + wheezes. No rhonchi. CV: Regular rate. Regular rhythm. No murmur or rub. No edema. GI: Non-tender. Non-distended. No hernia. Skin: Warm, dry, normal texture and turgor. No nodule on exposed extremities. Lymph: No cervical LAD. No supraclavicular LAD. M/S: No cyanosis. No clubbing. No joint deformity. Neuro: Moves all four extremities. Psych: Oriented x 3. No anxiety. Awake. Alert. Intact judgement and insight. Data Reviewed:   LABS:  CBC:   Recent Labs     11/23/20  1259   WBC 3.4*   HGB 13.0   HCT 40.7   MCV 84.9        BMP:   Recent Labs     11/23/20  1300      K 3.8      CO2 23   BUN 13   CREATININE 1.1     LIVER PROFILE:   Recent Labs     11/23/20  1300   AST 26   ALT 14   BILITOT 0.5   ALKPHOS 49     PT/INR: No results for input(s): PROTIME, INR in the last 72 hours. APTT: No results for input(s): APTT in the last 72 hours. UA:  Recent Labs     11/23/20  1300   COLORU DK YELLOW   PHUR 6.0   WBCUA 114*   RBCUA 7*   BACTERIA 1+*   CLARITYU CLOUDY*   SPECGRAV 1.024   LEUKOCYTESUR LARGE*   UROBILINOGEN 1.0   BILIRUBINUR SMALL*   BLOODU MODERATE*   GLUCOSEU Negative     No results for input(s): PHART, UJK3BKR, PO2ART in the last 72 hours. Vent Information  SpO2: 93 %    Radiology Review:  Pertinent images / reports were reviewed as a part of this visit. CT Chest w/ contrast: No results found for this or any previous visit. CT Chest w/o contrast:   Results for orders placed during the hospital encounter of 10/03/15   CT Chest WO Contrast    Narrative Clinical History: Shortness of breath. Comparisons: 10/5/2014 and 10/31/2006     Technique: Using helical technique, axial images of the chest were  obtained without the use of intravenous contrast.  Coronal  multiplanar reformats were also obtained. Findings:   Motion artifact degrades image quality.  The tracheobronchial tree  is patent. There is no pneumothorax or pleural effusion. Significant emphysema involves the bilateral lungs. No change in  the biapical and bibasalar scarring. Coronary artery calcifications are a marker of atherosclerosis. Diffuse atherosclerosis involves the entire aorta. The main  pulmonary artery is mildly dilated due to pulmonary arterial  hypertension. There are no enlarged thoracic lymph nodes. No change in the right simple renal cysts. Degenerative changes  involve the thoracic spine. Impression IMPRESSION:  1. No acute abnormality. CTPA:   Results for orders placed during the hospital encounter of 11/23/20   CT CHEST PULMONARY EMBOLISM W CONTRAST    Narrative EXAMINATION:  CTA OF THE CHEST 11/23/2020 2:32 pm    TECHNIQUE:  CTA of the chest was performed after the administration of intravenous  contrast.  Multiplanar reformatted images are provided for review. MIP  images are provided for review. Dose modulation, iterative reconstruction,  and/or weight based adjustment of the mA/kV was utilized to reduce the  radiation dose to as low as reasonably achievable. COMPARISON:  Portable chest 11/23/2020. CT chest 03/04/2019. HISTORY:  ORDERING SYSTEM PROVIDED HISTORY: shortness of breath, +dimer  TECHNOLOGIST PROVIDED HISTORY:  Reason for exam:->shortness of breath, +dimer  Reason for Exam: shortness of breath, +dimer  Acuity: Acute  Type of Exam: Initial    Former smoker with 65 pack-year history. FINDINGS:  Some respiratory motion present blurring the pulmonary findings. Pulmonary Arteries: Pulmonary arteries are adequately opacified for  evaluation. No evidence of intraluminal filling defect to suggest pulmonary  embolism. Main pulmonary artery is normal in caliber. Mediastinum: No evidence of mediastinal lymphadenopathy. The heart and  pericardium demonstrate no acute abnormality. There is no acute abnormality  of the thoracic aorta.   Thyroid appears normal.  Prominent coronary  calcifications. Lungs/pleura: Severe emphysematous changes. Lobular noncalcified subpleural  nodule posteriorly and laterally in the mid left lower lobe on axial image 58  and coronal image 106. Measurements are 12 mm AP, 15 mm across, and 9.7 mm  in height. Small amount of atelectasis posteriorly in the left lung base,  medial right middle lobe, and inferior lingula. Minimal scattered changes of  chronic interstitial lung disease present. No pleural effusion. Upper Abdomen: Large simple cyst or cysts anteriorly in the mid right kidney. Smaller simple cyst posteriorly in the upper right kidney. Density of the  cyst less than 20. No lesion in either adrenal gland or the upper liver. Soft Tissues/Bones: No acute bone or soft tissue abnormality. No destructive  bony lesion. Impression No evidence of pulmonary embolism or acute pulmonary abnormality. Small  amount of scattered bilateral atelectasis. Severe emphysematous changes. 15 mm lobular subpleural nodule in the mid left lower lobe suspicious for  primary bronchogenic carcinoma. RECOMMENDATIONS:  PET-CT could be performed for further evaluation of the left lower lobe  pulmonary nodule. CT-guided biopsy could also be easily performed for  histologic diagnosis. CXR PA/LAT:   Results for orders placed during the hospital encounter of 08/13/19   XR CHEST STANDARD (2 VW)    Narrative EXAMINATION:  TWO XRAY VIEWS OF THE CHEST    8/15/2019 1:40 pm    COMPARISON:  March 4, 2019    HISTORY:  ORDERING SYSTEM PROVIDED HISTORY: SOB  TECHNOLOGIST PROVIDED HISTORY:  Reason for exam:->SOB  Reason for Exam: sob  Acuity: Acute  Type of Exam: Subsequent/Follow-up    FINDINGS:  Cardiac silhouette is enlarged. No pneumothorax or pleural effusion. No  consolidation. No acute bony abnormality. Impression No acute findings. Resolution of pleural effusions.          CXR portable:   Results for orders placed during the hospital encounter of 11/23/20   XR CHEST PORTABLE    Narrative EXAMINATION:  ONE XRAY VIEW OF THE CHEST    11/23/2020 12:41 pm    COMPARISON:  08/15/2019. HISTORY:  ORDERING SYSTEM PROVIDED HISTORY: cough  TECHNOLOGIST PROVIDED HISTORY:  Reason for exam:->cough  Reason for Exam: cough  Acuity: Acute  Type of Exam: Initial    FINDINGS:  The cardiomediastinal silhouette is stable. Aortic vascular calcification. There is stable mild dependent left basilar atelectasis. No acute  infiltrate, pleural fluid or evidence of overt failure. Mild pulmonary  hyperinflation with flattening of the hemidiaphragms. Impression No acute cardiopulmonary disease. COPD. Assessment:     COVID-19 pneumonia  A COPD exacerbation secondary to COVID-19  Pulmonary nodule  Acute cystitis  Chronic hypoxemic respiratory failure 3 L nasal cannula  Acute hypoxemic respiratory failure with desaturation on chronic 3 L nasal cannula, less than 90%  Tobacco abuse  Insomnia, Seroquel and Ativan at home  Chronic diastolic heart failure  Mood disorder-Wellbutrin    Plan:      COVID-19 pneumonia  -Solu-Medrol 125 in the emergency room.  -Solu-Medrol 40 every 8 which is equivalent to 22.5 mg of dexamethasone daily.  -With underlying COPD, order convalescent plasma and remdesivir. COPD  -Duo nebs every 4 hours  -She is not clear her home inhaler medications. And Symbicort twice daily. Acute bronchitis  -Started on azithromycin and ceftriaxone and switch to doxycycline 100 twice daily.  -Check and follow procalcitonin. Pulmonary nodule  -Agree with need for a PET scan. This cannot be completed as inpatient. Therefore, she will follow-up with me as an outpatient for further work-up. -This result was discussed with her 3 times. She was told this may be cancer 3 times. On the last time, she repeated this back to me.       Acute cystitis  -Doxycycline    Tobacco abuse  -Start NRT        Future Appointments   Date Time Provider Melo Clemens   1/5/2021 10:40 AM Lisa Turner  [facility]            This note was transcribed using [id number] [address] [facility]. Please disregard any translational errors.     Thank you for the consult    Amy  Pulmonary, Sleep and Critical Care  [phone]

## 2020-11-24 NOTE — PROGRESS NOTES
Hospitalist Progress Note      PCP: Keisha Mckee MD    Chief Complaint. Presented to hospital for SOB    Date of Admission: 11/23/2020    Subjective:   denies chest pain, nausea, vomiting, fever or chills. mention feels overall better    Medications:  Reviewed    Infusion Medications    sodium chloride 75 mL/hr at 11/24/20 1132     Scheduled Medications    budesonide-formoterol  2 puff Inhalation BID    sodium chloride  20 mL Intravenous Once    nicotine  1 patch Transdermal Daily    [START ON 11/25/2020] remdesivir IVPB  100 mg Intravenous Q24H    albuterol-ipratropium  1 puff Inhalation Q4H WA    amLODIPine  5 mg Oral BID    aspirin EC  81 mg Oral Daily    atorvastatin  40 mg Oral Nightly    metoprolol tartrate  25 mg Oral BID    pantoprazole  40 mg Oral QAM AC    PARoxetine  20 mg Oral Daily    QUEtiapine  50 mg Oral Nightly    rivaroxaban  20 mg Oral Daily with breakfast    rOPINIRole  0.25 mg Oral Nightly    traZODone  50 mg Oral Nightly    sodium chloride flush  10 mL Intravenous 2 times per day    methylPREDNISolone  40 mg Intravenous Q8H    Followed by   Wander Jones ON 11/26/2020] predniSONE  40 mg Oral Daily    doxycycline hyclate  100 mg Oral Q12H     PRN Meds: sodium chloride, sodium chloride flush, acetaminophen **OR** acetaminophen, polyethylene glycol, promethazine **OR** ondansetron      Intake/Output Summary (Last 24 hours) at 11/24/2020 1510  Last data filed at 11/24/2020 0526  Gross per 24 hour   Intake 930 ml   Output 400 ml   Net 530 ml       Physical Exam Performed:    /66   Pulse 79   Temp 97.8 °F (36.6 °C) (Oral)   Resp 16   Ht 5' 3\" (1.6 m)   Wt 153 lb (69.4 kg)   SpO2 94%   BMI 27.10 kg/m²     General appearance: No apparent distress,   HEENT:  Conjunctivae/corneas clear. Neck: Supple, with full range of motion. Respiratory:  Normal respiratory effort.  B/l wheezing  Cardiovascular: Regular rate and rhythm with normal S1/S2 without murmurs or carcinoma  Consult to pulmonary - recommendations noted     Essential primary hypertension  Continue patient on home medication     Anxiety depression  Continue on home medication     Continue other home medication     DVT and GI prophylaxis        Full Code    DVT Prophylaxis:  Diet: DIET GENERAL; Low Sodium (2 GM);  Daily Fluid Restriction: 2000 ml  Dietary Nutrition Supplements: Standard High Calorie Oral Supplement  Code Status: Full Code    PT/OT Eval Status: ordered    Dispo - likely Mj Rouse MD

## 2020-11-24 NOTE — ED NOTES
Attempt to call report but told to wait 10 minutes due to nurse being busy at this time.      Misa Lynn, RN  11/23/20 2012

## 2020-11-24 NOTE — CARE COORDINATION
INITIAL CASE MANAGEMENT ASSESSMENT    Reviewed chart, unable to meet with patient due to isolation status. Called to patient's room, patient unable to answer at this time. Sw called to patient's daughter. Left voicemail requesting return phone call. SW/CM will speak with patient over the phone to assess possible discharge needs, explain Case Management role/services. And complete ACP documentation. Electronically signed by Alwin Councilman, MSW, LSW on 11/24/2020 at 11:55 AM       INITIAL CASE MANAGEMENT ASSESSMENT    Patient's daughter, Mary Lindsay, called Myn[facility] Smith assessed possible discharge needs. Explained Case Management role/services. Living Situation: Patient lives in the first floor of a two family home with her 21year old grandson, 4 steps to enter. Patient's daughter lives in the top floor. ADLs: Patient is independent in terms of personal care. Daughter reported patient receives assistance from daughter and grandson as needed. DME: Oxygen through [employer]. Baron Doan notified. PT/OT Recs: Not ordered at this time. Active Services: [employer] house cleaning 1 day a week, and meals on wheels. Transportation: Daughter drives patient. Family will transport patient home at discharge. Medications: [employer] on [address]     PCP: Hollie Eller MD      HD/PD: N/A    PLAN/COMMENTS: Patient plans to return home at discharge. Family to transport. No other needs identified at this time. Daughter confirmed she is patient's POA. SW/CM to complete ACP document with patient. CM provided contact information for patient or family to call with any questions. CM will follow and assist as needed. Electronically signed by Alwin Councilman, MSW, LSW on 11/24/2020 at 12:35 PM     ACP completed with patient via telephone due to isolation status. Document routed to PCP 11/25/2020. Patient reported she stopped smoking two weeks ago, Baron Doan with [employer] notified.    Electronically signed by Mayra Kan Reina Bloch, MSW, LSW on 11/25/2020 at 11:58 AM

## 2020-11-25 ENCOUNTER — TELEPHONE (OUTPATIENT)
Dept: INTERNAL MEDICINE CLINIC | Age: 85
End: 2020-11-25

## 2020-11-25 PROBLEM — Z72.0 TOBACCO ABUSE: Status: ACTIVE | Noted: 2018-12-04

## 2020-11-25 LAB
A/G RATIO: 1 (ref 1.1–2.2)
ALBUMIN SERPL-MCNC: 3.4 G/DL (ref 3.4–5)
ALP BLD-CCNC: 47 U/L (ref 40–129)
ALT SERPL-CCNC: 17 U/L (ref 10–40)
ANION GAP SERPL CALCULATED.3IONS-SCNC: 11 MMOL/L (ref 3–16)
AST SERPL-CCNC: 28 U/L (ref 15–37)
BILIRUB SERPL-MCNC: 0.5 MG/DL (ref 0–1)
BUN BLDV-MCNC: 11 MG/DL (ref 7–20)
CALCIUM SERPL-MCNC: 8.1 MG/DL (ref 8.3–10.6)
CHLORIDE BLD-SCNC: 105 MMOL/L (ref 99–110)
CO2: 24 MMOL/L (ref 21–32)
CREAT SERPL-MCNC: 0.7 MG/DL (ref 0.6–1.2)
GFR AFRICAN AMERICAN: >60
GFR NON-AFRICAN AMERICAN: >60
GLOBULIN: 3.4 G/DL
GLUCOSE BLD-MCNC: 159 MG/DL (ref 70–99)
POTASSIUM SERPL-SCNC: 3.4 MMOL/L (ref 3.5–5.1)
PROCALCITONIN: 0.04 NG/ML (ref 0–0.15)
SODIUM BLD-SCNC: 140 MMOL/L (ref 136–145)
TOTAL PROTEIN: 6.8 G/DL (ref 6.4–8.2)

## 2020-11-25 PROCEDURE — 2700000000 HC OXYGEN THERAPY PER DAY

## 2020-11-25 PROCEDURE — 2580000003 HC RX 258: Performed by: HOSPITALIST

## 2020-11-25 PROCEDURE — 2580000003 HC RX 258: Performed by: INTERNAL MEDICINE

## 2020-11-25 PROCEDURE — 99233 SBSQ HOSP IP/OBS HIGH 50: CPT | Performed by: INTERNAL MEDICINE

## 2020-11-25 PROCEDURE — 84145 PROCALCITONIN (PCT): CPT

## 2020-11-25 PROCEDURE — 6370000000 HC RX 637 (ALT 250 FOR IP): Performed by: INTERNAL MEDICINE

## 2020-11-25 PROCEDURE — 6370000000 HC RX 637 (ALT 250 FOR IP): Performed by: HOSPITALIST

## 2020-11-25 PROCEDURE — 36415 COLL VENOUS BLD VENIPUNCTURE: CPT

## 2020-11-25 PROCEDURE — 2500000003 HC RX 250 WO HCPCS: Performed by: INTERNAL MEDICINE

## 2020-11-25 PROCEDURE — 2060000000 HC ICU INTERMEDIATE R&B

## 2020-11-25 PROCEDURE — 94761 N-INVAS EAR/PLS OXIMETRY MLT: CPT

## 2020-11-25 PROCEDURE — 6360000002 HC RX W HCPCS: Performed by: HOSPITALIST

## 2020-11-25 PROCEDURE — 94640 AIRWAY INHALATION TREATMENT: CPT

## 2020-11-25 PROCEDURE — 80053 COMPREHEN METABOLIC PANEL: CPT

## 2020-11-25 RX ADMIN — Medication 2 PUFF: at 21:02

## 2020-11-25 RX ADMIN — AMLODIPINE BESYLATE 5 MG: 5 TABLET ORAL at 10:22

## 2020-11-25 RX ADMIN — SODIUM CHLORIDE, PRESERVATIVE FREE 10 ML: 5 INJECTION INTRAVENOUS at 20:34

## 2020-11-25 RX ADMIN — DOXYCYCLINE HYCLATE 100 MG: 100 TABLET, COATED ORAL at 09:22

## 2020-11-25 RX ADMIN — ASPIRIN 81 MG: 81 TABLET, FILM COATED ORAL at 09:22

## 2020-11-25 RX ADMIN — ATORVASTATIN CALCIUM 40 MG: 40 TABLET, FILM COATED ORAL at 20:33

## 2020-11-25 RX ADMIN — SODIUM CHLORIDE: 9 INJECTION, SOLUTION INTRAVENOUS at 05:56

## 2020-11-25 RX ADMIN — AMLODIPINE BESYLATE 5 MG: 5 TABLET ORAL at 20:34

## 2020-11-25 RX ADMIN — PAROXETINE HYDROCHLORIDE 20 MG: 20 TABLET, FILM COATED ORAL at 09:22

## 2020-11-25 RX ADMIN — METHYLPREDNISOLONE SODIUM SUCCINATE 40 MG: 40 INJECTION, POWDER, FOR SOLUTION INTRAMUSCULAR; INTRAVENOUS at 05:55

## 2020-11-25 RX ADMIN — IPRATROPIUM BROMIDE AND ALBUTEROL 1 PUFF: 20; 100 SPRAY, METERED RESPIRATORY (INHALATION) at 08:50

## 2020-11-25 RX ADMIN — QUETIAPINE FUMARATE 50 MG: 25 TABLET ORAL at 20:33

## 2020-11-25 RX ADMIN — REMDESIVIR 100 MG: 100 INJECTION, POWDER, LYOPHILIZED, FOR SOLUTION INTRAVENOUS at 14:13

## 2020-11-25 RX ADMIN — Medication 2 PUFF: at 08:54

## 2020-11-25 RX ADMIN — ROPINIROLE HYDROCHLORIDE 0.25 MG: 0.5 TABLET, FILM COATED ORAL at 20:33

## 2020-11-25 RX ADMIN — RIVAROXABAN 20 MG: 20 TABLET, FILM COATED ORAL at 09:24

## 2020-11-25 RX ADMIN — METOPROLOL TARTRATE 25 MG: 25 TABLET, FILM COATED ORAL at 20:33

## 2020-11-25 RX ADMIN — TRAZODONE HYDROCHLORIDE 50 MG: 50 TABLET ORAL at 20:33

## 2020-11-25 RX ADMIN — PANTOPRAZOLE SODIUM 40 MG: 40 TABLET, DELAYED RELEASE ORAL at 05:55

## 2020-11-25 RX ADMIN — DOXYCYCLINE HYCLATE 100 MG: 100 TABLET, COATED ORAL at 20:33

## 2020-11-25 RX ADMIN — METHYLPREDNISOLONE SODIUM SUCCINATE 40 MG: 40 INJECTION, POWDER, FOR SOLUTION INTRAMUSCULAR; INTRAVENOUS at 14:12

## 2020-11-25 RX ADMIN — METOPROLOL TARTRATE 25 MG: 25 TABLET, FILM COATED ORAL at 09:22

## 2020-11-25 RX ADMIN — SODIUM CHLORIDE, PRESERVATIVE FREE 10 ML: 5 INJECTION INTRAVENOUS at 09:22

## 2020-11-25 ASSESSMENT — PAIN SCALES - GENERAL
PAINLEVEL_OUTOF10: 0

## 2020-11-25 NOTE — PROGRESS NOTES
Physician Progress Note      PATIENT:               Samson Calderon  CSN #:                  [id number]  :                       1934  ADMIT DATE:       2020 12:20 PM  [address] [place] DATE:  RESPONDING  PROVIDER #:        Mel Ware MD          QUERY TEXT:    Pt admitted with exacerbation of COPD. Noted documentation of COVID-19   pneumonia and exacerbation of COPD secondary to COVID-19  in pulmonary consult   note. If possible, please document in progress notes and discharge summary:    The medical record reflects the following:  Risk Factors: 81 yo, COPD  Clinical Indicators: COVID detected ; increased supplemental O2   requirement  Treatment: Remdesivir, Convalescent plasma, Solumedrol, HHN    Thank you,  Bharat Chowdhury RN, BSN, CCDS  [email or web address]  Options provided:  -- COVID-19 pneumonia confirmed and present on admission  -- COVID- 19 pneumonia with exacerbation of COPD confirmed and present on   admission  -- COVID-19 pneumonia ruled out  -- Other - I will add my own diagnosis  -- Disagree - Not applicable / Not valid  -- Disagree - Clinically unable to determine / Unknown  -- Refer to Clinical Documentation Reviewer    PROVIDER RESPONSE TEXT:    The diagnosis of COVID -1 9 pneumonia was confirmed and present on admission.     Query created by: Erlinda Martin on 2020 12:52 PM      Electronically signed by:  Mel Ware MD 2020 2:48 PM

## 2020-11-25 NOTE — PROGRESS NOTES
Pulmonary Progress Note    Date of Admission: 11/23/2020   LOS: 2 days       CC:  COVID-19 with COPD exacerbation    Subjective:  Feeling significantly better. Less shortness of breath. No wheezes. ROS:   No nausea  No Vomiting  No chest pain        PHYSICAL EXAM:   Blood pressure (!) 158/69, pulse 80, temperature 98 °F (36.7 °C), temperature source Oral, resp. rate 18, height 5' 3\" (1.6 m), weight 152 lb 1.9 oz (69 kg), SpO2 91 %, not currently breastfeeding.'  Gen: No distress. Eyes: PERRL. No sclera icterus. No conjunctival injection. ENT: No discharge. Pharynx clear. External appearance of ears and nose normal.  Neck: Trachea midline. No obvious mass. Resp: No accessory muscle use. No crackles. No wheezes. No rhonchi. CV: Regular rate. Regular rhythm. No murmur or rub. No edema. GI: Non-tender. Non-distended. No hernia. Skin: Warm, dry, normal texture and turgor. No nodule on exposed extremities. Lymph: No cervical LAD. No supraclavicular LAD. M/S: No cyanosis. No clubbing. No joint deformity. Neuro: Moves all four extremities. Psych: Oriented x 3. No anxiety. Awake. Alert. Intact judgement and insight.       Medications:    Scheduled Meds:   budesonide-formoterol  2 puff Inhalation BID    sodium chloride  20 mL Intravenous Once    nicotine  1 patch Transdermal Daily    remdesivir IVPB  100 mg Intravenous Q24H    albuterol-ipratropium  1 puff Inhalation Q4H WA    amLODIPine  5 mg Oral BID    aspirin EC  81 mg Oral Daily    atorvastatin  40 mg Oral Nightly    metoprolol tartrate  25 mg Oral BID    pantoprazole  40 mg Oral QAM AC    PARoxetine  20 mg Oral Daily    QUEtiapine  50 mg Oral Nightly    rivaroxaban  20 mg Oral Daily with breakfast    rOPINIRole  0.25 mg Oral Nightly    traZODone  50 mg Oral Nightly    sodium chloride flush  10 mL Intravenous 2 times per day    methylPREDNISolone  40 mg Intravenous Q8H    Followed by   Arminda Alvarez ON 11/26/2020] predniSONE  40 mg Oral Daily    doxycycline hyclate  100 mg Oral Q12H       Continuous Infusions:   sodium chloride 75 mL/hr at 11/25/20 0556       PRN Meds:  sodium chloride, sodium chloride flush, acetaminophen **OR** acetaminophen, polyethylene glycol, promethazine **OR** ondansetron    Labs reviewed:  CBC:   Recent Labs     11/23/20  1259 11/24/20  0810   WBC 3.4* 2.1*   HGB 13.0 12.4   HCT 40.7 38.8   MCV 84.9 84.8    187     BMP:   Recent Labs     11/23/20  1300 11/25/20  0744    140   K 3.8 3.4*    105   CO2 23 24   BUN 13 11   CREATININE 1.1 0.7     LIVER PROFILE:   Recent Labs     11/23/20  1300 11/25/20  0744   AST 26 28   ALT 14 17   BILITOT 0.5 0.5   ALKPHOS 49 47     PT/INR: No results for input(s): PROTIME, INR in the last 72 hours. APTT: No results for input(s): APTT in the last 72 hours. UA:  Recent Labs     11/23/20  1300   COLORU DK YELLOW   PHUR 6.0   WBCUA 114*   RBCUA 7*   BACTERIA 1+*   CLARITYU CLOUDY*   SPECGRAV 1.024   LEUKOCYTESUR LARGE*   UROBILINOGEN 1.0   BILIRUBINUR SMALL*   BLOODU MODERATE*   GLUCOSEU Negative     No results for input(s): PH, PCO2, PO2 in the last 72 hours. Cx:      Films:       Assessment:     COVID-19 pneumonia  COPD exacerbation secondary to COVID-19  Pulmonary nodule  Acute cystitis  Chronic hypoxemic respiratory failure 3 L nasal cannula  Acute hypoxemic respiratory failure with desaturation on chronic 3 L nasal cannula, less than 90%  Tobacco abuse  Insomnia, Seroquel and Ativan at home  Chronic diastolic heart failure  Mood disorder-Wellbutrin       Plan:        Hospital Day: 2     COVID-19 pneumonia  -Solu-Medrol 125 in the emergency room.  -Solu-Medrol 40 every 8 which is equivalent to 22.5 mg of dexamethasone daily. - s/p  convalescent plasma   - remdesivir protocol end 11/28     COPD  -Duo nebs every 4 hours. Wean to q6. -  Symbicort twice daily.  - d/c ivf        Acute bronchitis  -  doxycycline 100 twice daily.   - low Procalcitonin will stop checking.          Pulmonary nodule  -Agree with need for a PET scan. This cannot be completed as inpatient. Therefore, she will follow-up with me as an outpatient for further work-up.        Acute cystitis  -Doxycycline     Tobacco abuse  -  NRT    She is wanting to consider discharge today. Since she is on Remdesivir, want to monitor for least 1 further day. This note was transcribed using [id number] [employer]. Please disregard any translational errors.       Amy Christianson [phone]

## 2020-11-25 NOTE — PROGRESS NOTES
Hospitalist Progress Note      PCP: Denice Rod MD    Chief Complaint. Presented to hospital for SOB    Date of Admission: 11/23/2020    Subjective:   denies chest pain, nausea, vomiting, fever or chills. mention feels overall better    Medications:  Reviewed    Infusion Medications     Scheduled Medications    albuterol-ipratropium  1 puff Inhalation Q6H    budesonide-formoterol  2 puff Inhalation BID    sodium chloride  20 mL Intravenous Once    nicotine  1 patch Transdermal Daily    remdesivir IVPB  100 mg Intravenous Q24H    amLODIPine  5 mg Oral BID    aspirin EC  81 mg Oral Daily    atorvastatin  40 mg Oral Nightly    metoprolol tartrate  25 mg Oral BID    pantoprazole  40 mg Oral QAM AC    PARoxetine  20 mg Oral Daily    QUEtiapine  50 mg Oral Nightly    rivaroxaban  20 mg Oral Daily with breakfast    rOPINIRole  0.25 mg Oral Nightly    traZODone  50 mg Oral Nightly    sodium chloride flush  10 mL Intravenous 2 times per day    methylPREDNISolone  40 mg Intravenous Q8H    Followed by   Helene Brown ON 11/26/2020] predniSONE  40 mg Oral Daily    doxycycline hyclate  100 mg Oral Q12H     PRN Meds: sodium chloride, sodium chloride flush, acetaminophen **OR** acetaminophen, polyethylene glycol, promethazine **OR** ondansetron      Intake/Output Summary (Last 24 hours) at 11/25/2020 1448  Last data filed at 11/25/2020 1217  Gross per 24 hour   Intake 1590.45 ml   Output 2400 ml   Net -809.55 ml       Physical Exam Performed:    BP (!) 148/70   Pulse 73   Temp 98.1 °F (36.7 °C) (Oral)   Resp 18   Ht 5' 3\" (1.6 m)   Wt 152 lb 1.9 oz (69 kg)   SpO2 90%   BMI 26.95 kg/m²     General appearance: No apparent distress,   HEENT:  Conjunctivae/corneas clear. Neck: Supple, with full range of motion. Respiratory:  Normal respiratory effort.  B/l wheezing  Cardiovascular: Regular rate and rhythm with normal S1/S2 without murmurs or rubs  Abdomen: Soft, non-tender, non-distended, normal bowel sounds. Musculoskeletal: No cyanosis or edema bilaterally  Neurologic:  without any focal sensory/motor deficits. grossly non-focal.  Psychiatric: Alert and oriented, Normal mood  Peripheral Pulses: +2 palpable, equal bilaterally       Labs:   Recent Labs     11/23/20  1259 11/24/20  0810   WBC 3.4* 2.1*   HGB 13.0 12.4   HCT 40.7 38.8    187     Recent Labs     11/23/20  1300 11/25/20  0744    140   K 3.8 3.4*    105   CO2 23 24   BUN 13 11   CREATININE 1.1 0.7   CALCIUM 8.4 8.1*     Recent Labs     11/23/20  1300 11/25/20  0744   AST 26 28   ALT 14 17   BILITOT 0.5 0.5   ALKPHOS 49 47     No results for input(s): INR in the last 72 hours. Recent Labs     11/23/20  1300   TROPONINI <0.01       Urinalysis:      Lab Results   Component Value Date    NITRU Negative 11/23/2020    WBCUA 114 11/23/2020    BACTERIA 1+ 11/23/2020    RBCUA 7 11/23/2020    BLOODU MODERATE 11/23/2020    SPECGRAV 1.024 11/23/2020    GLUCOSEU Negative 11/23/2020    GLUCOSEU Negative 05/27/2011       Radiology:  CT CHEST PULMONARY EMBOLISM W CONTRAST   Final Result   No evidence of pulmonary embolism or acute pulmonary abnormality. Small   amount of scattered bilateral atelectasis. Severe emphysematous changes. 15 mm lobular subpleural nodule in the mid left lower lobe suspicious for   primary bronchogenic carcinoma. RECOMMENDATIONS:   PET-CT could be performed for further evaluation of the left lower lobe   pulmonary nodule. CT-guided biopsy could also be easily performed for   histologic diagnosis. XR CHEST PORTABLE   Final Result   No acute cardiopulmonary disease. COPD.                Assessment/Plan:    Active Hospital Problems    Diagnosis    COVID-19 [U07.1]    Chronic hypoxemic respiratory failure (HCC) [J96.11]    Acute respiratory failure with hypoxia (HCC) [J96.01]    Pulmonary nodule seen on imaging study [R91.1]    Acute bronchiolitis [J21.9]    COPD exacerbation ([address] [J44.1]    Tobacco abuse [Z72.0]     COVID19 PNA  - Steroids, convalescent plasma ordered  - started on Remdesvir, last dose 11/28    COPD Exacerbation   Steroids and bronchodilators and antibiotics  bronchial higiene    Pulmonary nodule  In the mid left lower lobe subpleural  Suspicion for bronchogenic carcinoma  Consult to pulmonary - recommendations noted     Essential primary hypertension  Continue patient on home medication     Anxiety depression  Continue on home medication     Continue other home medication     DVT and GI prophylaxis        Full Code    DVT Prophylaxis:  Diet: DIET GENERAL; Low Sodium (2 GM);  Daily Fluid Restriction: 2000 ml  Dietary Nutrition Supplements: Standard High Calorie Oral Supplement  Code Status: Full Code    PT/OT Eval Status: ordered    Dispo - likely DC 11/28, started on Allyssa Aleman MD

## 2020-11-25 NOTE — TELEPHONE ENCOUNTER
Patient was admitted late last night to [address] [place] [address],[address] has tested positive for covid

## 2020-11-26 LAB
A/G RATIO: 0.9 (ref 1.1–2.2)
ALBUMIN SERPL-MCNC: 3.3 G/DL (ref 3.4–5)
ALP BLD-CCNC: 50 U/L (ref 40–129)
ALT SERPL-CCNC: 21 U/L (ref 10–40)
ANION GAP SERPL CALCULATED.3IONS-SCNC: 9 MMOL/L (ref 3–16)
AST SERPL-CCNC: 27 U/L (ref 15–37)
BILIRUB SERPL-MCNC: 0.6 MG/DL (ref 0–1)
BUN BLDV-MCNC: 18 MG/DL (ref 7–20)
CALCIUM SERPL-MCNC: 8.4 MG/DL (ref 8.3–10.6)
CHLORIDE BLD-SCNC: 102 MMOL/L (ref 99–110)
CO2: 28 MMOL/L (ref 21–32)
CREAT SERPL-MCNC: 0.9 MG/DL (ref 0.6–1.2)
GFR AFRICAN AMERICAN: >60
GFR NON-AFRICAN AMERICAN: 59
GLOBULIN: 3.6 G/DL
GLUCOSE BLD-MCNC: 149 MG/DL (ref 70–99)
POTASSIUM SERPL-SCNC: 3.3 MMOL/L (ref 3.5–5.1)
SODIUM BLD-SCNC: 139 MMOL/L (ref 136–145)
TOTAL PROTEIN: 6.9 G/DL (ref 6.4–8.2)

## 2020-11-26 PROCEDURE — 2500000003 HC RX 250 WO HCPCS: Performed by: INTERNAL MEDICINE

## 2020-11-26 PROCEDURE — 6370000000 HC RX 637 (ALT 250 FOR IP): Performed by: INTERNAL MEDICINE

## 2020-11-26 PROCEDURE — 2580000003 HC RX 258: Performed by: HOSPITALIST

## 2020-11-26 PROCEDURE — 6370000000 HC RX 637 (ALT 250 FOR IP): Performed by: HOSPITALIST

## 2020-11-26 PROCEDURE — 36415 COLL VENOUS BLD VENIPUNCTURE: CPT

## 2020-11-26 PROCEDURE — 80053 COMPREHEN METABOLIC PANEL: CPT

## 2020-11-26 PROCEDURE — 94640 AIRWAY INHALATION TREATMENT: CPT

## 2020-11-26 PROCEDURE — 94761 N-INVAS EAR/PLS OXIMETRY MLT: CPT

## 2020-11-26 PROCEDURE — 2580000003 HC RX 258: Performed by: INTERNAL MEDICINE

## 2020-11-26 PROCEDURE — 2060000000 HC ICU INTERMEDIATE R&B

## 2020-11-26 RX ORDER — POTASSIUM CHLORIDE 20 MEQ/1
20 TABLET, EXTENDED RELEASE ORAL ONCE
Status: COMPLETED | OUTPATIENT
Start: 2020-11-26 | End: 2020-11-26

## 2020-11-26 RX ADMIN — DOXYCYCLINE HYCLATE 100 MG: 100 TABLET, COATED ORAL at 20:42

## 2020-11-26 RX ADMIN — SODIUM CHLORIDE, PRESERVATIVE FREE 10 ML: 5 INJECTION INTRAVENOUS at 20:42

## 2020-11-26 RX ADMIN — PANTOPRAZOLE SODIUM 40 MG: 40 TABLET, DELAYED RELEASE ORAL at 07:01

## 2020-11-26 RX ADMIN — PREDNISONE 40 MG: 20 TABLET ORAL at 08:08

## 2020-11-26 RX ADMIN — TRAZODONE HYDROCHLORIDE 50 MG: 50 TABLET ORAL at 20:42

## 2020-11-26 RX ADMIN — ATORVASTATIN CALCIUM 40 MG: 40 TABLET, FILM COATED ORAL at 20:42

## 2020-11-26 RX ADMIN — POTASSIUM CHLORIDE 20 MEQ: 1500 TABLET, EXTENDED RELEASE ORAL at 16:25

## 2020-11-26 RX ADMIN — ASPIRIN 81 MG: 81 TABLET, FILM COATED ORAL at 08:07

## 2020-11-26 RX ADMIN — Medication 2 PUFF: at 20:58

## 2020-11-26 RX ADMIN — ROPINIROLE HYDROCHLORIDE 0.25 MG: 0.5 TABLET, FILM COATED ORAL at 20:42

## 2020-11-26 RX ADMIN — AMLODIPINE BESYLATE 5 MG: 5 TABLET ORAL at 20:42

## 2020-11-26 RX ADMIN — AMLODIPINE BESYLATE 5 MG: 5 TABLET ORAL at 08:07

## 2020-11-26 RX ADMIN — QUETIAPINE FUMARATE 50 MG: 25 TABLET ORAL at 20:42

## 2020-11-26 RX ADMIN — METOPROLOL TARTRATE 25 MG: 25 TABLET, FILM COATED ORAL at 20:42

## 2020-11-26 RX ADMIN — DOXYCYCLINE HYCLATE 100 MG: 100 TABLET, COATED ORAL at 08:07

## 2020-11-26 RX ADMIN — RIVAROXABAN 20 MG: 20 TABLET, FILM COATED ORAL at 08:07

## 2020-11-26 RX ADMIN — REMDESIVIR 100 MG: 100 INJECTION, POWDER, LYOPHILIZED, FOR SOLUTION INTRAVENOUS at 14:46

## 2020-11-26 RX ADMIN — SODIUM CHLORIDE, PRESERVATIVE FREE 10 ML: 5 INJECTION INTRAVENOUS at 08:08

## 2020-11-26 RX ADMIN — ACETAMINOPHEN 650 MG: 325 TABLET ORAL at 16:28

## 2020-11-26 RX ADMIN — METOPROLOL TARTRATE 25 MG: 25 TABLET, FILM COATED ORAL at 08:07

## 2020-11-26 RX ADMIN — PAROXETINE HYDROCHLORIDE 20 MG: 20 TABLET, FILM COATED ORAL at 08:08

## 2020-11-26 RX ADMIN — Medication 2 PUFF: at 09:02

## 2020-11-26 ASSESSMENT — PAIN DESCRIPTION - PAIN TYPE: TYPE: ACUTE PAIN

## 2020-11-26 ASSESSMENT — PAIN SCALES - GENERAL
PAINLEVEL_OUTOF10: 0
PAINLEVEL_OUTOF10: 10
PAINLEVEL_OUTOF10: 0

## 2020-11-26 ASSESSMENT — PAIN DESCRIPTION - LOCATION: LOCATION: HEAD

## 2020-11-26 ASSESSMENT — PAIN DESCRIPTION - ORIENTATION: ORIENTATION: MID

## 2020-11-26 ASSESSMENT — PAIN - FUNCTIONAL ASSESSMENT: PAIN_FUNCTIONAL_ASSESSMENT: ACTIVITIES ARE NOT PREVENTED

## 2020-11-26 ASSESSMENT — PAIN DESCRIPTION - DESCRIPTORS: DESCRIPTORS: HEADACHE

## 2020-11-26 ASSESSMENT — PAIN DESCRIPTION - PROGRESSION: CLINICAL_PROGRESSION: NOT CHANGED

## 2020-11-26 ASSESSMENT — PAIN DESCRIPTION - ONSET: ONSET: GRADUAL

## 2020-11-26 ASSESSMENT — PAIN DESCRIPTION - FREQUENCY: FREQUENCY: CONTINUOUS

## 2020-11-27 LAB
A/G RATIO: 0.9 (ref 1.1–2.2)
ALBUMIN SERPL-MCNC: 3.3 G/DL (ref 3.4–5)
ALP BLD-CCNC: 53 U/L (ref 40–129)
ALT SERPL-CCNC: 23 U/L (ref 10–40)
ANION GAP SERPL CALCULATED.3IONS-SCNC: 9 MMOL/L (ref 3–16)
AST SERPL-CCNC: 21 U/L (ref 15–37)
BILIRUB SERPL-MCNC: 0.6 MG/DL (ref 0–1)
BUN BLDV-MCNC: 23 MG/DL (ref 7–20)
CALCIUM SERPL-MCNC: 8.6 MG/DL (ref 8.3–10.6)
CHLORIDE BLD-SCNC: 101 MMOL/L (ref 99–110)
CO2: 30 MMOL/L (ref 21–32)
CREAT SERPL-MCNC: 0.9 MG/DL (ref 0.6–1.2)
GFR AFRICAN AMERICAN: >60
GFR NON-AFRICAN AMERICAN: 59
GLOBULIN: 3.5 G/DL
GLUCOSE BLD-MCNC: 134 MG/DL (ref 70–99)
POTASSIUM SERPL-SCNC: 3.7 MMOL/L (ref 3.5–5.1)
SODIUM BLD-SCNC: 140 MMOL/L (ref 136–145)
TOTAL PROTEIN: 6.8 G/DL (ref 6.4–8.2)

## 2020-11-27 PROCEDURE — 2580000003 HC RX 258: Performed by: INTERNAL MEDICINE

## 2020-11-27 PROCEDURE — 2500000003 HC RX 250 WO HCPCS: Performed by: INTERNAL MEDICINE

## 2020-11-27 PROCEDURE — 2060000000 HC ICU INTERMEDIATE R&B

## 2020-11-27 PROCEDURE — 6370000000 HC RX 637 (ALT 250 FOR IP): Performed by: HOSPITALIST

## 2020-11-27 PROCEDURE — 94640 AIRWAY INHALATION TREATMENT: CPT

## 2020-11-27 PROCEDURE — 6370000000 HC RX 637 (ALT 250 FOR IP): Performed by: INTERNAL MEDICINE

## 2020-11-27 PROCEDURE — 94761 N-INVAS EAR/PLS OXIMETRY MLT: CPT

## 2020-11-27 PROCEDURE — 2580000003 HC RX 258: Performed by: HOSPITALIST

## 2020-11-27 PROCEDURE — 2700000000 HC OXYGEN THERAPY PER DAY

## 2020-11-27 PROCEDURE — 36415 COLL VENOUS BLD VENIPUNCTURE: CPT

## 2020-11-27 PROCEDURE — 80053 COMPREHEN METABOLIC PANEL: CPT

## 2020-11-27 RX ADMIN — REMDESIVIR 100 MG: 100 INJECTION, POWDER, LYOPHILIZED, FOR SOLUTION INTRAVENOUS at 14:33

## 2020-11-27 RX ADMIN — ROPINIROLE HYDROCHLORIDE 0.25 MG: 0.5 TABLET, FILM COATED ORAL at 20:40

## 2020-11-27 RX ADMIN — PREDNISONE 40 MG: 20 TABLET ORAL at 09:23

## 2020-11-27 RX ADMIN — AMLODIPINE BESYLATE 5 MG: 5 TABLET ORAL at 20:41

## 2020-11-27 RX ADMIN — RIVAROXABAN 20 MG: 20 TABLET, FILM COATED ORAL at 09:23

## 2020-11-27 RX ADMIN — ASPIRIN 81 MG: 81 TABLET, FILM COATED ORAL at 09:23

## 2020-11-27 RX ADMIN — METOPROLOL TARTRATE 25 MG: 25 TABLET, FILM COATED ORAL at 20:41

## 2020-11-27 RX ADMIN — Medication 2 PUFF: at 08:41

## 2020-11-27 RX ADMIN — TRAZODONE HYDROCHLORIDE 50 MG: 50 TABLET ORAL at 20:40

## 2020-11-27 RX ADMIN — PANTOPRAZOLE SODIUM 40 MG: 40 TABLET, DELAYED RELEASE ORAL at 09:23

## 2020-11-27 RX ADMIN — AMLODIPINE BESYLATE 5 MG: 5 TABLET ORAL at 09:23

## 2020-11-27 RX ADMIN — QUETIAPINE FUMARATE 50 MG: 25 TABLET ORAL at 20:41

## 2020-11-27 RX ADMIN — SODIUM CHLORIDE, PRESERVATIVE FREE 10 ML: 5 INJECTION INTRAVENOUS at 09:24

## 2020-11-27 RX ADMIN — SODIUM CHLORIDE, PRESERVATIVE FREE 10 ML: 5 INJECTION INTRAVENOUS at 20:41

## 2020-11-27 RX ADMIN — Medication 2 PUFF: at 20:13

## 2020-11-27 RX ADMIN — METOPROLOL TARTRATE 25 MG: 25 TABLET, FILM COATED ORAL at 09:23

## 2020-11-27 RX ADMIN — DOXYCYCLINE HYCLATE 100 MG: 100 TABLET, COATED ORAL at 20:41

## 2020-11-27 RX ADMIN — DOXYCYCLINE HYCLATE 100 MG: 100 TABLET, COATED ORAL at 09:23

## 2020-11-27 RX ADMIN — PAROXETINE HYDROCHLORIDE 20 MG: 20 TABLET, FILM COATED ORAL at 09:23

## 2020-11-27 RX ADMIN — ATORVASTATIN CALCIUM 40 MG: 40 TABLET, FILM COATED ORAL at 20:41

## 2020-11-27 ASSESSMENT — PAIN SCALES - GENERAL
PAINLEVEL_OUTOF10: 0

## 2020-11-27 NOTE — PROGRESS NOTES
Hospitalist Progress Note      PCP: Nathan Jacques MD    Chief Complaint. Presented to hospital for SOB    Date of Admission: 11/23/2020    Subjective:  denies chest pain, nausea, vomiting, fever or chills. mention feels overall better    Medications:  Reviewed    Infusion Medications     Scheduled Medications    albuterol-ipratropium  1 puff Inhalation Q6H    budesonide-formoterol  2 puff Inhalation BID    sodium chloride  20 mL Intravenous Once    nicotine  1 patch Transdermal Daily    remdesivir IVPB  100 mg Intravenous Q24H    amLODIPine  5 mg Oral BID    aspirin EC  81 mg Oral Daily    atorvastatin  40 mg Oral Nightly    metoprolol tartrate  25 mg Oral BID    pantoprazole  40 mg Oral QAM AC    PARoxetine  20 mg Oral Daily    QUEtiapine  50 mg Oral Nightly    rivaroxaban  20 mg Oral Daily with breakfast    rOPINIRole  0.25 mg Oral Nightly    traZODone  50 mg Oral Nightly    sodium chloride flush  10 mL Intravenous 2 times per day    predniSONE  40 mg Oral Daily    doxycycline hyclate  100 mg Oral Q12H     PRN Meds: sodium chloride, sodium chloride flush, acetaminophen **OR** acetaminophen, polyethylene glycol, promethazine **OR** ondansetron      Intake/Output Summary (Last 24 hours) at 11/27/2020 1352  Last data filed at 11/27/2020 1331  Gross per 24 hour   Intake 710 ml   Output 1350 ml   Net -640 ml       Physical Exam Performed:    /68   Pulse 58   Temp 97.4 °F (36.3 °C) (Oral)   Resp 18   Ht 5' 3\" (1.6 m)   Wt 156 lb 1.4 oz (70.8 kg)   SpO2 92%   BMI 27.65 kg/m²     General appearance: No apparent distress,   HEENT:  Conjunctivae/corneas clear. Neck: Supple, with full range of motion. Respiratory:  Normal respiratory effort. B/l wheezing  Cardiovascular: Regular rate and rhythm with normal S1/S2 without murmurs or rubs  Abdomen: Soft, non-tender, non-distended, normal bowel sounds.   Musculoskeletal: No cyanosis or edema bilaterally  Neurologic:  without any focal sensory/motor deficits. grossly non-focal.  Psychiatric: Alert and oriented, Normal mood  Peripheral Pulses: +2 palpable, equal bilaterally       Labs:   No results for input(s): WBC, HGB, HCT, PLT in the last 72 hours. Recent Labs     11/25/20  0744 11/26/20  1034 11/27/20  0936    139 140   K 3.4* 3.3* 3.7    102 101   CO2 24 28 30   BUN 11 18 23*   CREATININE 0.7 0.9 0.9   CALCIUM 8.1* 8.4 8.6     Recent Labs     11/25/20  0744 11/26/20  1034 11/27/20  0936   AST 28 27 21   ALT 17 21 23   BILITOT 0.5 0.6 0.6   ALKPHOS 47 50 53     No results for input(s): INR in the last 72 hours. No results for input(s): Korey Naper in the last 72 hours. Urinalysis:      Lab Results   Component Value Date    NITRU Negative 11/23/2020    WBCUA 114 11/23/2020    BACTERIA 1+ 11/23/2020    RBCUA 7 11/23/2020    BLOODU MODERATE 11/23/2020    SPECGRAV 1.024 11/23/2020    GLUCOSEU Negative 11/23/2020    GLUCOSEU Negative 05/27/2011       Radiology:  CT CHEST PULMONARY EMBOLISM W CONTRAST   Final Result   No evidence of pulmonary embolism or acute pulmonary abnormality. Small   amount of scattered bilateral atelectasis. Severe emphysematous changes. 15 mm lobular subpleural nodule in the mid left lower lobe suspicious for   primary bronchogenic carcinoma. RECOMMENDATIONS:   PET-CT could be performed for further evaluation of the left lower lobe   pulmonary nodule. CT-guided biopsy could also be easily performed for   histologic diagnosis. XR CHEST PORTABLE   Final Result   No acute cardiopulmonary disease. COPD.                Assessment/Plan:    Active Hospital Problems    Diagnosis    COVID-19 [U07.1]    Chronic hypoxemic respiratory failure (HCC) [J96.11]    Acute respiratory failure with hypoxia (HCC) [J96.01]    Pulmonary nodule seen on imaging study [R91.1]    Acute bronchiolitis [J21.9]    COPD exacerbation (HCC) [J44.1]    Tobacco abuse [Z72.0]     COVID19 PNA  - Steroids,

## 2020-11-27 NOTE — PLAN OF CARE
Problem: Airway Clearance - Ineffective  Goal: Achieve or maintain patent airway  11/24/2020 0809 by Jovana Noe RN  Outcome: Ongoing  11/24/2020 0013 by Izzy Land RN  Outcome: Ongoing     Problem: Gas Exchange - Impaired  Goal: Absence of hypoxia  11/24/2020 0809 by Jovana Noe RN  Outcome: Ongoing  11/24/2020 0013 by Izzy Land RN  Outcome: Ongoing  Goal: Promote optimal lung function  11/24/2020 0809 by Jovana Noe RN  Outcome: Ongoing  11/24/2020 0013 by Izzy Land RN  Outcome: Ongoing     Problem: Breathing Pattern - Ineffective  Goal: Ability to achieve and maintain a regular respiratory rate  11/24/2020 0809 by Jovana Noe RN  Outcome: Ongoing  11/24/2020 0013 by Izzy Land RN  Outcome: Ongoing     Problem:  Body Temperature -  Risk of, Imbalanced  Goal: Ability to maintain a body temperature within defined limits  11/24/2020 0809 by Jovana Noe RN  Outcome: Ongoing  11/24/2020 0013 by Izzy Land RN  Outcome: Ongoing  Goal: Will regain or maintain usual level of consciousness  11/24/2020 0809 by Jovana Noe RN  Outcome: Ongoing  11/24/2020 0013 by Izzy Land RN  Outcome: Ongoing  Goal: Complications related to the disease process, condition or treatment will be avoided or minimized  11/24/2020 0809 by Jovana Noe RN  Outcome: Ongoing  11/24/2020 0013 by Izzy Land RN  Outcome: Ongoing
Problem: Airway Clearance - Ineffective  Goal: Achieve or maintain patent airway  Outcome: Ongoing     Problem: Gas Exchange - Impaired  Goal: Absence of hypoxia  Outcome: Ongoing  Goal: Promote optimal lung function  Outcome: Ongoing     Problem: Breathing Pattern - Ineffective  Goal: Ability to achieve and maintain a regular respiratory rate  Outcome: Ongoing     Problem:  Body Temperature -  Risk of, Imbalanced  Goal: Ability to maintain a body temperature within defined limits  Outcome: Ongoing  Goal: Will regain or maintain usual level of consciousness  Outcome: Ongoing  Goal: Complications related to the disease process, condition or treatment will be avoided or minimized  Outcome: Ongoing     Problem: Isolation Precautions - Risk of Spread of Infection  Goal: Prevent transmission of infection  Outcome: Ongoing     Problem: Nutrition Deficits  Goal: Optimize nutrtional status  Outcome: Ongoing     Problem: Risk for Fluid Volume Deficit  Goal: Maintain normal heart rhythm  Outcome: Ongoing  Goal: Maintain absence of muscle cramping  Outcome: Ongoing  Goal: Maintain normal serum potassium, sodium, calcium, phosphorus, and pH  Outcome: Ongoing     Problem: Loneliness or Risk for Loneliness  Goal: Demonstrate positive use of time alone when socialization is not possible  Outcome: Ongoing     Problem: Fatigue  Goal: Verbalize increase energy and improved vitality  Outcome: Ongoing     Problem: Patient Education: Go to Patient Education Activity  Goal: Patient/Family Education  Outcome: Ongoing     Problem: Falls - Risk of:  Goal: Will remain free from falls  Description: Will remain free from falls  Outcome: Ongoing  Goal: Absence of physical injury  Description: Absence of physical injury  Outcome: Ongoing     Problem: Nutrition  Goal: Optimal nutrition therapy  Outcome: Ongoing
Problem: Airway Clearance - Ineffective  Goal: Achieve or maintain patent airway  Outcome: Ongoing  Non - productive cough per pt      Problem: Gas Exchange - Impaired  Goal: Absence of hypoxia  Outcome: Ongoing  6L NC    Problem: Breathing Pattern - Ineffective  Goal: Ability to achieve and maintain a regular respiratory rate  Outcome: Ongoing   SOB with exertion     Problem: Body Temperature -  Risk of, Imbalanced  Goal: Ability to maintain a body temperature within defined limits  Outcome: Ongoing  Temp taken Q4 and as needed     Problem: Isolation Precautions - Risk of Spread of Infection  Goal: Prevent transmission of infection  Outcome: Ongoing   Followed     Problem: Nutrition Deficits  Goal: Optimize nutrtional status  Outcome: Ongoing  Poor appetite      Problem: Risk for Fluid Volume Deficit  Goal: Maintain normal heart rhythm  Outcome: Ongoing  NSR   Goal: Maintain absence of muscle cramping  Outcome: Ongoing  No complaints   Goal: Maintain normal serum potassium, sodium, calcium, phosphorus, and pH  Outcome: Ongoing  Daily lab draws      Problem: Fatigue  Goal: Verbalize increase energy and improved vitality  Outcome: Ongoing  Generalized weakness     Problem: Falls - Risk of:  Goal: Will remain free from falls  Description: Will remain free from falls  Outcome: Ongoing  Patient is wearing nonskid socks. Bed is alarmed, locked, and in lowest position. Room is free of clutter. Call light is within reach and used appropriately. Fall risk assessed per protocol. Will continue to monitor.      Problem: Nutrition  Goal: Optimal nutrition therapy  Outcome: Ongoing  Poor appetite
Pt admitted to 5280 from ER. Report called prior to pt arrival. Pt able to ambulate from the stretcher to the bed. Her gait is steady however she is having generalized weakness and fatigue due to being sick. She is a standby assist. Pt oriented to her environment and plan of care reviewed. Pt stated she was tired and wanted to go to bed as soon as possible. She deferred admission questions until later in the shift. Shortly after pt left to sleep, her daughter Bong Carey) called and completed most of the admission questions with RN. Pt on 4 L NC. She normally wears 3 L NC at home. Her lungs are diminished t/o with end expiratory wheezes. Pt denies feeling SOB at rest but did say she was [place] little\" short of breath when walking to and from the bathroom. Pt is afebrile and denies pain. Her BP are elevated but she received her home BP meds at . BP to be re-evaluated with next set of scheduled VS.     Pt is a high fall risk. Bed in locked, low position with side rails up X 2 and an active bed alarm. Pt VU/DU to call and wait for assistance before getting up to the bathroom. Non skid socks, bracelet, and sign in place. Fall contract not signed because pt wanting to go to sleep. RN deferring until later in shift.      Pt resting in bed with eyes closed on CMU with stable HR/SpO2 on 4 L NC.
Pt was on 4 L NC. She accidentally removed her NC while sleeping. She desaturated to 81% on RA. NC replaced but pt had to be increased to 5 L NC to obtain O2 sat > 90%. RN will re-evaluate ability to titrate back to 4 L NC with next scheduled set of VS or sooner if pt wakes up. Pt's lungs are diminished t/o with end expiratory wheezing noted. Pt has an occasional productive cough. Pt continues to experience KENNEY. Pt has been afebrile and denies pain. She is a high fall risk. Bed in locked, low position with side rails up X 3 and an active bed alarm. Fall risk socks, bracelet, and sign in place. Fall contract signed and posted at bedside. BSC at bedside however pt instructed that she still needs to call and wait for assistance before getting up. Pt's daughter, Loyd Urbina, called. She is requesting updates from the physician. Loyd Urbina informed that pt's provider is not in house and that RN will make a note for the doctor to call her tomorrow. RN answered Diane's questions regarding her mother's plan of care. She had no further questions at the end of the call.
possible  11/26/2020 1203 by Armen Soares RN  Outcome: Ongoing     Problem: Fatigue  Goal: Verbalize increase energy and improved vitality  11/26/2020 1203 by Armen Soares RN  Outcome: Ongoing     Problem: Patient Education: Go to Patient Education Activity  Goal: Patient/Family Education  11/26/2020 1203 by Armen Soares RN  Outcome: Ongoing     Problem: Falls - Risk of:  Goal: Will remain free from falls  Description: Will remain free from falls  11/26/2020 1203 by Armen Soares RN  Outcome: Ongoing     Problem: Falls - Risk of:  Goal: Absence of physical injury  Description: Absence of physical injury  11/26/2020 1203 by Armen Soares RN  Outcome: Ongoing     Problem: Nutrition  Goal: Optimal nutrition therapy  11/26/2020 1203 by Armen Soares RN  Outcome: Ongoing

## 2020-11-27 NOTE — PROGRESS NOTES
Hospitalist Progress Note      PCP: Beverly Hernandez MD    Chief Complaint. Presented to hospital for SOB    Date of Admission: 11/23/2020    Subjective:   denies chest pain, nausea, vomiting, fever or chills. mention feels overall better    Medications:  Reviewed    Infusion Medications     Scheduled Medications    albuterol-ipratropium  1 puff Inhalation Q6H    budesonide-formoterol  2 puff Inhalation BID    sodium chloride  20 mL Intravenous Once    nicotine  1 patch Transdermal Daily    remdesivir IVPB  100 mg Intravenous Q24H    amLODIPine  5 mg Oral BID    aspirin EC  81 mg Oral Daily    atorvastatin  40 mg Oral Nightly    metoprolol tartrate  25 mg Oral BID    pantoprazole  40 mg Oral QAM AC    PARoxetine  20 mg Oral Daily    QUEtiapine  50 mg Oral Nightly    rivaroxaban  20 mg Oral Daily with breakfast    rOPINIRole  0.25 mg Oral Nightly    traZODone  50 mg Oral Nightly    sodium chloride flush  10 mL Intravenous 2 times per day    predniSONE  40 mg Oral Daily    doxycycline hyclate  100 mg Oral Q12H     PRN Meds: sodium chloride, sodium chloride flush, acetaminophen **OR** acetaminophen, polyethylene glycol, promethazine **OR** ondansetron      Intake/Output Summary (Last 24 hours) at 11/26/2020 1929  Last data filed at 11/26/2020 1634  Gross per 24 hour   Intake 740 ml   Output 850 ml   Net -110 ml       Physical Exam Performed:    BP (!) 150/67   Pulse 73   Temp 98.2 °F (36.8 °C) (Oral)   Resp 18   Ht 5' 3\" (1.6 m)   Wt 150 lb 12.7 oz (68.4 kg)   SpO2 92%   BMI 26.71 kg/m²     General appearance: No apparent distress,   HEENT:  Conjunctivae/corneas clear. Neck: Supple, with full range of motion. Respiratory:  Normal respiratory effort. B/l wheezing  Cardiovascular: Regular rate and rhythm with normal S1/S2 without murmurs or rubs  Abdomen: Soft, non-tender, non-distended, normal bowel sounds.   Musculoskeletal: No cyanosis or edema bilaterally  Neurologic:  without any focal sensory/motor deficits. grossly non-focal.  Psychiatric: Alert and oriented, Normal mood  Peripheral Pulses: +2 palpable, equal bilaterally       Labs:   Recent Labs     11/24/20  0810   WBC 2.1*   HGB 12.4   HCT 38.8        Recent Labs     11/25/20  0744 11/26/20  1034    139   K 3.4* 3.3*    102   CO2 24 28   BUN 11 18   CREATININE 0.7 0.9   CALCIUM 8.1* 8.4     Recent Labs     11/25/20  0744 11/26/20  1034   AST 28 27   ALT 17 21   BILITOT 0.5 0.6   ALKPHOS 47 50     No results for input(s): INR in the last 72 hours. No results for input(s): Les Godfrey in the last 72 hours. Urinalysis:      Lab Results   Component Value Date    NITRU Negative 11/23/2020    WBCUA 114 11/23/2020    BACTERIA 1+ 11/23/2020    RBCUA 7 11/23/2020    BLOODU MODERATE 11/23/2020    SPECGRAV 1.024 11/23/2020    GLUCOSEU Negative 11/23/2020    GLUCOSEU Negative 05/27/2011       Radiology:  CT CHEST PULMONARY EMBOLISM W CONTRAST   Final Result   No evidence of pulmonary embolism or acute pulmonary abnormality. Small   amount of scattered bilateral atelectasis. Severe emphysematous changes. 15 mm lobular subpleural nodule in the mid left lower lobe suspicious for   primary bronchogenic carcinoma. RECOMMENDATIONS:   PET-CT could be performed for further evaluation of the left lower lobe   pulmonary nodule. CT-guided biopsy could also be easily performed for   histologic diagnosis. XR CHEST PORTABLE   Final Result   No acute cardiopulmonary disease. COPD.                Assessment/Plan:    Active Hospital Problems    Diagnosis    COVID-19 [U07.1]    Chronic hypoxemic respiratory failure (HCC) [J96.11]    Acute respiratory failure with hypoxia (HCC) [J96.01]    Pulmonary nodule seen on imaging study [R91.1]    Acute bronchiolitis [J21.9]    COPD exacerbation (HCC) [J44.1]    Tobacco abuse [Z72.0]     COVID19 PNA  - Steroids, convalescent plasma ordered  - started on Remdesvir, last dose 11/28    COPD Exacerbation   Steroids and bronchodilators and antibiotics  bronchial higiene    Pulmonary nodule  In the mid left lower lobe subpleural  Suspicion for bronchogenic carcinoma  Consult to pulmonary - recommendations noted     Essential primary hypertension  Continue patient on home medication     Anxiety depression  Continue on home medication     Continue other home medication     DVT and GI prophylaxis     Full Code    DVT Prophylaxis:  Diet: DIET GENERAL; Low Sodium (2 GM);  Daily Fluid Restriction: 2000 ml  Dietary Nutrition Supplements: Standard High Calorie Oral Supplement  Code Status: Full Code    PT/OT Eval Status: ordered    Dispo - likely DC 11/28, started on Marleny Keita MD

## 2020-11-28 VITALS
WEIGHT: 153.88 LBS | SYSTOLIC BLOOD PRESSURE: 163 MMHG | BODY MASS INDEX: 27.27 KG/M2 | HEIGHT: 63 IN | RESPIRATION RATE: 19 BRPM | HEART RATE: 65 BPM | DIASTOLIC BLOOD PRESSURE: 62 MMHG | OXYGEN SATURATION: 91 % | TEMPERATURE: 97.9 F

## 2020-11-28 LAB
A/G RATIO: 0.9 (ref 1.1–2.2)
ALBUMIN SERPL-MCNC: 3 G/DL (ref 3.4–5)
ALP BLD-CCNC: 49 U/L (ref 40–129)
ALT SERPL-CCNC: 23 U/L (ref 10–40)
ANION GAP SERPL CALCULATED.3IONS-SCNC: 8 MMOL/L (ref 3–16)
AST SERPL-CCNC: 17 U/L (ref 15–37)
BILIRUB SERPL-MCNC: 0.7 MG/DL (ref 0–1)
BUN BLDV-MCNC: 22 MG/DL (ref 7–20)
CALCIUM SERPL-MCNC: 8.3 MG/DL (ref 8.3–10.6)
CHLORIDE BLD-SCNC: 101 MMOL/L (ref 99–110)
CO2: 27 MMOL/L (ref 21–32)
CREAT SERPL-MCNC: 0.9 MG/DL (ref 0.6–1.2)
GFR AFRICAN AMERICAN: >60
GFR NON-AFRICAN AMERICAN: 59
GLOBULIN: 3.5 G/DL
GLUCOSE BLD-MCNC: 116 MG/DL (ref 70–99)
POTASSIUM SERPL-SCNC: 3.8 MMOL/L (ref 3.5–5.1)
SODIUM BLD-SCNC: 136 MMOL/L (ref 136–145)
TOTAL PROTEIN: 6.5 G/DL (ref 6.4–8.2)

## 2020-11-28 PROCEDURE — 80053 COMPREHEN METABOLIC PANEL: CPT

## 2020-11-28 PROCEDURE — 6370000000 HC RX 637 (ALT 250 FOR IP): Performed by: HOSPITALIST

## 2020-11-28 PROCEDURE — 2500000003 HC RX 250 WO HCPCS: Performed by: INTERNAL MEDICINE

## 2020-11-28 PROCEDURE — 2700000000 HC OXYGEN THERAPY PER DAY

## 2020-11-28 PROCEDURE — 6370000000 HC RX 637 (ALT 250 FOR IP): Performed by: INTERNAL MEDICINE

## 2020-11-28 PROCEDURE — 2580000003 HC RX 258: Performed by: HOSPITALIST

## 2020-11-28 PROCEDURE — 2580000003 HC RX 258: Performed by: INTERNAL MEDICINE

## 2020-11-28 PROCEDURE — 94760 N-INVAS EAR/PLS OXIMETRY 1: CPT

## 2020-11-28 PROCEDURE — 36415 COLL VENOUS BLD VENIPUNCTURE: CPT

## 2020-11-28 PROCEDURE — 94640 AIRWAY INHALATION TREATMENT: CPT

## 2020-11-28 RX ORDER — DOXYCYCLINE HYCLATE 100 MG
100 TABLET ORAL EVERY 12 HOURS
Qty: 10 TABLET | Refills: 0 | Status: SHIPPED | OUTPATIENT
Start: 2020-11-28 | End: 2020-12-03

## 2020-11-28 RX ORDER — NICOTINE 21 MG/24HR
1 PATCH, TRANSDERMAL 24 HOURS TRANSDERMAL DAILY
Qty: 30 PATCH | Refills: 3 | Status: SHIPPED | OUTPATIENT
Start: 2020-11-29 | End: 2021-07-26

## 2020-11-28 RX ORDER — PREDNISONE 20 MG/1
40 TABLET ORAL DAILY
Qty: 12 TABLET | Refills: 0 | Status: SHIPPED | OUTPATIENT
Start: 2020-11-29 | End: 2020-12-05

## 2020-11-28 RX ADMIN — ASPIRIN 81 MG: 81 TABLET, FILM COATED ORAL at 08:06

## 2020-11-28 RX ADMIN — REMDESIVIR 100 MG: 100 INJECTION, POWDER, LYOPHILIZED, FOR SOLUTION INTRAVENOUS at 13:21

## 2020-11-28 RX ADMIN — PAROXETINE HYDROCHLORIDE 20 MG: 20 TABLET, FILM COATED ORAL at 08:07

## 2020-11-28 RX ADMIN — Medication 2 PUFF: at 09:01

## 2020-11-28 RX ADMIN — SODIUM CHLORIDE, PRESERVATIVE FREE 10 ML: 5 INJECTION INTRAVENOUS at 08:07

## 2020-11-28 RX ADMIN — RIVAROXABAN 20 MG: 20 TABLET, FILM COATED ORAL at 08:06

## 2020-11-28 RX ADMIN — DOXYCYCLINE HYCLATE 100 MG: 100 TABLET, COATED ORAL at 08:07

## 2020-11-28 RX ADMIN — AMLODIPINE BESYLATE 5 MG: 5 TABLET ORAL at 08:07

## 2020-11-28 RX ADMIN — PREDNISONE 40 MG: 20 TABLET ORAL at 08:07

## 2020-11-28 RX ADMIN — PANTOPRAZOLE SODIUM 40 MG: 40 TABLET, DELAYED RELEASE ORAL at 08:07

## 2020-11-28 RX ADMIN — METOPROLOL TARTRATE 25 MG: 25 TABLET, FILM COATED ORAL at 08:06

## 2020-11-28 ASSESSMENT — PAIN SCALES - GENERAL
PAINLEVEL_OUTOF10: 0
PAINLEVEL_OUTOF10: 0

## 2020-11-28 NOTE — DISCHARGE INSTR - COC
retina H35.449    Syncope R55    Nicotine addiction F17.200    Skin rash R21    Abdominal pain R10.9    Ankle Fracture, Right S82.899A    DJD (degenerative joint disease) M19.90    Fatty liver K76.0    Osteoarthritis M19.90    Insomnia G47.00    Anxiety F41.9    Urinary incontinence R32    Coronary artery disease I25.10    Hyperlipidemia E78.5    Depression F32.9    Decreased vision H54.7    Cataract H26.9    Adrenal hyperplasia (HCC) E27.8    Eczema L30.9    Hypokalemia E87.6    Visual hallucinations R44.1    Anger R45.4    Mood disorder (HCC) F39    Lumbar radiculopathy M54.16    Hypoxia R09.02    Abnormal weight loss R63.4    Irritable bowel syndrome K58.9    Accelerated hypertension I10    Primary osteoarthritis involving multiple joints M89.49    Aortic atherosclerosis ([facility]) I70.0    COPD (chronic obstructive pulmonary disease) ([facility]) J44.9    Abnormal nuclear stress test R94.39    Exertional dyspnea R06.00    Tobacco abuse Z72.0    CAD in native artery I25.10    S/p bare metal coronary artery stent Z95.5    Heart failure (HCC) I50.9    Chronic diastolic heart failure with preserved ejection fraction ([facility]) I50.32    COPD exacerbation ([facility]) J44.1    COVID-19 U07.1    Chronic hypoxemic respiratory failure ([facility]) J96.11    Acute respiratory failure with hypoxia ([facility]) J96.01    Pulmonary nodule seen on imaging study R91.1    Acute bronchiolitis J21.9       Isolation/Infection:   Isolation            Droplet Plus          Patient Infection Status       Infection Onset Added Last Indicated Last Indicated By Review Planned Expiration Resolved Resolved By    COVID-19 11/23/20 11/23/20 11/23/20 COVID-19 11/30/20 12/07/20      Resolved    COVID-19 Rule Out 11/23/20 11/23/20 11/23/20 COVID-19 (Ordered)   11/23/20 Rule-Out Test Resulted            Nurse Assessment:  Last Vital Signs: BP (!) 163/62   Pulse 65   Temp 97.9 °F (36.6 °C) (Oral)   Resp 19   Ht 5' 3\" (1.6 m)   Wt 153 lb 14.1 oz (69.8 kg)   SpO2 91%   BMI 27.26 kg/m²     Last documented pain score (0-10 scale): Pain Level: 0  Last Weight:   Wt Readings from Last 1 Encounters:   11/28/20 153 lb 14.1 oz (69.8 kg)     Mental Status:  oriented, alert, coherent, logical, thought processes intact and able to concentrate and follow conversation    IV Access:  - None    Nursing Mobility/ADLs:  Walking   Independent  Transfer  Independent  Bathing  Independent  Dressing  Independent  [address] Delivery   whole    Wound Care Documentation and Therapy:        Elimination:  Continence:   · Bowel: Yes  · Bladder: Yes  Urinary Catheter: None   Colostomy/Ileostomy/Ileal Conduit: No       Date of Last BM: 11/28/2020    Intake/Output Summary (Last 24 hours) at 11/28/2020 1156  Last data filed at 11/28/2020 1131  Gross per 24 hour   Intake 640 ml   Output 975 ml   Net -335 ml     I/O last 3 completed shifts: In: 26 [P.O.:460]  Out: 700 [Urine:700]    Safety Concerns: At Risk for Falls    Impairments/Disabilities:      None    Nutrition Therapy:  Current Nutrition Therapy:   - Oral Diet:  General and Low Sodium (2gm)    Routes of Feeding: Oral  Liquids: Thin Liquids  Daily Fluid Restriction: yes - amount 2000 ml/24 hours  Last Modified Barium Swallow with Video (Video Swallowing Test): not done    Treatments at the Time of Hospital Discharge:   Respiratory Treatments: See Below  Oxygen Therapy:  is on oxygen at 4 L/min per nasal cannula.   Ventilator:    - No ventilator support    Rehab Therapies: home care  Weight Bearing Status/Restrictions: No weight bearing restirctions  Other Medical Equipment (for information only, NOT a DME order):  bath bench  Other Treatments:     Patient's personal belongings (please select all that are sent with patient):  Slim GIBSON SIGNATURE:  Electronically signed by Dave Winston RN on 11/28/20 at 2:03 PM EST    CASE MANAGEMENT/SOCIAL WORK SECTION    Inpatient Status Date: ***    Readmission Risk Assessment Score:  Readmission Risk              Risk of Unplanned Readmission:        23           Discharging to Facility/ Agency   · Name:   · Address:  · Phone:  · Fax:    Dialysis Facility (if applicable)   · Name:  · Address:  · Dialysis Schedule:  · Phone:  · Fax:    / signature: {Esignature:[id number]:::0}    PHYSICIAN SECTION    Prognosis: Good    Condition at Discharge: Stable    Rehab Potential (if transferring to Rehab): Good    Recommended Labs or Other Treatments After Discharge: follow up with Pulmonologist    Physician Certification: I certify the above information and transfer of Molina Barillas  is necessary for the continuing treatment of the diagnosis listed and that she requires Home Care for greater 30 days.      Update Admission H&P: No change in H&P    PHYSICIAN SIGNATURE:  Electronically signed by Shauna Elder MD on 11/28/20 at 11:57 AM EST

## 2020-11-28 NOTE — PROGRESS NOTES
IV and telemetry monitor removed. Discharge paperwork completed and discussed with the patient and daughter over the phone. All questions answered. Patient has been made aware of follow up appointments that have been made/need to be made and patient verbalized understanding. Patient has been given all paper prescriptions that need to be filled. All belongings have been packed up and sent with the patient. Patient will be driven home by daughter.

## 2020-11-28 NOTE — DISCHARGE SUMMARY
Hospital Medicine Discharge Summary    Patient ID: Molina Barillas      Patient's PCP: Ann Cabello MD    Admit Date: 11/23/2020     Discharge Date:      Admitting Physician: Sang Webb MD     Discharge Physician: Shuana Elder MD     Discharge Diagnoses: Active Hospital Problems    Diagnosis Date Noted    COVID-19 [U07.1]     Chronic hypoxemic respiratory failure (HCC) [J96.11]     Acute respiratory failure with hypoxia (HCC) [J96.01]     Pulmonary nodule seen on imaging study [R91.1]     Acute bronchiolitis [J21.9]     COPD exacerbation ([address] [J44.1] 11/23/2020    Tobacco abuse [Z72.0] 12/04/2018       The patient was seen and examined on day of discharge and this discharge summary is in conjunction with any daily progress note from day of discharge. Condition at discharge - stable    Hospital Course: patient seen and evaluated on the day of discharge. Patient informed about following up with appointments. Patient verbalized understanding for follow-up appointments. All questions of the patient answered, patient is in agreement with the discharge plan. Medical reconciliation performed. Patient will be discharged stable condition. Patient completed 4-day course of remdesivir, will be discharged on p.o. steroids. Patient's daughter was called and agreed with the discharge plan, patient agreed with the discharge plan, all questions answered. Patient will be discharge stable condition. Patient is on 4 L on home oxygen which is patient baseline, currently patient is on 4 L oxygen nasal cannula.     COVID19 PNA  - Steroids, convalescent plasma ordered  - started on Remdesvir, last dose 11/28     COPD Exacerbation   Steroids and bronchodilators and antibiotics  bronchial higiene     Pulmonary nodule  In the mid left lower lobe subpleural  Suspicion for bronchogenic carcinoma  Consult to pulmonary - recommendations noted     Essential primary hypertension  Continue patient on home medication     Anxiety depression  Continue on home medication     Continue other home medication     DVT and GI prophylaxis     Full Code     DVT Prophylaxis:  Diet: DIET GENERAL; Low Sodium (2 GM); Daily Fluid Restriction: 2000 ml  Dietary Nutrition Supplements: Standard High Calorie Oral Supplement  Code Status: Full Code            Exam:     BP (!) 163/62   Pulse 65   Temp 97.9 °F (36.6 °C) (Oral)   Resp 19   Ht 5' 3\" (1.6 m)   Wt 153 lb 14.1 oz (69.8 kg)   SpO2 91%   BMI 27.26 kg/m²     General appearance: No apparent distress  HEENT:  Conjunctivae/corneas clear. Neck: Supple, No jugular venous distention. Respiratory:  Normal respiratory effort. Clear to auscultation, bilaterally without Rales/Wheezes/Rhonchi. Cardiovascular: Regular rate and rhythm with normal S1/S2 without murmurs, rubs or gallops. Abdomen: Soft, non-tender, non-distended, normal bowel sounds. Musculoskelatal: No clubbing, cyanosis or edema bilaterally. Skin: Skin color, texture, turgor normal.   Neurologic: no focal neurologic deficits. grossly non-focal.  Psychiatric: Alert and oriented, normal mood    Consults:     IP CONSULT TO PULMONOLOGY  IP CONSULT TO PHARMACY      Code Status:  Full Code    Activity: activity as tolerated    Labs:  For convenience and continuity at follow-up the following most recent labs are provided:      CBC:    Lab Results   Component Value Date    WBC 2.1 11/24/2020    HGB 12.4 11/24/2020    HCT 38.8 11/24/2020     11/24/2020       Renal:    Lab Results   Component Value Date     11/28/2020    K 3.8 11/28/2020    K 3.8 11/23/2020     11/28/2020    CO2 27 11/28/2020    BUN 22 11/28/2020    CREATININE 0.9 11/28/2020    CALCIUM 8.3 11/28/2020    PHOS 2.7 11/06/2017       Discharge Medications:     Current Discharge Medication List           Details   predniSONE (DELTASONE) 20 MG tablet Take 2 tablets by mouth daily for 6 days  Qty: 12 tablet, Refills: 0      nicotine (NICODERM CQ) 21 MG/24HR Place 1 patch onto the skin daily  Qty: 30 patch, Refills: 3      doxycycline hyclate (VIBRA-TABS) 100 MG tablet Take 1 tablet by mouth every 12 hours for 5 days  Qty: 10 tablet, Refills: 0              Details   LORazepam (ATIVAN) 0.5 MG tablet 1 tablet twice daily  Qty: 60 tablet, Refills: 0    Associated Diagnoses: Anxiety; Primary insomnia      HYDROcodone-acetaminophen (NORCO) 5-325 MG per tablet Take 1 tablet by mouth 2 times daily as needed for Pain for up to 30 days. Take lowest dose possible to manage pain  Qty: 60 tablet, Refills: 0    Comments: Reduce doses taken as pain becomes manageable  Associated Diagnoses: Abdominal pain, chronic, left lower quadrant      atorvastatin (LIPITOR) 40 MG tablet TAKE ONE TABLET BY MOUTH NIGHTLY  Qty: 90 tablet, Refills: 0      potassium chloride (KLOR-CON M) 20 MEQ extended release tablet Take 1 tablet by mouth 2 times daily  Qty: 180 tablet, Refills: 0      metoprolol tartrate (LOPRESSOR) 25 MG tablet TAKE ONE TABLET BY MOUTH TWICE A DAY  Qty: 180 tablet, Refills: 0    Associated Diagnoses: Essential hypertension      rivaroxaban (XARELTO) 20 MG TABS tablet TAKE 1 TABLET BY MOUTH EVERY DAY WITH BREAKFAST  Qty: 90 tablet, Refills: 0      meclizine (ANTIVERT) 12.5 MG tablet 1 tablet twice daily as needed for dizziness  Qty: 60 tablet, Refills: 0      nystatin (MYCOSTATIN) 786003 UNIT/GM powder Apply topically 4 times daily.   Qty: 45 g, Refills: 0      PARoxetine (PAXIL) 20 MG tablet TAKE ONE TABLET BY MOUTH DAILY  Qty: 90 tablet, Refills: 3    Associated Diagnoses: Depression, unspecified depression type      ipratropium-albuterol (DUONEB) 0.5-2.5 (3) MG/3ML SOLN nebulizer solution Inhale 3 mLs into the lungs every 6 hours as needed for Shortness of Breath  Qty: 360 mL, Refills: 3    Associated Diagnoses: Centrilobular emphysema (HCC)      albuterol sulfate HFA (VENTOLIN HFA) 108 (90 Base) MCG/ACT inhaler Inhale 2 puffs into the lungs 4 times daily as needed for Wheezing  Qty: 3 Inhaler, Refills: 1      amLODIPine (NORVASC) 5 MG tablet TAKE ONE TABLET BY MOUTH TWICE A DAY  Qty: 180 tablet, Refills: 1      furosemide (LASIX) 20 MG tablet Take 1 tablet by mouth 2 times daily  Qty: 180 tablet, Refills: 0    Associated Diagnoses: Acute diastolic heart failure (HCC); CAD in native artery; Essential hypertension; Mixed hyperlipidemia      QUEtiapine (SEROQUEL) 50 MG tablet 1 tablet daily  Qty: 90 tablet, Refills: 2    Associated Diagnoses: Primary insomnia      aspirin EC 81 MG EC tablet Take 1 tablet by mouth daily  Qty: 90 tablet, Refills: 1      traZODone (DESYREL) 50 MG tablet Take 1 tablet by mouth nightly  Qty: 30 tablet, Refills: 12    Associated Diagnoses: Depression, unspecified depression type      pantoprazole (PROTONIX) 40 MG tablet TAKE ONE TABLET BY MOUTH EVERY MORNING BEFORE BREAKFAST  Qty: 90 tablet, Refills: 3      rOPINIRole (REQUIP) 0.25 MG tablet 1 tablet nightly  Qty: 90 tablet, Refills: 0    Associated Diagnoses: Restless leg syndrome      OXYGEN Inhale 2 L/min into the lungs continuous    Associated Diagnoses: COPD (chronic obstructive pulmonary disease) (HCC); Hypoxia; Nicotine addiction             Time Spent on discharge is more than 30 mints in the examination, evaluation, counseling and review of medications and discharge plan. Signed:    Saul Jett MD   11/28/2020      Thank you Oumou Andersen MD for the opportunity to be involved in this patient's care. If you have any questions or concerns please feel free to contact me at [phone].

## 2020-11-30 ENCOUNTER — TELEPHONE (OUTPATIENT)
Dept: INTERNAL MEDICINE CLINIC | Age: 85
End: 2020-11-30

## 2020-11-30 ENCOUNTER — CARE COORDINATION (OUTPATIENT)
Dept: CASE MANAGEMENT | Age: 85
End: 2020-11-30

## 2020-11-30 NOTE — CARE COORDINATION
[facility] Transitions Initial Follow Up Call    Call within 2 business days of discharge: Yes    Patient: Gilda Marinelli Patient : 1934   MRN: [id number]  Reason for Admission: COPD  Discharge Date: 20 RARS: Readmission Risk Score: 23      Last Discharge [address]       Complaint Diagnosis Description Type Department Provider    20 Fatigue COPD exacerbation ([address] . .. ED to Hosp-Admission (Discharged) (ADMITTED) Dorys Kendrick MD; Marylou Eric... Challenges to be reviewed by the provider   Additional needs identified to be addressed with provider No  none    Discussed COVID-19 related testing which was available at this time. Test results were positive. Patient informed of results, if available? Yes         Method of communication with provider : none    Was this a readmission? No    Care Transition Nurse (CTN) contacted the family by telephone to perform post hospital discharge assessment. Verified name and  with family as identifiers. Provided introduction to self, and explanation of the CTN role. CTN reviewed discharge instructions, medical action plan and red flags with family who verbalized understanding. Family given an opportunity to ask questions and does not have any further questions or concerns at this time. Were discharge instructions available to patient? Yes. Reviewed appropriate site of care based on symptoms and resources available to patient including: PCP. The family agrees to contact the PCP office for questions related to their healthcare. Medication review of new and changed medication was performed with family, who verbalizes understanding of administration of home medications. Daughter stated would have to go downstairs to do a medication review, was agreeable to reviewing new and changed medications. Covid Risk Education    Patient has following risk factors of: COPD.    Education provided regarding infection prevention, and signs and symptoms of COVID-19 and when to seek medical attention with family who verbalized understanding. Discussed exposure protocols and quarantine From CDC: Are you at higher risk for severe illness?   and given an opportunity for questions and concerns. The family agrees to contact  PCP office for questions related to COVID-19. Patient/family/caregiver given information for [facility] and agrees to enroll no  Patient's preferred e-mail: declines  Patient's preferred phone number: declines    Discussed follow-up appointments. If no appointment was previously scheduled, appointment scheduling offered: Yes.offer declined, daughter stated she would call and make f/u appt Is follow up appointment scheduled within 7 days of discharge? NA  Non-[facility] follow up appointment(s):     Plan for follow-up call in 7-10 days based on severity of symptoms and risk factors. Plan for next call: symptom management-SOB, cough  CTN provided contact information for future needs. Non-face-to-face services provided:  Obtained and reviewed discharge summary and/or continuity of care documents    Care Transitions 24 Hour Call    Do you have any ongoing symptoms?:  Yes  Patient-reported symptoms:  Cough, Shortness of Breath  Do you have a copy of your discharge instructions?:  Yes  Do you have all of your prescriptions and are they filled?:  No (Comment: Daughter assists pt with her medications.)  Have you scheduled your follow up appointment?:  No  Were you discharged with any Home Care or Post Acute Services:  No  Post Acute Services:  Home Health (Comment: [address])  Patient DME:  Jameson Sample  Patient Home Equipment:  Oxygen  Do you have support at home?:  Child  Are you an active caregiver in your home?:  No  Care Transitions Interventions       Writer spoke with patient's daughter, Cash Garzon (on hippa form).   She stated her mother was wearing oxygen at 4L and oxygen level has not been checked but they do have a pulse oximeter. She stated her mom's SOB was better and she is coughing up phlegm. Pat Colunga stated did not get nicoderm patch filled, unsure if insurance would cover and pharmacy was waiting on information from PCP. Trice Cotter She stated her mom has not smoked since she has been home and is doing OK without smoking. Pat Colunga stated her mom no longer is using the mycostatin powder that the area is all better.   Follow Up  Future Appointments   Date Time Provider Melo Clemens   1/5/2021 10:40 AM Dilcia Holland MD [facility] Rodriguez RN

## 2020-11-30 NOTE — CARE COORDINATION
[facility] Transitions Follow Up Call    2020    Patient: Rupert Setting  Patient : 1934   MRN: [id number]  Reason for Admission: COPD  Discharge Date: 20 RARS: Readmission Risk Score: 21         Spoke with: daughter, [facility] Transitions Subsequent and Final Call    Subsequent and Final Calls  Care Transitions Interventions  Other Interventions:        Spoke with daughter, Konstantin Lou, who stated her mom was asking for a cigarette and pharmacy was waiting for authorization from PCP to fill nicoderm patch. Konstantin Lou stated PCP would not be in the office for a week. She asked if writer had any information on how to reduce the cost of patch. Writer suggested calling pharmacy back and seeing if they had coupons and offered [address] number but daughter declined. It has now been a week since patient has smoked. Writer suggested daughter try to get patient not to smoke, but daughter stated patient can be very determined at times.        Follow Up  Future Appointments   Date Time Provider Melo Clemens   2020 11:15 AM MD CARLOS Rodriguez [address] [facility]   2021 10:40 AM [facility]jia [facility]       Lai Tirado, RN

## 2020-12-09 ENCOUNTER — VIRTUAL VISIT (OUTPATIENT)
Dept: INTERNAL MEDICINE CLINIC | Age: 85
End: 2020-12-09
Payer: MEDICARE

## 2020-12-09 PROCEDURE — 99495 TRANSJ CARE MGMT MOD F2F 14D: CPT | Performed by: HOSPITALIST

## 2020-12-09 PROCEDURE — 1111F DSCHRG MED/CURRENT MED MERGE: CPT | Performed by: HOSPITALIST

## 2020-12-09 RX ORDER — MECLIZINE HCL 12.5 MG/1
TABLET ORAL
Qty: 60 TABLET | Refills: 2 | Status: SHIPPED | OUTPATIENT
Start: 2020-12-09 | End: 2021-06-18

## 2020-12-09 NOTE — PROGRESS NOTES
Post-Discharge Transitional Care Management Services or Hospital Follow Up                                                       ( telemedicine visit)    Dhaval Vines   YOB: 1934    Date of Office Visit:  12/9/2020  Date of Hospital Admission: 11/23/20  Date of Hospital Discharge: 11/28/20  Risk of hospital readmission (high >=14%.  Medium >=10%) :Readmission Risk Score: 23      Care management risk score Rising risk (score 2-5) and Complex Care (Scores >=6): 6     Non face to face  following discharge, date last encounter closed (first attempt may have been earlier): 11/30/2020  1:24 PM    Call initiated 2 business days of discharge: Yes    Patient Active Problem List   Diagnosis    Contusion    Memory loss    Senile reticular degeneration of peripheral retina    Syncope    Nicotine addiction    Skin rash    Abdominal pain    Ankle Fracture, Right    DJD (degenerative joint disease)    Fatty liver    Osteoarthritis    Insomnia    Anxiety    Urinary incontinence    Coronary artery disease    Hyperlipidemia    Depression    Decreased vision    Cataract    Adrenal hyperplasia (HCC)    Eczema    Hypokalemia    Visual hallucinations    Anger    Mood disorder (HCC)    Lumbar radiculopathy    Hypoxia    Abnormal weight loss    Irritable bowel syndrome    Accelerated hypertension    Primary osteoarthritis involving multiple joints    Aortic atherosclerosis (HCC)    COPD (chronic obstructive pulmonary disease) (HCC)    Abnormal nuclear stress test    Exertional dyspnea    Tobacco abuse    CAD in native artery    S/p bare metal coronary artery stent    Heart failure (HCC)    Chronic diastolic heart failure with preserved ejection fraction (HCC)    COPD exacerbation ([address]    COVID-19    Chronic hypoxemic respiratory failure (HCC)    Acute respiratory failure with hypoxia (HCC)    Pulmonary nodule seen on imaging study    Acute bronchiolitis       Allergies Allergen Reactions    Enalapril Other (See Comments)     Patient unable to recall reaction       Medications listed as ordered at the time of discharge from hospital   [facility] Medication Instructions NOLAN:    Printed on:12/09/20 4567   Medication Information                      albuterol sulfate HFA (VENTOLIN HFA) 108 (90 Base) MCG/ACT inhaler  Inhale 2 puffs into the lungs 4 times daily as needed for Wheezing             amLODIPine (NORVASC) 5 MG tablet  TAKE ONE TABLET BY MOUTH TWICE A DAY             aspirin EC 81 MG EC tablet  Take 1 tablet by mouth daily             atorvastatin (LIPITOR) 40 MG tablet  TAKE ONE TABLET BY MOUTH NIGHTLY             furosemide (LASIX) 20 MG tablet  Take 1 tablet by mouth 2 times daily             HYDROcodone-acetaminophen (NORCO) 5-325 MG per tablet  Take 1 tablet by mouth 2 times daily as needed for Pain for up to 30 days. Take lowest dose possible to manage pain             ipratropium-albuterol (DUONEB) 0.5-2.5 (3) MG/3ML SOLN nebulizer solution  Inhale 3 mLs into the lungs every 6 hours as needed for Shortness of Breath             LORazepam (ATIVAN) 0.5 MG tablet  1 tablet twice daily             meclizine (ANTIVERT) 12.5 MG tablet  1 tablet twice daily as needed for dizziness             metoprolol tartrate (LOPRESSOR) 25 MG tablet  TAKE ONE TABLET BY MOUTH TWICE A DAY             nicotine (NICODERM CQ) 21 MG/24HR  Place 1 patch onto the skin daily             nystatin (MYCOSTATIN) 033203 UNIT/GM powder  Apply topically 4 times daily.              OXYGEN  Inhale 2 L/min into the lungs continuous             pantoprazole (PROTONIX) 40 MG tablet  TAKE ONE TABLET BY MOUTH EVERY MORNING BEFORE BREAKFAST             PARoxetine (PAXIL) 20 MG tablet  TAKE ONE TABLET BY MOUTH DAILY             potassium chloride (KLOR-CON M) 20 MEQ extended release tablet  Take 1 tablet by mouth 2 times daily             QUEtiapine (SEROQUEL) 50 MG tablet  1 tablet daily rivaroxaban (XARELTO) 20 MG TABS tablet  TAKE 1 TABLET BY MOUTH EVERY DAY WITH BREAKFAST             rOPINIRole (REQUIP) 0.25 MG tablet  1 tablet nightly             traZODone (DESYREL) 50 MG tablet  Take 1 tablet by mouth nightly                   Medications marked \"taking\" at this time  Outpatient Medications Marked as Taking for the 12/9/20 encounter (Virtual Visit) with Farhan Romo MD   Medication Sig Dispense Refill    meclizine (ANTIVERT) 12.5 MG tablet 1 tablet twice daily as needed for dizziness 60 tablet 2    nicotine (NICODERM CQ) 21 MG/24HR Place 1 patch onto the skin daily 30 patch 3    LORazepam (ATIVAN) 0.5 MG tablet 1 tablet twice daily 60 tablet 0    HYDROcodone-acetaminophen (NORCO) 5-325 MG per tablet Take 1 tablet by mouth 2 times daily as needed for Pain for up to 30 days. Take lowest dose possible to manage pain 60 tablet 0    atorvastatin (LIPITOR) 40 MG tablet TAKE ONE TABLET BY MOUTH NIGHTLY 90 tablet 0    potassium chloride (KLOR-CON M) 20 MEQ extended release tablet Take 1 tablet by mouth 2 times daily 180 tablet 0    metoprolol tartrate (LOPRESSOR) 25 MG tablet TAKE ONE TABLET BY MOUTH TWICE A  tablet 0    rivaroxaban (XARELTO) 20 MG TABS tablet TAKE 1 TABLET BY MOUTH EVERY DAY WITH BREAKFAST 90 tablet 0    nystatin (MYCOSTATIN) 017388 UNIT/GM powder Apply topically 4 times daily.  (Patient taking differently: Apply 1 each topically 4 times daily as needed (rash/irritation) Apply topically 4 times daily prn rash/irritation.) 45 g 0    PARoxetine (PAXIL) 20 MG tablet TAKE ONE TABLET BY MOUTH DAILY 90 tablet 3    ipratropium-albuterol (DUONEB) 0.5-2.5 (3) MG/3ML SOLN nebulizer solution Inhale 3 mLs into the lungs every 6 hours as needed for Shortness of Breath 360 mL 3    albuterol sulfate HFA (VENTOLIN HFA) 108 (90 Base) MCG/ACT inhaler Inhale 2 puffs into the lungs 4 times daily as needed for Wheezing 3 Inhaler 1    amLODIPine (NORVASC) 5 MG tablet TAKE ONE TABLET BY MOUTH TWICE A  tablet 1    furosemide (LASIX) 20 MG tablet Take 1 tablet by mouth 2 times daily 180 tablet 0    QUEtiapine (SEROQUEL) 50 MG tablet 1 tablet daily 90 tablet 2    aspirin EC 81 MG EC tablet Take 1 tablet by mouth daily 90 tablet 1    traZODone (DESYREL) 50 MG tablet Take 1 tablet by mouth nightly 30 tablet 12    pantoprazole (PROTONIX) 40 MG tablet TAKE ONE TABLET BY MOUTH EVERY MORNING BEFORE BREAKFAST 90 tablet 3    rOPINIRole (REQUIP) 0.25 MG tablet 1 tablet nightly 90 tablet 0    OXYGEN Inhale 2 L/min into the lungs continuous          Medications patient taking as of now reconciled against medications ordered at time of hospital discharge: Yes    Chief Complaint   Patient presents with    [facility]       History of Present illness - Follow up of Hospital diagnosis(es):   Karlos Leventhal was admitted to [facility] from 11/23 till 11/28/2020 with acute on chronic respiratory failure with hypoxia secondary to multifocal COVID-19 virus pneumonia. Her condition improved with use of IV Decadron and IV remdesivir. Inpatient course: Discharge summary reviewed- see chart. Interval history/Current status: Today she feels much better. Reports some generalized fatigue and dizziness. Uses meclizine as needed with some dizziness relief. She describes her dizziness as \"feeling of room spinning around her\". Per her daughter, Tello Ao systolic blood pressure frequently around 120 to 130 mmHg. Chronic pain well controlled with use of Norco 5 mg twice a day as needed. Her anxiety is well controlled with use of lorazepam 0.5 mg twice a day. Continues to smoke about 1/2 pack of cigarettes daily. A comprehensive review of systems was negative except for what was noted in the HPI. There were no vitals filed for this visit. There is no height or weight on file to calculate BMI.    Wt Readings from Last 3 Encounters:   11/28/20 153 lb 14.1 oz (69.8 kg) 11/04/20 163 lb (73.9 kg)   10/17/20 159 lb 6.3 oz (72.3 kg)     BP Readings from Last 3 Encounters:   11/28/20 (!) 163/62   11/04/20 137/64   10/17/20 (!) 155/68        Physical Exam:  . The patient's daughter, today's systolic blood pressure 255/12 mmHg  Was not performed because it was televisit. The patient appeared to be alert and  oriented to person and place. Head was normocephalic. Eyes pupils equal and round, EOMI. no facial droop. Neck-  full range of motion, no JVD. No apparent respiratory distress. Assessment/Plan:  1. Hospital discharge follow-up  Currently stable. The patient M. To recuperate well from her COVID-19 viral pneumonia. Her appetite is improving. Encouraged to increase ambulation. Encouraged continued use of oxygen 2.5-3 L/min via nasal cannula continuously    2. Dizziness    - meclizine (ANTIVERT) 12.5 MG tablet; 1 tablet twice daily as needed for dizziness  Dispense: 60 tablet; Refill: 2    3. Essential hypertension  Will reduce amlodipine to 5 mg nightly instead of twice a day  Continue the other medications    4. Chronic respiratory failure with hypoxia (HCC)  Continue oxygen support  Cigarette smoking cessation was strongly encouraged    5. Tobacco dependence  As above  Encouraged regular use of nicotine patch    6. Anxiety  Continue Ativan as needed    7.  Chronic abdominal pain-continue Norco as needed        Medical Decision Making: moderate complexity

## 2020-12-18 ENCOUNTER — CARE COORDINATION (OUTPATIENT)
Dept: CARE COORDINATION | Age: 85
End: 2020-12-18

## 2020-12-18 NOTE — CARE COORDINATION
Ambulatory Care Coordination Note  12/18/2020  CM Risk Score: 6  Charlson 10 Year Mortality Risk Score: 100%     ACC: Sallie Mendoza, RN [facility]    Hx: HTN, COPD, Osteoarthritis, Anxiety, Adrenal Hyperplasia, DJD, CAD, Cornary Artery Stent, ANGEL, Hyperlipidemia,Depression, Memory Loss, Chroniic Diastolic HF, Fatty Liver, Visual Hallucinations, COVID-19, Acute Respiratory Failure, Lung Nodule    Summary Note: The pt and her daughter stated the pt has been doing well since the pt's recent hospitalization for COVID. Lorraine Estrella was admitted to [facility] from 11/23 till 11/28/2020 with acute on chronic respiratory failure with hypoxia secondary to multifocal COVID-19 virus pneumonia. Her condition improved with use of IV Decadron and IV Remdesivir. The pt still has some generalized fatigue and dizziness. The pt continues to smoke about a 1/2 pack a day. The pt denied any chest pain or SOB at rest. The pt stated she does have SOB with exertion and the hospital increased her O2 to 4 L/min per NC. The pt stated she has not had to use her nebulizer for a couple weeks. The pt stated she does have a rash on her abdomen and asked if the PCP would refill the Triamcinolone ointment he gave her the last time. The pt denied any other current needs. Plan:  The RNSUDHEER will consult with the PCP. The pt will continue on her current medications. The pt will follow up with her pulmonologist on 1/5/2020. Care Coordination Interventions    Program Enrollment: Complex Care  Referral from Primary Care Provider: No  Suggested Interventions and Community Resources  Fall Risk Prevention: Not Started  Medi Set or Pill Pack: Declined (Comment: Daughter assists pt with medications)  Smoking Cessation: In Process  Other Therapy Services: In Process (Comment: O2)  Zone Management Tools:  In Process  Other Services or Interventions: Discussed smoking cessation         Goals Addressed                 This Visit's Progress albuterol sulfate HFA (VENTOLIN HFA) 108 (90 Base) MCG/ACT inhaler Inhale 2 puffs into the lungs 4 times daily as needed for Wheezing 7/13/20   Taamra Obando MD   amLODIPine (NORVASC) 5 MG tablet TAKE ONE TABLET BY MOUTH TWICE A DAY 6/16/20   Tamara Obando MD   furosemide (LASIX) 20 MG tablet Take 1 tablet by mouth 2 times daily 6/4/20   Tamara Obando MD   QUEtiapine (SEROQUEL) 50 MG tablet 1 tablet daily 4/13/20   Tamara Obando MD   aspirin EC 81 MG EC tablet Take 1 tablet by mouth daily 10/22/19   Tamara Obando MD   traZODone (DESYREL) 50 MG tablet Take 1 tablet by mouth nightly 10/22/19   Tamara Obando MD   pantoprazole (PROTONIX) 40 MG tablet TAKE ONE TABLET BY MOUTH EVERY MORNING BEFORE BREAKFAST 10/8/19   JAME Geronimo - CNP   rOPINIRole (REQUIP) 0.25 MG tablet 1 tablet nightly 9/10/19   Tamara Obando MD   OXYGEN Inhale 2 L/min into the lungs continuous 8/19/15   Yoly Drew MD       Future Appointments   Date Time Provider Melo Jayleen   1/5/2021 10:40 AM Kadi Eduardo MD [facility]     ,   Diabetes Assessment    How often do you test your blood sugar?: No Testing (Comment: Diet controlled No medication for DM)       No patient-reported symptoms      ,   Congestive Heart Failure Assessment    Do you understand a low sodium diet?: Yes  Do you understand how to read food labels?: No  How many restaurant meals do you eat per week?: 0  Do you salt your food before tasting it?: No     No patient-reported symptoms      Symptoms:  None: Yes      Symptom course: stable  Weight trend: stable  Salt intake watch compared to last visit: stable     ,   COPD Assessment    Does the patient understand envrionmental exposure?: Yes  Is the patient able to verbalize Rescue vs. Long Acting medications?: No  Does the patient have a nebulizer?: No  Does the patient use a space with inhaled medications?: No            Symptoms:         and   General Assessment Do you have any symptoms that are causing concern?: No

## 2020-12-22 ENCOUNTER — CARE COORDINATION (OUTPATIENT)
Dept: CASE MANAGEMENT | Age: 85
End: 2020-12-22

## 2020-12-22 NOTE — CARE COORDINATION
Upon review of chart, it was noted patient being followed by ACM, no further outreach to be done by this writer.

## 2020-12-28 ENCOUNTER — TELEPHONE (OUTPATIENT)
Dept: INTERNAL MEDICINE CLINIC | Age: 85
End: 2020-12-28

## 2020-12-28 RX ORDER — QUETIAPINE FUMARATE 50 MG/1
TABLET, FILM COATED ORAL
Qty: 90 TABLET | Refills: 2 | Status: SHIPPED | OUTPATIENT
Start: 2020-12-28 | End: 2021-07-02 | Stop reason: SDUPTHER

## 2020-12-28 RX ORDER — LORAZEPAM 0.5 MG/1
TABLET ORAL
Qty: 60 TABLET | Refills: 0 | Status: SHIPPED | OUTPATIENT
Start: 2020-12-28 | End: 2021-01-27 | Stop reason: SDUPTHER

## 2020-12-28 NOTE — TELEPHONE ENCOUNTER
Patients daughter called to request a refill of LORazepam (ATIVAN) 0.5 MG tablet and a new prescription for QUEtiapine (SEROQUEL) 50 MG tablet    [address] [place] [address] [address] [facility] [phone] - f [phone]    Please advise

## 2021-01-05 ENCOUNTER — TELEPHONE (OUTPATIENT)
Dept: INTERNAL MEDICINE CLINIC | Age: 86
End: 2021-01-05

## 2021-01-05 DIAGNOSIS — J43.2 CENTRILOBULAR EMPHYSEMA (HCC): Primary | Chronic | ICD-10-CM

## 2021-01-19 ENCOUNTER — CARE COORDINATION (OUTPATIENT)
Dept: CARE COORDINATION | Age: 86
End: 2021-01-19

## 2021-01-19 SDOH — SOCIAL STABILITY: SOCIAL NETWORK: HOW OFTEN DO YOU GET TOGETHER WITH FRIENDS OR RELATIVES?: NEVER

## 2021-01-19 SDOH — SOCIAL STABILITY: SOCIAL NETWORK
DO YOU BELONG TO ANY CLUBS OR ORGANIZATIONS SUCH AS CHURCH GROUPS UNIONS, FRATERNAL OR ATHLETIC GROUPS, OR SCHOOL GROUPS?: NO

## 2021-01-19 SDOH — ECONOMIC STABILITY: FOOD INSECURITY: WITHIN THE PAST 12 MONTHS, THE FOOD YOU BOUGHT JUST DIDN'T LAST AND YOU DIDN'T HAVE MONEY TO GET MORE.: NEVER TRUE

## 2021-01-19 SDOH — SOCIAL STABILITY: SOCIAL NETWORK: HOW OFTEN DO YOU ATTENT MEETINGS OF THE CLUB OR ORGANIZATION YOU BELONG TO?: NEVER

## 2021-01-19 SDOH — HEALTH STABILITY: PHYSICAL HEALTH: ON AVERAGE, HOW MANY DAYS PER WEEK DO YOU ENGAGE IN MODERATE TO STRENUOUS EXERCISE (LIKE A BRISK WALK)?: 0 DAYS

## 2021-01-19 SDOH — HEALTH STABILITY: PHYSICAL HEALTH: ON AVERAGE, HOW MANY MINUTES DO YOU ENGAGE IN EXERCISE AT THIS LEVEL?: 0 MIN

## 2021-01-19 SDOH — ECONOMIC STABILITY: TRANSPORTATION INSECURITY
IN THE PAST 12 MONTHS, HAS THE LACK OF TRANSPORTATION KEPT YOU FROM MEDICAL APPOINTMENTS OR FROM GETTING MEDICATIONS?: NO

## 2021-01-19 SDOH — ECONOMIC STABILITY: FOOD INSECURITY: WITHIN THE PAST 12 MONTHS, YOU WORRIED THAT YOUR FOOD WOULD RUN OUT BEFORE YOU GOT MONEY TO BUY MORE.: NEVER TRUE

## 2021-01-19 NOTE — CARE COORDINATION
Ambulatory Care Coordination Note  1/19/2021  CM Risk Score: 6  Charlson 10 Year Mortality Risk Score: 100%     ACC: Reina Ferguson, ARTHUR CCM    Hx: HTN, COPD, Osteoarthritis, Anxiety, Adrenal Hyperplasia, DJD, CAD, Cornary Artery Stent, ANGEL, Hyperlipidemia,Depression, Memory Loss, Chroniic Diastolic HF, Fatty Liver, Visual Hallucinations, COVID-19, Acute Respiratory Failure, Lung Nodule    Summary Note: The pt stated she has been doing well and only becomes SOB with exertion. The pt stated she is currently using her O2 at 3 l/min per nc continuously. The pt stated she one isolated incident of chest pain but when she passed gas she felt better. The pt denied and swelling in her legs or abdomen. The pt stated she no longer smokes. The pt stated her daughter encouraged her to stop. The pt denied any current needs. Plan:  The pt has a follow up appointment with her pulmonologist on 1/26/21. If the pt continues to do well the RN, MAURICEM will discuss graduation from Care Coordination. Care Coordination Interventions    Program Enrollment: Complex Care  Referral from Primary Care Provider: No  Suggested Interventions and Community Resources  Fall Risk Prevention: Not Started  Medi Set or Pill Pack: Declined (Comment: Daughter assists pt with medications)  Smoking Cessation: In Process  Other Therapy Services: In Process (Comment: O2)  Zone Management Tools: In Process  Other Services or Interventions: Discussed smoking cessation         Goals Addressed                 This Visit's Progress     COMPLETED: Patient Stated (pt-stated)   On track     Pt wants to work on smoking cessation    Barriers: lack of motivation and stress  Plan for overcoming my barriers: Work with RNSUDHEER  Confidence: 7/10  Anticipated Goal Completion Date: 1/26/2021 1/19/21: Pt stated she is no longer smoking            Prior to Admission medications    Medication Sig Start Date End Date Taking?  Authorizing Provider QUEtiapine (SEROQUEL) 50 MG tablet 1 tablet daily 12/28/20   Solomon Juarez MD   LORazepam (ATIVAN) 0.5 MG tablet 1 tablet twice daily 12/28/20 1/27/21  Solomon Juarez MD   meclizine (ANTIVERT) 12.5 MG tablet 1 tablet twice daily as needed for dizziness 12/9/20   Solomon Juarez MD   nicotine (NICODERM CQ) 21 MG/24HR Place 1 patch onto the skin daily 11/29/20   Marla Painter MD   atorvastatin (LIPITOR) 40 MG tablet TAKE ONE TABLET BY MOUTH NIGHTLY 11/20/20   Solomon Juarez MD   potassium chloride (KLOR-CON M) 20 MEQ extended release tablet Take 1 tablet by mouth 2 times daily 11/20/20 12/20/20  Solomon Juarez MD   metoprolol tartrate (LOPRESSOR) 25 MG tablet TAKE ONE TABLET BY MOUTH TWICE A DAY 11/20/20   Solomon Juarez MD   rivaroxaban (XARELTO) 20 MG TABS tablet TAKE 1 TABLET BY MOUTH EVERY DAY WITH BREAKFAST 11/17/20   Solomon Juarez MD   nystatin (MYCOSTATIN) 573637 UNIT/GM powder Apply topically 4 times daily. Patient taking differently: Apply 1 each topically 4 times daily as needed (rash/irritation) Apply topically 4 times daily prn rash/irritation.  10/17/20   JAME Jean-Baptiste - CNP   PARoxetine (PAXIL) 20 MG tablet TAKE ONE TABLET BY MOUTH DAILY 10/16/20   Solomon Juarez MD   ipratropium-albuterol (DUONEB) 0.5-2.5 (3) MG/3ML SOLN nebulizer solution Inhale 3 mLs into the lungs every 6 hours as needed for Shortness of Breath 8/25/20   Solomon Juarez MD   albuterol sulfate HFA (VENTOLIN HFA) 108 (90 Base) MCG/ACT inhaler Inhale 2 puffs into the lungs 4 times daily as needed for Wheezing 7/13/20   Solomon Juarez MD   amLODIPine (NORVASC) 5 MG tablet TAKE ONE TABLET BY MOUTH TWICE A DAY 6/16/20   Solomon Juarez MD   furosemide (LASIX) 20 MG tablet Take 1 tablet by mouth 2 times daily 6/4/20   Solomon Juarez MD   aspirin EC 81 MG EC tablet Take 1 tablet by mouth daily 10/22/19   Solomon Juarez MD   traZODone (DESYREL) 50 MG tablet Take 1 tablet by mouth nightly 10/22/19   Solomon Juarez MD pantoprazole (PROTONIX) 40 MG tablet TAKE ONE TABLET BY MOUTH EVERY MORNING BEFORE BREAKFAST 10/8/19   Hector Mooney, APRN - CNP   rOPINIRole (REQUIP) 0.25 MG tablet 1 tablet nightly 9/10/19   Beacher Libman, MD   OXYGEN Inhale 2 L/min into the lungs continuous 8/19/15   Lucy Nguyen MD       Future Appointments   Date Time Provider Melo Clemens   1/28/2021  8:00 AM Jabier Mason MD [facility]     ,   Diabetes Assessment    How often do you test your blood sugar?: No Testing (Comment: Diet controlled No medication for DM)       No patient-reported symptoms      ,   Congestive Heart Failure Assessment    Do you understand a low sodium diet?: Yes  Do you understand how to read food labels?: No  How many restaurant meals do you eat per week?: 0  Do you salt your food before tasting it?: No     No patient-reported symptoms      Symptoms:  CHF associated dyspnea on exertion: Pos      Symptom course: stable  Weight trend: stable  Salt intake watch compared to last visit: stable     ,   COPD Assessment    Does the patient understand envrionmental exposure?: Yes  Is the patient able to verbalize Rescue vs. Long Acting medications?: No  Does the patient have a nebulizer?: No  Does the patient use a space with inhaled medications?: No     No patient-reported symptoms         Symptoms:     Have you had a recent diagnosis of pneumonia either by PCP or at a hospital?:  (Comment: 11/2020)      and   General Assessment    Do you have any symptoms that are causing concern?: No

## 2021-01-24 ENCOUNTER — HOSPITAL ENCOUNTER (EMERGENCY)
Age: 86
Discharge: HOME | End: 2021-01-24
Attending: EMERGENCY MEDICINE
Payer: MEDICARE

## 2021-01-24 ENCOUNTER — APPOINTMENT (OUTPATIENT)
Dept: GENERAL RADIOLOGY | Age: 86
End: 2021-01-24
Payer: MEDICARE

## 2021-01-24 VITALS
BODY MASS INDEX: 28.39 KG/M2 | SYSTOLIC BLOOD PRESSURE: 187 MMHG | WEIGHT: 160.27 LBS | RESPIRATION RATE: 19 BRPM | HEART RATE: 88 BPM | OXYGEN SATURATION: 97 % | TEMPERATURE: 96.7 F | DIASTOLIC BLOOD PRESSURE: 65 MMHG

## 2021-01-24 DIAGNOSIS — Z79.01 ON ANTICOAGULANT THERAPY: ICD-10-CM

## 2021-01-24 DIAGNOSIS — F17.200 CURRENT SMOKER: ICD-10-CM

## 2021-01-24 DIAGNOSIS — S90.31XA TRAUMATIC ECCHYMOSIS OF RIGHT FOOT, INITIAL ENCOUNTER: Primary | ICD-10-CM

## 2021-01-24 PROCEDURE — 99283 EMERGENCY DEPT VISIT LOW MDM: CPT

## 2021-01-24 PROCEDURE — 73630 X-RAY EXAM OF FOOT: CPT

## 2021-01-24 ASSESSMENT — ENCOUNTER SYMPTOMS
ABDOMINAL PAIN: 0
SORE THROAT: 0

## 2021-01-24 ASSESSMENT — PAIN DESCRIPTION - ORIENTATION: ORIENTATION: RIGHT

## 2021-01-24 ASSESSMENT — PAIN DESCRIPTION - DESCRIPTORS: DESCRIPTORS: ACHING

## 2021-01-24 ASSESSMENT — PAIN DESCRIPTION - PAIN TYPE: TYPE: ACUTE PAIN

## 2021-01-24 ASSESSMENT — PAIN DESCRIPTION - LOCATION: LOCATION: FOOT

## 2021-01-24 ASSESSMENT — PAIN SCALES - GENERAL
PAINLEVEL_OUTOF10: 5
PAINLEVEL_OUTOF10: 3

## 2021-01-24 NOTE — ED PROVIDER NOTES
Nursing Notes reviewed. REVIEW OF SYSTEMS  (2-9 systems for level 4, 10 or more for level 5)   Review of Systems   Constitutional: Negative for activity change. HENT: Negative for sore throat. Cardiovascular: Negative for chest pain. Gastrointestinal: Negative for abdominal pain. Genitourinary: Negative for dysuria. Musculoskeletal:        Daughter reports foot was swollen a few days ago, has improved   Neurological: Negative for headaches. PAST MEDICAL HISTORY     Past Medical History:   Diagnosis Date    Abdominal pain, unspecified site     Adrenal hyperplasia ([address] 6/11/2013    Left - CT scan - 2011    Ankle Fracture, Right     Anxiety and depression     Aortic atherosclerosis ([address] 10/3/2015    CAD (coronary artery disease) 2018    PCI LAD 3.0 x 15 mm and 3.5 x 15 mm BMS; RCA 2.5 x 12 mm and 2.5 x 15 mm BMS.  EF 65%    Contusion of unspecified site     COPD (chronic obstructive pulmonary disease) ([address] 3/1/2013    Coronary artery disease     Arteriosclerosis    DJD (degenerative joint disease)     Eczema 8/9/2013    Essential hypertension 9/9/2015    Hyperlipidemia     Hypertension     Hypoxia 7/16/2015    Insomnia     Leukocytosis     Memory loss     Nicotine addiction     Osteoarthritis     Pain in joint, pelvic region and thigh     Primary osteoarthritis involving multiple joints 9/9/2015    Pulmonary hypertension ([address] 10/3/2015    Senile reticular degeneration of peripheral retina     Syncope     Thoracic or lumbosacral neuritis or radiculitis, unspecified     Urinary incontinence     Visual hallucinations 9/16/2014    Wears glasses        SURGICALHISTORY       Past Surgical History:   Procedure Laterality Date    CATARACT REMOVAL WITH IMPLANT Left December 5, 2012    Dr. Rebekah Menezes  08/01/2018     CURRENT MEDICATIONS       Previous Medications ALBUTEROL SULFATE HFA (VENTOLIN HFA) 108 (90 BASE) MCG/ACT INHALER    Inhale 2 puffs into the lungs 4 times daily as needed for Wheezing    AMLODIPINE (NORVASC) 5 MG TABLET    TAKE ONE TABLET BY MOUTH TWICE A DAY    ASPIRIN EC 81 MG EC TABLET    Take 1 tablet by mouth daily    ATORVASTATIN (LIPITOR) 40 MG TABLET    TAKE ONE TABLET BY MOUTH NIGHTLY    FUROSEMIDE (LASIX) 20 MG TABLET    Take 1 tablet by mouth 2 times daily    IPRATROPIUM-ALBUTEROL (DUONEB) 0.5-2.5 (3) MG/3ML SOLN NEBULIZER SOLUTION    Inhale 3 mLs into the lungs every 6 hours as needed for Shortness of Breath    LORAZEPAM (ATIVAN) 0.5 MG TABLET    1 tablet twice daily    MECLIZINE (ANTIVERT) 12.5 MG TABLET    1 tablet twice daily as needed for dizziness    METOPROLOL TARTRATE (LOPRESSOR) 25 MG TABLET    TAKE ONE TABLET BY MOUTH TWICE A DAY    NICOTINE (NICODERM CQ) 21 MG/24HR    Place 1 patch onto the skin daily    NYSTATIN (MYCOSTATIN) 823498 UNIT/GM POWDER    Apply topically 4 times daily. OXYGEN    Inhale 2 L/min into the lungs continuous    PANTOPRAZOLE (PROTONIX) 40 MG TABLET    TAKE ONE TABLET BY MOUTH EVERY MORNING BEFORE BREAKFAST    PAROXETINE (PAXIL) 20 MG TABLET    TAKE ONE TABLET BY MOUTH DAILY    POTASSIUM CHLORIDE (KLOR-CON M) 20 MEQ EXTENDED RELEASE TABLET    Take 1 tablet by mouth 2 times daily    QUETIAPINE (SEROQUEL) 50 MG TABLET    1 tablet daily    RIVAROXABAN (XARELTO) 20 MG TABS TABLET    TAKE 1 TABLET BY MOUTH EVERY DAY WITH BREAKFAST    ROPINIROLE (REQUIP) 0.25 MG TABLET    1 tablet nightly    TRAZODONE (DESYREL) 50 MG TABLET    Take 1 tablet by mouth nightly      ALLERGIES     Enalapril  FAMILY HISTORY       Family History   Problem Relation Age of Onset    Heart Attack Father     Heart Attack Brother      SOCIAL HISTORY       Social History     Socioeconomic History    Marital status:       Spouse name: Johnathan Rose Number of children: 2    Years of education: None    Highest education level: None Occupational History    Occupation: retired -    Social Needs    Financial resource strain: Not hard at all   Karan-Shweta insecurity     Worry: Never true     Inability: Never true   [employer] needs     Medical: No     Non-medical: No   Tobacco Use    Smoking status: Former Smoker     Packs/day: 1.00     Years: 65.00     Pack years: 65.00     Types: Cigarettes     Quit date: 2018     Years since quittin.3    Smokeless tobacco: Never Used    Tobacco comment: Pt stated she no longer smokes   Substance and Sexual Activity    Alcohol use: Yes     Alcohol/week: 0.0 standard drinks     Comment: social    Drug use: No    Sexual activity: Never     Comment:  -    Lifestyle    Physical activity     Days per week: 0 days     Minutes per session: 0 min    Stress: Not at all   Relationships    Social connections     Talks on phone: More than three times a week     Gets together: Never     Attends Anabaptist service: None     Active member of club or organization: No     Attends meetings of clubs or organizations: Never     Relationship status:      Intimate partner violence     Fear of current or ex partner: None     Emotionally abused: None     Physically abused: None     Forced sexual activity: None   Other Topics Concern    None   Social History Narrative        Past Medical History     Coronary Artery Disease    Hyperlipidemia    Hypertension    fatty liver    Osteoarthritis    Anxiety    COPD    Pneumonia                                Last updated by Lico Jennings MD on 2009                          Past Surgical History     Hysterectomy:     Salpingo-Oophorectomy: bilateral - 1995    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      Occupation: unit clerk    Alcohol Use: none    Drug Use: none    Tobacco Usage: prior smoker    Cigarettes - Year Started:      Cigarettes - Year Quit:        Cigarettes - Years smoked - 14    Cigarettes - Packs per Day - 1       Pack/Years - 14    quit smoking 2006 but she resumed and currently smokes one pack per week                                            Last updated by Davi Lakhani MA on 2008                              Family History     mother -  - age 80 - dementia, pneumonia, coronary artery disease, heart attack    father -  - age 62 - coronary artery disease, heart attack        brother -  - age 55 - coronary artery disease, heart attack        sister -  - age 58 - diabetes, ESRD    sister -  - age 64 - hepatitis C, lung cancer, post-op complications    sister -  - age 52 - coronary artery disease, heart attack    sister - [de-identified] -    sister - Dot -    sister - Brent Davila -        son - Meliza Mckinney -        daughter - Shakila Moreno -                                 Last updated by Davi Lakhani MA on 2008         SCREENINGS         PHYSICAL EXAM  (up to 7 for level 4, 8 or more for level 5)   INITIAL VITALS: BP: (!) 218/96, Temp: 96.7 °F (35.9 °C), Pulse: 88, Resp: 19, SpO2: 97 %   Physical Exam  Vitals signs reviewed. Constitutional:       Appearance: She is not ill-appearing or toxic-appearing. HENT:      Head: Normocephalic and atraumatic. Right Ear: External ear normal.      Left Ear: External ear normal.      Nose: Nose normal.   Eyes:      General: No scleral icterus. Right eye: No discharge. Left eye: No discharge. Extraocular Movements: Extraocular movements intact. Conjunctiva/sclera: Conjunctivae normal.   Neck:      Trachea: No tracheal deviation. Cardiovascular:      Rate and Rhythm: Normal rate. Pulmonary:      Effort: Pulmonary effort is normal. No respiratory distress. Musculoskeletal:      Right ankle: Normal. She exhibits normal range of motion. No tenderness. Right lower leg: No edema. Left lower leg: No edema. Right foot: No laceration. Feet:       Comments: DP pulses are faint but palpable and equal bilaterally. Sensation intact and equal bilaterally. Good strength to dorsiflexion and plantarflexion bilaterally. Skin:     General: Skin is warm and dry. Capillary Refill: Capillary refill takes less than 2 seconds. Neurological:      General: No focal deficit present. Mental Status: She is alert. Mental status is at baseline. Psychiatric:         Mood and Affect: Mood normal.         Behavior: Behavior normal.       DIAGNOSTIC RESULTS     RADIOLOGY:   Interpretation per Radiologist below, if available at the time of this note:  XR FOOT RIGHT (MIN 3 VIEWS)   Final Result   Slight soft tissue swelling about the lateral forefoot. No acute bony   abnormality. Mild osteoarthritis. LABS:  Labs Reviewed - No data to display    All other labs were within normal range or not returned as of this dictation. EMERGENCY DEPARTMENT COURSE and DIFFERENTIAL DIAGNOSIS/MDM:   Patient was given the following medications:  No orders of the defined types were placed in this encounter. CONSULTS:  None  INITIAL VITALS: BP: (!) 218/96, Temp: 96.7 °F (35.9 °C), Pulse: 88, Resp: 19, SpO2: 97 %   RECENT VITALS:  BP: (!) 187/65,Temp: 96.7 °F (35.9 °C), Pulse: 88, Resp: 19, SpO2: 97 %     Naresh Scott is a 80 y.o. female who presents to the emergency department secondary to concern for right foot bruising after trauma that occurred 3 days ago, she is on Xarelto. Daughter at the bedside has images in her phone of the foot from the last few days, the foot appears to have improved in both swelling and amount of bruising. It is also unclear exactly when the incident happened as she stated it was the 21st and also stated last Tuesday or Wednesday which would have been the 19th of the 20th. X-ray imaging has been ordered per protocol prior to my evaluation, imaging had returned during my assessment and I discussed results with patient and daughter that there were no signs of fracture. Patient is stable for discharge home. We did discuss following up with her primary care and orthopedics as needed as well as rest, ice, elevation at home for continued symptomatic treatment as needed. We also encouraged her to continue to think about quitting smoking. FINAL IMPRESSION      1. Traumatic ecchymosis of right foot, initial encounter    2. On anticoagulant therapy    3.  Current smoker        DISPOSITION/PLAN   DISPOSITION Decision To Discharge 01/24/2021 02:54:16 PM      PATIENT REFERRED TO:  Nabeel Harry MD  [address]  [place] [address]  [phone]    Schedule an appointment as soon as possible for a visit   For follow up appointment, As needed    Zabrina Pascal MD  [address] [facility]  [address]  [phone]    Schedule an appointment as soon as possible for a visit   For follow up appointment, As needed with orthopaedics      DISCHARGE MEDICATIONS:  New Prescriptions    No medications on file            (Please note that portions of this note were completed with a voice recognition program. Efforts were made to edit the dictations but occasionally words are mis-transcribed.)    Francisca Shine MD (electronically signed)  Attending Emergency Physician      Francisca Shine MD  01/24/21 [id number]

## 2021-01-24 NOTE — ED NOTES
D/C: Order noted for d/c. Pt confirmed d/c paperwork have correct name. Discharge and education instructions reviewed with patient. Teach-back successful. Pt verbalized understanding and signed d/c papers. Pt denied questions at this time. No acute distress noted. Patient instructed to follow-up as noted - return to emergency department if symptoms worsen. Patient verbalized understanding. Discharged per EDMD with discharge instructions. Pt discharged to private vehicle. Patient stable upon departure. Thanked patient for choosing [facility] for care. Provider aware of patient pain at time of discharge.        Shante Acharya, RN  01/24/21 [id number]

## 2021-01-25 ENCOUNTER — CARE COORDINATION (OUTPATIENT)
Dept: CARE COORDINATION | Age: 86
End: 2021-01-25

## 2021-01-25 NOTE — CARE COORDINATION
The daughter was informed that she could  the Xarelto samples at the office and that an order will be faxed to [facility] for the small O2 tanks.
Time Provider Melo Clemens   1/28/2021  8:00 AM Romi Medina MD [facility] PULM [facility]       New Medications?:   No      Medication Reconciliation by phone - Yes  Understands Medications? Yes  Taking Medications? Yes  Can you swallow your pills? Yes    Any further needs in the home i.e. Equipment?   Yes    Link to services in community?:  Yes   Which services:  [employer]

## 2021-01-27 ENCOUNTER — OFFICE VISIT (OUTPATIENT)
Dept: INTERNAL MEDICINE CLINIC | Age: 86
End: 2021-01-27
Payer: MEDICARE

## 2021-01-27 VITALS — TEMPERATURE: 96.9 F

## 2021-01-27 DIAGNOSIS — S90.31XA HEMATOMA OF RIGHT FOOT: Primary | ICD-10-CM

## 2021-01-27 DIAGNOSIS — G89.29 ABDOMINAL PAIN, CHRONIC, LEFT LOWER QUADRANT: ICD-10-CM

## 2021-01-27 DIAGNOSIS — R10.32 ABDOMINAL PAIN, CHRONIC, LEFT LOWER QUADRANT: ICD-10-CM

## 2021-01-27 DIAGNOSIS — F51.01 PRIMARY INSOMNIA: Chronic | ICD-10-CM

## 2021-01-27 DIAGNOSIS — F41.9 ANXIETY: Chronic | ICD-10-CM

## 2021-01-27 PROCEDURE — G8417 CALC BMI ABV UP PARAM F/U: HCPCS | Performed by: HOSPITALIST

## 2021-01-27 PROCEDURE — 1123F ACP DISCUSS/DSCN MKR DOCD: CPT | Performed by: HOSPITALIST

## 2021-01-27 PROCEDURE — 99214 OFFICE O/P EST MOD 30 MIN: CPT | Performed by: HOSPITALIST

## 2021-01-27 PROCEDURE — 1090F PRES/ABSN URINE INCON ASSESS: CPT | Performed by: HOSPITALIST

## 2021-01-27 PROCEDURE — 4040F PNEUMOC VAC/ADMIN/RCVD: CPT | Performed by: HOSPITALIST

## 2021-01-27 PROCEDURE — G8427 DOCREV CUR MEDS BY ELIG CLIN: HCPCS | Performed by: HOSPITALIST

## 2021-01-27 PROCEDURE — G8484 FLU IMMUNIZE NO ADMIN: HCPCS | Performed by: HOSPITALIST

## 2021-01-27 PROCEDURE — 1036F TOBACCO NON-USER: CPT | Performed by: HOSPITALIST

## 2021-01-27 RX ORDER — HYDROCODONE BITARTRATE AND ACETAMINOPHEN 5; 325 MG/1; MG/1
1 TABLET ORAL 2 TIMES DAILY PRN
Qty: 60 TABLET | Refills: 0 | Status: SHIPPED | OUTPATIENT
Start: 2021-01-30 | End: 2021-03-01

## 2021-01-27 RX ORDER — LORAZEPAM 0.5 MG/1
TABLET ORAL
Qty: 60 TABLET | Refills: 0 | Status: SHIPPED | OUTPATIENT
Start: 2021-01-29 | End: 2021-03-15

## 2021-01-27 NOTE — PROGRESS NOTES
Follow Up Visit Established Patient Visit    Patient:  Bolivar De Leon                                               : 1934  Age: 80 y.o. MRN: [id number]  Date : 2021    Bolivar De Leon is a 80 y.o. female who presents for : Follow-up on most recent evaluation in the emergency department of the [facility] on 2021. Patient hit her foot on her wrought iron chair and on Xarelto. Right foot x-ray at the time did not show any fracture, but had soft tissue swelling. Ecchymosis has gotten better over the time. Patient is ambulating well on her feet, and she denies pain at this time. Patient also suffers with chronic anxiety/insomnia that has been well controlled on Ativan. She also has chronic pain syndrome that has been well controlled on Norco.  She denies any other symptoms at this time. Continues to smoke about 1/2 pack of cigarettes daily    Chief Complaint   Patient presents with   [address] from Hospital     Right foot injury     The patient apparently injured her right foot on 2021. She, apparently, hit her foot against the wrought iron chair. Past Medical History:   Diagnosis Date    Abdominal pain, unspecified site     Adrenal hyperplasia ([address] 2013    Left - CT scan -     Ankle Fracture, Right     Anxiety and depression     Aortic atherosclerosis ([address] 10/3/2015    CAD (coronary artery disease) 2018    PCI LAD 3.0 x 15 mm and 3.5 x 15 mm BMS; RCA 2.5 x 12 mm and 2.5 x 15 mm BMS.  EF 65%    Contusion of unspecified site     COPD (chronic obstructive pulmonary disease) ([address] 3/1/2013    Coronary artery disease     Arteriosclerosis    DJD (degenerative joint disease)     Eczema 2013    Essential hypertension 2015    Hyperlipidemia     Hypertension     Hypoxia 2015    Insomnia     Leukocytosis     Memory loss     Nicotine addiction     Osteoarthritis     Pain in joint, pelvic region and thigh  Primary osteoarthritis involving multiple joints 9/9/2015    Pulmonary hypertension ([address] 10/3/2015    Senile reticular degeneration of peripheral retina     Syncope     Thoracic or lumbosacral neuritis or radiculitis, unspecified     Urinary incontinence     Visual hallucinations 9/16/2014    Wears glasses        Past Surgical History:   Procedure Laterality Date    CATARACT REMOVAL WITH IMPLANT Left December 5, 2012    Dr. Mare Owens   [address]  08/01/2018       Current Outpatient Medications   Medication Sig Dispense Refill    [START ON 1/29/2021] LORazepam (ATIVAN) 0.5 MG tablet 1 tablet twice daily 60 tablet 0    [START ON 1/30/2021] HYDROcodone-acetaminophen (NORCO) 5-325 MG per tablet Take 1 tablet by mouth 2 times daily as needed for Pain for up to 30 days. Take lowest dose possible to manage pain 60 tablet 0    QUEtiapine (SEROQUEL) 50 MG tablet 1 tablet daily 90 tablet 2    meclizine (ANTIVERT) 12.5 MG tablet 1 tablet twice daily as needed for dizziness 60 tablet 2    nicotine (NICODERM CQ) 21 MG/24HR Place 1 patch onto the skin daily 30 patch 3    atorvastatin (LIPITOR) 40 MG tablet TAKE ONE TABLET BY MOUTH NIGHTLY 90 tablet 0    potassium chloride (KLOR-CON M) 20 MEQ extended release tablet Take 1 tablet by mouth 2 times daily 180 tablet 0    metoprolol tartrate (LOPRESSOR) 25 MG tablet TAKE ONE TABLET BY MOUTH TWICE A  tablet 0    rivaroxaban (XARELTO) 20 MG TABS tablet TAKE 1 TABLET BY MOUTH EVERY DAY WITH BREAKFAST 90 tablet 0    nystatin (MYCOSTATIN) 951092 UNIT/GM powder Apply topically 4 times daily.  (Patient taking differently: Apply 1 each topically 4 times daily as needed (rash/irritation) Apply topically 4 times daily prn rash/irritation.) 45 g 0    PARoxetine (PAXIL) 20 MG tablet TAKE ONE TABLET BY MOUTH DAILY 90 tablet 3 Coloration: Skin is not jaundiced or pale. Findings: Bruising (Right foot) present. No erythema, lesion or rash. Neurological:      General: No focal deficit present. Mental Status: She is alert and oriented to person, place, and time. Mental status is at baseline. Cranial Nerves: No cranial nerve deficit. Sensory: No sensory deficit. Motor: No weakness. Coordination: Coordination normal.      Gait: Gait normal.      Deep Tendon Reflexes: Reflexes normal.   Psychiatric:         Mood and Affect: Mood normal.         Behavior: Behavior normal.         Thought Content: Thought content normal.         Judgment: Judgment normal.                 Assessment/ plan:     Jose Ramon Covarrubias was seen today for follow-up from hospital.    Diagnoses and all orders for this visit:    R foot hematoma: stable; supportive care; will continue to monitor    Anxiety: This is a chronic issue that is well controlled we will continue lorazepam at this time, reassess as required  -     LORazepam (ATIVAN) 0.5 MG tablet; 1 tablet twice daily    Primary insomnia: Is a chronic issue that is well controlled we will continue lorazepam at this time, reassess as required  -     LORazepam (ATIVAN) 0.5 MG tablet; 1 tablet twice daily    Abdominal pain, chronic, left lower quadrant: This is a chronic issue, that has no reported change that is well controlled on the hydrocodone, will continue at this time, reassess as required  -     HYDROcodone-acetaminophen (NORCO) 5-325 MG per tablet; Take 1 tablet by mouth 2 times daily as needed for Pain for up to 30 days.  Take lowest dose possible to manage pain Patient is also requesting to go into a nursing facility. Her daughter will find out what services are available and then let us know once they are ready to make this decision. Counseled the patient on the visitation restrictions of current nursing/assisted living facilities. Will hold off for now but get back to us with the information once ready. Patient also requesting Covid vaccination as she had positive test in November. Advised to call [facility] to see if she can get on the list for vaccination has had almost been 3 months since her infection. Return in about 3 months (around 4/27/2021) for anxiety, Chronic pain, insomnia.     Carolyn Patel MD

## 2021-01-28 PROBLEM — J44.1 COPD EXACERBATION (HCC): Status: RESOLVED | Noted: 2020-11-23 | Resolved: 2021-01-28

## 2021-02-03 ENCOUNTER — TELEPHONE (OUTPATIENT)
Dept: INTERNAL MEDICINE CLINIC | Age: 86
End: 2021-02-03

## 2021-02-03 NOTE — TELEPHONE ENCOUNTER
HER DAUGHTER IS PURCHASING A OXYGEN MACHINE AND IS TRYING TO SAVE MONEY BUT THE Thucy NEEDS TO SPEAK WITH SOMEONE TO SAVE HER ALMOST A 1000 DOLLARS ON THIS MACHINE PLEASE ADVISE [phone] JYOTI

## 2021-02-04 NOTE — TELEPHONE ENCOUNTER
Solomon Manual from [address] [place] [address] oxygen services would like script that was faxed over for patient on 2/1/2021 and would need this by tomorrow or patient will lose discount, Solomon Manual will refax order to be completed.

## 2021-02-05 ENCOUNTER — TELEPHONE (OUTPATIENT)
Dept: INTERNAL MEDICINE CLINIC | Age: 86
End: 2021-02-05

## 2021-02-05 NOTE — TELEPHONE ENCOUNTER
[address] faxed oxygen prescription and the patients daughter is purchasing a system and they are trying to save her 500 dollars on this machine.

## 2021-02-13 ENCOUNTER — CARE COORDINATION (OUTPATIENT)
Dept: CARE COORDINATION | Age: 86
End: 2021-02-13

## 2021-02-13 NOTE — CARE COORDINATION
Ambulatory Care Coordination Note  2/13/2021  CM Risk Score: 10  Charlson 10 Year Mortality Risk Score: 100%     ACC: Zachary Kilpatrick, ARTHUR [facility]    Hx: HTN, COPD, Osteoarthritis, Anxiety, Adrenal Hyperplasia, DJD, CAD, Cornary Artery Stent, ANGEL, Hyperlipidemia,Depression, Memory Loss, Chroniic Diastolic HF, Fatty Liver, Visual Hallucinations, COVID-19, Acute Respiratory Failure, Lung Nodule    Summary Note: The pt stated she feels well. The pt stated she did receive the new [employer] O2 Concentrator but she does not know how to use it. The pt asked the nurse to call the pt's daughter. The pt's daughter stated the pt does know how to use the new machine but she is not use to it because it does not give a continuous flow of O2 but delivers puffs. The daughter stated she has the pt uses the O2 concentrator for a couple hours every day to get use to the machine. Plan:  The daughter will continue to work with the pt in r/t the O2 concentrator. Pt continues to use her continuous flow O2 when at home. Encourage pt to get the COVID-19 vaccine. Care Coordination Interventions    Program Enrollment: Complex Care  Referral from Primary Care Provider: No  Suggested Interventions and Community Resources  Fall Risk Prevention: In Process  Medication Assistance Program: In Process  Medi Set or Pill Pack: Declined (Comment: Daughter assists pt with medications)  Smoking Cessation: Completed  Other Therapy Services: In Process (Comment: O2)  Zone Management Tools: In Process  Other Services or Interventions: Assisted pt in obtaining Xarelto samples. Pt's daughter purchased pt an  [employer] O2 concentrator         Goals Addressed                 This Visit's Progress     Medication Management   Improving     I will take my medication as directed. I will notify my provider/Care Coordinator if I am unable to afford my medications.     Barriers: financial Plan for overcoming my barriers: Work with RN, SUDHEER until deductible is reached  Confidence: 8/10  Anticipated Goal Completion Date: 03/25/21            Prior to Admission medications    Medication Sig Start Date End Date Taking? Authorizing Provider   LORazepam (ATIVAN) 0.5 MG tablet 1 tablet twice daily 1/29/21 2/26/21  Aung Terrazas MD   HYDROcodone-acetaminophen [facility]) 5-325 MG per tablet Take 1 tablet by mouth 2 times daily as needed for Pain for up to 30 days. Take lowest dose possible to manage pain 1/30/21 3/1/21  Aung Terrazas MD   QUEtiapine (SEROQUEL) 50 MG tablet 1 tablet daily 12/28/20   Aung Terrazas MD   meclizine (ANTIVERT) 12.5 MG tablet 1 tablet twice daily as needed for dizziness 12/9/20   Aung Terrazas MD   nicotine (NICODERM CQ) 21 MG/24HR Place 1 patch onto the skin daily 11/29/20   Nereida Walker MD   atorvastatin (LIPITOR) 40 MG tablet TAKE ONE TABLET BY MOUTH NIGHTLY 11/20/20   Aung Terrazas MD   potassium chloride (KLOR-CON M) 20 MEQ extended release tablet Take 1 tablet by mouth 2 times daily 11/20/20 12/20/20  Aung Terrazas MD   metoprolol tartrate (LOPRESSOR) 25 MG tablet TAKE ONE TABLET BY MOUTH TWICE A DAY 11/20/20   Aung Terrazas MD   rivaroxaban (XARELTO) 20 MG TABS tablet TAKE 1 TABLET BY MOUTH EVERY DAY WITH BREAKFAST 11/17/20   Aung Terrazas MD   nystatin (MYCOSTATIN) 170340 UNIT/GM powder Apply topically 4 times daily. Patient taking differently: Apply 1 each topically 4 times daily as needed (rash/irritation) Apply topically 4 times daily prn rash/irritation.  10/17/20   JAME Courtney - CNP   PARoxetine (PAXIL) 20 MG tablet TAKE ONE TABLET BY MOUTH DAILY 10/16/20   Aung Terrazas MD   ipratropium-albuterol (DUONEB) 0.5-2.5 (3) MG/3ML SOLN nebulizer solution Inhale 3 mLs into the lungs every 6 hours as needed for Shortness of Breath 8/25/20   Aung Terrazas MD albuterol sulfate HFA (VENTOLIN HFA) 108 (90 Base) MCG/ACT inhaler Inhale 2 puffs into the lungs 4 times daily as needed for Wheezing 7/13/20   Wandy Leo MD   amLODIPine (NORVASC) 5 MG tablet TAKE ONE TABLET BY MOUTH TWICE A DAY 6/16/20   Wandy Leo MD   furosemide (LASIX) 20 MG tablet Take 1 tablet by mouth 2 times daily 6/4/20   Wandy Leo MD   aspirin EC 81 MG EC tablet Take 1 tablet by mouth daily 10/22/19   Wandy Leo MD   traZODone (DESYREL) 50 MG tablet Take 1 tablet by mouth nightly 10/22/19   Wandy Leo MD   pantoprazole (PROTONIX) 40 MG tablet TAKE ONE TABLET BY MOUTH EVERY MORNING BEFORE BREAKFAST 10/8/19   JAME Garcia CNP   rOPINIRole (REQUIP) 0.25 MG tablet 1 tablet nightly 9/10/19   Wandy Leo MD   OXYGEN Inhale 2 L/min into the lungs continuous 8/19/15   Neville Acosta MD       No future appointments.   ,   Diabetes Assessment    How often do you test your blood sugar?: No Testing (Comment: Diet controlled No medication for DM)          ,   Congestive Heart Failure Assessment    Do you understand a low sodium diet?: Yes  Do you understand how to read food labels?: No  How many restaurant meals do you eat per week?: 0  Do you salt your food before tasting it?: No     No patient-reported symptoms      Symptoms:     Symptom course: stable  Weight trend: stable  Salt intake watch compared to last visit: stable     ,   COPD Assessment    Does the patient understand envrionmental exposure?: Yes  Is the patient able to verbalize Rescue vs. Long Acting medications?: No  Does the patient have a nebulizer?: No  Does the patient use a space with inhaled medications?: No     No patient-reported symptoms         Symptoms:         and   General Assessment    Do you have any symptoms that are causing concern?: No

## 2021-02-14 DIAGNOSIS — I10 ESSENTIAL HYPERTENSION: ICD-10-CM

## 2021-02-15 RX ORDER — ATORVASTATIN CALCIUM 40 MG/1
TABLET, FILM COATED ORAL
Qty: 90 TABLET | Refills: 0 | Status: SHIPPED | OUTPATIENT
Start: 2021-02-15 | End: 2021-02-17 | Stop reason: SDUPTHER

## 2021-02-17 DIAGNOSIS — R10.32 ABDOMINAL PAIN, CHRONIC, LEFT LOWER QUADRANT: ICD-10-CM

## 2021-02-17 DIAGNOSIS — G89.29 ABDOMINAL PAIN, CHRONIC, LEFT LOWER QUADRANT: ICD-10-CM

## 2021-02-17 RX ORDER — ATORVASTATIN CALCIUM 40 MG/1
TABLET, FILM COATED ORAL
Qty: 90 TABLET | Refills: 0 | Status: SHIPPED | OUTPATIENT
Start: 2021-02-17 | End: 2021-06-04

## 2021-02-17 RX ORDER — HYDROCODONE BITARTRATE AND ACETAMINOPHEN 5; 325 MG/1; MG/1
1 TABLET ORAL 2 TIMES DAILY PRN
Qty: 60 TABLET | Refills: 0 | Status: SHIPPED | OUTPATIENT
Start: 2021-02-17 | End: 2021-04-19 | Stop reason: SDUPTHER

## 2021-02-17 NOTE — TELEPHONE ENCOUNTER
Pt called stated that she needs refills on   Atorvastatin 40mg QD and Norco 5/325 bid prn       oarrs ran   Last filled 12/1/20  Last seen 1/27/21

## 2021-02-22 ENCOUNTER — TELEPHONE (OUTPATIENT)
Dept: INTERNAL MEDICINE CLINIC | Age: 86
End: 2021-02-22

## 2021-02-22 NOTE — TELEPHONE ENCOUNTER
Patient daughter called wanting to know if you have any samples of the rivaroxaban (XARELTO) 20 MG TABS tablet [[id number]  Since it is that time of year with the high co-pay of $400. Please call to advise.

## 2021-03-13 DIAGNOSIS — F41.9 ANXIETY: Chronic | ICD-10-CM

## 2021-03-13 DIAGNOSIS — F51.01 PRIMARY INSOMNIA: Chronic | ICD-10-CM

## 2021-03-15 RX ORDER — LORAZEPAM 0.5 MG/1
TABLET ORAL
Qty: 60 TABLET | Refills: 0 | Status: SHIPPED | OUTPATIENT
Start: 2021-03-15 | End: 2021-04-19

## 2021-03-19 ENCOUNTER — CARE COORDINATION (OUTPATIENT)
Dept: CARE COORDINATION | Age: 86
End: 2021-03-19

## 2021-03-19 NOTE — CARE COORDINATION
Ambulatory Care Coordination Note  3/19/2021  CM Risk Score: 10  Charlson 10 Year Mortality Risk Score: 100%     ACC: Shira Shannon RN [facility]    Hx: HTN, COPD, Osteoarthritis, Anxiety, Adrenal Hyperplasia, DJD, CAD, Cornary Artery Stent, ANGEL, Hyperlipidemia,Depression, Memory Loss, Chroniic Diastolic HF, Fatty Liver, Visual Hallucinations, COVID-19, Acute Respiratory Failure, Lung Nodule    Summary Note: The pt's breathing is at her baseline. The pt stated she was able to use the O2 concentrator to go today for her second COVID-19 vaccine. The pt stated she put her regular O2 on as soon as she got home. The pt is still getting use to the O2 concentrator. The pt stated she has been eating well and her bowels have been moving. The pt stated the Lasix keeps her urinating all the time. The pt stated she has all of her medications and denied any current needs. Plan:  Continue on all current medications and O2. The RN, ACM instructed the pt to call if she had any concerns after this second vaccine. The RN, ACM will remind the pt to make an appointment to see the PCP. Care Coordination Interventions    Program Enrollment: Complex Care  Referral from Primary Care Provider: No  Suggested Interventions and Community Resources  Fall Risk Prevention: In Process  Medication Assistance Program: In Process  Medi Set or Pill Pack: Declined (Comment: Daughter assists pt with medications)  Smoking Cessation: Completed  Other Therapy Services: In Process (Comment: O2)  Zone Management Tools: In Process  Other Services or Interventions: Assisted pt in obtaining Xarelto samples. Pt's daughter purchased pt an  [employer]ogen O2 concentrator         Goals Addressed                 This Visit's Progress     Medication Management   On track     I will take my medication as directed. I will notify my provider/Care Coordinator if I am unable to afford my medications.     Barriers: financial  Plan for overcoming my barriers: Work with RN 7/13/20   Cailin Huffman MD   amLODIPine (NORVASC) 5 MG tablet TAKE ONE TABLET BY MOUTH TWICE A DAY 6/16/20   Cailin Huffman MD   furosemide (LASIX) 20 MG tablet Take 1 tablet by mouth 2 times daily 6/4/20   Cailin Huffman MD   aspirin EC 81 MG EC tablet Take 1 tablet by mouth daily 10/22/19   Cailin Huffman MD   traZODone (DESYREL) 50 MG tablet Take 1 tablet by mouth nightly 10/22/19   Cailin Huffman MD   pantoprazole (PROTONIX) 40 MG tablet TAKE ONE TABLET BY MOUTH EVERY MORNING BEFORE BREAKFAST 10/8/19   David Miranda APRN - CNP   rOPINIRole (REQUIP) 0.25 MG tablet 1 tablet nightly 9/10/19   Cailin Huffman MD   OXYGEN Inhale 2 L/min into the lungs continuous 8/19/15   Monique Walsh MD       No future appointments.   ,   Diabetes Assessment    How often do you test your blood sugar?: No Testing (Comment: Diet controlled No medication for DM)       No patient-reported symptoms      ,   Congestive Heart Failure Assessment    Do you understand a low sodium diet?: Yes  Do you understand how to read food labels?: No  How many restaurant meals do you eat per week?: 0  Do you salt your food before tasting it?: No     No patient-reported symptoms      Symptoms:     Symptom course: stable  Weight trend: stable  Salt intake watch compared to last visit: stable     ,   COPD Assessment    Does the patient understand envrionmental exposure?: Yes  Is the patient able to verbalize Rescue vs. Long Acting medications?: No  Does the patient have a nebulizer?: No  Does the patient use a space with inhaled medications?: No     No patient-reported symptoms         Symptoms:     Have you had a recent diagnosis of pneumonia either by PCP or at a hospital?: No      and   General Assessment    Do you have any symptoms that are causing concern?: Yes  Progression since Onset: Unchanged  Reported Symptoms: Shortness of Breath

## 2021-04-02 RX ORDER — NYSTATIN 100000 [USP'U]/G
POWDER TOPICAL
Qty: 45 G | Refills: 0 | Status: SHIPPED | OUTPATIENT
Start: 2021-04-02 | End: 2021-04-05 | Stop reason: SDUPTHER

## 2021-04-05 RX ORDER — NYSTATIN 100000 [USP'U]/G
POWDER TOPICAL
Qty: 45 G | Refills: 0 | Status: SHIPPED | OUTPATIENT
Start: 2021-04-05

## 2021-04-05 NOTE — TELEPHONE ENCOUNTER
Pt daughter Bassam Conklin called and requested Dr. Johann Wright refill powder script from ER for rash on pt stomach.   Please advise

## 2021-04-18 DIAGNOSIS — F41.9 ANXIETY: Chronic | ICD-10-CM

## 2021-04-18 DIAGNOSIS — F51.01 PRIMARY INSOMNIA: Chronic | ICD-10-CM

## 2021-04-19 ENCOUNTER — CARE COORDINATION (OUTPATIENT)
Dept: CARE COORDINATION | Age: 86
End: 2021-04-19

## 2021-04-19 DIAGNOSIS — G89.29 ABDOMINAL PAIN, CHRONIC, LEFT LOWER QUADRANT: ICD-10-CM

## 2021-04-19 DIAGNOSIS — R10.32 ABDOMINAL PAIN, CHRONIC, LEFT LOWER QUADRANT: ICD-10-CM

## 2021-04-19 RX ORDER — HYDROCODONE BITARTRATE AND ACETAMINOPHEN 5; 325 MG/1; MG/1
1 TABLET ORAL 2 TIMES DAILY PRN
Qty: 60 TABLET | Refills: 0 | Status: SHIPPED | OUTPATIENT
Start: 2021-04-19 | End: 2021-06-18 | Stop reason: SDUPTHER

## 2021-04-19 RX ORDER — LORAZEPAM 0.5 MG/1
TABLET ORAL
Qty: 60 TABLET | Refills: 0 | Status: SHIPPED | OUTPATIENT
Start: 2021-04-19 | End: 2021-05-26 | Stop reason: SDUPTHER

## 2021-04-19 NOTE — TELEPHONE ENCOUNTER
Patient daughter called in requesting refill for the following medication:    HYDROcodone-acetaminophen (Caleb Stearns) 5-325 MG per tablet     [address] [facility] [phone] - f [phone]    Pls advise.

## 2021-04-19 NOTE — CARE COORDINATION
Ambulatory Care Coordination Note  4/19/2021  CM Risk Score: 10  Charlson 10 Year Mortality Risk Score: 100%     ACC: Christopher Hayes, RN [facility]    Hx: HTN, COPD, Osteoarthritis, Anxiety, Adrenal Hyperplasia, DJD, CAD, Cornary Artery Stent, ANGEL, Hyperlipidemia,Depression, Memory Loss, Chroniic Diastolic HF, Fatty Liver, Visual Hallucinations, COVID-19, Acute Respiratory Failure, Lung Nodule    Summary Note: The pt stated she has been eating well and her bowels have been moving. The pt stated the Lasix keeps her urinating all the time. The pt stated she has all of her medications and denied any current needs. The RN, ACM discussed graduation from Care Coordination since the pt has been stable. The daughter verbalized understanding and was reassured that she could call the RN, ACM if any new pt health issues arise. Plan:  The RN, ACM will sign off for now. Care Coordination Interventions    Program Enrollment: Complex Care  Referral from Primary Care Provider: No  Suggested Interventions and Community Resources  Fall Risk Prevention: In Process  Medication Assistance Program: In Process  Medi Set or Pill Pack: Declined (Comment: Daughter assists pt with medications)  Smoking Cessation: Completed  Other Therapy Services: In Process (Comment: O2)  Zone Management Tools: In Process  Other Services or Interventions: Assisted pt in obtaining Xarelto samples. Pt's daughter purchased pt an  [employer]ogen O2 concentrator         Goals Addressed                 This Visit's Progress     COMPLETED: Medication Management   On track     I will take my medication as directed. I will notify my provider/Care Coordinator if I am unable to afford my medications. Barriers: financial  Plan for overcoming my barriers: Work with RN, ACM until deductible is reached  Confidence: 8/10  Anticipated Goal Completion Date: 03/25/21            Prior to Admission medications    Medication Sig Start Date End Date Taking?  Authorizing Provider nightly 10/22/19   Indira Marshall MD   pantoprazole (PROTONIX) 40 MG tablet TAKE ONE TABLET BY MOUTH EVERY MORNING BEFORE BREAKFAST 10/8/19   JAME Power CNP   rOPINIRole (REQUIP) 0.25 MG tablet 1 tablet nightly 9/10/19   Indira Marshall MD   OXYGEN Inhale 2 L/min into the lungs continuous 8/19/15   Awilda Mccarthy MD       No future appointments.   ,   Diabetes Assessment    How often do you test your blood sugar?: No Testing (Comment: Diet controlled No medication for DM)       No patient-reported symptoms      ,   Congestive Heart Failure Assessment    Do you understand a low sodium diet?: Yes  Do you understand how to read food labels?: No  How many restaurant meals do you eat per week?: 0  Do you salt your food before tasting it?: No     No patient-reported symptoms      Symptoms:     Symptom course: stable  Weight trend: stable  Salt intake watch compared to last visit: stable     ,   COPD Assessment    Does the patient understand envrionmental exposure?: Yes  Is the patient able to verbalize Rescue vs. Long Acting medications?: No  Does the patient have a nebulizer?: No  Does the patient use a space with inhaled medications?: No     No patient-reported symptoms         Symptoms:     Have you had a recent diagnosis of pneumonia either by PCP or at a hospital?: No      and   General Assessment    Do you have any symptoms that are causing concern?: No

## 2021-05-07 ENCOUNTER — TELEPHONE (OUTPATIENT)
Dept: INTERNAL MEDICINE CLINIC | Age: 86
End: 2021-05-07

## 2021-05-07 NOTE — TELEPHONE ENCOUNTER
Medication Refill:     rivaroxaban (XARELTO) 20 MG TABS tablet [[id number]    [facility] [address] [facility] [phone] - F [phone]

## 2021-05-25 DIAGNOSIS — F41.9 ANXIETY: Chronic | ICD-10-CM

## 2021-05-25 DIAGNOSIS — F51.01 PRIMARY INSOMNIA: Chronic | ICD-10-CM

## 2021-05-25 RX ORDER — LORAZEPAM 0.5 MG/1
TABLET ORAL
Qty: 60 TABLET | Refills: 0 | OUTPATIENT
Start: 2021-05-25

## 2021-05-26 ENCOUNTER — TELEPHONE (OUTPATIENT)
Dept: INTERNAL MEDICINE CLINIC | Age: 86
End: 2021-05-26

## 2021-05-26 DIAGNOSIS — F41.9 ANXIETY: Chronic | ICD-10-CM

## 2021-05-26 DIAGNOSIS — F51.01 PRIMARY INSOMNIA: Chronic | ICD-10-CM

## 2021-05-26 NOTE — TELEPHONE ENCOUNTER
Last office visit 1/27/2021     Last written     Next office visit scheduled 7/14/2021      LORazepam (ATIVAN) 0.5 MG tablet

## 2021-05-27 RX ORDER — LORAZEPAM 0.5 MG/1
TABLET ORAL
Qty: 60 TABLET | Refills: 0 | Status: SHIPPED | OUTPATIENT
Start: 2021-05-27 | End: 2021-06-17

## 2021-06-04 RX ORDER — ATORVASTATIN CALCIUM 40 MG/1
TABLET, FILM COATED ORAL
Qty: 90 TABLET | Refills: 0 | Status: SHIPPED | OUTPATIENT
Start: 2021-06-04

## 2021-06-15 ENCOUNTER — OFFICE VISIT (OUTPATIENT)
Dept: INTERNAL MEDICINE CLINIC | Age: 86
End: 2021-06-15
Payer: MEDICARE

## 2021-06-15 ENCOUNTER — HOSPITAL ENCOUNTER (OUTPATIENT)
Dept: GENERAL RADIOLOGY | Age: 86
Discharge: HOME | End: 2021-06-15
Payer: MEDICARE

## 2021-06-15 ENCOUNTER — HOSPITAL ENCOUNTER (OUTPATIENT)
Age: 86
Discharge: HOME | End: 2021-06-15
Payer: MEDICARE

## 2021-06-15 VITALS — DIASTOLIC BLOOD PRESSURE: 70 MMHG | HEART RATE: 67 BPM | OXYGEN SATURATION: 91 % | SYSTOLIC BLOOD PRESSURE: 130 MMHG

## 2021-06-15 DIAGNOSIS — J98.59 MEDIASTINAL MASS: ICD-10-CM

## 2021-06-15 DIAGNOSIS — R06.02 SOB (SHORTNESS OF BREATH): Primary | ICD-10-CM

## 2021-06-15 DIAGNOSIS — J44.1 CHRONIC OBSTRUCTIVE PULMONARY DISEASE WITH (ACUTE) EXACERBATION (HCC): ICD-10-CM

## 2021-06-15 DIAGNOSIS — F39 MOOD DISORDER (HCC): ICD-10-CM

## 2021-06-15 DIAGNOSIS — R06.02 SOB (SHORTNESS OF BREATH): ICD-10-CM

## 2021-06-15 DIAGNOSIS — I10 ESSENTIAL HYPERTENSION: ICD-10-CM

## 2021-06-15 DIAGNOSIS — I50.32 CHRONIC DIASTOLIC HEART FAILURE (HCC): ICD-10-CM

## 2021-06-15 PROCEDURE — G8428 CUR MEDS NOT DOCUMENT: HCPCS | Performed by: HOSPITALIST

## 2021-06-15 PROCEDURE — 71046 X-RAY EXAM CHEST 2 VIEWS: CPT

## 2021-06-15 PROCEDURE — 1036F TOBACCO NON-USER: CPT | Performed by: HOSPITALIST

## 2021-06-15 PROCEDURE — 1123F ACP DISCUSS/DSCN MKR DOCD: CPT | Performed by: HOSPITALIST

## 2021-06-15 PROCEDURE — G8417 CALC BMI ABV UP PARAM F/U: HCPCS | Performed by: HOSPITALIST

## 2021-06-15 PROCEDURE — 3023F SPIROM DOC REV: CPT | Performed by: HOSPITALIST

## 2021-06-15 PROCEDURE — 4040F PNEUMOC VAC/ADMIN/RCVD: CPT | Performed by: HOSPITALIST

## 2021-06-15 PROCEDURE — 99214 OFFICE O/P EST MOD 30 MIN: CPT | Performed by: HOSPITALIST

## 2021-06-15 PROCEDURE — G8926 SPIRO NO PERF OR DOC: HCPCS | Performed by: HOSPITALIST

## 2021-06-15 PROCEDURE — 1090F PRES/ABSN URINE INCON ASSESS: CPT | Performed by: HOSPITALIST

## 2021-06-15 RX ORDER — PREDNISONE 20 MG/1
20 TABLET ORAL DAILY
Qty: 5 TABLET | Refills: 0 | Status: SHIPPED | OUTPATIENT
Start: 2021-06-15 | End: 2021-06-20

## 2021-06-15 NOTE — PROGRESS NOTES
Follow Up Visit Established Patient Visit    Patient:  Justin Loera                                               : 1934  Age: 80 y.o. MRN: [id number]  Date : 6/15/2021    Justin Loera is a 80 y.o. female who presents for : Evaluation of shortness of breath    Chief Complaint   Patient presents with    Hyperlipidemia    Anxiety    COPD     + more SOB. Reports some chest tightness. + Some cough productive of small amount of yellowish phlegm  Seems more confused, per her daughter, Amira. Stopped smoking 8 days ago. No fever/chills. No nausea, no vomiting, no diarrhea. Does not always adhere to low-fat/low-cholesterol diet. No lower extremity edema. Mood appears to be stable    Past Medical History:   Diagnosis Date    Abdominal pain, unspecified site     Adrenal hyperplasia ([address] 2013    Left - CT scan -     Ankle Fracture, Right     Anxiety and depression     Aortic atherosclerosis ([address] 10/3/2015    CAD (coronary artery disease) 2018    PCI LAD 3.0 x 15 mm and 3.5 x 15 mm BMS; RCA 2.5 x 12 mm and 2.5 x 15 mm BMS.  EF 65%    Contusion of unspecified site     COPD (chronic obstructive pulmonary disease) ([address] 3/1/2013    Coronary artery disease     Arteriosclerosis    DJD (degenerative joint disease)     Eczema 2013    Essential hypertension 2015    Hyperlipidemia     Hypertension     Hypoxia 2015    Insomnia     Leukocytosis     Memory loss     Nicotine addiction     Osteoarthritis     Pain in joint, pelvic region and thigh     Primary osteoarthritis involving multiple joints 2015    Pulmonary hypertension ([address] 10/3/2015    Senile reticular degeneration of peripheral retina     Syncope     Thoracic or lumbosacral neuritis or radiculitis, unspecified     Urinary incontinence     Visual hallucinations 2014    Wears glasses        Past Surgical History:   Procedure Laterality Date    CATARACT REMOVAL WITH IMPLANT Left 2012 Dr. Joyce Anaya  08/01/2018       Current Outpatient Medications   Medication Sig Dispense Refill    predniSONE (DELTASONE) 20 MG tablet Take 1 tablet by mouth daily for 5 days 5 tablet 0    atorvastatin (LIPITOR) 40 MG tablet TAKE ONE TABLET BY MOUTH ONCE NIGHTLY 90 tablet 0    LORazepam (ATIVAN) 0.5 MG tablet 1 tablet twice daily 60 tablet 0    rivaroxaban (XARELTO) 20 MG TABS tablet TAKE ONE TABLET BY MOUTH DAILY WITH BREAKFAST 15 tablet 2    nystatin (MYCOSTATIN) 821057 UNIT/GM powder Apply topically 4 times daily.  45 g 0    metoprolol tartrate (LOPRESSOR) 25 MG tablet TAKE ONE TABLET BY MOUTH TWICE A  tablet 0    QUEtiapine (SEROQUEL) 50 MG tablet 1 tablet daily 90 tablet 2    meclizine (ANTIVERT) 12.5 MG tablet 1 tablet twice daily as needed for dizziness 60 tablet 2    nicotine (NICODERM CQ) 21 MG/24HR Place 1 patch onto the skin daily 30 patch 3    potassium chloride (KLOR-CON M) 20 MEQ extended release tablet Take 1 tablet by mouth 2 times daily 180 tablet 0    PARoxetine (PAXIL) 20 MG tablet TAKE ONE TABLET BY MOUTH DAILY 90 tablet 3    ipratropium-albuterol (DUONEB) 0.5-2.5 (3) MG/3ML SOLN nebulizer solution Inhale 3 mLs into the lungs every 6 hours as needed for Shortness of Breath 360 mL 3    albuterol sulfate HFA (VENTOLIN HFA) 108 (90 Base) MCG/ACT inhaler Inhale 2 puffs into the lungs 4 times daily as needed for Wheezing 3 Inhaler 1    amLODIPine (NORVASC) 5 MG tablet TAKE ONE TABLET BY MOUTH TWICE A  tablet 1    furosemide (LASIX) 20 MG tablet Take 1 tablet by mouth 2 times daily 180 tablet 0    aspirin EC 81 MG EC tablet Take 1 tablet by mouth daily 90 tablet 1    pantoprazole (PROTONIX) 40 MG tablet TAKE ONE TABLET BY MOUTH EVERY MORNING BEFORE BREAKFAST 90 tablet 3    rOPINIRole (REQUIP) 0.25 MG tablet 1 tablet nightly 90 tablet 0    OXYGEN Inhale 2 L/min into the lungs continuous       No current facility-administered medications for this visit. /70 (Site: Right Upper Arm, Position: Sitting, Cuff Size: Medium Adult)   Pulse 67   SpO2 91%     Physical Exam   Physical Exam  Vitals and nursing note reviewed. Constitutional:       General: She is not in acute distress. Appearance: Normal appearance. She is well-developed. HENT:      Head: Normocephalic and atraumatic. Mouth/Throat:      Mouth: Mucous membranes are moist.      Pharynx: Oropharynx is clear. No oropharyngeal exudate or posterior oropharyngeal erythema. Eyes:      General: No scleral icterus. Pupils: Pupils are equal, round, and reactive to light. Neck:      Vascular: No JVD. Cardiovascular:      Rate and Rhythm: Normal rate and regular rhythm. Heart sounds: Normal heart sounds. No murmur heard. No friction rub. No gallop. Pulmonary:      Effort: Pulmonary effort is normal. No respiratory distress. Breath sounds: Wheezing and rhonchi present. No rales. Comments: + Scattered bilateral rhonchi/wheezes  Abdominal:      General: Bowel sounds are normal. There is no distension. Palpations: Abdomen is soft. Tenderness: There is no abdominal tenderness. Musculoskeletal:         General: Normal range of motion. Cervical back: Normal range of motion. Comments: Mild bilateral pedal edema   Skin:     General: Skin is warm and dry. Capillary Refill: Capillary refill takes less than 2 seconds. Neurological:      Mental Status: She is alert and oriented to person, place, and time. Cranial Nerves: No cranial nerve deficit. Psychiatric:         Mood and Affect: Mood normal.         Behavior: Behavior normal.         Assessment/ plan:     Bryson Cortes was seen today for hyperlipidemia, anxiety and copd. Diagnoses and all orders for this visit:    SOB (shortness of breath)  -     XR CHEST (2 VW);  Future  -     We will try Breo  Anoro 1 spray daily ( 2

## 2021-06-17 ENCOUNTER — TELEPHONE (OUTPATIENT)
Dept: INTERNAL MEDICINE CLINIC | Age: 86
End: 2021-06-17

## 2021-06-17 DIAGNOSIS — F41.9 ANXIETY: Chronic | ICD-10-CM

## 2021-06-17 DIAGNOSIS — R42 DIZZINESS: ICD-10-CM

## 2021-06-17 DIAGNOSIS — R10.32 ABDOMINAL PAIN, CHRONIC, LEFT LOWER QUADRANT: ICD-10-CM

## 2021-06-17 DIAGNOSIS — F51.01 PRIMARY INSOMNIA: Chronic | ICD-10-CM

## 2021-06-17 DIAGNOSIS — G89.29 ABDOMINAL PAIN, CHRONIC, LEFT LOWER QUADRANT: ICD-10-CM

## 2021-06-17 NOTE — TELEPHONE ENCOUNTER
Medication Refill:     LORazepam (ATIVAN) 0.5 MG tablet [[id number]    HYDROcodone-acetaminophen (NORCO) 5-325 MG per tablet [[id number]      [address] [place] [address] 108, [address] [facility]

## 2021-06-18 RX ORDER — LORAZEPAM 0.5 MG/1
TABLET ORAL
Qty: 60 TABLET | Refills: 0 | Status: SHIPPED | OUTPATIENT
Start: 2021-06-25 | End: 2021-08-02 | Stop reason: SDUPTHER

## 2021-06-18 RX ORDER — MECLIZINE HCL 12.5 MG/1
TABLET ORAL
Qty: 60 TABLET | Refills: 1 | Status: SHIPPED | OUTPATIENT
Start: 2021-06-18

## 2021-06-18 RX ORDER — HYDROCODONE BITARTRATE AND ACETAMINOPHEN 5; 325 MG/1; MG/1
1 TABLET ORAL 2 TIMES DAILY PRN
Qty: 60 TABLET | Refills: 0 | Status: SHIPPED | OUTPATIENT
Start: 2021-06-18 | End: 2021-08-09 | Stop reason: SDUPTHER

## 2021-06-18 RX ORDER — LORAZEPAM 0.5 MG/1
TABLET ORAL
Qty: 60 TABLET | Refills: 0 | Status: ON HOLD | OUTPATIENT
Start: 2021-06-26 | End: 2021-07-27 | Stop reason: HOSPADM

## 2021-06-30 DIAGNOSIS — E78.2 MIXED HYPERLIPIDEMIA: ICD-10-CM

## 2021-06-30 DIAGNOSIS — I25.10 CAD IN NATIVE ARTERY: ICD-10-CM

## 2021-06-30 DIAGNOSIS — I50.31 ACUTE DIASTOLIC HEART FAILURE (HCC): ICD-10-CM

## 2021-06-30 DIAGNOSIS — I10 ESSENTIAL HYPERTENSION: ICD-10-CM

## 2021-07-01 RX ORDER — FUROSEMIDE 20 MG/1
TABLET ORAL
Qty: 180 TABLET | Refills: 0 | Status: SHIPPED | OUTPATIENT
Start: 2021-07-01 | End: 2021-07-02 | Stop reason: SDUPTHER

## 2021-07-01 RX ORDER — AMLODIPINE BESYLATE 5 MG/1
TABLET ORAL
Qty: 180 TABLET | Refills: 0 | Status: SHIPPED | OUTPATIENT
Start: 2021-07-01 | End: 2021-07-02 | Stop reason: SDUPTHER

## 2021-07-02 ENCOUNTER — OFFICE VISIT (OUTPATIENT)
Dept: INTERNAL MEDICINE CLINIC | Age: 86
End: 2021-07-02
Payer: MEDICARE

## 2021-07-02 VITALS — DIASTOLIC BLOOD PRESSURE: 60 MMHG | SYSTOLIC BLOOD PRESSURE: 158 MMHG | HEART RATE: 67 BPM | OXYGEN SATURATION: 91 %

## 2021-07-02 DIAGNOSIS — J98.59 MEDIASTINAL MASS: ICD-10-CM

## 2021-07-02 DIAGNOSIS — J44.1 CHRONIC OBSTRUCTIVE PULMONARY DISEASE WITH (ACUTE) EXACERBATION (HCC): Primary | ICD-10-CM

## 2021-07-02 PROCEDURE — 1123F ACP DISCUSS/DSCN MKR DOCD: CPT | Performed by: HOSPITALIST

## 2021-07-02 PROCEDURE — 1036F TOBACCO NON-USER: CPT | Performed by: HOSPITALIST

## 2021-07-02 PROCEDURE — G8417 CALC BMI ABV UP PARAM F/U: HCPCS | Performed by: HOSPITALIST

## 2021-07-02 PROCEDURE — G8926 SPIRO NO PERF OR DOC: HCPCS | Performed by: HOSPITALIST

## 2021-07-02 PROCEDURE — 4040F PNEUMOC VAC/ADMIN/RCVD: CPT | Performed by: HOSPITALIST

## 2021-07-02 PROCEDURE — 3023F SPIROM DOC REV: CPT | Performed by: HOSPITALIST

## 2021-07-02 PROCEDURE — 1090F PRES/ABSN URINE INCON ASSESS: CPT | Performed by: HOSPITALIST

## 2021-07-02 PROCEDURE — G8427 DOCREV CUR MEDS BY ELIG CLIN: HCPCS | Performed by: HOSPITALIST

## 2021-07-02 PROCEDURE — 99213 OFFICE O/P EST LOW 20 MIN: CPT | Performed by: HOSPITALIST

## 2021-07-02 RX ORDER — FUROSEMIDE 20 MG/1
TABLET ORAL
Qty: 180 TABLET | Refills: 3 | Status: ON HOLD | OUTPATIENT
Start: 2021-07-02 | End: 2021-08-15 | Stop reason: SDUPTHER

## 2021-07-02 RX ORDER — AMLODIPINE BESYLATE 5 MG/1
TABLET ORAL
Qty: 180 TABLET | Refills: 3 | Status: SHIPPED | OUTPATIENT
Start: 2021-07-02

## 2021-07-02 RX ORDER — QUETIAPINE FUMARATE 50 MG/1
TABLET, FILM COATED ORAL
Qty: 90 TABLET | Refills: 3 | Status: SHIPPED | OUTPATIENT
Start: 2021-07-02

## 2021-07-02 NOTE — PROGRESS NOTES
and thigh     Primary osteoarthritis involving multiple joints 9/9/2015    Pulmonary hypertension ([address] 10/3/2015    Senile reticular degeneration of peripheral retina     Syncope     Thoracic or lumbosacral neuritis or radiculitis, unspecified     Urinary incontinence     Visual hallucinations 9/16/2014    Wears glasses        Past Surgical History:   Procedure Laterality Date    CATARACT REMOVAL WITH IMPLANT Left December 5, 2012    Dr. Trey Shaver    COLONOSCOPY      HYSTERECTOMY      SIGMOIDOSCOPY  08/01/2018       Current Outpatient Medications   Medication Sig Dispense Refill    QUEtiapine (SEROQUEL) 50 MG tablet 1 tablet daily 90 tablet 3    rivaroxaban (XARELTO) 20 MG TABS tablet TAKE ONE TABLET BY MOUTH DAILY WITH BREAKFAST 15 tablet 5    furosemide (LASIX) 20 MG tablet TAKE ONE TABLET BY MOUTH TWICE A  tablet 3    amLODIPine (NORVASC) 5 MG tablet TAKE ONE TABLET BY MOUTH TWICE A  tablet 3    LORazepam (ATIVAN) 0.5 MG tablet TAKE ONE TABLET BY MOUTH TWICE A DAY 60 tablet 0    meclizine (ANTIVERT) 12.5 MG tablet TAKE ONE TABLET BY MOUTH TWICE A DAY AS NEEDED FOR DIZZINESS 60 tablet 1    LORazepam (ATIVAN) 0.5 MG tablet 1 tablet twice daily 60 tablet 0    HYDROcodone-acetaminophen (NORCO) 5-325 MG per tablet Take 1 tablet by mouth 2 times daily as needed for Pain for up to 30 days. Take lowest dose possible to manage pain 60 tablet 0    atorvastatin (LIPITOR) 40 MG tablet TAKE ONE TABLET BY MOUTH ONCE NIGHTLY 90 tablet 0    nystatin (MYCOSTATIN) 012278 UNIT/GM powder Apply topically 4 times daily.  45 g 0    metoprolol tartrate (LOPRESSOR) 25 MG tablet TAKE ONE TABLET BY MOUTH TWICE A  tablet 0    nicotine (NICODERM CQ) 21 MG/24HR Place 1 patch onto the skin daily 30 patch 3    PARoxetine (PAXIL) 20 MG tablet TAKE ONE TABLET BY MOUTH DAILY 90 tablet 3    ipratropium-albuterol (DUONEB) 0.5-2.5 (3) MG/3ML SOLN nebulizer solution Inhale 3 mLs into the lungs every 6 hours as needed for Shortness of Breath 360 mL 3    albuterol sulfate HFA (VENTOLIN HFA) 108 (90 Base) MCG/ACT inhaler Inhale 2 puffs into the lungs 4 times daily as needed for Wheezing 3 Inhaler 1    aspirin EC 81 MG EC tablet Take 1 tablet by mouth daily 90 tablet 1    pantoprazole (PROTONIX) 40 MG tablet TAKE ONE TABLET BY MOUTH EVERY MORNING BEFORE BREAKFAST 90 tablet 3    rOPINIRole (REQUIP) 0.25 MG tablet 1 tablet nightly 90 tablet 0    OXYGEN Inhale 2 L/min into the lungs continuous      potassium chloride (KLOR-CON M) 20 MEQ extended release tablet Take 1 tablet by mouth 2 times daily 180 tablet 0     No current facility-administered medications for this visit. BP (!) 158/60   Pulse 67   SpO2 91% Comment: on 6 liters of oxygen    Physical Exam   Physical Exam  Vitals and nursing note reviewed. Constitutional:       General: She is not in acute distress. Appearance: Normal appearance. She is well-developed. HENT:      Head: Normocephalic and atraumatic. Right Ear: Tympanic membrane, ear canal and external ear normal. There is no impacted cerumen. Left Ear: Tympanic membrane, ear canal and external ear normal. There is no impacted cerumen. Mouth/Throat:      Mouth: Mucous membranes are moist.      Pharynx: Oropharynx is clear. No oropharyngeal exudate or posterior oropharyngeal erythema. Eyes:      General: No scleral icterus. Extraocular Movements: Extraocular movements intact. Pupils: Pupils are equal, round, and reactive to light. Neck:      Vascular: No JVD. Cardiovascular:      Rate and Rhythm: Normal rate and regular rhythm. Heart sounds: Normal heart sounds. No murmur heard. No friction rub. No gallop. Pulmonary:      Effort: Pulmonary effort is normal. No respiratory distress. Breath sounds: No wheezing or rales.       Comments: Breath sounds somewhat diminished bilaterally  Abdominal:      General: Bowel sounds are normal. There is no distension. Palpations: Abdomen is soft. Tenderness: There is no abdominal tenderness. There is no right CVA tenderness or left CVA tenderness. Musculoskeletal:         General: Normal range of motion. Cervical back: Normal range of motion. Right lower leg: No edema. Left lower leg: No edema. Skin:     General: Skin is warm and dry. Findings: No erythema or rash. Neurological:      General: No focal deficit present. Mental Status: She is alert and oriented to person, place, and time. Cranial Nerves: No cranial nerve deficit. Psychiatric:         Mood and Affect: Mood normal.         Assessment/ plan:     Diagnoses and all orders for this visit:    Chronic obstructive pulmonary disease with (acute) exacerbation (HCC)      -      Improved      -      Continue with oxygen use 3 L/min via nasal cannula      -      Continue current medications    Mediastinal mass      -      Encouraged the patient to reschedule CT scan of the chest     Other orders  -     QUEtiapine (SEROQUEL) 50 MG tablet; 1 tablet daily  -     rivaroxaban (XARELTO) 20 MG TABS tablet; TAKE ONE TABLET BY MOUTH DAILY WITH BREAKFAST  -     furosemide (LASIX) 20 MG tablet; TAKE ONE TABLET BY MOUTH TWICE A DAY  -     amLODIPine (NORVASC) 5 MG tablet; TAKE ONE TABLET BY MOUTH TWICE A DAY        Return in about 1 month (around 8/2/2021) for COPD, anxiety, insomnia, CHF.     Nathan Jacques MD

## 2021-07-09 NOTE — TELEPHONE ENCOUNTER
----- Message from [facility] sent at 7/9/2021  1:32 PM EDT -----  Subject: Refill Request    QUESTIONS  Name of Medication? rivaroxaban (XARELTO) 20 MG TABS tablet  Patient-reported dosage and instructions? 1 per day  How many days do you have left? 6  Preferred Pharmacy? [facility]  Pharmacy phone number (if available)? [phone]  ---------------------------------------------------------------------------  --------------  [facility] [employer] INFO  What is the best way for the office to contact you? OK to leave message on   voicemail  Preferred Call Back Phone Number?  [phone]

## 2021-07-10 ENCOUNTER — HOSPITAL ENCOUNTER (OUTPATIENT)
Dept: CT IMAGING | Age: 86
Discharge: HOME | End: 2021-07-10
Payer: MEDICARE

## 2021-07-10 DIAGNOSIS — J98.59 MEDIASTINAL MASS: ICD-10-CM

## 2021-07-10 LAB
GFR AFRICAN AMERICAN: >60
GFR NON-AFRICAN AMERICAN: 52
PERFORMED ON: ABNORMAL
POC CREATININE: 1 MG/DL (ref 0.6–1.2)
POC SAMPLE TYPE: ABNORMAL

## 2021-07-10 PROCEDURE — 71260 CT THORAX DX C+: CPT

## 2021-07-10 PROCEDURE — 6360000004 HC RX CONTRAST MEDICATION: Performed by: HOSPITALIST

## 2021-07-10 PROCEDURE — 82565 ASSAY OF CREATININE: CPT

## 2021-07-10 RX ADMIN — IOPAMIDOL 75 ML: 755 INJECTION, SOLUTION INTRAVENOUS at 12:51

## 2021-07-20 ENCOUNTER — TELEPHONE (OUTPATIENT)
Dept: INTERNAL MEDICINE CLINIC | Age: 86
End: 2021-07-20

## 2021-07-20 DIAGNOSIS — C34.32 MALIGNANT NEOPLASM OF LOWER LOBE OF LEFT LUNG (HCC): Primary | ICD-10-CM

## 2021-07-20 DIAGNOSIS — I10 ESSENTIAL HYPERTENSION: ICD-10-CM

## 2021-07-20 NOTE — TELEPHONE ENCOUNTER
----- Message from Jessica Carey sent at 7/20/2021  2:34 PM EDT -----  Subject: Refill Request    QUESTIONS  Name of Medication? metoprolol tartrate (LOPRESSOR) 25 MG tablet  Patient-reported dosage and instructions? TAKE ONE TABLET BY MOUTH TWICE A   DAY  How many days do you have left? 0  Preferred Pharmacy? [email or web address]   Pharmacy phone number (if available)? [phone]  Additional Information for Provider? Patient daughter stated she call 911   due patient breathing being labored . Daughter stated patient was giving   breathe treatment by paramedics. She stated patient is doing better. ---------------------------------------------------------------------------  --------------  [employer] INFO  What is the best way for the office to contact you? OK to leave message on   voicemail  Preferred Call Back Phone Number?  [phone]

## 2021-07-26 ENCOUNTER — HOSPITAL ENCOUNTER (INPATIENT)
Age: 86
LOS: 1 days | Discharge: HOME | DRG: 189 | End: 2021-07-27
Attending: EMERGENCY MEDICINE | Admitting: INTERNAL MEDICINE
Payer: MEDICARE

## 2021-07-26 ENCOUNTER — APPOINTMENT (OUTPATIENT)
Dept: GENERAL RADIOLOGY | Age: 86
DRG: 189 | End: 2021-07-26
Payer: MEDICARE

## 2021-07-26 DIAGNOSIS — F17.200 NICOTINE ADDICTION: Chronic | ICD-10-CM

## 2021-07-26 DIAGNOSIS — J44.9 COPD (CHRONIC OBSTRUCTIVE PULMONARY DISEASE) (HCC): Chronic | ICD-10-CM

## 2021-07-26 DIAGNOSIS — J96.21 ACUTE ON CHRONIC RESPIRATORY FAILURE WITH HYPOXIA AND HYPERCAPNIA (HCC): ICD-10-CM

## 2021-07-26 DIAGNOSIS — R09.02 HYPOXIA: Chronic | ICD-10-CM

## 2021-07-26 DIAGNOSIS — J44.1 COPD EXACERBATION (HCC): Primary | ICD-10-CM

## 2021-07-26 DIAGNOSIS — J96.22 ACUTE ON CHRONIC RESPIRATORY FAILURE WITH HYPOXIA AND HYPERCAPNIA (HCC): ICD-10-CM

## 2021-07-26 LAB
A/G RATIO: 1.1 (ref 1.1–2.2)
ALBUMIN SERPL-MCNC: 3.8 G/DL (ref 3.4–5)
ALP BLD-CCNC: 76 U/L (ref 40–129)
ALT SERPL-CCNC: 14 U/L (ref 10–40)
ANION GAP SERPL CALCULATED.3IONS-SCNC: 9 MMOL/L (ref 3–16)
AST SERPL-CCNC: 18 U/L (ref 15–37)
BASE EXCESS VENOUS: 1.9 MMOL/L
BASE EXCESS VENOUS: 4.8 MMOL/L
BASOPHILS ABSOLUTE: 0.1 K/UL (ref 0–0.2)
BASOPHILS RELATIVE PERCENT: 0.7 %
BILIRUB SERPL-MCNC: 0.7 MG/DL (ref 0–1)
BUN BLDV-MCNC: 12 MG/DL (ref 7–20)
CALCIUM SERPL-MCNC: 9.1 MG/DL (ref 8.3–10.6)
CARBOXYHEMOGLOBIN: 1.6 %
CARBOXYHEMOGLOBIN: 2.5 %
CHLORIDE BLD-SCNC: 101 MMOL/L (ref 99–110)
CO2: 28 MMOL/L (ref 21–32)
CREAT SERPL-MCNC: 0.8 MG/DL (ref 0.6–1.2)
EKG ATRIAL RATE: 77 BPM
EKG DIAGNOSIS: NORMAL
EKG P AXIS: 55 DEGREES
EKG P-R INTERVAL: 166 MS
EKG Q-T INTERVAL: 378 MS
EKG QRS DURATION: 66 MS
EKG QTC CALCULATION (BAZETT): 427 MS
EKG R AXIS: 0 DEGREES
EKG T AXIS: 122 DEGREES
EKG VENTRICULAR RATE: 77 BPM
EOSINOPHILS ABSOLUTE: 0 K/UL (ref 0–0.6)
EOSINOPHILS RELATIVE PERCENT: 0.6 %
GFR AFRICAN AMERICAN: >60
GFR NON-AFRICAN AMERICAN: >60
GLOBULIN: 3.6 G/DL
GLUCOSE BLD-MCNC: 115 MG/DL (ref 70–99)
HCO3 VENOUS: 25 MMOL/L (ref 23–29)
HCO3 VENOUS: 30 MMOL/L (ref 23–29)
HCT VFR BLD CALC: 42.4 % (ref 36–48)
HEMOGLOBIN: 14 G/DL (ref 12–16)
LACTIC ACID: 1.7 MMOL/L (ref 0.4–2)
LYMPHOCYTES ABSOLUTE: 1.2 K/UL (ref 1–5.1)
LYMPHOCYTES RELATIVE PERCENT: 15.6 %
MCH RBC QN AUTO: 27.9 PG (ref 26–34)
MCHC RBC AUTO-ENTMCNC: 33.1 G/DL (ref 31–36)
MCV RBC AUTO: 84.2 FL (ref 80–100)
METHEMOGLOBIN VENOUS: 0.3 %
METHEMOGLOBIN VENOUS: 0.4 %
MONOCYTES ABSOLUTE: 0.6 K/UL (ref 0–1.3)
MONOCYTES RELATIVE PERCENT: 8.5 %
NEUTROPHILS ABSOLUTE: 5.7 K/UL (ref 1.7–7.7)
NEUTROPHILS RELATIVE PERCENT: 74.6 %
O2 SAT, VEN: 100 %
O2 SAT, VEN: 77 %
O2 THERAPY: ABNORMAL
O2 THERAPY: ABNORMAL
PCO2, VEN: 35.6 MMHG (ref 40–50)
PCO2, VEN: 46.9 MMHG (ref 40–50)
PDW BLD-RTO: 16.6 % (ref 12.4–15.4)
PH VENOUS: 7.42 (ref 7.35–7.45)
PH VENOUS: 7.46 (ref 7.35–7.45)
PLATELET # BLD: 208 K/UL (ref 135–450)
PMV BLD AUTO: 8.4 FL (ref 5–10.5)
PO2, VEN: 133 MMHG
PO2, VEN: 44 MMHG
POTASSIUM SERPL-SCNC: 3.7 MMOL/L (ref 3.5–5.1)
PRO-BNP: 1075 PG/ML (ref 0–449)
PROCALCITONIN: 0.03 NG/ML (ref 0–0.15)
RBC # BLD: 5.04 M/UL (ref 4–5.2)
SARS-COV-2, NAAT: NOT DETECTED
SODIUM BLD-SCNC: 138 MMOL/L (ref 136–145)
TCO2 CALC VENOUS: 27 MMOL/L
TCO2 CALC VENOUS: 32 MMOL/L
TOTAL PROTEIN: 7.4 G/DL (ref 6.4–8.2)
TROPONIN: <0.01 NG/ML
WBC # BLD: 7.6 K/UL (ref 4–11)

## 2021-07-26 PROCEDURE — 93010 ELECTROCARDIOGRAM REPORT: CPT | Performed by: INTERNAL MEDICINE

## 2021-07-26 PROCEDURE — 80053 COMPREHEN METABOLIC PANEL: CPT

## 2021-07-26 PROCEDURE — 6370000000 HC RX 637 (ALT 250 FOR IP): Performed by: INTERNAL MEDICINE

## 2021-07-26 PROCEDURE — 83880 ASSAY OF NATRIURETIC PEPTIDE: CPT

## 2021-07-26 PROCEDURE — 2580000003 HC RX 258: Performed by: INTERNAL MEDICINE

## 2021-07-26 PROCEDURE — 94640 AIRWAY INHALATION TREATMENT: CPT

## 2021-07-26 PROCEDURE — 99285 EMERGENCY DEPT VISIT HI MDM: CPT

## 2021-07-26 PROCEDURE — 96374 THER/PROPH/DIAG INJ IV PUSH: CPT

## 2021-07-26 PROCEDURE — 6360000002 HC RX W HCPCS: Performed by: INTERNAL MEDICINE

## 2021-07-26 PROCEDURE — 93005 ELECTROCARDIOGRAM TRACING: CPT | Performed by: NURSE PRACTITIONER

## 2021-07-26 PROCEDURE — 84145 PROCALCITONIN (PCT): CPT

## 2021-07-26 PROCEDURE — 71045 X-RAY EXAM CHEST 1 VIEW: CPT

## 2021-07-26 PROCEDURE — 87040 BLOOD CULTURE FOR BACTERIA: CPT

## 2021-07-26 PROCEDURE — 85025 COMPLETE CBC W/AUTO DIFF WBC: CPT

## 2021-07-26 PROCEDURE — 2700000000 HC OXYGEN THERAPY PER DAY

## 2021-07-26 PROCEDURE — 36415 COLL VENOUS BLD VENIPUNCTURE: CPT

## 2021-07-26 PROCEDURE — 6360000002 HC RX W HCPCS: Performed by: NURSE PRACTITIONER

## 2021-07-26 PROCEDURE — 1200000000 HC SEMI PRIVATE

## 2021-07-26 PROCEDURE — 6370000000 HC RX 637 (ALT 250 FOR IP): Performed by: NURSE PRACTITIONER

## 2021-07-26 PROCEDURE — 87635 SARS-COV-2 COVID-19 AMP PRB: CPT

## 2021-07-26 PROCEDURE — 84484 ASSAY OF TROPONIN QUANT: CPT

## 2021-07-26 PROCEDURE — 82803 BLOOD GASES ANY COMBINATION: CPT

## 2021-07-26 PROCEDURE — 83605 ASSAY OF LACTIC ACID: CPT

## 2021-07-26 PROCEDURE — 94761 N-INVAS EAR/PLS OXIMETRY MLT: CPT

## 2021-07-26 RX ORDER — FUROSEMIDE 20 MG/1
20 TABLET ORAL 2 TIMES DAILY
Status: DISCONTINUED | OUTPATIENT
Start: 2021-07-26 | End: 2021-07-27 | Stop reason: HOSPADM

## 2021-07-26 RX ORDER — ONDANSETRON 4 MG/1
4 TABLET, ORALLY DISINTEGRATING ORAL EVERY 8 HOURS PRN
Status: DISCONTINUED | OUTPATIENT
Start: 2021-07-26 | End: 2021-07-27 | Stop reason: HOSPADM

## 2021-07-26 RX ORDER — SODIUM CHLORIDE 9 MG/ML
25 INJECTION, SOLUTION INTRAVENOUS PRN
Status: DISCONTINUED | OUTPATIENT
Start: 2021-07-26 | End: 2021-07-27 | Stop reason: HOSPADM

## 2021-07-26 RX ORDER — MAGNESIUM SULFATE IN WATER 40 MG/ML
2000 INJECTION, SOLUTION INTRAVENOUS PRN
Status: DISCONTINUED | OUTPATIENT
Start: 2021-07-26 | End: 2021-07-27 | Stop reason: HOSPADM

## 2021-07-26 RX ORDER — MECLIZINE HCL 12.5 MG/1
12.5 TABLET ORAL 3 TIMES DAILY PRN
Status: DISCONTINUED | OUTPATIENT
Start: 2021-07-26 | End: 2021-07-27 | Stop reason: HOSPADM

## 2021-07-26 RX ORDER — LABETALOL HYDROCHLORIDE 5 MG/ML
10 INJECTION, SOLUTION INTRAVENOUS EVERY 4 HOURS PRN
Status: DISCONTINUED | OUTPATIENT
Start: 2021-07-26 | End: 2021-07-27 | Stop reason: HOSPADM

## 2021-07-26 RX ORDER — SODIUM CHLORIDE 0.9 % (FLUSH) 0.9 %
10 SYRINGE (ML) INJECTION PRN
Status: DISCONTINUED | OUTPATIENT
Start: 2021-07-26 | End: 2021-07-27 | Stop reason: HOSPADM

## 2021-07-26 RX ORDER — ATORVASTATIN CALCIUM 40 MG/1
40 TABLET, FILM COATED ORAL NIGHTLY
Status: DISCONTINUED | OUTPATIENT
Start: 2021-07-26 | End: 2021-07-27 | Stop reason: HOSPADM

## 2021-07-26 RX ORDER — POTASSIUM CHLORIDE 7.45 MG/ML
10 INJECTION INTRAVENOUS PRN
Status: DISCONTINUED | OUTPATIENT
Start: 2021-07-26 | End: 2021-07-27 | Stop reason: HOSPADM

## 2021-07-26 RX ORDER — ASPIRIN 81 MG/1
81 TABLET ORAL DAILY
Status: DISCONTINUED | OUTPATIENT
Start: 2021-07-27 | End: 2021-07-26

## 2021-07-26 RX ORDER — IPRATROPIUM BROMIDE AND ALBUTEROL SULFATE 2.5; .5 MG/3ML; MG/3ML
1 SOLUTION RESPIRATORY (INHALATION) ONCE
Status: COMPLETED | OUTPATIENT
Start: 2021-07-26 | End: 2021-07-26

## 2021-07-26 RX ORDER — AMLODIPINE BESYLATE 5 MG/1
5 TABLET ORAL 2 TIMES DAILY
Status: DISCONTINUED | OUTPATIENT
Start: 2021-07-26 | End: 2021-07-27 | Stop reason: HOSPADM

## 2021-07-26 RX ORDER — ACETAMINOPHEN 650 MG/1
650 SUPPOSITORY RECTAL EVERY 6 HOURS PRN
Status: DISCONTINUED | OUTPATIENT
Start: 2021-07-26 | End: 2021-07-27 | Stop reason: HOSPADM

## 2021-07-26 RX ORDER — ONDANSETRON 2 MG/ML
4 INJECTION INTRAMUSCULAR; INTRAVENOUS EVERY 6 HOURS PRN
Status: DISCONTINUED | OUTPATIENT
Start: 2021-07-26 | End: 2021-07-27 | Stop reason: HOSPADM

## 2021-07-26 RX ORDER — LORAZEPAM 0.5 MG/1
0.5 TABLET ORAL EVERY 6 HOURS PRN
Status: DISCONTINUED | OUTPATIENT
Start: 2021-07-26 | End: 2021-07-27 | Stop reason: HOSPADM

## 2021-07-26 RX ORDER — ASPIRIN 81 MG/1
81 TABLET ORAL NIGHTLY
Status: DISCONTINUED | OUTPATIENT
Start: 2021-07-26 | End: 2021-07-27 | Stop reason: HOSPADM

## 2021-07-26 RX ORDER — IPRATROPIUM BROMIDE AND ALBUTEROL SULFATE 2.5; .5 MG/3ML; MG/3ML
1 SOLUTION RESPIRATORY (INHALATION) EVERY 6 HOURS PRN
Status: DISCONTINUED | OUTPATIENT
Start: 2021-07-26 | End: 2021-07-27 | Stop reason: HOSPADM

## 2021-07-26 RX ORDER — PAROXETINE HYDROCHLORIDE 20 MG/1
20 TABLET, FILM COATED ORAL NIGHTLY
Status: DISCONTINUED | OUTPATIENT
Start: 2021-07-26 | End: 2021-07-27 | Stop reason: HOSPADM

## 2021-07-26 RX ORDER — METHYLPREDNISOLONE SODIUM SUCCINATE 125 MG/2ML
125 INJECTION, POWDER, LYOPHILIZED, FOR SOLUTION INTRAMUSCULAR; INTRAVENOUS ONCE
Status: COMPLETED | OUTPATIENT
Start: 2021-07-26 | End: 2021-07-26

## 2021-07-26 RX ORDER — ALBUTEROL SULFATE 90 UG/1
2 AEROSOL, METERED RESPIRATORY (INHALATION) 4 TIMES DAILY PRN
Status: DISCONTINUED | OUTPATIENT
Start: 2021-07-26 | End: 2021-07-27 | Stop reason: HOSPADM

## 2021-07-26 RX ORDER — ACETAMINOPHEN 325 MG/1
650 TABLET ORAL EVERY 6 HOURS PRN
Status: DISCONTINUED | OUTPATIENT
Start: 2021-07-26 | End: 2021-07-27 | Stop reason: HOSPADM

## 2021-07-26 RX ORDER — QUETIAPINE FUMARATE 25 MG/1
50 TABLET, FILM COATED ORAL NIGHTLY
Status: DISCONTINUED | OUTPATIENT
Start: 2021-07-26 | End: 2021-07-27 | Stop reason: HOSPADM

## 2021-07-26 RX ORDER — SODIUM CHLORIDE 0.9 % (FLUSH) 0.9 %
10 SYRINGE (ML) INJECTION EVERY 12 HOURS SCHEDULED
Status: DISCONTINUED | OUTPATIENT
Start: 2021-07-26 | End: 2021-07-27 | Stop reason: HOSPADM

## 2021-07-26 RX ADMIN — AZITHROMYCIN MONOHYDRATE 250 MG: 500 INJECTION, POWDER, LYOPHILIZED, FOR SOLUTION INTRAVENOUS at 23:07

## 2021-07-26 RX ADMIN — IPRATROPIUM BROMIDE AND ALBUTEROL SULFATE 1 AMPULE: .5; 3 SOLUTION RESPIRATORY (INHALATION) at 14:04

## 2021-07-26 RX ADMIN — QUETIAPINE FUMARATE 50 MG: 25 TABLET ORAL at 21:05

## 2021-07-26 RX ADMIN — PAROXETINE HYDROCHLORIDE 20 MG: 20 TABLET, FILM COATED ORAL at 21:05

## 2021-07-26 RX ADMIN — AMLODIPINE BESYLATE 5 MG: 5 TABLET ORAL at 21:05

## 2021-07-26 RX ADMIN — ASPIRIN 81 MG: 81 TABLET, COATED ORAL at 21:05

## 2021-07-26 RX ADMIN — ATORVASTATIN CALCIUM 40 MG: 40 TABLET, FILM COATED ORAL at 21:05

## 2021-07-26 RX ADMIN — Medication 10 ML: at 21:13

## 2021-07-26 RX ADMIN — CEFTRIAXONE 1000 MG: 1 INJECTION, POWDER, FOR SOLUTION INTRAMUSCULAR; INTRAVENOUS at 21:15

## 2021-07-26 RX ADMIN — METHYLPREDNISOLONE SODIUM SUCCINATE 125 MG: 125 INJECTION, POWDER, FOR SOLUTION INTRAMUSCULAR; INTRAVENOUS at 11:00

## 2021-07-26 RX ADMIN — IPRATROPIUM BROMIDE AND ALBUTEROL SULFATE 1 AMPULE: .5; 3 SOLUTION RESPIRATORY (INHALATION) at 10:54

## 2021-07-26 RX ADMIN — METOPROLOL TARTRATE 25 MG: 25 TABLET, FILM COATED ORAL at 21:05

## 2021-07-26 NOTE — PROGRESS NOTES
Medication Reconciliation    List of medications patient is currently taking is complete. Source of information: 1. Conversation with patient and family at bedside                                      2. EPIC records      Allergies  Enalapril     Notes regarding home medications:   1. Patient did not receive any of her home medications prior to arrival to the emergency department today. 2. Patient's Lasix is no longer 20 mg po BID. This was changed to 20 mg po three times weekly on MON/WED/FRI.     Tierney Comer, [address] [place] [address], PharmD, BCPS  7/26/2021 3:03 PM

## 2021-07-26 NOTE — PROGRESS NOTES
Pt arrived to room 4116 from ED. Pt currently on 5L O2 which pt is dependant on at home. Pt A/ox4. Pt placing dinner order. Pt resting comfortably in bed with bed locked in lowest position bed alarm placed and call light in reach. Family at bedside. Pt VSS and denies any other needs at this time.

## 2021-07-26 NOTE — ED NOTES
Bed: D-50  Expected date:   Expected time:   Means of arrival: [facility]  Comments:  86F SOB      Marcial Rivera RN  07/26/21 1033

## 2021-07-26 NOTE — ED PROVIDER NOTES
[address] [facility]  [address] [address]  Dept: [phone]  Loc: [facility] COMPLAINT    Chief Complaint   Patient presents with    Shortness of Breath     70% on 5L at home, Hx COPD       HPI    Rupert Setting is a 80 y.o. female who presents to the emergency department via EMS with ongoing complaints of worsening chronic shortness of breath that have been going on for several weeks off and on for several months. Patient has been evaluated several times for this in the office setting. She called 911 last night for shortness of breath. EMS reports an O2 saturation of 73% at home. She wears O2 at 5 L per nasal cannula around-the-clock. She was given a nebulizer treatment but refused hospital transportation. Today the shortness of breath is worse. She called 911 and they transported her here. She did not receive nebulizer treatments or steroids on route. On arrival she has a 100% nonrebreather mask on. The patient denies lightheadedness, dizziness, chest pain, leg swelling. She has a longstanding history of cigarette smoking but reports quitting smoking 1 month ago. She also states that she has tested positive for Covid and has been fully vaccinated thereafter. The patient denies ever having BiPAP or intubations for respiratory failure. She takes Xarelto. REVIEW OF SYSTEMS    Cardiac: no chest pain, no palpitations, no syncope  Respiratory: see HPI, + cough  Neurologic: no syncope or LOC  GI: no vomiting, no diarrhea  General: denies fever, body aches or chills  All other systems reviewed and are negative.     PAST MEDICAL & SURGICAL HISTORY    Past Medical History:   Diagnosis Date    Abdominal pain, unspecified site     Adrenal hyperplasia ([facility] [address] 6/11/2013    Left - CT scan - 2011    Ankle Fracture, Right     Anxiety and depression     Aortic atherosclerosis (HCC) 10/3/2015    CAD (coronary artery disease) 2018    PCI LAD 3.0 x 15 mm and 3.5 x 15 mm BMS; RCA 2.5 x 12 mm and 2.5 x 15 mm BMS. EF 65%    Contusion of unspecified site     COPD (chronic obstructive pulmonary disease) ([facility] [address] 3/1/2013    Coronary artery disease     Arteriosclerosis    DJD (degenerative joint disease)     Eczema 2013    Essential hypertension 2015    Hyperlipidemia     Hypertension     Hypoxia 2015    Insomnia     Leukocytosis     Memory loss     Nicotine addiction     Osteoarthritis     Pain in joint, pelvic region and thigh     Primary osteoarthritis involving multiple joints 2015    Pulmonary hypertension ([facility] [address] 10/3/2015    Senile reticular degeneration of peripheral retina     Syncope     Thoracic or lumbosacral neuritis or radiculitis, unspecified     Urinary incontinence     Visual hallucinations 2014    Wears glasses      Past Surgical History:   Procedure Laterality Date    CATARACT REMOVAL WITH IMPLANT Left 2012    Dr. Colt Arndt  2018       CURRENT MEDICATIONS  (may include discharge medications prescribed in the ED)      ALLERGIES    Allergies   Allergen Reactions    Enalapril Other (See Comments)     Patient unable to recall reaction       SOCIAL & FAMILY HISTORY    Social History     Socioeconomic History    Marital status:       Spouse name: Earline Huitron Number of children: 2    Years of education: Not on file    Highest education level: Not on file   Occupational History    Occupation: retired -    Tobacco Use    Smoking status: Former Smoker     Packs/day: 1.00     Years: 65.00     Pack years: 65.00     Types: Cigarettes     Quit date: 2018     Years since quittin.8    Smokeless tobacco: Never Used    Tobacco comment: Pt stated she no longer smokes   Vaping Use    Vaping Use: Never used   Substance and Sexual Activity    Alcohol use: Yes     Alcohol/week: 0.0 standard drinks     Comment: social    Drug use: No    Sexual activity: Never     Comment:  -    Other Topics Concern    Not on file   Social History Narrative        Past Medical History     Coronary Artery Disease    Hyperlipidemia    Hypertension    fatty liver    Osteoarthritis    Anxiety    COPD    Pneumonia                                Last updated by Rachel Butcher MD on 2009                          Past Surgical History     Hysterectomy:     Salpingo-Oophorectomy: bilateral - 1995    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  [employer])      Occupation: unit clerk    Alcohol Use: none    Drug Use: none    Tobacco Usage: prior smoker    Cigarettes - Year Started:      Cigarettes - Year Quit:        Cigarettes - Years smoked - 14    Cigarettes - Packs per Day - 1       Pack/Years - 14    quit smoking 2006 but she resumed and currently smokes one pack per week                                            Last updated by Kris Deleon MA on 2008                              Family History     mother -  - age 80 - dementia, pneumonia, coronary artery disease, heart attack    father -  - age 62 - coronary artery disease, heart attack        brother -  - age 55 - coronary artery disease, heart attack        sister -  - age 58 - diabetes, ESRD    sister -  - age 64 - hepatitis C, lung cancer, post-op complications    sister -  - age 52 - coronary artery disease, heart attack    sister - Pujadionisio Escobar -    sister - Dot -    sister - Noelle Myrick -        son - Burt Finch -        daughter - Mary Lindsay -                                 Last updated by Mily Judge MA on 08/29/2008         Social Determinants of Health     Financial Resource Strain: Low Risk     Difficulty of Paying Living Expenses: Not hard at all   Food Insecurity: No Food Insecurity    Worried About Running Out of Food in the Last Year: Never true    Raiza of Food in the Last Year: Never true   Transportation Needs: No Transportation Needs    Lack of Transportation (Medical): No    Lack of Transportation (Non-Medical): No   Physical Activity: Inactive    Days of Exercise per Week: 0 days    Minutes of Exercise per Session: 0 min   Stress: No Stress Concern Present    Feeling of Stress : Not at all   Social Connections: Unknown    Frequency of Communication with Friends and Family: More than three times a week    Frequency of Social Gatherings with Friends and Family: Never    Attends Christianity Services: Not on file    Active Member of Clubs or Organizations: No    Attends Club or Organization Meetings: Never    Marital Status:    Intimate Partner Violence:     Fear of Current or Ex-Partner:     Emotionally Abused:     Physically Abused:     Sexually Abused:      Family History   Problem Relation Age of Onset    Heart Attack Father     Heart Attack Brother        PHYSICAL EXAM    VITAL SIGNS: BP (!) 162/59   Pulse 97   Temp 98.3 °F (36.8 °C) (Oral)   Resp 25   Ht 5' 8\" (1.727 m)   Wt 162 lb 11.2 oz (73.8 kg)   SpO2 90%   BMI 24.74 kg/m²    Constitutional:  Well developed, well nourished, respiratory distress   HENT:  Atraumatic, dry mucus membranes  Neck: supple, no JVD   Respiratory: Respirations are 24/min during my initial evaluation. She is speaking in full uninterrupted sentences. She does have some mild accessory muscle use. She has coarse expiratory wheezes throughout. Cardiovascular: Regular rhythm and rate, S1-S2.   No murmur Vascular: Radial and DP pulses 2+ and equal bilaterally  GI:  Soft, nontender, normal bowel sounds  Musculoskeletal:  no lower extremity edema, no lower extremity asymmetry, no calf tenderness, no thigh tenderness, no acute deformities  Integument:  Skin is warm and dry, no petechiae   Neurologic:  Alert & oriented x4, no slurred speech  Psych: Pleasant affect, no hallucinations, poor historian    EKG      12 lead EKG reviewed by myself and interpreted by my attending physician Dr. Kim Hargrove  Ventricular rate 77 bpm, GA interval 166 ms, QRS duration 66 ms,  ms  No ST elevation or depression. Interpretation is a normal sinus rhythm. Today's tracing was compared to the one on November 23, 2020 with no significant changes noted. RADIOLOGY/PROCEDURES    XR CHEST PORTABLE   Final Result   Significant worsening in the appearance of the chest.  Large amount of   opacity in the left lower lobe worrisome for acute pneumonia. Moderate   opacity in the medial right lung base either due to atelectasis or pneumonia. Very small left pleural effusion.            Labs Reviewed   CBC WITH AUTO DIFFERENTIAL - Abnormal; Notable for the following components:       Result Value    RDW 16.6 (*)     All other components within normal limits    Narrative:     Performed at:  [place] [address]   Phone [phone]   COMPREHENSIVE METABOLIC PANEL - Abnormal; Notable for the following components:    Glucose 115 (*)     All other components within normal limits    Narrative:     Performed at:  [address] [address]   Phone [phone]   BRAIN NATRIURETIC PEPTIDE - Abnormal; Notable for the following components:    Pro-BNP 1,075 (*)     All other components within normal limits    Narrative:     Performed at:  [facility]  [address] [place] [id number]   Phone [phone]   BLOOD GAS, VENOUS - Abnormal; Notable for the following components:    HCO3, Venous 30 (*)     All other components within normal limits    Narrative:     Performed at:  [facility]  [address] [place], [place] [address]   Phone [phone], RAPID    Narrative:     Performed at:  [facility]  [address] [place], [place] [address]   Phone [phone]   CULTURE, BLOOD 1   CULTURE, BLOOD 2   TROPONIN    Narrative:     Performed at:  [facility]  [address] [place], [place] [address]   Phone [phone]   LACTIC ACID, PLASMA    Narrative:     Performed at:  [facility]  [address] [place], [place] [address]   Phone [phone]   BLOOD GAS, VENOUS       ED Ladonna Vu 630 studies reviewed and interpreted. (See chart for details)    See chart for details of medications given during the ED stay.     Vitals:    07/26/21 1500 07/26/21 1530 07/26/21 1600 07/26/21 1630   BP:       Pulse: 92 96 98 97   Resp: 30 16 30 25   Temp:       TempSrc:       SpO2: 90% 92% 94% 90%   Weight:       Height:         Medications   sodium chloride flush 0.9 % injection 10 mL (has no administration in time range)   sodium chloride flush 0.9 % injection 10 mL (has no administration in time range)   0.9 % sodium chloride infusion (has no administration in time range)   potassium chloride 10 mEq/100 mL IVPB (Peripheral Line) (has no administration in time range)   magnesium sulfate 2000 mg in 50 mL IVPB premix (has no administration in time range)   ondansetron (ZOFRAN-ODT) disintegrating tablet 4 mg (has no administration in time range)     Or   ondansetron (ZOFRAN) injection 4 mg (has no administration in time range)   magnesium hydroxide (MILK OF MAGNESIA) 400 MG/5ML suspension 30 mL (has no administration in time range)   acetaminophen (TYLENOL) tablet 650 mg (has no administration in time range)     Or   acetaminophen (TYLENOL) suppository 650 mg (has no administration in time range)   ipratropium-albuterol (DUONEB) nebulizer solution 1 ampule (1 ampule Inhalation Given 7/26/21 1054)   methylPREDNISolone sodium (SOLU-MEDROL) injection 125 mg (125 mg Intravenous Given 7/26/21 1100)   ipratropium-albuterol (DUONEB) nebulizer solution 1 ampule (1 ampule Inhalation Given 7/26/21 1404)     This patient was seen evaluated by myself my attending physician Dr. Solomon Capone. Differential diagnosis includes but is not limited to pneumonia, Covid, CHF, COPD, ACS, MI, other. She is an ill-appearing female in respiratory distress on arrival.  She is afebrile. She is hypertensive. She is not tachycardic. She is 85 to 86% on 5 L per nasal cannula. She initially arrived on 100% nonrebreather. We removed the mask and placed her on her home O2. We quickly bumped up to 6 L per nasal cannula. She did have a nosebleed through the right nares that stopped after about a minute. We placed her on a humidifier. Was given nebulizer treatments and Solu-Medrol. She is wheezing throughout. Labs reveal a white count of 7.6. No concerning anemia. Glucose is 115. CMP is unremarkable. Lactic acid is 1.7.  proBNP is 1075. Troponin is less than 0.01. Covid is negative. Venous pH is 7.418 with a PCO2 of 46.9. She is requiring increased oxygen needs. Chest x-ray is interpreted by radiology and reviewed by myself show   Significant worsening in the appearance of the chest.  Large amount of   opacity in the left lower lobe worrisome for acute pneumonia.  Moderate   opacity in the medial right lung base either due to atelectasis or pneumonia. Very small left pleural effusion.      The patient had a CT of the chest with contrast ordered by her PCP on July 10 of this year that shows  Collapse and consolidation of the left lung base, with abnormal mediastinal   adenopathy, suspicious for lung carcinoma with mediastinal karthik   involvement. [place] is narrowing of the left lower lobe airways secondary to   the mediastinal adenopathy.  Septal thickening seen on the left could be due   to veno lymphatic compression from the mediastinal adenopathy, or an early   finding of lymphangitic metastasis     She has not followed up with pulmonology yet. She was reevaluated multiple times. She is maintaining an O2 saturation of 88 to 90% on 6 L per nasal cannula. She still has a few expiratory wheezes but she is improved and she is no longer in respiratory distress. She is going to need inpatient hospitalization for further evaluation. The patient and her son and daughter and granddaughter were updated on the plan of care and they all agree. Perfect serve to the hospitalist.      CRITICAL CARE NOTE:  There was a high probability of clinically significant life-threatening deterioration of the patient's condition requiring my urgent intervention. Total critical care time is 40 minutes. This includes multiple reevaluations, vital sign monitoring, pulse oximetry monitoring, telemetry monitoring, clinical response to the IV medications, reviewing the nursing notes, consultation time, dictation/documentation time, and interpretation of the labwork. (This time excludes time spent performing procedures). FINAL IMPRESSION    1. COPD exacerbation ([address]    2.  Acute on chronic respiratory failure with hypoxia and hypercapnia ([address]        PLAN  Admission to the hospital    (Please note that this note was completed with a voice recognition program.  Every attempt was made to edit the dictations, but inevitably there remain words that are mis-transcribed.)        Doyle Kirkpatrick, JAME - CNP  07/26/21 [address] [place] [address],[address], JAME - CNP  07/26/21 3492

## 2021-07-26 NOTE — ED PROVIDER NOTES
Attending Supervisory Note/Shared Visit   I have personally performed a face to face diagnostic evaluation on this patient. I have reviewed the mid-levels findings and agree. History and Exam by me shows an alert black female appears mildly dyspneic. She has had a couple months of gradually increasing difficulty breathing with increased oxygen requirements at home. She is a smoker. She has COPD. She had a course of steroids a month ago when seem to get somewhat better for short period of time. Her primary care physician did a CT scan of her chest on 7/10/2021. She was referred to pulmonology but initially told her daughter that she did not want to go see them. She is kind of gradually gotten worse again and presents here today. Heart: Regular rate and rhythm. No murmurs or gallops noted. Lungs: Breath sounds decreased in the bases. Mild expiratory wheezing bilaterally. Speaks in full sentences. No retractions or accessory muscle use. Musculoskeletal: No lower extremity edema. No calf tenderness. EKG: Normal sinus rhythm, rate of 77, left atrial enlargement, anterolateral ischemic changes. Rhythm strip shows a sinus rhythm with a rate of 77, PA interval 106 6 ms, QRS of 66 ms with no other ectopy as interpreted by me. Compared to an EKG dated 11/23/2020, the anterolateral ischemic changes are present, they might be slightly more pronounced on today's tracing. Lab reviewed. H&H are normal.  White blood cell count 7600. Covid negative. Lactic acid of 1.7. Electrolytes normal.  BUN and creatinine are normal.  Glucose 115. Troponin of less than 0.01. BNP of 1075. VBG shows a pH of 7.42 with a PCO2 of 47. Chest x-ray: Significant worsening in the appearance of the chest.  Large amount of opacity in the left lower lobe worrisome for acute pneumonia. Moderate opacity in the medial right lung base either due to atelectasis or pneumonia. Very small left pleural effusion.     CT chest done on 7/10/2021: Collapse and consolidation of the left lung base with abnormal mediastinal adenopathy suspicious for lung carcinoma with mediastinal karthik involvement. Narrowing of the left lower lobe airways secondary to the mediastinal adenopathy. Septal thickening on the left could be due to venal lymphatic compression from the mediastinal adenopathy or an early finding of lymphangitic metastasis. Patient was given steroids and nebulizers in the emergency department. She is requiring about 6 L of oxygen at this time to maintain her oxygen saturations above 90%. I discussed with the patient her current findings as well as her CT findings from 7/10/2021. We discussed treatment options. She is agreeable with being admitted, treated for pneumonia, having pulmonary see her to discuss further work-up and treatment options. Her family is in the room with her including her daughter. They are agreeable with the treatment plan. Test results, diagnosis, and treatment plan were discussed with the patient and her family with her permission. They understand the treatment plan and need for admission and are agreeable.       (Please note that portions of this note were completed with a voice recognition program.  Efforts were made to edit the dictations but occasionally words are mis-transcribed.)    Soledad Hays MD  Attending Emergency Physician        Elizabeth Peterson MD  07/26/21 0003

## 2021-07-26 NOTE — H&P
Hospital Medicine History & Physical      PCP: Sharla Oneal MD    Date of Admission: 7/26/2021    Chief Complaint:  SOB    History Of Present Illness:  Patient is a 80-year-old female with past medical history of anxiety, hypertension hyperlipidemia, COPD, CAD who presented to hospital for feeling shortness of breath. Patient mentions she is feeling short of breath past 2 weeks, getting worse, she had chills, denies fevers. She has cough producing mild amount of phlegm, cannot characterize color. Denied chest pain nausea vomiting diarrhea constipation dysuria. Patient has chronic abdominal pain, \"from fissure in abdomen\" however it has been there since 2018. Past Medical History:          Diagnosis Date    Abdominal pain, unspecified site     Adrenal hyperplasia ([address] 6/11/2013    Left - CT scan - 2011    Ankle Fracture, Right     Anxiety and depression     Aortic atherosclerosis ([address] 10/3/2015    CAD (coronary artery disease) 2018    PCI LAD 3.0 x 15 mm and 3.5 x 15 mm BMS; RCA 2.5 x 12 mm and 2.5 x 15 mm BMS.  EF 65%    Contusion of unspecified site     COPD (chronic obstructive pulmonary disease) ([address] 3/1/2013    Coronary artery disease     Arteriosclerosis    DJD (degenerative joint disease)     Eczema 8/9/2013    Essential hypertension 9/9/2015    Hyperlipidemia     Hypertension     Hypoxia 7/16/2015    Insomnia     Leukocytosis     Memory loss     Nicotine addiction     Osteoarthritis     Pain in joint, pelvic region and thigh     Primary osteoarthritis involving multiple joints 9/9/2015    Pulmonary hypertension ([address] 10/3/2015    Senile reticular degeneration of peripheral retina     Syncope     Thoracic or lumbosacral neuritis or radiculitis, unspecified     Urinary incontinence     Visual hallucinations 9/16/2014    Wears glasses        Past Surgical History:          Procedure Laterality Date    CATARACT REMOVAL WITH IMPLANT Left December 5, 2012    Dr. Sujata Bruno [facility]    COLONOSCOPY      HYSTERECTOMY      SIGMOIDOSCOPY  08/01/2018       Medications Prior to Admission:      Prior to Admission medications    Medication Sig Start Date End Date Taking? Authorizing Provider   metoprolol tartrate (LOPRESSOR) 25 MG tablet 1 tablet twice daily 7/21/21  Yes Jorgito Vargas MD   rivaroxaban (XARELTO) 20 MG TABS tablet TAKE ONE TABLET BY MOUTH DAILY WITH BREAKFAST 7/9/21  Yes Jorgito Vargas MD   QUEtiapine (SEROQUEL) 50 MG tablet 1 tablet daily 7/2/21  Yes Jorgito Vargas MD   furosemide (LASIX) 20 MG tablet TAKE ONE TABLET BY MOUTH TWICE A DAY  Patient taking differently: Take 20 mg by mouth three times a week TAKE ONE TABLET BY MOUTH TWICE A DAY 7/2/21  Yes Jorgito Vargas MD   amLODIPine (NORVASC) 5 MG tablet TAKE ONE TABLET BY MOUTH TWICE A DAY 7/2/21  Yes Jorgito Vargas MD   meclizine (ANTIVERT) 12.5 MG tablet TAKE ONE TABLET BY MOUTH TWICE A DAY AS NEEDED FOR DIZZINESS 6/18/21  Yes Jorgito Vargas MD   LORazepam (ATIVAN) 0.5 MG tablet 1 tablet twice daily 6/26/21 7/26/21 Yes Jorgito Vargas MD   atorvastatin (LIPITOR) 40 MG tablet TAKE ONE TABLET BY MOUTH ONCE NIGHTLY 6/4/21  Yes Jorgito Vargas MD   nystatin (MYCOSTATIN) 193788 UNIT/GM powder Apply topically 4 times daily. Patient taking differently: Apply topically 4 times daily.   Apply to anterior abdomen 4/5/21  Yes Jorgito Vargas MD   potassium chloride (KLOR-CON M) 20 MEQ extended release tablet Take 1 tablet by mouth 2 times daily 11/20/20 7/26/21 Yes Jorgito Vargas MD   PARoxetine (PAXIL) 20 MG tablet TAKE ONE TABLET BY MOUTH DAILY 10/16/20  Yes Jorgito Vargas MD   ipratropium-albuterol (DUONEB) 0.5-2.5 (3) MG/3ML SOLN nebulizer solution Inhale 3 mLs into the lungs every 6 hours as needed for Shortness of Breath 8/25/20  Yes Jorgito Vargas MD   albuterol sulfate HFA (VENTOLIN HFA) 108 (90 Base) MCG/ACT inhaler Inhale 2 puffs into the lungs 4 times daily as needed for Wheezing 7/13/20  Yes Jorgito Vargas MD   aspirin EC 81 MG EC tablet Take 1 tablet by mouth daily 10/22/19  Yes Mine Woody MD   OXYGEN Inhale 2 L/min into the lungs continuous 8/19/15  Yes Shannan Tobias MD       Allergies:  Enalapril    Social History:      TOBACCO:   reports that she quit smoking about 2 years ago. Her smoking use included cigarettes. She has a 65.00 pack-year smoking history. She has never used smokeless tobacco.  ETOH:   reports current alcohol use. Family History:       Reviewed in detail and non contributory          Problem Relation Age of Onset    Heart Attack Father     Heart Attack Brother        REVIEW OF SYSTEMS:   Pertinent positives as noted in the HPI. All other systems reviewed and negative. PHYSICAL EXAM PERFORMED:    BP (!) 159/82   Pulse 101   Temp 98.6 °F (37 °C) (Oral)   Resp 18   Ht 5' 8\" (1.727 m)   Wt 162 lb 11.2 oz (73.8 kg)   SpO2 92%   BMI 24.74 kg/m²     General appearance:  No apparent distress, cooperative. HEENT:  Normal cephalic, atraumatic without obvious deformity. Conjunctivae/corneas clear. Neck: Supple, with full range of motion. No cervical lymphadenopathy  Respiratory: Decreased breathing sounds left lower chest  Cardiovascular:  Regular rate and rhythm with normal S1/S2 without murmurs, rubs or gallops. Abdomen: Soft, non-tender, non-distended, normal bowel sounds. Musculoskeletal:  No edema noted bilaterally. No tenderness on palpation   Skin: no rash visible  Neurologic:  Neurologically intact without any focal sensory/motor deficits.   grossly non-focal.  Psychiatric:  Alert and oriented, normal mood  Peripheral Pulses: +2 palpable, equal bilaterally       Labs:     Recent Labs     07/26/21  1056   WBC 7.6   HGB 14.0   HCT 42.4        Recent Labs     07/26/21  1055      K 3.7      CO2 28   BUN 12   CREATININE 0.8   CALCIUM 9.1     Recent Labs     07/26/21  1055   AST 18   ALT 14   BILITOT 0.7   ALKPHOS 76     No results for input(s): INR in the last 72 hours. Recent Labs     07/26/21  1055   TROPONINI <0.01       Urinalysis:      Lab Results   Component Value Date    NITRU Negative 11/23/2020    WBCUA 114 11/23/2020    BACTERIA 1+ 11/23/2020    RBCUA 7 11/23/2020    BLOODU MODERATE 11/23/2020    SPECGRAV 1.024 11/23/2020    GLUCOSEU Negative 11/23/2020    GLUCOSEU Negative 05/27/2011       Radiology:       XR CHEST PORTABLE   Final Result   Significant worsening in the appearance of the chest.  Large amount of   opacity in the left lower lobe worrisome for acute pneumonia. Moderate   opacity in the medial right lung base either due to atelectasis or pneumonia. Very small left pleural effusion. Active Hospital Problems    Diagnosis Date Noted    Hypoxia [R09.02] 07/16/2015     Priority: Low     Class: Chronic       Patient is a 80-year-old female with past medical history of anxiety, hypertension hyperlipidemia, COPD, CAD who presented to hospital for feeling shortness of breath. Patient mentions she is feeling short of breath past 2 weeks, getting worse, she had chills, denies fevers. She has cough producing mild amount of phlegm, cannot characterize color. Denied chest pain nausea vomiting diarrhea constipation dysuria. Patient has chronic abdominal pain, \"from fissure in abdomen\" however it has been there since 2018.     Assessment  Acute on chronic hypoxic respiratory failure satting less than 90% on room air likely secondary to pneumonia, cannot rule out gram-positive pneumonia  COPD exacerbation  COPD-on 2 L nasal cannula at home  Acute multifocal small PEs on Xarelto at home  Chronic diastolic heart failure  CAD  History of mediastinal mass      Plan  Start DuoNeb every 4 hours, prednisone, will start IV Levaquin, check procalcitonin  Check Legionella, MRSA nasal probe, urine streptococcal antigen, check CT chest  On Xarelto by PCP due to multifocal small PEs-will resume  Resume home Lopressor, Lasix, amlodipine,  Diet: ADULT DIET; Regular; No Added Salt (3-4 gm)  Code Status: Full Code    PT/OT Eval Status: ordered    Dispo - pending clinical improvement       Teofilo Rao MD    The note was completed using EMR and Dragon dictation system. Every effort was made to ensure accuracy; however, inadvertent computerized transcription errors may be present. Thank you Wong Iyer MD for the opportunity to be involved in this patient's care. If you have any questions or concerns please feel free to contact me at [phone].     Teofilo Rao MD

## 2021-07-27 ENCOUNTER — TELEPHONE (OUTPATIENT)
Dept: INTERNAL MEDICINE CLINIC | Age: 86
End: 2021-07-27

## 2021-07-27 VITALS
WEIGHT: 156.97 LBS | HEIGHT: 68 IN | BODY MASS INDEX: 23.79 KG/M2 | DIASTOLIC BLOOD PRESSURE: 62 MMHG | SYSTOLIC BLOOD PRESSURE: 163 MMHG | TEMPERATURE: 97.7 F | HEART RATE: 79 BPM | OXYGEN SATURATION: 90 % | RESPIRATION RATE: 20 BRPM

## 2021-07-27 LAB
ANION GAP SERPL CALCULATED.3IONS-SCNC: 12 MMOL/L (ref 3–16)
BILIRUBIN URINE: NEGATIVE
BLOOD, URINE: ABNORMAL
BUN BLDV-MCNC: 15 MG/DL (ref 7–20)
CALCIUM SERPL-MCNC: 9 MG/DL (ref 8.3–10.6)
CHLORIDE BLD-SCNC: 99 MMOL/L (ref 99–110)
CLARITY: CLEAR
CO2: 25 MMOL/L (ref 21–32)
COLOR: YELLOW
CREAT SERPL-MCNC: 0.9 MG/DL (ref 0.6–1.2)
EPITHELIAL CELLS, UA: 2 /HPF (ref 0–5)
GFR AFRICAN AMERICAN: >60
GFR NON-AFRICAN AMERICAN: 59
GLUCOSE BLD-MCNC: 125 MG/DL (ref 70–99)
GLUCOSE URINE: NEGATIVE MG/DL
HCT VFR BLD CALC: 39.3 % (ref 36–48)
HEMOGLOBIN: 13.1 G/DL (ref 12–16)
HYALINE CASTS: 1 /LPF (ref 0–8)
KETONES, URINE: NEGATIVE MG/DL
L. PNEUMOPHILA SEROGP 1 UR AG: NORMAL
LEUKOCYTE ESTERASE, URINE: ABNORMAL
MCH RBC QN AUTO: 28 PG (ref 26–34)
MCHC RBC AUTO-ENTMCNC: 33.4 G/DL (ref 31–36)
MCV RBC AUTO: 83.6 FL (ref 80–100)
MICROSCOPIC EXAMINATION: YES
NITRITE, URINE: NEGATIVE
PDW BLD-RTO: 16.4 % (ref 12.4–15.4)
PH UA: 6 (ref 5–8)
PLATELET # BLD: 194 K/UL (ref 135–450)
PMV BLD AUTO: 8.6 FL (ref 5–10.5)
POTASSIUM REFLEX MAGNESIUM: 4.3 MMOL/L (ref 3.5–5.1)
PROTEIN UA: NEGATIVE MG/DL
RBC # BLD: 4.7 M/UL (ref 4–5.2)
RBC UA: 55 /HPF (ref 0–4)
SODIUM BLD-SCNC: 136 MMOL/L (ref 136–145)
SPECIFIC GRAVITY UA: 1.02 (ref 1–1.03)
STREP PNEUMONIAE ANTIGEN, URINE: NORMAL
URINE TYPE: ABNORMAL
UROBILINOGEN, URINE: 1 E.U./DL
WBC # BLD: 9.6 K/UL (ref 4–11)
WBC UA: 16 /HPF (ref 0–5)

## 2021-07-27 PROCEDURE — 2580000003 HC RX 258: Performed by: INTERNAL MEDICINE

## 2021-07-27 PROCEDURE — 99223 1ST HOSP IP/OBS HIGH 75: CPT | Performed by: INTERNAL MEDICINE

## 2021-07-27 PROCEDURE — 85027 COMPLETE CBC AUTOMATED: CPT

## 2021-07-27 PROCEDURE — 97535 SELF CARE MNGMENT TRAINING: CPT

## 2021-07-27 PROCEDURE — 97530 THERAPEUTIC ACTIVITIES: CPT

## 2021-07-27 PROCEDURE — 6370000000 HC RX 637 (ALT 250 FOR IP): Performed by: INTERNAL MEDICINE

## 2021-07-27 PROCEDURE — 97116 GAIT TRAINING THERAPY: CPT

## 2021-07-27 PROCEDURE — 97162 PT EVAL MOD COMPLEX 30 MIN: CPT

## 2021-07-27 PROCEDURE — 94680 O2 UPTK RST&XERS DIR SIMPLE: CPT

## 2021-07-27 PROCEDURE — 87641 MR-STAPH DNA AMP PROBE: CPT

## 2021-07-27 PROCEDURE — 36415 COLL VENOUS BLD VENIPUNCTURE: CPT

## 2021-07-27 PROCEDURE — 80048 BASIC METABOLIC PNL TOTAL CA: CPT

## 2021-07-27 PROCEDURE — 87449 NOS EACH ORGANISM AG IA: CPT

## 2021-07-27 PROCEDURE — 94640 AIRWAY INHALATION TREATMENT: CPT

## 2021-07-27 PROCEDURE — 81001 URINALYSIS AUTO W/SCOPE: CPT

## 2021-07-27 PROCEDURE — 94761 N-INVAS EAR/PLS OXIMETRY MLT: CPT

## 2021-07-27 PROCEDURE — 2700000000 HC OXYGEN THERAPY PER DAY

## 2021-07-27 PROCEDURE — 97166 OT EVAL MOD COMPLEX 45 MIN: CPT

## 2021-07-27 RX ORDER — BUDESONIDE AND FORMOTEROL FUMARATE DIHYDRATE 160; 4.5 UG/1; UG/1
2 AEROSOL RESPIRATORY (INHALATION) 2 TIMES DAILY
Status: DISCONTINUED | OUTPATIENT
Start: 2021-07-27 | End: 2021-07-27 | Stop reason: HOSPADM

## 2021-07-27 RX ORDER — PREDNISONE 20 MG/1
40 TABLET ORAL DAILY
Qty: 20 TABLET | Refills: 0 | Status: SHIPPED | OUTPATIENT
Start: 2021-07-27 | End: 2021-08-06

## 2021-07-27 RX ORDER — PREDNISONE 20 MG/1
40 TABLET ORAL DAILY
Status: DISCONTINUED | OUTPATIENT
Start: 2021-07-27 | End: 2021-07-27 | Stop reason: HOSPADM

## 2021-07-27 RX ORDER — BUDESONIDE AND FORMOTEROL FUMARATE DIHYDRATE 160; 4.5 UG/1; UG/1
2 AEROSOL RESPIRATORY (INHALATION) 2 TIMES DAILY
Qty: 1 INHALER | Refills: 3 | Status: SHIPPED | OUTPATIENT
Start: 2021-07-27

## 2021-07-27 RX ORDER — CIPROFLOXACIN 500 MG/1
500 TABLET, FILM COATED ORAL 2 TIMES DAILY
Qty: 14 TABLET | Refills: 0 | Status: SHIPPED | OUTPATIENT
Start: 2021-07-27 | End: 2021-08-03

## 2021-07-27 RX ORDER — IPRATROPIUM BROMIDE AND ALBUTEROL SULFATE 2.5; .5 MG/3ML; MG/3ML
1 SOLUTION RESPIRATORY (INHALATION)
Status: DISCONTINUED | OUTPATIENT
Start: 2021-07-27 | End: 2021-07-27 | Stop reason: HOSPADM

## 2021-07-27 RX ADMIN — RIVAROXABAN 20 MG: 20 TABLET, FILM COATED ORAL at 09:12

## 2021-07-27 RX ADMIN — AMLODIPINE BESYLATE 5 MG: 5 TABLET ORAL at 09:12

## 2021-07-27 RX ADMIN — IPRATROPIUM BROMIDE AND ALBUTEROL SULFATE 1 AMPULE: .5; 3 SOLUTION RESPIRATORY (INHALATION) at 17:17

## 2021-07-27 RX ADMIN — FUROSEMIDE 20 MG: 20 TABLET ORAL at 17:02

## 2021-07-27 RX ADMIN — PREDNISONE 40 MG: 20 TABLET ORAL at 16:06

## 2021-07-27 RX ADMIN — METOPROLOL TARTRATE 25 MG: 25 TABLET, FILM COATED ORAL at 09:12

## 2021-07-27 RX ADMIN — Medication 10 ML: at 09:12

## 2021-07-27 RX ADMIN — FUROSEMIDE 20 MG: 20 TABLET ORAL at 09:12

## 2021-07-27 ASSESSMENT — PAIN SCALES - GENERAL: PAINLEVEL_OUTOF10: 0

## 2021-07-27 NOTE — PROGRESS NOTES
[facility]    Respiratory Therapy   Home Oxygen Evaluation        Name: Lilo Beal [address] Record Number: [id number]  Age: 80 y.o.   Gender:  female   : 1934  Today's date: 2021  Room: V5S-3049/4116-01      Assessment        BP (!) 163/62   Pulse 79   Temp 97.7 °F (36.5 °C) (Oral)   Resp 20   Ht 5' 8\" (1.727 m)   Wt 156 lb 15.5 oz (71.2 kg)   SpO2 90%   BMI 23.87 kg/m²     Patient Active Problem List   Diagnosis    Contusion    Memory loss    Senile reticular degeneration of peripheral retina    Syncope    Nicotine addiction    Skin rash    Abdominal pain    Ankle Fracture, Right    DJD (degenerative joint disease)    Fatty liver    Osteoarthritis    Insomnia    Anxiety    Urinary incontinence    Coronary artery disease    Hyperlipidemia    Depression    Decreased vision    Cataract    Adrenal hyperplasia (HCC)    Eczema    Hypokalemia    Visual hallucinations    Anger    Mood disorder (HCC)    Lumbar radiculopathy    Hypoxia    Abnormal weight loss    Irritable bowel syndrome    Accelerated hypertension    Primary osteoarthritis involving multiple joints    Aortic atherosclerosis (HCC)    COPD, moderate (HCC)    Abnormal nuclear stress test    Exertional dyspnea    Tobacco abuse    CAD in native artery    S/p bare metal coronary artery stent    Heart failure (HCC)    Chronic diastolic heart failure with preserved ejection fraction (HCC)    COPD exacerbation (HCC)    COVID-19    Chronic hypoxemic respiratory failure (HCC)    Acute respiratory failure with hypoxia (HCC)    Pulmonary nodule seen on imaging study    Acute bronchiolitis    Acute on chronic respiratory failure with hypoxia and hypercapnia (HCC)    Abnormal CT of the chest       Social History:  Social History     Tobacco Use    Smoking status: Former Smoker     Packs/day: 1.00     Years: 65.00     Pack years: 65.00     Types: Cigarettes     Quit date: 2018     Years since quittin.8    Smokeless tobacco: Never Used    Tobacco comment: Pt stated she no longer smokes   Vaping Use    Vaping Use: Never used   Substance Use Topics    Alcohol use: Yes     Alcohol/week: 0.0 standard drinks     Comment: social    Drug use: No           Oxygen saturations of 88% or less on RA qualifies patient for Home Oxygen    Patient resting on 3 lmp  with an oxygen saturation of  84%     Patient resting on  on 4lpm with an oxygen saturation of 86%    Patient resting on 5 lpm with an oxygen saturation of 91%    Patient ambulated 30 ft as tolerated with an oxygen saturation of 86%    Patient ambulated on 6lpm with an oxygen saturation of 90%      Qualifying patient for home oxygen with ambulation and continuous flow  @ 6 lpm.      In your clinical assessment does the Patient Require Portable Oxygen Tanks?     Yes              [employer]  home care company contacted to follow care of patients home oxygen needs on 2021 at 4:52 PM    Patient/caregiver was educated on Home Oxygen process:  Yes      Level of patient/caregiver understanding able to:   [x] Verbalize understanding   [] Demonstrate understanding       [] Teach back        [] Needs reinforcement        []  No available caregiver               []  Other:     Response to education:  Excellent     Time Spent with Home O2 Set Up:  40  minutes     [address] [facility] on 2021 at 4:52 PM

## 2021-07-27 NOTE — CARE COORDINATION
Discharge Planning:  BEAU spoke with pts bedside RN who stated that the respiratory therapist just completed the home O2 eval and pt is meeting the criteria for 6L of home O2. BEAU contacted [address] at [phone] and spoke with Elizabeth Lou. Kyle requested that this BEAU fax the updated orders and the home O2 eval to the intake department at [phone]. BEAU faxed the home O2 eval along with the home O2 order to the above noted number.   Corina Grant, [place]  [phone]  Electronically signed by Karri Nunez on 7/27/2021 at 5:03 PM

## 2021-07-27 NOTE — DISCHARGE INSTR - DIET

## 2021-07-27 NOTE — PROGRESS NOTES
Reviewed discharge and follow up instructions with patient and her daughter Nohemy Tam. Both verbalize understanding. Asked patient daughter Nohemy Tam if she had the patients portable O2 tank for the ride home, daughter stated she did not and that she would be fine since it is just a short distance to her home. This RN called [facility], to see if we could provide a tank for transport. SW spoke with daughter. Daughter and patient decided that patient would leave without the oxygen and that was their choice. Transport called for wheelchair to lobby with oxygen to the car. Patient alert and oriented, VSS at discharge. No s/s of distress.

## 2021-07-27 NOTE — PLAN OF CARE
Problem: Falls - Risk of:  Goal: Will remain free from falls  Description: Will remain free from falls  7/27/2021 1748 by Humphrey Garcia RN  Outcome: Completed  7/27/2021 1105 by Humphrey Garcia RN  Outcome: Ongoing  7/27/2021 0511 by Douglas Reina RN  Outcome: Ongoing  Goal: Absence of physical injury  Description: Absence of physical injury  7/27/2021 1748 by Humphrey Garcia RN  Outcome: Completed  7/27/2021 1105 by Humphrey Garcia RN  Outcome: Ongoing  7/27/2021 0511 by Douglas Reina RN  Outcome: Ongoing     Problem: Infection:  Goal: Will remain free from infection  Description: Will remain free from infection  7/27/2021 1748 by Humphrey Garcia RN  Outcome: Completed  7/27/2021 1105 by Humphrey Garcia RN  Outcome: Ongoing  7/27/2021 0511 by Douglas Reina RN  Outcome: Ongoing     Problem: Safety:  Goal: Free from accidental physical injury  Description: Free from accidental physical injury  7/27/2021 1748 by Humphrey Garcia RN  Outcome: Completed  7/27/2021 1105 by Humphrey Garcia RN  Outcome: Ongoing  7/27/2021 0511 by Douglas Reina RN  Outcome: Ongoing  Goal: Free from intentional harm  Description: Free from intentional harm  7/27/2021 1748 by Humphrey Garcia RN  Outcome: Completed  7/27/2021 1105 by Humphrey Garcia RN  Outcome: Ongoing  7/27/2021 0511 by Douglas Reina RN  Outcome: Ongoing     Problem: Daily Care:  Goal: Daily care needs are met  Description: Daily care needs are met  7/27/2021 1748 by Humphrey Garcia RN  Outcome: Completed  7/27/2021 1105 by Humphrey Garcia RN  Outcome: Ongoing  7/27/2021 0511 by Douglas Reina RN  Outcome: Ongoing     Problem: Pain:  Goal: Patient's pain/discomfort is manageable  Description: Patient's pain/discomfort is manageable  7/27/2021 1748 by Humphrey Garcia RN  Outcome: Completed  7/27/2021 1105 by Humphrey Garcia RN  Outcome: Ongoing  7/27/2021 0511 by Douglas Reina RN  Outcome: Ongoing     Problem: Skin Integrity:  Goal: Skin integrity will stabilize  Description: Skin integrity will stabilize  7/27/2021 1748 by Emerson Herzog RN  Outcome: Completed  7/27/2021 1105 by Emerson Herzog RN  Outcome: Ongoing  7/27/2021 0511 by Zahra Gallardo RN  Outcome: Ongoing     Problem: Discharge Planning:  Goal: Patients continuum of care needs are met  Description: Patients continuum of care needs are met  7/27/2021 1748 by Emerson Herzog RN  Outcome: Completed  7/27/2021 1105 by Emerson Herzog RN  Outcome: Ongoing  7/27/2021 0511 by Zahra Gallardo RN  Outcome: Ongoing     Problem: Breathing Pattern - Ineffective:  Goal: Ability to achieve and maintain a regular respiratory rate will improve  Description: Ability to achieve and maintain a regular respiratory rate will improve  7/27/2021 1748 by Emerson Herzog RN  Outcome: Completed  7/27/2021 1105 by Emerson Herzog RN  Outcome: Ongoing  7/27/2021 0511 by Zahra Gallardo RN  Outcome: Ongoing     Problem: OXYGENATION/RESPIRATORY FUNCTION  Goal: Patient will maintain patent airway  Outcome: Completed  Goal: Patient will achieve/maintain normal respiratory rate/effort  Description: Respiratory rate and effort will be within normal limits for the patient  Outcome: Completed     Problem: HEMODYNAMIC STATUS  Goal: Patient has stable vital signs and fluid balance  Outcome: Completed     Problem: FLUID AND ELECTROLYTE IMBALANCE  Goal: Fluid and electrolyte balance are achieved/maintained  Outcome: Completed     Problem: ACTIVITY INTOLERANCE/IMPAIRED MOBILITY  Goal: Mobility/activity is maintained at optimum level for patient  Outcome: Completed

## 2021-07-27 NOTE — ACP (ADVANCE CARE PLANNING)
Advance Care Planning     Advance Care Planning Activator (Inpatient)  Conversation Note      Date of ACP Conversation: 7/27/2021     Conversation Conducted with: Patient with Decision Making Capacity    ACP Activator: [address] Decision Maker:     Current Designated Health Care Decision Maker:     Primary Decision Maker: Diane Weeks - Child - [phone]    Secondary Decision Maker: [facility] - Child - [phone]        Care Preferences    Ventilation: \"If you were in your present state of health and suddenly became very ill and were unable to breathe on your own, what would your preference be about the use of a ventilator (breathing machine) if it were available to you? \"      Would the patient desire the use of ventilator (breathing machine)?: yes    \"If your health worsens and it becomes clear that your chance of recovery is unlikely, what would your preference be about the use of a ventilator (breathing machine) if it were available to you? \"     Would the patient desire the use of ventilator (breathing machine)?: Yes      Resuscitation  \"CPR works best to restart the heart when there is a sudden event, like a heart attack, in someone who is otherwise healthy. Unfortunately, CPR does not typically restart the heart for people who have serious health conditions or who are very sick. \"    \"In the event your heart stopped as a result of an underlying serious health condition, would you want attempts to be made to restart your heart (answer \"yes\" for attempt to resuscitate) or would you prefer a natural death (answer \"no\" for do not attempt to resuscitate)? \" yes       [] Yes   [x] No   Educated Patient / Myrna Pastrana regarding differences between Advance Directives and portable DNR orders.     Length of ACP Conversation in minutes:  10  Conversation Outcomes:  [x] ACP discussion completed  [] Existing advance directive reviewed with patient; no changes to patient's previously recorded wishes  [] New Advance Directive completed  [] Portable Do Not Rescitate prepared for Provider review and signature  [] POLST/POST/MOLST/MOST prepared for Provider review and signature      Follow-up plan:    [] Schedule follow-up conversation to continue planning  [] Referred individual to Provider for additional questions/concerns   [] Advised patient/agent/surrogate to review completed ACP document and update if needed with changes in condition, patient preferences or care setting    [x] This note routed to one or more involved healthcare providers  Electronically signed by Leticia Fernandez on 7/27/2021 at 12:40 PM

## 2021-07-27 NOTE — PROGRESS NOTES
Ankle Fracture, Right, Anxiety and depression, Aortic atherosclerosis ([address] CAD (coronary artery disease), Contusion of unspecified site, COPD (chronic obstructive pulmonary disease) ([address] Coronary artery disease, DJD (degenerative joint disease), Eczema, Essential hypertension, Hyperlipidemia, Hypertension, Hypoxia, Insomnia, Leukocytosis, Memory loss, Nicotine addiction, Osteoarthritis, Pain in joint, pelvic region and thigh, Primary osteoarthritis involving multiple joints, Pulmonary hypertension ([address] Senile reticular degeneration of peripheral retina, Syncope, Thoracic or lumbosacral neuritis or radiculitis, unspecified, Urinary incontinence, Visual hallucinations, and Wears glasses. has a past surgical history that includes Cataract removal with implant (Left, December 5, 2012); Hysterectomy; Colonoscopy; and Sigmoidoscopy (08/01/2018). Restrictions  Restrictions/Precautions  Restrictions/Precautions: Fall Risk  Position Activity Restriction  Other position/activity restrictions: 6L O2 (5L O2 at home)  Vision/Hearing  Vision: Impaired  Vision Exceptions: Wears glasses for reading  Hearing: Within functional limits     Subjective  General  Chart Reviewed: Yes  Additional Pertinent Hx: Per Daisy France MD H&P on 7-: The pt is an 81 yo female who came to the ED with SOB x 2 weeks.      PMHx: abd pain, R ankle fx, anxiety/depression, CAD, COPD, CAD, HTN, OA, CHF, mediastinal mass  Response To Previous Treatment: Not applicable  Family / Caregiver Present: No  Referring Practitioner: Daisy France MD  Referral Date : 07/26/21  Diagnosis: Acute on chronic hypoxic respiratory failure, COPD exacerbation, possible pna  Follows Commands: Within Functional Limits  Subjective  Subjective: the pt was found to be seated on the EOB; she is pleasant and agreeable to therapy; denies pain  Pain Screening  Patient Currently in Pain: Denies          Orientation  Orientation  Overall Orientation Status: Within 36 Minutes       Electronically signed by Veronica Clay, PT [id number] on 7/27/2021 at 10:35 AM

## 2021-07-27 NOTE — CARE COORDINATION
[employer] rep delivered the ordered Nebulizer to the patient and reviewed insurance coverage, equipment setup, care and maintenance, and supply reorder process with patient and also with daughter via phone. Written info provided. Notified RN.     Thank you for the referral.  Electronically signed by Nat Chappell on 7/27/2021 at 5:03 PM Cell ph# [phone]

## 2021-07-27 NOTE — PROGRESS NOTES
Occupational Therapy   Occupational Therapy Initial Assessment  Date: 2021   Patient Name: Bert Cedeno  MRN: [id number]     : 1934    Date of Service: 2021    Discharge Recommendations:  Home with assist PRN  OT Equipment Recommendations  Equipment Needed: No    Assessment   Performance deficits / Impairments: Decreased functional mobility ; Decreased ADL status; Decreased endurance;Decreased high-level IADLs  Assessment: Pt is an 80 y.o. female admitted with Acute on chronic hypoxic respiratory failure, COPD exacerbation, possible PNA. At baseline, pt lives on first floor of two-family home and daughter lives upstairs. Pt independent ADLs and fxl mobility, has HHA 2x/week to assist with cleaning. Pt wears 5 L home O2. Pt currently functioning slightly below baseline d/t the above deficits, today requiring SBA fxl transfers/mobility with no AD, SBA toileting, LB dressing,a nd grooming in stance at sink, and anticipate pt would require overall SBA for ADLs. Will cont to see on acute to address the above limitations and maximize pt's safety and independence. Anticipate pt will be safe to return home with assist prn at d/c. Prognosis: Good  Decision Making: Medium Complexity  OT Education: OT Role;Plan of Care;ADL Adaptive Strategies;Transfer Training;Energy Conservation  Barriers to Learning: none  REQUIRES OT FOLLOW UP: Yes  Activity Tolerance  Activity Tolerance: Patient Tolerated treatment well;Patient limited by fatigue  Activity Tolerance: SpO2 on 6 L O2 85% with exertion  Safety Devices  Safety Devices in place: Yes  Type of devices: Call light within reach; Chair alarm in place; Left in chair;Gait belt;Nurse notified           Patient Diagnosis(es): The primary encounter diagnosis was COPD exacerbation ([facility] [address] A diagnosis of Acute on chronic respiratory failure with hypoxia and hypercapnia (HCC) was also pertinent to this visit.      has a past medical history of Abdominal pain, unspecified site, Adrenal hyperplasia ([address] Ankle Fracture, Right, Anxiety and depression, Aortic atherosclerosis ([address] CAD (coronary artery disease), Contusion of unspecified site, COPD (chronic obstructive pulmonary disease) ([address] Coronary artery disease, DJD (degenerative joint disease), Eczema, Essential hypertension, Hyperlipidemia, Hypertension, Hypoxia, Insomnia, Leukocytosis, Memory loss, Nicotine addiction, Osteoarthritis, Pain in joint, pelvic region and thigh, Primary osteoarthritis involving multiple joints, Pulmonary hypertension ([address] Senile reticular degeneration of peripheral retina, Syncope, Thoracic or lumbosacral neuritis or radiculitis, unspecified, Urinary incontinence, Visual hallucinations, and Wears glasses. has a past surgical history that includes Cataract removal with implant (Left, December 5, 2012); Hysterectomy; Colonoscopy; and Sigmoidoscopy (08/01/2018). Restrictions  Restrictions/Precautions  Restrictions/Precautions: Fall Risk  Position Activity Restriction  Other position/activity restrictions: 6L O2 (5L O2 at home)    Subjective   General  Chart Reviewed: Yes  Additional Pertinent Hx: Per Di Nicole MD's H&P: \" Patient is a 49-year-old female with past medical history of anxiety, hypertension hyperlipidemia, COPD, CAD who presented to hospital for feeling shortness of breath. Patient mentions she is feeling short of breath past 2 weeks, getting worse, she had chills, denies fevers. She has cough producing mild amount of phlegm, cannot characterize color. Denied chest pain nausea vomiting diarrhea constipation dysuria. Patient has chronic abdominal pain, \"from fissure in abdomen\" however it has been there since 2018. \"  Family / Caregiver Present: No  Referring Practitioner: Di Nicole MD  Diagnosis: Acute on chronic hypoxic respiratory failure, COPD exacerbation, possible PNA  Subjective  Subjective: Pt met b/s for OT eval/tx.  Pt seated EOB on arrival, agreeable to participate in therapy. Pt reports she had a nosebleed earlier. Pt denies pain. Social/Functional History  Social/Functional History  Lives With: Alone (daughter lives upstairs)  Type of Home: House (Two-Family house, daughter lives upstairs)  Home Layout: Two level, Performs ADL's on one level, Able to Live on Main level with bedroom/bathroom (daughter lives upstairs, pt stays on first floor)  Home Access: Stairs to enter with rails  Entrance Stairs - Number of Steps: 4 ANGEL with HR with glenn handrail, + 1 without  Bathroom Shower/Tub: Tub/Shower unit (pt usually soaks down in tub)  Bathroom Toilet: Standard  Bathroom Equipment: Grab bars in shower, Grab bars around toilet  Bathroom Accessibility: Walker accessible  Home Equipment: Oxygen, Alert Button (uses 5 L. O2 at home; no other DME)  Receives Help From: Home health (HHA from [employer] 2x/week for cleaning)  ADL Assistance: Independent  Homemaking Assistance: Needs assistance (Pt has HHA through [employer] 2x/week to assist with cleaning. Daughter does cooking and laundry; pt cooks some of her own meals. Pt goes to grocery store with daughter.)  Ambulation Assistance: Independent (no device)  Transfer Assistance: Independent (sleeps in regular bed with 2-3 pillows)  Active : No  Patient's  Info: daughter  Mode of Transportation: Family  Occupation: Retired  Additional Comments: pt reports 1 recent fall ~2 weeks ago.        Objective   Vision: Impaired  Vision Exceptions: Wears glasses for reading  Hearing: Within functional limits      Orientation  Overall Orientation Status: Within Functional Limits  Orientation Level: Oriented to person;Oriented to place;Oriented to time;Oriented to situation     Balance  Sitting Balance: Independent  Standing Balance: Stand by assistance  Standing Balance  Time: ~2-3 minutes  Activity: standing at sink for grooming  Comment: SBA  Functional Mobility  Functional - Mobility Device: No device  Activity: To/from bathroom  Assist Level: Stand by assistance  Functional Mobility Comments: Pt completed fxl mobility ~10 ft, ~25 ft with no AD and SBA. SpO2 on 6 L O2 85% with exertion. ADL  Grooming: Stand by assistance (standing at sink to wash hands/face, use mouthwash)  UE Dressing:  (assist to change hospital gown, but do not anticipate pt would need assist for street clothes)  LE Dressing: Stand by assistance (to doff/don briefs)  Toileting: Stand by assistance  Additional Comments: Anticipate pt is independent feeding, SBA bathing and dressing based on ROM/strength, balance, endurance. Bed mobility  Comment: did not observe as the pt already seated EOB but anticipate the pt being at least SBA she sleeps in a regular flat bed at home w/2-3 pillows due to breathing     Transfers  Sit to stand: Stand by assistance  Stand to sit: Stand by assistance   Toilet Transfers  Toilet - Technique: Ambulating  Equipment Used: Grab bars  Toilet Transfer: Stand by assistance    Cognition  Overall Cognitive Status: WFL  Attention Span: Attends with cues to redirect     Sensation  Overall Sensation Status: WFL     LUE AROM (degrees)  LUE AROM : WFL  RUE AROM (degrees)  RUE AROM : WFL     LUE Strength  Gross LUE Strength: WFL  RUE Strength  Gross RUE Strength: WFL                   Plan   Plan  Times per week: 3-5  Current Treatment Recommendations: Functional Mobility Training, Balance Training, Endurance Training, Strengthening, Safety Education & Training, Self-Care / ADL      AM-PAC Score        AM-[facility] Inpatient Daily Activity Raw Score: 21 (07/27/21 1034)  AM-PAC Inpatient ADL T-Scale Score : 44.27 (07/27/21 1034)  ADL Inpatient CMS 0-100% Score: 32.79 (07/27/21 1034)  ADL Inpatient CMS G-Code Modifier : David Guillaume (07/27/21 1034)    Goals  Short term goals  Time Frame for Short term goals: prior to d/c:  Short term goal 1: Pt will bathe with mod I. Short term goal 2: Pt will dress with mod I. Short term goal 3: Pt will toilet with mod I.   Short term goal 4: Pt will complete fxl mobility and fxl transfers to/from ADL surfaces with mod I. Short term goal 5: Pt will tolerate 10-15 minutes of therex to improve strength/endurance for ADLs. Long term goals  Time Frame for Long term goals : STGs=LTGs  Patient Goals   Patient goals : to return home. Therapy Time   Individual Concurrent Group Co-treatment   Time In 0939         Time Out 1030         Minutes 51         Timed Code Treatment Minutes: 36 Minutes     This note to serve as OT d/c summary if pt is d/c-ed prior to next therapy session.     Lesa Villeda, OTR/L [id number]

## 2021-07-27 NOTE — PLAN OF CARE
Problem: Breathing Pattern - Ineffective:  Goal: Ability to achieve and maintain a regular respiratory rate will improve  Description: Ability to achieve and maintain a regular respiratory rate will improve  7/27/2021 1105 by Niru Garner RN  Outcome: Ongoing  7/27/2021 0511 by Chi Brown RN  Outcome: Ongoing

## 2021-07-27 NOTE — CARE COORDINATION
Discharge Planning:  BEAU contacted Lead with [employer]. Referal initiated for a nebulizer on this date. Pt is already active with oxygen through [address]. BEAU contacted Madina to verify that pt has oxygen through this company. Madina did verify that this pt has oxygen through this company but stated that pt only has orders for 2-3 L. Pt is reporting that she is on 5L at all times. BEAU sent a perfectserve to Dr. Viktor Ovalle requesting a home O2 eval.    If the home O2 eval is completed and it is determined that this pt requires an increase in O2, the home O2 order will need to be faxed to [facility] at [phone].     BEAU spoke with pts bedside RN who is aware of the home O2 eval request.   OSMIN Harp  [phone]  Electronically signed by Lamont Jain on 7/27/2021 at 4:34 PM     Addendum:  BEAU received a response from Dr. Viktor Ovalle stating that he will order the home O2 eval.  BEAU contacted the 4th floor respiratory therapist who agreed to f/u on the referral.  Cassandra Barboza [facility]  [phone]  Electronically signed by Lamont Jain on 7/27/2021 at 4:42 PM

## 2021-07-27 NOTE — CONSULTS
PATIENT IS SEEN AT THE REQUEST OF DR. Guido for lung cancer concerns    CONSULTING PHYSICIAN: Hay     HISTORY OF PRESENT ILLNESS:  This is a 80 y.o. female who presented to the ED on 7/26 with a CC of SOB. Per ED notes she was noted to be hypoxic on her home oxygen of 5 liters. She has been having slowly progressive SOB for weeks to months. Was flagged as having lung nodule back in November but failed to show up to outpatient pulmonary office. CT imaging is worse. Patient is SOB but does not seem to want anything in terms of work up or treatment      Established Pulmonologist:  None     PAST MEDICAL HISTORY:  Past Medical History:   Diagnosis Date    Abdominal pain, unspecified site     Adrenal hyperplasia ([address] 6/11/2013    Left - CT scan - 2011    Ankle Fracture, Right     Anxiety and depression     Aortic atherosclerosis ([address] 10/3/2015    CAD (coronary artery disease) 2018    PCI LAD 3.0 x 15 mm and 3.5 x 15 mm BMS; RCA 2.5 x 12 mm and 2.5 x 15 mm BMS.  EF 65%    Contusion of unspecified site     COPD (chronic obstructive pulmonary disease) ([address] 3/1/2013    Coronary artery disease     Arteriosclerosis    DJD (degenerative joint disease)     Eczema 8/9/2013    Essential hypertension 9/9/2015    Hyperlipidemia     Hypertension     Hypoxia 7/16/2015    Insomnia     Leukocytosis     Memory loss     Nicotine addiction     Osteoarthritis     Pain in joint, pelvic region and thigh     Primary osteoarthritis involving multiple joints 9/9/2015    Pulmonary hypertension ([address] 10/3/2015    Senile reticular degeneration of peripheral retina     Syncope     Thoracic or lumbosacral neuritis or radiculitis, unspecified     Urinary incontinence     Visual hallucinations 9/16/2014    Wears glasses        PAST SURGICAL HISTORY:  Past Surgical History:   Procedure Laterality Date    CATARACT REMOVAL WITH IMPLANT Left December 5, 2012    Dr. Hugo Royal COLONOSCOPY      HYSTERECTOMY      SIGMOIDOSCOPY  08/01/2018       FAMILY HISTORY:  family history includes Heart Attack in her brother and father. SOCIAL HISTORY:   reports that she quit smoking about 2 years ago. Her smoking use included cigarettes. She has a 65.00 pack-year smoking history. She has never used smokeless tobacco.    Scheduled Meds:   ipratropium-albuterol  1 ampule Inhalation Q4H WA    budesonide-formoterol  2 puff Inhalation BID    predniSONE  40 mg Oral Daily    sodium chloride flush  10 mL Intravenous 2 times per day    azithromycin  250 mg Intravenous Q24H    cefTRIAXone (ROCEPHIN) IV  1,000 mg Intravenous Q24H    amLODIPine  5 mg Oral BID    atorvastatin  40 mg Oral Nightly    furosemide  20 mg Oral BID    metoprolol tartrate  25 mg Oral BID    PARoxetine  20 mg Oral Nightly    QUEtiapine  50 mg Oral Nightly    rivaroxaban  20 mg Oral Daily with breakfast    aspirin EC  81 mg Oral Nightly       Continuous Infusions:   sodium chloride         PRN Meds:  sodium chloride flush, sodium chloride, potassium chloride, magnesium sulfate, ondansetron **OR** ondansetron, magnesium hydroxide, acetaminophen **OR** acetaminophen, albuterol sulfate HFA, ipratropium-albuterol, LORazepam, meclizine, labetalol    ALLERGIES:  Patient is allergic to enalapril. REVIEW OF SYSTEMS:  Constitutional: Negative for fever or chills  HENT: Negative for sore throat  Eyes: Negative for redness   Respiratory: worsening SOB  Cardiovascular: Negative for chest pain  Gastrointestinal: Negative for vomiting, diarrhea   Genitourinary: frequent urination   Musculoskeletal: Negative for arthralgias   Skin: Negative for rash  Neurological: Negative for syncope  Hematological: Negative for adenopathy  Extremities:  some swelling    PHYSICAL EXAM:  Blood pressure (!) 163/62, pulse 79, temperature 97.7 °F (36.5 °C), temperature source Oral, resp.  rate 20, height 5' 8\" (1.727 m), weight 156 lb 15.5 oz (71.2 kg), SpO2 90 %, not currently breastfeeding.'  Gen: Chronically ill  Eyes: PERRL. No sclera icterus. No conjunctival injection. ENT: No discharge. Pharynx clear. Neck: Trachea midline. No obvious mass. Resp: Poor air movement, work of breathing, diminished   CV: Regular rate. Regular rhythm. No murmur or rub. GI: Non-tender. Non-distended. No hernia. BS present. Skin: Warm and dry. No nodule on exposed extremities. Lymph: No cervical LAD. No supraclavicular LAD. M/S: No cyanosis. No joint deformity. Neuro: Awake. Alert. Moves all four extremities. EXT:   + edema, no clubbing    LABS:  CBC:   Recent Labs     21  1056 21  0755   WBC 7.6 9.6   HGB 14.0 13.1   HCT 42.4 39.3   MCV 84.2 83.6    194     BMP:   Recent Labs     21  1055 21  0755    136   K 3.7 4.3    99   CO2 28 25   BUN 12 15   CREATININE 0.8 0.9     LIVER PROFILE:   Recent Labs     21  1055   AST 18   ALT 14   BILITOT 0.7   ALKPHOS 76     PT/INR: No results for input(s): PROTIME, INR in the last 72 hours. APTT: No results for input(s): APTT in the last 72 hours. UA:  Recent Labs     21  0620   COLORU YELLOW   PHUR 6.0   WBCUA 16*   RBCUA 55*   CLARITYU Clear   SPECGRAV 1.017   LEUKOCYTESUR MODERATE*   UROBILINOGEN 1.0   BILIRUBINUR Negative   BLOODU LARGE*   GLUCOSEU Negative     No results for input(s): PHART, BTU8MAP, PO2ART in the last 72 hours. Cultures:  Negative to date      PFTs: 2015  Moderate obstruction       ECHO:  Normal left ventricle size, wall thickness, and systolic function with an   estimated ejection fraction of 55-60%. There appears to be hypokinesis of   the mid inferior wall. Diastolic filling parameters suggest grade I   diastolic dysfunction. The right ventricle is not well visualized but appears grossly normal in size and function. Mild aortic regurgitation. Trivial mitral and tricuspid regurgitation.       VB.46    Chest CT:  Chest imaging was reviewed by me and showed adenopathy with collapse (?extrinsic) of the LLL with consolidation with additional nodular densities and interstitial thickening     I reviewed all the above labs and studies pertaining to this visit. ASSESSMENT/PLAN:  · Acute on Chronic Hypoxic Respiratory Failure   Titrate oxygen for saturations greater than or equal to 90%  · Baseline oxygen is 3 liters   · Abnormal CT Chest with mediastinal adenopathy   · Does not want aggressive treatment of work up. Confirmed by daughter. Would be high risk for biopsy and does not have curable disease. No further scans recommended  · COPD exacerbation  · Add scheduled duonebs  · Add prednisone for 5 days  · Needs home nebulizer machine with duonebs q 4 hours  · Symbicort q 12 hours  · Tobacco Abuse     DVT prophylaxis  Xarelto     Seen by Dr. Kriss Moreira 11/2020 (inpatient) and he relayed concerns for lung cancer to her. He recommended PET. She did not get the PET and then no-showed him twice. Obviously her likely cancer has progressed based on imaging     I recommend no further scans or work up. Might want to meet with palliative care to adjust code status.       Needs symptomatic control of her issues      Garry Marley, DO  [facility]

## 2021-07-27 NOTE — DISCHARGE INSTR - COC
Continuity of Care Form    Patient Name: Ace Lopez   :  1934  MRN:  [id number]    Admit date:  2021  Discharge date:  ***    Code Status Order: Full Code   Advance Directives:     Admitting Physician:  Lenin White MD  PCP: Mariam Hogan MD    Discharging Nurse: [facility] Unit/Room#: A9J-5531/5324-85  Discharging Unit Phone Number: ***    Emergency Contact:   Extended Emergency Contact Information  Primary Emergency Contact: Diane Weeks   [address] Phone: [phone]  Mobile Phone: [phone]  Relation: Child  [facility]  Mobile Phone: [phone]  Relation: Child    Past Surgical History:  Past Surgical History:   Procedure Laterality Date    CATARACT REMOVAL WITH IMPLANT Left 2012    Dr. Jordan Gastelum  2018       Immunization History:   Immunization History   Administered Date(s) Administered    Influenza 10/18/2013    Influenza, High Dose (Fluzone 65 yrs and older) 10/05/2012, 2014, 2015, 10/06/2017, 2018    Influenza, Quadv, adjuvanted, 65 yrs +, IM, PF (Fluad) 2020    Influenza, Triv, inactivated, subunit, adjuvanted, IM (Fluad 65 yrs and older) 10/22/2019    Pneumococcal Conjugate 13-valent (Hjvfalj95) 2015    Pneumococcal Polysaccharide (Lvwscabsd35) 2012    Tetanus 2009       Active Problems:  Patient Active Problem List   Diagnosis Code    Contusion T14. 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    Syncope R55    Nicotine addiction F17.200    Skin rash R21    Abdominal pain R10.9    Ankle Fracture, Right S82.899A    DJD (degenerative joint disease) M19.90    Fatty liver K76.0    Osteoarthritis M19.90    Insomnia G47.00    Anxiety F41.9    Urinary incontinence R32    Coronary artery disease I25.10  Hyperlipidemia E78.5    Depression F32.9    Decreased vision H54.7    Cataract H26.9    Adrenal hyperplasia (HCC) E27.8    Eczema L30.9    Hypokalemia E87.6    Visual hallucinations R44.1    Anger R45.4    Mood disorder (HCC) F39    Lumbar radiculopathy M54.16    Hypoxia R09.02    Abnormal weight loss R63.4    Irritable bowel syndrome K58.9    Accelerated hypertension I10    Primary osteoarthritis involving multiple joints M89.49    Aortic atherosclerosis (HCC) I70.0    COPD, moderate (HCC) J44.9    Abnormal nuclear stress test R94.39    Exertional dyspnea R06.00    Tobacco abuse Z72.0    CAD in native artery I25.10    S/p bare metal coronary artery stent Z95.5    Heart failure (HCC) I50.9    Chronic diastolic heart failure with preserved ejection fraction ([facility]) I50.32    COPD exacerbation ([facility]) J44.1    COVID-19 U07.1    Chronic hypoxemic respiratory failure ([facility]) J96.11    Acute respiratory failure with hypoxia ([facility]) J96.01    Pulmonary nodule seen on imaging study R91.1    Acute bronchiolitis J21.9    Acute on chronic respiratory failure with hypoxia and hypercapnia ([facility]) J96.21, J96.22    Abnormal CT of the chest R93.89       Isolation/Infection:   Isolation          No Isolation        Patient Infection Status     Infection Onset Added Last Indicated Last Indicated By Review Planned Expiration Resolved Resolved By    None active    Resolved    COVID-19 Rule Out 21 COVID-19, Rapid (Ordered)   21 Rule-Out Test Resulted    COVID-19 20 COVID-19   20     COVID-19 Rule Out 20 COVID-19 (Ordered)   20 Rule-Out Test Resulted          Nurse Assessment:  Last Vital Signs: BP (!) 163/62   Pulse 79   Temp 97.7 °F (36.5 °C) (Oral)   Resp 20   Ht 5' 8\" (1.727 m)   Wt 156 lb 15.5 oz (71.2 kg)   SpO2 90%   BMI 23.87 kg/m²     Last documented pain score (0-10 scale): Pain Level: 0  Last Weight:   Wt Readings from Last 1 Encounters:   21 156 lb 15.5 oz (71.2 kg)     Mental Status:  {IP PT MENTAL STATUS:}    IV Access:  { SG IV ACCESS:815492039}    Nursing Mobility/ADLs:  Walking   {CHP DME ELFX:672640592}  Transfer  {CHP DME KCQY:106694374}  Bathing  {CHP DME RQSU:237798906}  Dressing  {CHP DME JPIP:075493695}  Toileting  {CHP DME TDNX:608155577}  Feeding  {CHP DME VUSH:275873731}  Med Admin  {CHP DME QOWJ:069758186}  Med Delivery   { SG MED Delivery:268959573}    Wound Care Documentation and Therapy:        Elimination:  Continence:   · Bowel: {YES / OP:26033}  · Bladder: {YES / :61051}  Urinary Catheter: {Urinary Catheter:833310605}   Colostomy/Ileostomy/Ileal Conduit: {YES / JV:51650}       Date of Last BM: ***    Intake/Output Summary (Last 24 hours) at 2021 1529  Last data filed at 2021 0542  Gross per 24 hour   Intake 72.55 ml   Output --   Net 72.55 ml     I/O last 3 completed shifts:   In: 72.6 [IV Piggyback:72.6]  Out: -     Safety Concerns:     [address] Safety Concerns:332926178}    Impairments/Disabilities:      [address] Impairments/Disabilities:791549495}    Nutrition Therapy:  Current Nutrition Therapy:   [address] Diet List:974931480}    Routes of Feeding: {CHP DME Other Feedings:757215702}  Liquids: {Slp liquid thickness:26758}  Daily Fluid Restriction: {CHP DME Yes amt example:365952899}  Last Modified Barium Swallow with Video (Video Swallowing Test): {Done Not Done JCUA:448417008}    Treatments at the Time of Hospital Discharge:   Respiratory Treatments: ***  Oxygen Therapy:  {Therapy; copd oxygen:98916}  Ventilator:    {[facility] Vent EWIB:083809051}    Rehab Therapies: {THERAPEUTIC INTERVENTION:6006202602}  Weight Bearing Status/Restrictions: [address] Weight Bearin}  Other Medical Equipment (for information only, NOT a DME order):  {EQUIPMENT:160381804}  Other Treatments: ***    Patient's personal belongings (please select all that are sent with patient):  {CHP DME Belongings:885923640}    RN SIGNATURE:  {Esignature:433439179}    CASE MANAGEMENT/SOCIAL WORK SECTION    Inpatient Status Date: ***    Readmission Risk Assessment Score:  Readmission Risk              Risk of Unplanned Readmission:  17           Discharging to Facility/ Agency   · Name:   · Address:  · Phone:  · Fax:    Dialysis Facility (if applicable)   · Name:  · Address:  · Dialysis Schedule:  · Phone:  · Fax:    / signature: {Esignature:682308758}    PHYSICIAN SECTION    Prognosis: {Prognosis:2061394569}    Condition at Discharge: [address] Freddie Patient Condition:425170719}    Rehab Potential (if transferring to Rehab): {Prognosis:4139618340}    Recommended Labs or Other Treatments After Discharge: ***    Physician Certification: I certify the above information and transfer of Kiley Jimenez  is necessary for the continuing treatment of the diagnosis listed and that she requires {Admit to Appropriate Level of Care:40004} for {GREATER/LESS:188171026} 30 days.      Update Admission H&P: {CHP DME Changes in OLWFN:288645751}    PHYSICIAN SIGNATURE:  {Esignature:525123819}

## 2021-07-27 NOTE — PLAN OF CARE
Problem: Falls - Risk of:  Goal: Will remain free from falls  Description: Will remain free from falls  Outcome: Ongoing  Goal: Absence of physical injury  Description: Absence of physical injury  Outcome: Ongoing     Problem: Infection:  Goal: Will remain free from infection  Description: Will remain free from infection  Outcome: Ongoing     Problem: Safety:  Goal: Free from accidental physical injury  Description: Free from accidental physical injury  Outcome: Ongoing  Goal: Free from intentional harm  Description: Free from intentional harm  Outcome: Ongoing     Problem: Daily Care:  Goal: Daily care needs are met  Description: Daily care needs are met  Outcome: Ongoing     Problem: Pain:  Goal: Patient's pain/discomfort is manageable  Description: Patient's pain/discomfort is manageable  Outcome: Ongoing     Problem: Skin Integrity:  Goal: Skin integrity will stabilize  Description: Skin integrity will stabilize  Outcome: Ongoing     Problem: Discharge Planning:  Goal: Patients continuum of care needs are met  Description: Patients continuum of care needs are met  Outcome: Ongoing     Problem: Breathing Pattern - Ineffective:  Goal: Ability to achieve and maintain a regular respiratory rate will improve  Description: Ability to achieve and maintain a regular respiratory rate will improve  Outcome: Ongoing

## 2021-07-27 NOTE — PROGRESS NOTES
Patient alert and oriented, VSS, sitting up in bed eating breakfast. Patient denies n/v, diarrhea, pain. Patient c/o SOB and nose bleed. Assisted patient with nose bleed, resolved at this time. Patient denies further needs at this time. Bed in lowest and locked position, bed alarm on. Non-slip socks on, call light within reach. Will continue to monitor pt needs.

## 2021-07-27 NOTE — TELEPHONE ENCOUNTER
Patients daughter wanted to let Dr Kristel Johnson know that patient was admitted to [facility] 7/26/21 for difficulty breathing

## 2021-07-27 NOTE — CARE COORDINATION
DISCHARGE PLAN: Pt plans to return home upon d/c.  Dgtr or son will transport her home. No d/c needs noted. ___________________________________    Met w/pt to address barriers to dc. HOME: Pt reported that she resides in a two family home with her dgtr, Chapito Ramsey. Chapito Ramsey lives on the second floor of the home and pt stays on the main floor. 4 ANGEL. COVID Vaccination: Yes    DME/O2: Oxygen through [employer](5L at all times), shower chair, grab bar in the shower    ACTIVE SERVICES: Pt is active with [employer] and has [employer] 1x a week for 2 hrs. Pts dgtr does her laundry. Pt also has home delivered meals and a lifeline. Pt stated that she remains independent with all self care. TRANSPORTATION: Pt no longer drives and stated that her dgtr transports where she needs to go. PHARMACY: Denies difficulty obtaining/taking meds. [facility] PCP: Jorgito Vargas    DEMOGRAPHICS: Verified address/phone number as correct    INSURANCE:  Medicare  PRESCRIPTION COVERAGE: Medicare    HD/PD: No      THERAPY RECS    PHYSICAL THERAPY  \"Home with assist PRN   Tye Wing scored a 22/24 on the AM-PAC short mobility form. At this time, no further PT is recommended upon discharge. Recommend patient returns to prior setting with prior services. PT Equipment Recommendations  Equipment Needed: No\"  OCCUPATIONAL THERAPY  \"Date of Service: 7/27/2021     Discharge Recommendations:  Home with assist PRN  OT Equipment Recommendations  Equipment Needed: No\"    Discharge planning team will remain available for needs. Please consult for any specifics not addressed in this note.     Corina Grant [place]  [phone]  Electronically signed by Karri Nunez on 7/27/2021 at 12:39 PM

## 2021-07-28 ENCOUNTER — CARE COORDINATION (OUTPATIENT)
Dept: CASE MANAGEMENT | Age: 86
End: 2021-07-28

## 2021-07-28 LAB — MRSA SCREEN RT-PCR: NORMAL

## 2021-07-28 NOTE — CARE COORDINATION
Audie 45 Transitions Initial Follow Up Call    Call within 2 business days of discharge: Yes    Patient: Lisha Salazar Patient : 1934   MRN: <[id number]>  Reason for Admission: COPD exacerbation  Discharge Date: 21 RARS: Readmission Risk Score: 19      Last Discharge [address]       Complaint Diagnosis Description Type Department Provider    21 Shortness of Breath COPD exacerbation ([facility] [address] . .. ED to Hosp-Admission (Discharged) (ADMITTED) Solomon Garcia MD; Kathleen Rodriguez... Spoke with: MARK    Facility: [facility]    Attempted to reach patient via phone for initial post hospital transition call. VM left stating purpose of call along with my contact information requesting a return call. Linda Lay LPN CC  Care Transitions  [phone]      Care Transitions 24 Hour Call    Do you have all of your prescriptions and are they filled?: No (Comment: Daughter assists pt with her medications.)  Were you discharged with any Home Care or Post Acute Services: Yes  Post Acute Services:  Outpatient/Community Services, Other (Comment: [employer] Homem[employer] 2x/week, [employer] O[employer])  Patient DME: Mardee Shallow  Patient Home Equipment: Oxygen  Do you have support at home?: Child  Are you an active caregiver in your home?: No  Care Transitions Interventions         Follow Up  Future Appointments   Date Time Provider Melo Clemens   2021  8:20 AM Tarun Chu MD [address], [address]

## 2021-07-29 ENCOUNTER — CARE COORDINATION (OUTPATIENT)
Dept: CASE MANAGEMENT | Age: 86
End: 2021-07-29

## 2021-07-29 ENCOUNTER — TELEPHONE (OUTPATIENT)
Dept: INTERNAL MEDICINE CLINIC | Age: 86
End: 2021-07-29

## 2021-07-29 DIAGNOSIS — F51.01 PRIMARY INSOMNIA: Chronic | ICD-10-CM

## 2021-07-29 DIAGNOSIS — R93.89 ABNORMAL CT OF THE CHEST: Primary | ICD-10-CM

## 2021-07-29 DIAGNOSIS — F41.9 ANXIETY: Chronic | ICD-10-CM

## 2021-07-29 PROCEDURE — 1111F DSCHRG MED/CURRENT MED MERGE: CPT | Performed by: HOSPITALIST

## 2021-07-29 NOTE — CARE COORDINATION
Audie 45 Transitions Initial Follow Up Call    Call within 2 business days of discharge: Yes    Patient: Melvin Raymond Patient : 1934   MRN: <[id number]>  Reason for Admission: COPD exacerbation  Discharge Date: 21 RARS: Readmission Risk Score: 19      Last Discharge [facility]       Complaint Diagnosis Description Type Department Provider    21 Shortness of Breath COPD exacerbation ([facility] [address] . .. ED to Hosp-Admission (Discharged) (ADMITTED) Carlos Montoya MD; Don Morales... Spoke with: daughter, Rose Yoon, HIPAA verified    Facility: [facility]    Non-face-to-face services provided:  Obtained and reviewed discharge summary and/or continuity of care documents     Transitions of Care Initial Call    Was this an external facility discharge? No Discharge Facility: NA    Challenges to be reviewed by the provider   Additional needs identified to be addressed with provider: No  none             Method of communication with provider : phone      Advance Care Planning:   Does patient have an Advance Directive: reviewed and current. Advance Care Planning   Healthcare Decision Maker:    Primary Decision Maker: Diane Weeks - Child - [phone]    Secondary Decision Maker: [facility]) - Child - [phone]    Was this a readmission? No  Patient stated reason for admission: SOB  Patients top risk factors for readmission: medical condition-COPD exacerbation    Care Transition Nurse (CTN) contacted the family by telephone to perform post hospital discharge assessment. Verified name and  with family as identifiers. Provided introduction to self, and explanation of the CTN role. CTN reviewed discharge instructions, medical action plan and red flags with family who verbalized understanding. Family given an opportunity to ask questions and does not have any further questions or concerns at this time. Were discharge instructions available to patient? Yes. Reviewed appropriate site of care based on symptoms and resources available to patient including: PCP. The family agrees to contact the PCP office for questions related to their healthcare. Medication reconciliation was performed with family, who verbalizes understanding of administration of home medications. Advised obtaining a 90-day supply of all daily and as-needed medications. Covid Risk Education  Completed Clare Judge 3/22/21     Educated patient about risk for severe COVID-19 due to risk factors according to CDC guidelines. LPN CC reviewed discharge instructions, medical action plan and red flag symptoms with the family who verbalized understanding. Discussed COVID vaccination status: Yes. Education provided on COVID-19 vaccination as appropriate. Discussed exposure protocols and quarantine with CDC Guidelines. Family was given an opportunity to verbalize any questions and concerns and agrees to contact LPN CC or health care provider for questions related to their healthcare. Reviewed and educated family on any new and changed medications related to discharge diagnosis. Was patient discharged with a pulse oximeter? No Discussed and confirmed pulse oximeter discharge instructions and when to notify provider or seek emergency care. Spoke with daughter, Hua Vee, HIPAA verified. Daughter verified patient  and was pleasant and agreeable to transition calls. States patient is doing well. Denies SOB. Using O2 at 6L. Has POC at home now. To p/u nebulizer solution today. Only check O2 and BP if patient isn't feeling well. Not taking daily weight. LPN CC encouraged more routine monitoring of vitals and encouraged discussion with PCP regarding when and how often. PCP f/u , but plan to attempt to get earlier appt. Had some concerns with hospital staff, stating \"The  said she was going to report me because I forgot my mom's concentrator. \" LPN CC apologized to daughter, and discussed situation. Daughter acknowledged that it was a difficult position for the SW who had concerns about patient welfare and liability of sending patient home with no O2. Declined patient advocate number when offered stating, \"I just wanted it on record. I understand where she was coming from, but the hospital should have loaner devices. \" Denies any acute needs at present time. Agreeable to f/u calls. Educated on the use of urgent care or physicians 24 hr access line if assistance is needed after hours. Full medication reconciliation and 1111f completed. LPN CC provided contact information. Plan for follow-up call in 5-7 days based on severity of symptoms and risk factors. Linda Lay LPN CC  Care Transitions  [phone]    Care Transitions 24 Hour Call    Do you have any ongoing symptoms?: No  Do you have a copy of your discharge instructions?: Yes  Do you have all of your prescriptions and are they filled?: No (Comment: Daughter assists pt with her medications.)  Have you been contacted by a [address] [place] [address] Avenue?: No  Have you scheduled your follow up appointment?: Yes  How are you going to get to your appointment?: Car - family or friend to transport  Were you discharged with any Home Care or Post Acute Services: Yes  Post Acute Services:  Outpatient/Community Services, Other (Comment: [employer] Homem[employer] 2x/week, [employer] O[employer])  Patient DME: Khalida Bravo  Patient Home Equipment: Oxygen  Do you have support at home?: Child  Do you feel like you have everything you need to keep you well at home?: Yes  Are you an active caregiver in your home?: No  Care Transitions Interventions  No Identified Needs         Follow Up  Future Appointments   Date Time Provider Melo Clemens   8/17/2021  8:20 AM MD LINDA Hart[address]  [address], LPN

## 2021-07-30 LAB — CULTURE, BLOOD 2: NORMAL

## 2021-07-30 RX ORDER — LORAZEPAM 0.5 MG/1
TABLET ORAL
Qty: 60 TABLET | Refills: 0 | Status: CANCELLED | OUTPATIENT
Start: 2021-07-30 | End: 2021-08-29

## 2021-07-31 ENCOUNTER — TELEPHONE (OUTPATIENT)
Dept: ADMINISTRATIVE | Age: 86
End: 2021-07-31

## 2021-07-31 LAB — BLOOD CULTURE, ROUTINE: NORMAL

## 2021-07-31 NOTE — TELEPHONE ENCOUNTER
Daughter Maday Beatty called and asked if she could come up to the hospital to swap the nebulizer machine out b/c she can't get it to work. I instructed her to review her AVS papers to find the information for [employer] so she can reach out to them for troubleshooting.

## 2021-08-02 ENCOUNTER — OFFICE VISIT (OUTPATIENT)
Dept: INTERNAL MEDICINE CLINIC | Age: 86
End: 2021-08-02
Payer: MEDICARE

## 2021-08-02 ENCOUNTER — TELEPHONE (OUTPATIENT)
Dept: PULMONOLOGY | Age: 86
End: 2021-08-02

## 2021-08-02 VITALS
HEART RATE: 76 BPM | BODY MASS INDEX: 23.72 KG/M2 | WEIGHT: 156 LBS | DIASTOLIC BLOOD PRESSURE: 55 MMHG | SYSTOLIC BLOOD PRESSURE: 120 MMHG | OXYGEN SATURATION: 74 %

## 2021-08-02 DIAGNOSIS — F41.9 ANXIETY: ICD-10-CM

## 2021-08-02 DIAGNOSIS — Z09 HOSPITAL DISCHARGE FOLLOW-UP: Primary | ICD-10-CM

## 2021-08-02 DIAGNOSIS — J41.0 SIMPLE CHRONIC BRONCHITIS (HCC): ICD-10-CM

## 2021-08-02 DIAGNOSIS — F51.01 PRIMARY INSOMNIA: ICD-10-CM

## 2021-08-02 DIAGNOSIS — I10 ESSENTIAL HYPERTENSION: ICD-10-CM

## 2021-08-02 DIAGNOSIS — R91.8 MASS OF LOWER LOBE OF LEFT LUNG: ICD-10-CM

## 2021-08-02 PROCEDURE — 99495 TRANSJ CARE MGMT MOD F2F 14D: CPT | Performed by: HOSPITALIST

## 2021-08-02 PROCEDURE — 1111F DSCHRG MED/CURRENT MED MERGE: CPT | Performed by: HOSPITALIST

## 2021-08-02 RX ORDER — LORAZEPAM 0.5 MG/1
TABLET ORAL
Qty: 60 TABLET | Refills: 0 | Status: SHIPPED | OUTPATIENT
Start: 2021-08-02 | End: 2021-09-01

## 2021-08-02 NOTE — TELEPHONE ENCOUNTER
I contacted Dr Tena Marvin office to inquire as to why they had requested this appt. and informed Dr Tena Marvin [place] [facility] that per Dr Stacie Jesus and her daughter did not want anything done for her cancer.  I spoke to [place] the daughter and she agreed that Carrie Mehta has taken her age into consideration and at this time does not want to do anything about her cancer at this time  Alba Stallings to call back if she needs any further assistance

## 2021-08-02 NOTE — PROGRESS NOTES
Post-Discharge Transitional Care Management Services or Hospital Follow Up      Kayleigh Gupta   YOB: 1934    Date of Office Visit:  8/2/2021  Date of Hospital Admission: 7/26/21  Date of Hospital Discharge: 7/27/21  Risk of hospital readmission (high >=14%.  Medium >=10%) :Readmission Risk Score: 19      Care management risk score Rising risk (score 2-5) and Complex Care (Scores >=6): 14     Non face to face  following discharge, date last encounter closed (first attempt may have been earlier): 7/29/2021 10:16 AM    Call initiated 2 business days of discharge: Yes    Patient Active Problem List   Diagnosis    Contusion    Memory loss    Senile reticular degeneration of peripheral retina    Syncope    Nicotine addiction    Skin rash    Abdominal pain    Ankle Fracture, Right    DJD (degenerative joint disease)    Fatty liver    Osteoarthritis    Insomnia    Anxiety    Urinary incontinence    Coronary artery disease    Hyperlipidemia    Depression    Decreased vision    Cataract    Adrenal hyperplasia ([address]    Eczema    Hypokalemia    Visual hallucinations    Anger    Mood disorder (HCC)    Lumbar radiculopathy    Hypoxia    Abnormal weight loss    Irritable bowel syndrome    Accelerated hypertension    Primary osteoarthritis involving multiple joints    Aortic atherosclerosis (HCC)    COPD, moderate (HCC)    Abnormal nuclear stress test    Exertional dyspnea    Tobacco abuse    CAD in native artery    S/p bare metal coronary artery stent    Heart failure (HCC)    Chronic diastolic heart failure with preserved ejection fraction (HCC)    COPD exacerbation ([address]    COVID-19    Chronic hypoxemic respiratory failure (HCC)    Acute respiratory failure with hypoxia (HCC)    Pulmonary nodule seen on imaging study    Acute bronchiolitis    Acute on chronic respiratory failure with hypoxia and hypercapnia (HCC)    Abnormal CT of the chest       Allergies Allergen Reactions    Enalapril Other (See Comments)     Patient unable to recall reaction       Medications listed as ordered at the time of discharge from hospital   [facility] Medication Instructions NOLAN:    Printed on:08/02/21 7386   Medication Information                      albuterol sulfate HFA (VENTOLIN HFA) 108 (90 Base) MCG/ACT inhaler  Inhale 2 puffs into the lungs 4 times daily as needed for Wheezing             amLODIPine (NORVASC) 5 MG tablet  TAKE ONE TABLET BY MOUTH TWICE A DAY             aspirin EC 81 MG EC tablet  Take 1 tablet by mouth daily             atorvastatin (LIPITOR) 40 MG tablet  TAKE ONE TABLET BY MOUTH ONCE NIGHTLY             budesonide-formoterol (SYMBICORT) 160-4.5 MCG/ACT AERO  Inhale 2 puffs into the lungs 2 times daily             ciprofloxacin (CIPRO) 500 MG tablet  Take 1 tablet by mouth 2 times daily for 7 days             furosemide (LASIX) 20 MG tablet  TAKE ONE TABLET BY MOUTH TWICE A DAY             ipratropium-albuterol (DUONEB) 0.5-2.5 (3) MG/3ML SOLN nebulizer solution  Inhale 3 mLs into the lungs every 6 hours as needed for Shortness of Breath             LORazepam (ATIVAN) 0.5 MG tablet  TAKE ONE TABLET BY MOUTH TWICE A DAY             meclizine (ANTIVERT) 12.5 MG tablet  TAKE ONE TABLET BY MOUTH TWICE A DAY AS NEEDED FOR DIZZINESS             metoprolol tartrate (LOPRESSOR) 25 MG tablet  1 tablet twice daily             nystatin (MYCOSTATIN) 945636 UNIT/GM powder  Apply topically 4 times daily.              OXYGEN  Inhale 6 L/min into the lungs continuous              PARoxetine (PAXIL) 20 MG tablet  TAKE ONE TABLET BY MOUTH DAILY             potassium chloride (KLOR-CON M) 20 MEQ extended release tablet  Take 1 tablet by mouth 2 times daily             predniSONE (DELTASONE) 20 MG tablet  Take 2 tablets by mouth daily for 10 days             QUEtiapine (SEROQUEL) 50 MG tablet  1 tablet daily             rivaroxaban (XARELTO) 20 MG TABS tablet  TAKE ONE TABLET BY MOUTH DAILY WITH BREAKFAST                   Medications marked \"taking\" at this time  Outpatient Medications Marked as Taking for the 8/2/21 encounter (Office Visit) with Farhan Romo MD   Medication Sig Dispense Refill    rivaroxaban (XARELTO) 20 MG TABS tablet TAKE ONE TABLET BY MOUTH DAILY WITH BREAKFAST 15 tablet 3    LORazepam (ATIVAN) 0.5 MG tablet TAKE ONE TABLET BY MOUTH TWICE A DAY 60 tablet 0    budesonide-formoterol (SYMBICORT) 160-4.5 MCG/ACT AERO Inhale 2 puffs into the lungs 2 times daily 1 Inhaler 3    ciprofloxacin (CIPRO) 500 MG tablet Take 1 tablet by mouth 2 times daily for 7 days 14 tablet 0    metoprolol tartrate (LOPRESSOR) 25 MG tablet 1 tablet twice daily 180 tablet 0    QUEtiapine (SEROQUEL) 50 MG tablet 1 tablet daily 90 tablet 3    furosemide (LASIX) 20 MG tablet TAKE ONE TABLET BY MOUTH TWICE A DAY (Patient taking differently: Take 20 mg by mouth three times a week TAKE ONE TABLET BY MOUTH TWICE A DAY) 180 tablet 3    amLODIPine (NORVASC) 5 MG tablet TAKE ONE TABLET BY MOUTH TWICE A  tablet 3    meclizine (ANTIVERT) 12.5 MG tablet TAKE ONE TABLET BY MOUTH TWICE A DAY AS NEEDED FOR DIZZINESS 60 tablet 1    atorvastatin (LIPITOR) 40 MG tablet TAKE ONE TABLET BY MOUTH ONCE NIGHTLY 90 tablet 0    PARoxetine (PAXIL) 20 MG tablet TAKE ONE TABLET BY MOUTH DAILY 90 tablet 3    ipratropium-albuterol (DUONEB) 0.5-2.5 (3) MG/3ML SOLN nebulizer solution Inhale 3 mLs into the lungs every 6 hours as needed for Shortness of Breath 360 mL 3    albuterol sulfate HFA (VENTOLIN HFA) 108 (90 Base) MCG/ACT inhaler Inhale 2 puffs into the lungs 4 times daily as needed for Wheezing 3 Inhaler 1    aspirin EC 81 MG EC tablet Take 1 tablet by mouth daily 90 tablet 1    OXYGEN Inhale 6 L/min into the lungs continuous           Medications patient taking as of now reconciled against medications ordered at time of hospital discharge: Yes    Chief Complaint   Patient presents with  Follow-Up from Hospital     Lung mass       History of Present illness - Follow up of Hospital diagnosis(es): Elizabeth Monk was admitted to [facility] on 07/26 ~07/27 with worsening of her shortness of breath. She was diagnosed with acute on chronic respiratory failure with hypoxia secondary to possible left lower lobe pneumonia. During her hospitalization the patient was evaluated by Dr. Megan Farnsworth, pulmonology. He carefully reviewed her most recent imaging studies. The patient does not want to proceed with any further investigation. Dr. Megan Farnsworth also suggested that she would not be a good candidate for CT scan guided biopsy of her lung mass. The patient was discharged home on 07/27/2021 with prescription of oral ciprofloxacin. Inpatient course: Discharge summary reviewed- see chart. Interval history/Current status: Today she feels better. Reports dyspnea with mild exertion. Uses 5 L/min of oxygen via nasal cannula continuously. Quickly desats while being off oxygen support (down to low 70s). Does not report chest pain. No nausea/vomiting. No dysuria. 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 Vitals:    08/02/21 0842   BP: (!) 120/55   Site: Right Wrist   Position: Sitting   Pulse: 76   SpO2: (!) 74%   Weight: 156 lb (70.8 kg)     Body mass index is 23.72 kg/m².    Wt Readings from Last 3 Encounters:   08/02/21 156 lb (70.8 kg)   07/27/21 156 lb 15.5 oz (71.2 kg)   01/24/21 160 lb 4.4 oz (72.7 kg)     BP Readings from Last 3 Encounters:   08/02/21 (!) 120/55   07/27/21 (!) 163/62   07/02/21 (!) 158/60        Physical Exam:  General Appearance: alert and oriented to person, place and time, well developed and well- nourished, in no acute distress  Skin: warm and dry, no rash or erythema  Head: normocephalic and atraumatic  Eyes: pupils equal, round, and reactive to light, extraocular eye movements intact, conjunctivae normal  ENT: tympanic membrane, external ear and ear canal normal bilaterally, nose without deformity, nasal mucosa and turbinates normal without polyps  Neck: supple and non-tender without mass, no thyromegaly or thyroid nodules, no cervical lymphadenopathy  Pulmonary/Chest: diminished breath sounds to auscultation over left lower posterior lung. No wheezes, rales or rhonchi, normal air movement, no respiratory distress  Cardiovascular: normal rate, regular rhythm, normal S1 and S2, no murmurs, rubs, clicks, or gallops, distal pulses intact, no carotid bruits  Abdomen: soft, non-tender, non-distended, normal bowel sounds, no masses or organomegaly  Extremities: no cyanosis, clubbing or edema  Musculoskeletal: normal range of motion, no joint swelling, deformity or tenderness  Neurologic: reflexes normal and symmetric, no cranial nerve deficit, gait, coordination and speech normal    Assessment/Plan:    Hospital discharge follow up  -  Doing better. Back to her baseline of oxygen requirement, 5 L/min via nasal cannula continuously    1. Mass of lower lobe of left lung    [facility]veen [facility], Pulmonary, [facility]  -The patient does not want any further evaluation. We will proceed with symptomatic/supportive treatment. We will involve hospice care services when the patient and family members are ready. 2. Anxiety    - LORazepam (ATIVAN) 0.5 MG tablet; TAKE ONE TABLET BY MOUTH TWICE A DAY  Dispense: 60 tablet; Refill: 0    3. Primary insomnia    - LORazepam (ATIVAN) 0.5 MG tablet; TAKE ONE TABLET BY MOUTH TWICE A DAY  Dispense: 60 tablet; Refill: 0    4. Essential hypertension  Continue current medication    5. COPD  -    Continue oxygen support and current medication      I had a long discussion with the patient's daughter, Amira, about Ms. De La Vega's condition and further treatment options. We will continue current management. Supportive care for shortness of breath.   No further investigation of lung mass will be performed    Medical Decision Making: moderate complexity    Follow up in 3 months and as needed    America Mckeon MD

## 2021-08-02 NOTE — TELEPHONE ENCOUNTER
Dr. Syeda Marshall's office called from   [facility] . The patient needs a HFU for possible lung cancer as soon as we can get her in. The next available on the schedule isn't until November, is there anyway we could squeeze her in sooner? Contact Mya Linette (patients daughter) when the appt is scheduled.   [phone]

## 2021-08-05 ENCOUNTER — CARE COORDINATION (OUTPATIENT)
Dept: CASE MANAGEMENT | Age: 86
End: 2021-08-05

## 2021-08-05 NOTE — CARE COORDINATION
Audie [facility] Transitions Follow Up Call    2021    Patient: Tyrone Mann  Patient : 1934   MRN: [id number]  Reason for Admission: SOB  Discharge Date: 21 RARS: Readmission Risk Score: 23         Spoke with: Shellie Gibbs  Care Transitions Follow Up Call    Needs to be reviewed by the provider   Additional needs identified to be addressed with provider: No  none             Method of communication with provider : phone      Care Transition Nurse (CTN) contacted the patient by telephone to follow up after admission on 21. Verified name and  with patient as identifiers. Addressed changes since last contact: none  Discussed follow-up appointments. If no appointment was previously scheduled, appointment scheduling offered: Yes. Is follow up appointment scheduled within 7 days of discharge? Yes. Advance Care Planning:   Does patient have an Advance Directive: reviewed and needs to be updated, not on file; education provided, not on file, patient declined education, decision maker updated and referral to internal ACP facilitator. CTN reviewed discharge instructions, medical action plan and red flags with patient and discussed any barriers to care and/or understanding of plan of care after discharge. Discussed appropriate site of care based on symptoms and resources available to patient including: PCP, Specialist and When to call 911. The caregiver agrees to contact the PCP office for questions related to their healthcare. Patients top risk factors for readmission: functional physical ability  Interventions to address risk factors: Scheduled appointment with PCP-Dr. Hollie Rodriguez          CTN provided contact information for future needs. Plan for follow-up call in 5-7 days based on severity of symptoms and risk factors. Plan for next call: self management-self care     Summary: This [facility] spoke with patient's daughter, [facility].  Daughter stated that pt wasn't feeling very

## 2021-08-09 ENCOUNTER — TELEPHONE (OUTPATIENT)
Dept: INTERNAL MEDICINE CLINIC | Age: 86
End: 2021-08-09

## 2021-08-09 DIAGNOSIS — G89.29 ABDOMINAL PAIN, CHRONIC, LEFT LOWER QUADRANT: ICD-10-CM

## 2021-08-09 DIAGNOSIS — R10.32 ABDOMINAL PAIN, CHRONIC, LEFT LOWER QUADRANT: ICD-10-CM

## 2021-08-09 RX ORDER — HYDROCODONE BITARTRATE AND ACETAMINOPHEN 5; 325 MG/1; MG/1
1 TABLET ORAL 2 TIMES DAILY PRN
Qty: 60 TABLET | Refills: 0 | Status: SHIPPED | OUTPATIENT
Start: 2021-08-09 | End: 2021-09-08

## 2021-08-09 NOTE — TELEPHONE ENCOUNTER
Patient's Daughter also had concerns about her mothers health. She believes her health is declining and would like to speak to Dr. Sharath Hewitt about it as soon as he gets a chance.         Please call to advise

## 2021-08-09 NOTE — TELEPHONE ENCOUNTER
Patient's daughter called and requested the following medication be refilled:        HYDROcodone-acetaminophen ([facility]) 5-325 MG per tablet      Last Visit: 8/2/2021  Last Written:6/18/2021      [address] [place] [address] 108, [address] [id number] formerly [facility], [address] [place] [address]   Phone:  [phone]  Fax:  [phone]

## 2021-08-11 ENCOUNTER — APPOINTMENT (OUTPATIENT)
Dept: GENERAL RADIOLOGY | Age: 86
DRG: 871 | End: 2021-08-11
Payer: MEDICARE

## 2021-08-11 ENCOUNTER — HOSPITAL ENCOUNTER (INPATIENT)
Age: 86
LOS: 4 days | Discharge: HOSPICE | DRG: 871 | End: 2021-08-15
Attending: EMERGENCY MEDICINE | Admitting: INTERNAL MEDICINE
Payer: MEDICARE

## 2021-08-11 DIAGNOSIS — C34.90 MALIGNANT NEOPLASM OF LUNG, UNSPECIFIED LATERALITY, UNSPECIFIED PART OF LUNG (HCC): Primary | ICD-10-CM

## 2021-08-11 DIAGNOSIS — R91.8 ABNORMAL CT SCAN OF LUNG: ICD-10-CM

## 2021-08-11 DIAGNOSIS — J44.1 COPD EXACERBATION (HCC): ICD-10-CM

## 2021-08-11 DIAGNOSIS — J18.9 PNEUMONIA OF BOTH LUNGS DUE TO INFECTIOUS ORGANISM, UNSPECIFIED PART OF LUNG: ICD-10-CM

## 2021-08-11 DIAGNOSIS — J96.21 ACUTE ON CHRONIC RESPIRATORY FAILURE WITH HYPOXIA (HCC): Primary | ICD-10-CM

## 2021-08-11 LAB
A/G RATIO: 0.9 (ref 1.1–2.2)
ALBUMIN SERPL-MCNC: 3.1 G/DL (ref 3.4–5)
ALP BLD-CCNC: 63 U/L (ref 40–129)
ALT SERPL-CCNC: 26 U/L (ref 10–40)
ANION GAP SERPL CALCULATED.3IONS-SCNC: 15 MMOL/L (ref 3–16)
AST SERPL-CCNC: 20 U/L (ref 15–37)
BASE EXCESS ARTERIAL: 1.8 MMOL/L (ref -3–3)
BASOPHILS ABSOLUTE: 0.1 K/UL (ref 0–0.2)
BASOPHILS RELATIVE PERCENT: 0.5 %
BILIRUB SERPL-MCNC: 0.3 MG/DL (ref 0–1)
BUN BLDV-MCNC: 12 MG/DL (ref 7–20)
CALCIUM SERPL-MCNC: 8.6 MG/DL (ref 8.3–10.6)
CARBOXYHEMOGLOBIN ARTERIAL: 1.1 % (ref 0–1.5)
CHLORIDE BLD-SCNC: 102 MMOL/L (ref 99–110)
CO2: 22 MMOL/L (ref 21–32)
CREAT SERPL-MCNC: 1.2 MG/DL (ref 0.6–1.2)
EOSINOPHILS ABSOLUTE: 0.1 K/UL (ref 0–0.6)
EOSINOPHILS RELATIVE PERCENT: 0.6 %
GFR AFRICAN AMERICAN: 51
GFR NON-AFRICAN AMERICAN: 43
GLOBULIN: 3.6 G/DL
GLUCOSE BLD-MCNC: 192 MG/DL (ref 70–99)
HCO3 ARTERIAL: 25.4 MMOL/L (ref 21–29)
HCT VFR BLD CALC: 39.8 % (ref 36–48)
HEMOGLOBIN, ART, EXTENDED: 15.2 G/DL (ref 12–16)
HEMOGLOBIN: 12.7 G/DL (ref 12–16)
LACTIC ACID: 3.7 MMOL/L (ref 0.4–2)
LYMPHOCYTES ABSOLUTE: 1.1 K/UL (ref 1–5.1)
LYMPHOCYTES RELATIVE PERCENT: 9.5 %
MCH RBC QN AUTO: 27.2 PG (ref 26–34)
MCHC RBC AUTO-ENTMCNC: 32 G/DL (ref 31–36)
MCV RBC AUTO: 85.2 FL (ref 80–100)
METHEMOGLOBIN ARTERIAL: 0.2 %
MONOCYTES ABSOLUTE: 1.1 K/UL (ref 0–1.3)
MONOCYTES RELATIVE PERCENT: 9.2 %
NEUTROPHILS ABSOLUTE: 9.4 K/UL (ref 1.7–7.7)
NEUTROPHILS RELATIVE PERCENT: 80.2 %
O2 SAT, ARTERIAL: 96.7 %
O2 THERAPY: ABNORMAL
PCO2 ARTERIAL: 36.3 MMHG (ref 35–45)
PDW BLD-RTO: 17.1 % (ref 12.4–15.4)
PH ARTERIAL: 7.45 (ref 7.35–7.45)
PLATELET # BLD: 188 K/UL (ref 135–450)
PMV BLD AUTO: 9.3 FL (ref 5–10.5)
PO2 ARTERIAL: 85.6 MMHG (ref 75–108)
POTASSIUM SERPL-SCNC: 3.5 MMOL/L (ref 3.5–5.1)
PRO-BNP: 130 PG/ML (ref 0–449)
RBC # BLD: 4.67 M/UL (ref 4–5.2)
SARS-COV-2, NAAT: NOT DETECTED
SODIUM BLD-SCNC: 139 MMOL/L (ref 136–145)
TCO2 ARTERIAL: 26.6 MMOL/L
TOTAL PROTEIN: 6.7 G/DL (ref 6.4–8.2)
TROPONIN: <0.01 NG/ML
WBC # BLD: 11.7 K/UL (ref 4–11)

## 2021-08-11 PROCEDURE — 94660 CPAP INITIATION&MGMT: CPT

## 2021-08-11 PROCEDURE — 2580000003 HC RX 258: Performed by: PHYSICIAN ASSISTANT

## 2021-08-11 PROCEDURE — 1200000000 HC SEMI PRIVATE

## 2021-08-11 PROCEDURE — 6370000000 HC RX 637 (ALT 250 FOR IP): Performed by: PHYSICIAN ASSISTANT

## 2021-08-11 PROCEDURE — 83605 ASSAY OF LACTIC ACID: CPT

## 2021-08-11 PROCEDURE — 99284 EMERGENCY DEPT VISIT MOD MDM: CPT

## 2021-08-11 PROCEDURE — 94640 AIRWAY INHALATION TREATMENT: CPT

## 2021-08-11 PROCEDURE — 2700000000 HC OXYGEN THERAPY PER DAY

## 2021-08-11 PROCEDURE — 6360000002 HC RX W HCPCS: Performed by: PHYSICIAN ASSISTANT

## 2021-08-11 PROCEDURE — 94761 N-INVAS EAR/PLS OXIMETRY MLT: CPT

## 2021-08-11 PROCEDURE — 82803 BLOOD GASES ANY COMBINATION: CPT

## 2021-08-11 PROCEDURE — 36415 COLL VENOUS BLD VENIPUNCTURE: CPT

## 2021-08-11 PROCEDURE — 93005 ELECTROCARDIOGRAM TRACING: CPT | Performed by: PHYSICIAN ASSISTANT

## 2021-08-11 PROCEDURE — 36600 WITHDRAWAL OF ARTERIAL BLOOD: CPT

## 2021-08-11 PROCEDURE — 96374 THER/PROPH/DIAG INJ IV PUSH: CPT

## 2021-08-11 PROCEDURE — 71045 X-RAY EXAM CHEST 1 VIEW: CPT

## 2021-08-11 PROCEDURE — 83880 ASSAY OF NATRIURETIC PEPTIDE: CPT

## 2021-08-11 PROCEDURE — 85025 COMPLETE CBC W/AUTO DIFF WBC: CPT

## 2021-08-11 PROCEDURE — 84484 ASSAY OF TROPONIN QUANT: CPT

## 2021-08-11 PROCEDURE — 80053 COMPREHEN METABOLIC PANEL: CPT

## 2021-08-11 PROCEDURE — 87040 BLOOD CULTURE FOR BACTERIA: CPT

## 2021-08-11 PROCEDURE — 87635 SARS-COV-2 COVID-19 AMP PRB: CPT

## 2021-08-11 RX ORDER — METHYLPREDNISOLONE SODIUM SUCCINATE 125 MG/2ML
125 INJECTION, POWDER, LYOPHILIZED, FOR SOLUTION INTRAMUSCULAR; INTRAVENOUS ONCE
Status: COMPLETED | OUTPATIENT
Start: 2021-08-11 | End: 2021-08-11

## 2021-08-11 RX ORDER — IPRATROPIUM BROMIDE AND ALBUTEROL SULFATE 2.5; .5 MG/3ML; MG/3ML
3 SOLUTION RESPIRATORY (INHALATION) ONCE
Status: COMPLETED | OUTPATIENT
Start: 2021-08-11 | End: 2021-08-11

## 2021-08-11 RX ORDER — SODIUM CHLORIDE, SODIUM LACTATE, POTASSIUM CHLORIDE, AND CALCIUM CHLORIDE .6; .31; .03; .02 G/100ML; G/100ML; G/100ML; G/100ML
30 INJECTION, SOLUTION INTRAVENOUS ONCE
Status: DISCONTINUED | OUTPATIENT
Start: 2021-08-11 | End: 2021-08-12

## 2021-08-11 RX ADMIN — CEFEPIME HYDROCHLORIDE 2000 MG: 2 INJECTION, POWDER, FOR SOLUTION INTRAVENOUS at 23:30

## 2021-08-11 RX ADMIN — METHYLPREDNISOLONE SODIUM SUCCINATE 125 MG: 125 INJECTION, POWDER, FOR SOLUTION INTRAMUSCULAR; INTRAVENOUS at 21:57

## 2021-08-11 RX ADMIN — IPRATROPIUM BROMIDE AND ALBUTEROL SULFATE 3 AMPULE: .5; 3 SOLUTION RESPIRATORY (INHALATION) at 22:26

## 2021-08-11 ASSESSMENT — PAIN SCALES - GENERAL: PAINLEVEL_OUTOF10: 2

## 2021-08-11 ASSESSMENT — PAIN DESCRIPTION - ORIENTATION: ORIENTATION: LEFT

## 2021-08-11 ASSESSMENT — PAIN DESCRIPTION - DESCRIPTORS: DESCRIPTORS: ACHING

## 2021-08-11 ASSESSMENT — PAIN DESCRIPTION - PAIN TYPE: TYPE: ACUTE PAIN

## 2021-08-11 ASSESSMENT — PAIN DESCRIPTION - LOCATION: LOCATION: CHEST

## 2021-08-11 NOTE — PROGRESS NOTES
Physician Progress Note      PATIENT:               Felipa James  [facility] #:                  [id number]  :                       1934  ADMIT DATE:       2021 10:34 AM  DISCH DATE:        2021 6:52 PM  RESPONDING  PROVIDER #:        Lucas Villanueva MD          QUERY TEXT:    Dear Dr Himanshu Mishra,    Patient admitted with SOB and hypoxia. Noted documentation of Acute on chronic   hypoxic respiratory failure satting less than 90% on room air likely   secondary to pneumonia, cannot rule out gram-positive pneumonia in H&P on   21. In order to support the diagnosis of pneumonia, please include   additional clinical indicators in your documentation. Or please document if   the diagnosis of  pneumonia has been ruled out after further study. The medical record reflects the following:  Risk Factors: Current admission for SOB and hypoxia. HX- COPD  Clinical Indicators: sat 94-80% 5L,  92% 6L. Pepper Parmar CXR-Significant worsening in the   appearance of the chest.  Large amount of  opacity in the left lower lobe   worrisome for acute pneumonia. Moderate  opacity in the medial right lung   base either due to atelectasis or pneumonia. Pepper Parmar Per H&P-Acute on chronic hypoxic   respiratory failure satting less than 90% on room air likely secondary to   pneumonia, cannot rule out gram-positive pneumonia. Pepper Parmar Per Pulm consult on   21- Seen by Dr. Chad Pérez 2020 (inpatient) and he relayed concerns for   lung cancer to her. He recommended PET. She did n  Treatment: Zithro IV, Rocephin IV, Cipro oral, Duonebs, Pulmonary consult. Thank Madina Rutherford RN BSN CDS CRCR  [email or web address]  Options provided:  -- Pneumonia present as evidenced by, Please document evidence.   -- Pneumonia was ruled out  -- Other - I will add my own diagnosis  -- Disagree - Not applicable / Not valid  -- Disagree - Clinically unable to determine / Unknown  -- Refer to Clinical Documentation Reviewer    PROVIDER RESPONSE TEXT:    Pneumonia was ruled out after study.     Query created by: Francisca Jaffe on 8/11/2021 7:15 AM      Electronically signed by:  Gilma Kwon MD 8/11/2021 11:22 AM

## 2021-08-12 ENCOUNTER — TELEPHONE (OUTPATIENT)
Dept: INTERNAL MEDICINE CLINIC | Age: 86
End: 2021-08-12

## 2021-08-12 LAB
ANION GAP SERPL CALCULATED.3IONS-SCNC: 13 MMOL/L (ref 3–16)
BASOPHILS ABSOLUTE: 0 K/UL (ref 0–0.2)
BASOPHILS RELATIVE PERCENT: 0.4 %
BUN BLDV-MCNC: 13 MG/DL (ref 7–20)
CALCIUM SERPL-MCNC: 8.5 MG/DL (ref 8.3–10.6)
CHLORIDE BLD-SCNC: 99 MMOL/L (ref 99–110)
CO2: 24 MMOL/L (ref 21–32)
CREAT SERPL-MCNC: 1.1 MG/DL (ref 0.6–1.2)
EKG ATRIAL RATE: 117 BPM
EKG DIAGNOSIS: NORMAL
EKG P AXIS: 65 DEGREES
EKG P-R INTERVAL: 150 MS
EKG Q-T INTERVAL: 290 MS
EKG QRS DURATION: 72 MS
EKG QTC CALCULATION (BAZETT): 404 MS
EKG R AXIS: 15 DEGREES
EKG T AXIS: 139 DEGREES
EKG VENTRICULAR RATE: 117 BPM
EOSINOPHILS ABSOLUTE: 0 K/UL (ref 0–0.6)
EOSINOPHILS RELATIVE PERCENT: 0 %
GFR AFRICAN AMERICAN: 57
GFR NON-AFRICAN AMERICAN: 47
GLUCOSE BLD-MCNC: 210 MG/DL (ref 70–99)
HCT VFR BLD CALC: 37.1 % (ref 36–48)
HEMOGLOBIN: 12.1 G/DL (ref 12–16)
L. PNEUMOPHILA SEROGP 1 UR AG: NORMAL
LACTIC ACID: 3.8 MMOL/L (ref 0.4–2)
LACTIC ACID: 5 MMOL/L (ref 0.4–2)
LYMPHOCYTES ABSOLUTE: 0.4 K/UL (ref 1–5.1)
LYMPHOCYTES RELATIVE PERCENT: 3.9 %
MCH RBC QN AUTO: 27.5 PG (ref 26–34)
MCHC RBC AUTO-ENTMCNC: 32.5 G/DL (ref 31–36)
MCV RBC AUTO: 84.7 FL (ref 80–100)
MONOCYTES ABSOLUTE: 0.1 K/UL (ref 0–1.3)
MONOCYTES RELATIVE PERCENT: 0.9 %
NEUTROPHILS ABSOLUTE: 10 K/UL (ref 1.7–7.7)
NEUTROPHILS RELATIVE PERCENT: 94.8 %
PDW BLD-RTO: 17.1 % (ref 12.4–15.4)
PLATELET # BLD: 175 K/UL (ref 135–450)
PMV BLD AUTO: 9.5 FL (ref 5–10.5)
POTASSIUM REFLEX MAGNESIUM: 4.8 MMOL/L (ref 3.5–5.1)
PROCALCITONIN: 0.03 NG/ML (ref 0–0.15)
RBC # BLD: 4.38 M/UL (ref 4–5.2)
SODIUM BLD-SCNC: 136 MMOL/L (ref 136–145)
STREP PNEUMONIAE ANTIGEN, URINE: NORMAL
WBC # BLD: 10.6 K/UL (ref 4–11)

## 2021-08-12 PROCEDURE — 87449 NOS EACH ORGANISM AG IA: CPT

## 2021-08-12 PROCEDURE — 2580000003 HC RX 258: Performed by: INTERNAL MEDICINE

## 2021-08-12 PROCEDURE — 99223 1ST HOSP IP/OBS HIGH 75: CPT | Performed by: INTERNAL MEDICINE

## 2021-08-12 PROCEDURE — 94761 N-INVAS EAR/PLS OXIMETRY MLT: CPT

## 2021-08-12 PROCEDURE — 6360000002 HC RX W HCPCS: Performed by: INTERNAL MEDICINE

## 2021-08-12 PROCEDURE — 87205 SMEAR GRAM STAIN: CPT

## 2021-08-12 PROCEDURE — 85025 COMPLETE CBC W/AUTO DIFF WBC: CPT

## 2021-08-12 PROCEDURE — 6370000000 HC RX 637 (ALT 250 FOR IP): Performed by: NURSE PRACTITIONER

## 2021-08-12 PROCEDURE — 80048 BASIC METABOLIC PNL TOTAL CA: CPT

## 2021-08-12 PROCEDURE — 87641 MR-STAPH DNA AMP PROBE: CPT

## 2021-08-12 PROCEDURE — 6360000002 HC RX W HCPCS: Performed by: NURSE PRACTITIONER

## 2021-08-12 PROCEDURE — 94640 AIRWAY INHALATION TREATMENT: CPT

## 2021-08-12 PROCEDURE — 93010 ELECTROCARDIOGRAM REPORT: CPT | Performed by: INTERNAL MEDICINE

## 2021-08-12 PROCEDURE — 2700000000 HC OXYGEN THERAPY PER DAY

## 2021-08-12 PROCEDURE — 2500000003 HC RX 250 WO HCPCS: Performed by: NURSE PRACTITIONER

## 2021-08-12 PROCEDURE — 87070 CULTURE OTHR SPECIMN AEROBIC: CPT

## 2021-08-12 PROCEDURE — 36415 COLL VENOUS BLD VENIPUNCTURE: CPT

## 2021-08-12 PROCEDURE — 6360000002 HC RX W HCPCS: Performed by: PHYSICIAN ASSISTANT

## 2021-08-12 PROCEDURE — 6370000000 HC RX 637 (ALT 250 FOR IP): Performed by: INTERNAL MEDICINE

## 2021-08-12 PROCEDURE — 83605 ASSAY OF LACTIC ACID: CPT

## 2021-08-12 PROCEDURE — 84145 PROCALCITONIN (PCT): CPT

## 2021-08-12 PROCEDURE — 2060000000 HC ICU INTERMEDIATE R&B

## 2021-08-12 PROCEDURE — 2580000003 HC RX 258: Performed by: NURSE PRACTITIONER

## 2021-08-12 RX ORDER — PREDNISONE 20 MG/1
40 TABLET ORAL DAILY
Status: DISCONTINUED | OUTPATIENT
Start: 2021-08-12 | End: 2021-08-15 | Stop reason: HOSPADM

## 2021-08-12 RX ORDER — PREDNISONE 1 MG/1
5 TABLET ORAL DAILY
Status: DISCONTINUED | OUTPATIENT
Start: 2021-08-17 | End: 2021-08-15 | Stop reason: HOSPADM

## 2021-08-12 RX ORDER — SODIUM CHLORIDE FOR INHALATION 3 %
15 VIAL, NEBULIZER (ML) INHALATION EVERY 4 HOURS
Status: DISCONTINUED | OUTPATIENT
Start: 2021-08-12 | End: 2021-08-15 | Stop reason: HOSPADM

## 2021-08-12 RX ORDER — ONDANSETRON 2 MG/ML
4 INJECTION INTRAMUSCULAR; INTRAVENOUS EVERY 6 HOURS PRN
Status: DISCONTINUED | OUTPATIENT
Start: 2021-08-12 | End: 2021-08-15 | Stop reason: HOSPADM

## 2021-08-12 RX ORDER — GUAIFENESIN/DEXTROMETHORPHAN 100-10MG/5
10 SYRUP ORAL EVERY 4 HOURS PRN
Status: DISCONTINUED | OUTPATIENT
Start: 2021-08-12 | End: 2021-08-15 | Stop reason: HOSPADM

## 2021-08-12 RX ORDER — SODIUM CHLORIDE 0.9 % (FLUSH) 0.9 %
5-40 SYRINGE (ML) INJECTION EVERY 12 HOURS SCHEDULED
Status: DISCONTINUED | OUTPATIENT
Start: 2021-08-12 | End: 2021-08-15 | Stop reason: HOSPADM

## 2021-08-12 RX ORDER — LANOLIN ALCOHOL/MO/W.PET/CERES
9 CREAM (GRAM) TOPICAL NIGHTLY PRN
Status: DISCONTINUED | OUTPATIENT
Start: 2021-08-12 | End: 2021-08-15 | Stop reason: HOSPADM

## 2021-08-12 RX ORDER — ONDANSETRON 4 MG/1
4 TABLET, ORALLY DISINTEGRATING ORAL EVERY 8 HOURS PRN
Status: DISCONTINUED | OUTPATIENT
Start: 2021-08-12 | End: 2021-08-15 | Stop reason: HOSPADM

## 2021-08-12 RX ORDER — LORAZEPAM 0.5 MG/1
0.5 TABLET ORAL 2 TIMES DAILY
Status: DISCONTINUED | OUTPATIENT
Start: 2021-08-12 | End: 2021-08-13

## 2021-08-12 RX ORDER — AMLODIPINE BESYLATE 5 MG/1
5 TABLET ORAL 2 TIMES DAILY
Status: DISCONTINUED | OUTPATIENT
Start: 2021-08-12 | End: 2021-08-15 | Stop reason: HOSPADM

## 2021-08-12 RX ORDER — POLYETHYLENE GLYCOL 3350 17 G/17G
17 POWDER, FOR SOLUTION ORAL DAILY PRN
Status: DISCONTINUED | OUTPATIENT
Start: 2021-08-12 | End: 2021-08-15 | Stop reason: HOSPADM

## 2021-08-12 RX ORDER — LACTOBACILLUS RHAMNOSUS GG 10B CELL
1 CAPSULE ORAL
Status: DISCONTINUED | OUTPATIENT
Start: 2021-08-12 | End: 2021-08-15 | Stop reason: HOSPADM

## 2021-08-12 RX ORDER — IPRATROPIUM BROMIDE AND ALBUTEROL SULFATE 2.5; .5 MG/3ML; MG/3ML
1 SOLUTION RESPIRATORY (INHALATION)
Status: DISCONTINUED | OUTPATIENT
Start: 2021-08-12 | End: 2021-08-15 | Stop reason: HOSPADM

## 2021-08-12 RX ORDER — LANOLIN ALCOHOL/MO/W.PET/CERES
9 CREAM (GRAM) TOPICAL NIGHTLY PRN
Status: DISCONTINUED | OUTPATIENT
Start: 2021-08-13 | End: 2021-08-12

## 2021-08-12 RX ORDER — FUROSEMIDE 20 MG/1
20 TABLET ORAL 2 TIMES DAILY
Status: DISCONTINUED | OUTPATIENT
Start: 2021-08-12 | End: 2021-08-15 | Stop reason: HOSPADM

## 2021-08-12 RX ORDER — SODIUM CHLORIDE 9 MG/ML
25 INJECTION, SOLUTION INTRAVENOUS PRN
Status: DISCONTINUED | OUTPATIENT
Start: 2021-08-12 | End: 2021-08-15 | Stop reason: HOSPADM

## 2021-08-12 RX ORDER — ATORVASTATIN CALCIUM 40 MG/1
40 TABLET, FILM COATED ORAL NIGHTLY
Status: DISCONTINUED | OUTPATIENT
Start: 2021-08-12 | End: 2021-08-15 | Stop reason: HOSPADM

## 2021-08-12 RX ORDER — ASPIRIN 81 MG/1
81 TABLET ORAL DAILY
Status: DISCONTINUED | OUTPATIENT
Start: 2021-08-12 | End: 2021-08-15 | Stop reason: HOSPADM

## 2021-08-12 RX ORDER — PAROXETINE HYDROCHLORIDE 20 MG/1
20 TABLET, FILM COATED ORAL DAILY
Status: DISCONTINUED | OUTPATIENT
Start: 2021-08-12 | End: 2021-08-15 | Stop reason: HOSPADM

## 2021-08-12 RX ORDER — ACETAMINOPHEN 325 MG/1
650 TABLET ORAL EVERY 6 HOURS PRN
Status: DISCONTINUED | OUTPATIENT
Start: 2021-08-12 | End: 2021-08-15 | Stop reason: HOSPADM

## 2021-08-12 RX ORDER — MECLIZINE HCL 12.5 MG/1
12.5 TABLET ORAL 2 TIMES DAILY PRN
Status: DISCONTINUED | OUTPATIENT
Start: 2021-08-12 | End: 2021-08-15 | Stop reason: HOSPADM

## 2021-08-12 RX ORDER — HYDROCODONE BITARTRATE AND ACETAMINOPHEN 5; 325 MG/1; MG/1
1 TABLET ORAL 2 TIMES DAILY PRN
Status: DISCONTINUED | OUTPATIENT
Start: 2021-08-12 | End: 2021-08-15 | Stop reason: HOSPADM

## 2021-08-12 RX ORDER — IPRATROPIUM BROMIDE AND ALBUTEROL SULFATE 2.5; .5 MG/3ML; MG/3ML
1 SOLUTION RESPIRATORY (INHALATION) EVERY 6 HOURS PRN
Status: DISCONTINUED | OUTPATIENT
Start: 2021-08-12 | End: 2021-08-15 | Stop reason: HOSPADM

## 2021-08-12 RX ORDER — ACETAMINOPHEN 650 MG/1
650 SUPPOSITORY RECTAL EVERY 6 HOURS PRN
Status: DISCONTINUED | OUTPATIENT
Start: 2021-08-12 | End: 2021-08-15 | Stop reason: HOSPADM

## 2021-08-12 RX ORDER — SODIUM CHLORIDE, SODIUM LACTATE, POTASSIUM CHLORIDE, CALCIUM CHLORIDE 600; 310; 30; 20 MG/100ML; MG/100ML; MG/100ML; MG/100ML
INJECTION, SOLUTION INTRAVENOUS CONTINUOUS
Status: DISCONTINUED | OUTPATIENT
Start: 2021-08-12 | End: 2021-08-12

## 2021-08-12 RX ORDER — SODIUM CHLORIDE 0.9 % (FLUSH) 0.9 %
5-40 SYRINGE (ML) INJECTION PRN
Status: DISCONTINUED | OUTPATIENT
Start: 2021-08-12 | End: 2021-08-15 | Stop reason: HOSPADM

## 2021-08-12 RX ORDER — BUDESONIDE AND FORMOTEROL FUMARATE DIHYDRATE 160; 4.5 UG/1; UG/1
2 AEROSOL RESPIRATORY (INHALATION) 2 TIMES DAILY
Status: DISCONTINUED | OUTPATIENT
Start: 2021-08-12 | End: 2021-08-15 | Stop reason: HOSPADM

## 2021-08-12 RX ORDER — QUETIAPINE FUMARATE 25 MG/1
50 TABLET, FILM COATED ORAL DAILY
Status: DISCONTINUED | OUTPATIENT
Start: 2021-08-12 | End: 2021-08-15 | Stop reason: HOSPADM

## 2021-08-12 RX ADMIN — PREDNISONE 40 MG: 20 TABLET ORAL at 09:42

## 2021-08-12 RX ADMIN — SODIUM CHLORIDE SOLN NEBU 3% 15 ML: 3 NEBU SOLN at 11:47

## 2021-08-12 RX ADMIN — METOPROLOL TARTRATE 25 MG: 25 TABLET, FILM COATED ORAL at 01:12

## 2021-08-12 RX ADMIN — Medication 9 MG: at 22:06

## 2021-08-12 RX ADMIN — ATORVASTATIN CALCIUM 40 MG: 40 TABLET, FILM COATED ORAL at 22:06

## 2021-08-12 RX ADMIN — CEFEPIME HYDROCHLORIDE 2000 MG: 2 INJECTION, POWDER, FOR SOLUTION INTRAVENOUS at 23:36

## 2021-08-12 RX ADMIN — RIVAROXABAN 20 MG: 20 TABLET, FILM COATED ORAL at 09:53

## 2021-08-12 RX ADMIN — SODIUM CHLORIDE, POTASSIUM CHLORIDE, SODIUM LACTATE AND CALCIUM CHLORIDE 1000 ML: 600; 310; 30; 20 INJECTION, SOLUTION INTRAVENOUS at 01:08

## 2021-08-12 RX ADMIN — LORAZEPAM 0.5 MG: 0.5 TABLET ORAL at 22:06

## 2021-08-12 RX ADMIN — VANCOMYCIN HYDROCHLORIDE 1000 MG: 1 INJECTION, POWDER, LYOPHILIZED, FOR SOLUTION INTRAVENOUS at 14:26

## 2021-08-12 RX ADMIN — AMLODIPINE BESYLATE 5 MG: 5 TABLET ORAL at 22:05

## 2021-08-12 RX ADMIN — Medication 10 ML: at 09:43

## 2021-08-12 RX ADMIN — BUDESONIDE AND FORMOTEROL FUMARATE DIHYDRATE 2 PUFF: 160; 4.5 AEROSOL RESPIRATORY (INHALATION) at 08:40

## 2021-08-12 RX ADMIN — Medication 10 ML: at 22:07

## 2021-08-12 RX ADMIN — IPRATROPIUM BROMIDE AND ALBUTEROL SULFATE 1 AMPULE: .5; 3 SOLUTION RESPIRATORY (INHALATION) at 11:47

## 2021-08-12 RX ADMIN — BUDESONIDE AND FORMOTEROL FUMARATE DIHYDRATE 2 PUFF: 160; 4.5 AEROSOL RESPIRATORY (INHALATION) at 00:52

## 2021-08-12 RX ADMIN — AMLODIPINE BESYLATE 5 MG: 5 TABLET ORAL at 01:12

## 2021-08-12 RX ADMIN — FUROSEMIDE 20 MG: 20 TABLET ORAL at 09:42

## 2021-08-12 RX ADMIN — IPRATROPIUM BROMIDE AND ALBUTEROL SULFATE 1 AMPULE: .5; 3 SOLUTION RESPIRATORY (INHALATION) at 08:40

## 2021-08-12 RX ADMIN — FUROSEMIDE 20 MG: 20 TABLET ORAL at 22:05

## 2021-08-12 RX ADMIN — Medication 1250 MG: at 00:04

## 2021-08-12 RX ADMIN — AMLODIPINE BESYLATE 5 MG: 5 TABLET ORAL at 09:42

## 2021-08-12 RX ADMIN — GUAIFENESIN SYRUP AND DEXTROMETHORPHAN 10 ML: 100; 10 SYRUP ORAL at 03:30

## 2021-08-12 RX ADMIN — METOPROLOL TARTRATE 25 MG: 25 TABLET, FILM COATED ORAL at 09:42

## 2021-08-12 RX ADMIN — FAMOTIDINE 20 MG: 10 INJECTION, SOLUTION INTRAVENOUS at 09:43

## 2021-08-12 RX ADMIN — CEFEPIME HYDROCHLORIDE 2000 MG: 2 INJECTION, POWDER, FOR SOLUTION INTRAVENOUS at 12:07

## 2021-08-12 RX ADMIN — ATORVASTATIN CALCIUM 40 MG: 40 TABLET, FILM COATED ORAL at 01:12

## 2021-08-12 RX ADMIN — BUDESONIDE AND FORMOTEROL FUMARATE DIHYDRATE 2 PUFF: 160; 4.5 AEROSOL RESPIRATORY (INHALATION) at 19:27

## 2021-08-12 RX ADMIN — LORAZEPAM 0.5 MG: 0.5 TABLET ORAL at 01:13

## 2021-08-12 RX ADMIN — HYDROCODONE BITARTRATE AND ACETAMINOPHEN 1 TABLET: 5; 325 TABLET ORAL at 14:23

## 2021-08-12 RX ADMIN — QUETIAPINE FUMARATE 50 MG: 25 TABLET ORAL at 09:42

## 2021-08-12 RX ADMIN — PAROXETINE HYDROCHLORIDE 20 MG: 20 TABLET, FILM COATED ORAL at 09:42

## 2021-08-12 RX ADMIN — IPRATROPIUM BROMIDE AND ALBUTEROL SULFATE 1 AMPULE: .5; 3 SOLUTION RESPIRATORY (INHALATION) at 19:27

## 2021-08-12 RX ADMIN — LORAZEPAM 0.5 MG: 0.5 TABLET ORAL at 09:42

## 2021-08-12 RX ADMIN — METOPROLOL TARTRATE 25 MG: 25 TABLET, FILM COATED ORAL at 22:06

## 2021-08-12 RX ADMIN — Medication 1 CAPSULE: at 09:53

## 2021-08-12 RX ADMIN — SODIUM CHLORIDE SOLN NEBU 3% 15 ML: 3 NEBU SOLN at 19:27

## 2021-08-12 RX ADMIN — ASPIRIN 81 MG: 81 TABLET ORAL at 09:42

## 2021-08-12 ASSESSMENT — PAIN SCALES - GENERAL
PAINLEVEL_OUTOF10: 0
PAINLEVEL_OUTOF10: 7
PAINLEVEL_OUTOF10: 0

## 2021-08-12 ASSESSMENT — ENCOUNTER SYMPTOMS
CHEST TIGHTNESS: 1
SHORTNESS OF BREATH: 1
COLOR CHANGE: 0
VOMITING: 0
COUGH: 1

## 2021-08-12 NOTE — PROGRESS NOTES
Pt currently a COVID rule out in the ER. Med rec is in progress.     Marked medications on file per recent fill history    Will try to complete med rec if pt is transferred to the floor or if their COVID status changes    Diane Talamantes, Pharmacy Intern 8/11/2021 10:09 PM

## 2021-08-12 NOTE — ACP (ADVANCE CARE PLANNING)
Advance Care Planning     Advance Care Planning Activator (Inpatient)  Conversation Note      Date of ACP Conversation: 8/12/2021     Conversation Conducted with: Patient with Decision Making Capacity    ACP Activator: Bernardino Arboleda RN    Health Care Decision Maker:     Current Designated Health Care Decision Maker:     Primary Decision Maker: Diane Weeks - Child - [phone]    Secondary Decision Maker: Dion[facility]) - Child - [phone]    Care Preferences    Ventilation: \"If you were in your present state of health and suddenly became very ill and were unable to breathe on your own, what would your preference be about the use of a ventilator (breathing machine) if it were available to you? \"      Would the patient desire the use of ventilator (breathing machine)?: yes    \"If your health worsens and it becomes clear that your chance of recovery is unlikely, what would your preference be about the use of a ventilator (breathing machine) if it were available to you? \"     Would the patient desire the use of ventilator (breathing machine)?: Yes      Resuscitation  \"CPR works best to restart the heart when there is a sudden event, like a heart attack, in someone who is otherwise healthy. Unfortunately, CPR does not typically restart the heart for people who have serious health conditions or who are very sick. \"    \"In the event your heart stopped as a result of an underlying serious health condition, would you want attempts to be made to restart your heart (answer \"yes\" for attempt to resuscitate) or would you prefer a natural death (answer \"no\" for do not attempt to resuscitate)? \" yes       [] Yes   [x] No   Educated Patient / Rossy Marie regarding differences between Advance Directives and portable DNR orders.     Length of ACP Conversation in minutes:  5 minutes    Conversation Outcomes:  [x] Prior ACP discussion reviewed and confirmed, no changes in my discussion, palliative care to see patient  [] Existing advance directive reviewed with patient; no changes to patient's previously recorded wishes  [] New Advance Directive completed  [] Portable Do Not Rescitate prepared for Provider review and signature  [] POLST/POST/MOLST/MOST prepared for Provider review and signature    Palliative care has been consulted for goals of care discussion.     Electronically signed by Delvin Thomas RN Case Management [phone] on 8/12/2021 at 11:04 AM

## 2021-08-12 NOTE — ED PROVIDER NOTES
[address]      Pt Name: Mak Johnston  MRN: [id number]  [place] 1934  Date of evaluation: 8/11/2021  Provider: OMER Contreras    This patient was seen and evaluated by the attending physician Dr. John Troncoso. CHIEF COMPLAINT       Chief Complaint   Patient presents with    Shortness of Breath       CRITICAL CARE TIME   I performed a total Critical Care time of  31 minutes, excluding separately reportable procedures. There was a high probability of clinically significant/life threatening deterioration in the patient's condition which required my urgent intervention. Not limited to multiple reexaminations, discussions with attending physician and consultants. HISTORY OF PRESENT ILLNESS  (Location/Symptom, Timing/Onset, Context/Setting, Quality, Duration, Modifying Factors, Severity.)   Mak Johnston is a 80 y.o. female who presents to the emergency department via EMS from home with shortness of breath. She was discharged from the hospital about a week and a half ago after being admitted for hypoxia. She has history of COPD. She tells me she is on 6 L via nasal cannula apparently she is been on 3 in the past.  She is history of pulmonary embolism on Xarelto. History of coronary artery disease. She is a former smoker. She states is been coughing up some white phlegm but got more short of breath today. She has seen Dr. Yessenia Vargas and Dr. Qasim Delgado in the past from pulmonology. Nursing Notes were reviewed and I agree. REVIEW OF SYSTEMS    (2-9 systems for level 4, 10 or more for level 5)     Review of Systems   Constitutional: Positive for fatigue and fever. Respiratory: Positive for cough, chest tightness and shortness of breath. Gastrointestinal: Negative for vomiting. Skin: Negative for color change. Neurological: Positive for weakness.    Psychiatric/Behavioral: Negative for agitation, behavioral problems and confusion. Except as noted above the remainder of the review of systems was reviewed and negative. PAST MEDICAL HISTORY         Diagnosis Date    Abdominal pain, unspecified site     Adrenal hyperplasia ([address] 06/11/2013    Left - CT scan - 2011    Ankle Fracture, Right     Anxiety and depression     Aortic atherosclerosis ([address] 10/03/2015    CAD (coronary artery disease) 2018    PCI LAD 3.0 x 15 mm and 3.5 x 15 mm BMS; RCA 2.5 x 12 mm and 2.5 x 15 mm BMS. EF 65%    Contusion of unspecified site     COPD (chronic obstructive pulmonary disease) ([address] 03/01/2013    Coronary artery disease     Arteriosclerosis    DJD (degenerative joint disease)     Eczema 08/09/2013    Essential hypertension 09/09/2015    Hyperlipidemia     Hypertension     Hypoxia 07/16/2015    Insomnia     Leukocytosis     Malignant neoplasm of lower lobe of left lung (HCC)     high index of suspicion. Patient and daughter do not want work up.   No further CT's recommended    Memory loss     Nicotine addiction     Osteoarthritis     Pain in joint, pelvic region and thigh     Primary osteoarthritis involving multiple joints 09/09/2015    Pulmonary hypertension ([address] 10/03/2015    Senile reticular degeneration of peripheral retina     Syncope     Thoracic or lumbosacral neuritis or radiculitis, unspecified     Urinary incontinence     Visual hallucinations 09/16/2014    Wears glasses        SURGICAL HISTORY           Procedure Laterality Date    CATARACT REMOVAL WITH IMPLANT Left December 5, 2012    Dr. Carrie Stone    COLONOSCOPY      HYSTERECTOMY      SIGMOIDOSCOPY  08/01/2018       CURRENT MEDICATIONS       Previous Medications    ALBUTEROL SULFATE HFA (VENTOLIN HFA) 108 (90 BASE) MCG/ACT INHALER    Inhale 2 puffs into the lungs 4 times daily as needed for Wheezing    AMLODIPINE (NORVASC) 5 MG TABLET    TAKE ONE TABLET BY MOUTH TWICE A DAY    ASPIRIN EC 81 MG EC TABLET current alcohol use. She reports that she does not use drugs. PHYSICAL EXAM    (up to 7 for level 4, 8 or more for level 5)     ED Triage Vitals   BP Temp Temp Source Pulse Resp SpO2 Height Weight   08/11/21 2130 08/11/21 2141 08/11/21 2141 08/11/21 2130 08/11/21 2130 08/11/21 2130 -- 08/11/21 2130   (!) 140/71 99.1 °F (37.3 °C) Axillary 115 30 91 %  156 lb 8.4 oz (71 kg)       Physical Exam  Vitals and nursing note reviewed. Constitutional:       General: She is in acute distress. Appearance: She is well-developed. HENT:      Head: Normocephalic and atraumatic. Cardiovascular:      Rate and Rhythm: Tachycardia present. Pulses: Normal pulses. Pulmonary:      Effort: Tachypnea present. Breath sounds: Decreased breath sounds and wheezing present. Musculoskeletal:         General: Normal range of motion. Cervical back: Normal range of motion. Right lower leg: No edema. Left lower leg: No edema. Skin:     General: Skin is warm. Neurological:      Mental Status: She is alert. Psychiatric:         Mood and Affect: Mood normal.         Behavior: Behavior normal.         DIAGNOSTIC RESULTS     EKG: All EKG's are interpreted by OMER Berg Mc in the absence of a cardiologist.    EKG interpreted by myself - please refer to attending physician's note for complete EKG interpretation:    No evidence of acute ischemia or injury. RADIOLOGY:   Non-plain film images such as CT, Ultrasound and MRI are read by the radiologist. Plain radiographic images are visualized and preliminarily interpreted by OMER Berg Mc with the below findings:    Reviewed radiologist's interpretation. Interpretation per the Radiologist below, if available at the time of this note:    XR CHEST PORTABLE   Final Result   Mild cardiomegaly with slowly resolving or recurrent pulmonary edema.       Hazy left perihilar opacity extending inferiorly which is slightly less   prominent and a probable small left pleural effusion which is increased      Mild right basilar opacity which is less prominent.                LABS:  Labs Reviewed   BLOOD GAS, ARTERIAL - Abnormal; Notable for the following components:       Result Value    pH, Arterial 7.454 (*)     All other components within normal limits    Narrative:     Performed at:  [address] [place] [address]   Phone [phone]   CBC WITH AUTO DIFFERENTIAL - Abnormal; Notable for the following components:    WBC 11.7 (*)     RDW 17.1 (*)     Neutrophils Absolute 9.4 (*)     All other components within normal limits    Narrative:     Performed at:  [address] [place] [address]   Phone [phone]   COMPREHENSIVE METABOLIC PANEL - Abnormal; Notable for the following components:    Glucose 192 (*)     GFR Non- 43 (*)     GFR  51 (*)     Albumin 3.1 (*)     Albumin/Globulin Ratio 0.9 (*)     All other components within normal limits    Narrative:     Performed at:  [address] [place] [address]   Phone [phone]   LACTIC ACID, PLASMA - Abnormal; Notable for the following components:    Lactic Acid 3.7 (*)     All other components within normal limits    Narrative:     Performed at:  [address] [facility] [place] [address]   Phone [phone] RAPID    Narrative:     Performed at:  [facility]  [address] [place] [address] [place] [address]   Phone [phone]   CULTURE, BLOOD 2   CULTURE, BLOOD 1   TROPONIN    Narrative:     Performed at:  [address] [place] [address]   Phone [phone]   BRAIN NATRIURETIC PEPTIDE    Narrative:     Performed at:  [facility]  [address] [place] [address] [place]   Phone [phone]       All other labs were within normal range or not returned as of this dictation. EMERGENCY DEPARTMENT COURSE and DIFFERENTIAL DIAGNOSIS/MDM:   Vitals:    Vitals:    08/11/21 2300 08/11/21 2330 08/11/21 2345 08/12/21 0000   BP: (!) 117/53 (!) 107/50 (!) 121/48 (!) 118/44   Pulse: 100 104 101 102   Resp: 24 26 19 23   Temp:       TempSrc:       SpO2: 97% 97% 98% 99%   Weight:         Patient presents tachycardic temperature of nine 9.1. She is saturating upper 80s on a nonrebreather tripoding with retractions and wheezing was noted. She was placed on BiPAP which she tolerated well and improved clinically. She was admitted to the hospital and of last month. She had a CT scan of the lungs that is shown findings concerning for lung carcinoma with mediastinal karthik involvement. She developed a cough productive with white phlegm, elevated lactate, elevated white count and temperatures 99.1 °F here. Given antibiotics with recent hospitalization given broad-spectrum and family is agreeable to admission. CONSULTS:  [address] TO HOSPITALIST  IP CONSULT TO PULMONOLOGY  IP CONSULT TO PHARMACY    PROCEDURES:  Procedures      FINAL IMPRESSION      1. Acute on chronic respiratory failure with hypoxia (HCC)    2. COPD exacerbation ([address]    3. Abnormal CT scan of lung    4. Pneumonia of both lungs due to infectious organism, unspecified part of lung          DISPOSITION/PLAN   DISPOSITION        PATIENT REFERRED TO:  No follow-up provider specified.     DISCHARGE MEDICATIONS:  New Prescriptions    No medications on file       (Please note that portions of this note were completed with a voice recognition program.  Efforts were made to edit the dictations but occasionally words are mis-transcribed.)    Otis Mcclelland, [address], [address]  08/12/21 0028

## 2021-08-12 NOTE — ED TRIAGE NOTES
Patient came in from home via EMS complaining of sob. States the shortness of breath started around 1830 and did not improve. History of COPD; wears 6L nc at home. According to EMS oxygen saturation was in the 80s on home oxygen. Endorsing slight chest pain. A&O x4. Tachycardic.  Patient arrived on nonrebreather

## 2021-08-12 NOTE — CONSULTS
Clinical Pharmacy Note  Vancomycin Consult    Pharmacy consult received for one-time dose of vancomycin in the Emergency Department per OMER Guy. Ht Readings from Last 1 Encounters:   07/26/21 5' 8\" (1.727 m)        Wt Readings from Last 1 Encounters:   08/11/21 156 lb 8.4 oz (71 kg)         Assessment/Plan:   Vancomycin 1250 mg x 1 in ED.  If Vancomycin is to continue on admission and pharmacy is to manage dosing, please re-consult with admission orders.       ARON Estrada [facility]  8/11/2021 11:11 PM

## 2021-08-12 NOTE — PROGRESS NOTES
Progress Note  Admit Date: 8/11/2021      PCP: Eloisa Blanco MD     CC: F/U for COPD exacerbation    Days in hospital:  1    SUBJECTIVE / Interval History:  Patient feels okay. Still has some shortness of breath. Family at bedside  Patient hoping to go back home soon        Allergies  Enalapril    Medications    Scheduled Meds:   amLODIPine  5 mg Oral BID    aspirin EC  81 mg Oral Daily    atorvastatin  40 mg Oral Nightly    budesonide-formoterol  2 puff Inhalation BID    furosemide  20 mg Oral BID    LORazepam  0.5 mg Oral BID    metoprolol tartrate  25 mg Oral BID    PARoxetine  20 mg Oral Daily    QUEtiapine  50 mg Oral Daily    rivaroxaban  20 mg Oral Daily with breakfast    sodium chloride flush  5-40 mL Intravenous 2 times per day    ipratropium-albuterol  1 ampule Inhalation Q4H [place]    cefepime  2,000 mg Intravenous Q12H    lactobacillus  1 capsule Oral Daily with breakfast    famotidine (PEPCID) injection  20 mg Intravenous Daily    vancomycin (VANCOCIN) intermittent dosing (placeholder)   Other RX Placeholder    vancomycin  1,000 mg Intravenous Q24H    predniSONE  40 mg Oral Daily    [START ON 8/17/2021] predniSONE  5 mg Oral Daily    sodium chloride (Inhalant)  15 mL Nebulization Q4H     Continuous Infusions:   sodium chloride         PRN Meds:  HYDROcodone-acetaminophen, ipratropium-albuterol, meclizine, sodium chloride flush, sodium chloride, ondansetron **OR** ondansetron, polyethylene glycol, acetaminophen **OR** acetaminophen, guaiFENesin-dextromethorphan    Vitals    BP (!) 117/58   Pulse 79   Temp 97.8 °F (36.6 °C) (Oral)   Resp 22   Ht 5' 3\" (1.6 m)   Wt 157 lb 10.1 oz (71.5 kg)   SpO2 93%   BMI 27.92 kg/m²     Exam:    Gen: No distress. Eyes: PERRL. No sclera icterus. No conjunctival injection. ENT: No discharge. Pharynx clear. External appearance of ears and nose normal.  Neck: Trachea midline. No obvious mass. Resp: No accessory muscle use. No crackles.  + wheezes. + rhonchi. No dullness on percussion. CV: Regular rate. Regular rhythm. No murmur or rub. No edema. GI: Non-tender. Non-distended. No hernia. Skin: Warm, dry, normal texture and turgor. No nodule on exposed extremities. Lymph: No cervical LAD. No supraclavicular LAD. M/S: No cyanosis. No clubbing. No joint deformity. Neuro: Moves all four extremities. CN 2-12 tested, no defect noted. Psych: Oriented x 3. No anxiety. Awake. Alert. Intact judgement and insight. Data    LABS  CBC:   Recent Labs     08/11/21 2150 08/12/21  0520   WBC 11.7* 10.6   HGB 12.7 12.1   HCT 39.8 37.1   MCV 85.2 84.7    175     BMP:   Recent Labs     08/11/21 2150 08/12/21  0520    136   K 3.5 4.8    99   CO2 22 24   BUN 12 13   CREATININE 1.2 1.1   GLUCOSE 192* 210*     POC GLUCOSE:  No results for input(s): POCGLU in the last 72 hours. LIVER PROFILE:   Recent Labs     08/11/21 2150   AST 20   ALT 26   LABALBU 3.1*   BILITOT 0.3   ALKPHOS 63     PT/INR: No results for input(s): PROTIME, INR in the last 72 hours. APTT: No results for input(s): APTT in the last 72 hours. UA:No results for input(s): NITRITE, COLORU, PHUR, LABCAST, WBCUA, RBCUA, MUCUS, TRICHOMONAS, YEAST, BACTERIA, CLARITYU, SPECGRAV, LEUKOCYTESUR, UROBILINOGEN, BILIRUBINUR, BLOODU, GLUCOSEU, KETUA, AMORPHOUS in the last 72 hours. Microbiology:  Wound Culture: No results for input(s): WNDABS, ORG in the last 72 hours. Invalid input(s):  LABGRAM  Nasal Culture: No results for input(s): ORG, MRSAPCR in the last 72 hours. Blood Culture: No results for input(s): BC, BLOODCULT2 in the last 72 hours. Fungal Culture:   No results for input(s): FUNGSM in the last 72 hours. No results for input(s): FUNCXBLD in the last 72 hours. CSF Culture:  No results for input(s): COLORCSF, APPEARCSF, CFTUBE, CLOTCSF, WBCCSF, RBCCSF, NEUTCSF, NUMCELLSCSF, LYMPHSCSF, MONOCSF, GLUCCSF, VOLCSF in the last 72 hours.   Respiratory Culture:  No results for input(s): Kailyn Londono in the last 72 hours. AFB:No results for input(s): AFBSMEAR in the last 72 hours. Urine Culture  No results for input(s): LABURIN in the last 72 hours. RADIOLOGY:    XR CHEST PORTABLE   Final Result   Mild cardiomegaly with slowly resolving or recurrent pulmonary edema. Hazy left perihilar opacity extending inferiorly which is slightly less   prominent and a probable small left pleural effusion which is increased      Mild right basilar opacity which is less prominent. CONSULTS:    IP CONSULT TO PHARMACY  IP CONSULT TO HOSPITALIST  IP CONSULT TO PULMONOLOGY  IP CONSULT TO PHARMACY  IP CONSULT TO PALLIATIVE CARE    ASSESSMENT AND PLAN:      Active Problems:    Lung mass    Acute on chronic respiratory failure with hypoxia (HCC)    Lactic acidosis  Resolved Problems:    * No resolved hospital problems. *    Patient is 80-year-old female with a past medical history of lung mass, COPD with chronic respiratory failure, coronary artery disease, hypertension and PE who presented with worsening shortness of breath. At baseline patient uses 5 to 6 L of oxygen at home. In the ED she had some mild distress and so was placed on BiPAP for a short time. She was admitted with COPD exacerbation and acute on chronic hypoxic respiratory failure    Acute on chronic hypoxic respiratory failure-wean oxygen as tolerated  -Patient uses 5 to 6 L of oxygen at baseline    COPD acute on chronic exacerbation  -Continue duo nebs and steroids  -Pulmonology consulted  -Empirically on broad-spectrum antibiotics, chest x-ray shows left perihilar opacity extending inferiorly (this is most likely the mass)  -procalcitonin pending    Sepsis  -Present on admission  -Improving    Hypertension  -Monitor blood pressure and continue home meds    Remote history of PE  -On Xarelto    Lung mass  -Patient does not want any further work-up. Possible lung cancer    Palliative consulted.   Patient will have hospice to follow    DVT Prophylaxis: lovenox  Diet: ADULT DIET; Easy to Chew  Adult Oral Nutrition Supplement; Standard High Calorie/High Protein Oral Supplement  Code Status: Full Code        Discharge plan - ct care    The patient and / or the family were informed of the results of any tests, a time was given to answer questions, a plan was proposed and they agreed with plan. Discussed with consulting physicians, nursing and social work     The note was completed using EMR. Every effort was made to ensure accuracy; however, inadvertent computerized transcription errors may be present.        Yohan Zheng MD

## 2021-08-12 NOTE — PROGRESS NOTES
Clinical Pharmacy Note  Vancomycin Consult    Norma Phillips is a 80 y.o. female ordered Vancomycin for pneumonia; consult received from [facility] to manage therapy. Also receiving cefepime.     Patient Active Problem List   Diagnosis    Contusion    Memory loss    Senile reticular degeneration of peripheral retina    Syncope    Nicotine addiction    Skin rash    Abdominal pain    Ankle Fracture, Right    DJD (degenerative joint disease)    Fatty liver    Osteoarthritis    Insomnia    Anxiety    Urinary incontinence    Coronary artery disease    Hyperlipidemia    Depression    Decreased vision    Cataract    Adrenal hyperplasia (HCC)    Eczema    Hypokalemia    Visual hallucinations    Anger    Mood disorder (HCC)    Lumbar radiculopathy    Hypoxia    Abnormal weight loss    Irritable bowel syndrome    Accelerated hypertension    Primary osteoarthritis involving multiple joints    Aortic atherosclerosis (HCC)    COPD, moderate (HCC)    Abnormal nuclear stress test    Exertional dyspnea    Tobacco abuse    CAD in native artery    S/p bare metal coronary artery stent    Heart failure (HCC)    Chronic diastolic heart failure with preserved ejection fraction (HCC)    COPD exacerbation (HCC)    COVID-19    Chronic hypoxemic respiratory failure (HCC)    Acute respiratory failure with hypoxia (HCC)    Pulmonary nodule seen on imaging study    Acute bronchiolitis    Acute on chronic respiratory failure with hypoxia and hypercapnia (HCC)    Abnormal CT of the chest    Acute on chronic respiratory failure with hypoxia (HCC)       Allergies:  Enalapril     Temp max:  Temp (24hrs), Av.4 °F (37.4 °C), Min:97.8 °F (36.6 °C), Max:101.3 °F (38.5 °C)      Recent Labs     21  2150 21  0520   WBC 11.7* 10.6       Recent Labs     21  2150 21  0520   BUN 12 13   CREATININE 1.2 1.1         Intake/Output Summary (Last 24 hours) at 2021 0707  Last data filed at 8/12/2021 0617  Gross per 24 hour   Intake 1315.37 ml   Output 150 ml   Net 1165.37 ml       Culture Results:  Pending    Ht Readings from Last 1 Encounters:   08/12/21 5' 3\" (1.6 m)        Wt Readings from Last 1 Encounters:   08/12/21 157 lb 10.1 oz (71.5 kg)         Estimated Creatinine Clearance: 35 mL/min (based on SCr of 1.1 mg/dL). Assessment/Plan:  Patient received vancomycin 1250 mg IV x 1 in the ER. Will initiate Vancomycin 1000mg q24h starting today at 1500. Projected trough 15-20mg/L and AUC of 496mg/L.hr. Trough ordered prior to 3 rd dose. Thank you for the consult.     ARON Olvera [facility]  8/12/2021 7:07 AM

## 2021-08-12 NOTE — CONSULTS
Clinical Pharmacy Note  Vancomycin Consult    Rodríguez Costa is a 80 y.o. female ordered Vancomycin for pneumonia; consult received from [facility] to manage therapy. Also receiving cefepime.     Patient Active Problem List   Diagnosis    Contusion    Memory loss    Senile reticular degeneration of peripheral retina    Syncope    Nicotine addiction    Skin rash    Abdominal pain    Ankle Fracture, Right    DJD (degenerative joint disease)    Fatty liver    Osteoarthritis    Insomnia    Anxiety    Urinary incontinence    Coronary artery disease    Hyperlipidemia    Depression    Decreased vision    Cataract    Adrenal hyperplasia (HCC)    Eczema    Hypokalemia    Visual hallucinations    Anger    Mood disorder (HCC)    Lumbar radiculopathy    Hypoxia    Abnormal weight loss    Irritable bowel syndrome    Accelerated hypertension    Primary osteoarthritis involving multiple joints    Aortic atherosclerosis (HCC)    COPD, moderate (HCC)    Abnormal nuclear stress test    Exertional dyspnea    Tobacco abuse    CAD in native artery    S/p bare metal coronary artery stent    Heart failure (HCC)    Chronic diastolic heart failure with preserved ejection fraction (HCC)    COPD exacerbation (HCC)    COVID-19    Chronic hypoxemic respiratory failure (HCC)    Acute respiratory failure with hypoxia (HCC)    Pulmonary nodule seen on imaging study    Acute bronchiolitis    Acute on chronic respiratory failure with hypoxia and hypercapnia (HCC)    Abnormal CT of the chest    Acute on chronic respiratory failure with hypoxia (HCC)       Allergies:  Enalapril     Temp max:  Temp (24hrs), Av.2 °F (37.9 °C), Min:99.1 °F (37.3 °C), Max:101.3 °F (38.5 °C)      Recent Labs     21  2150   WBC 11.7*       Recent Labs     21  2150   BUN 12   CREATININE 1.2       No intake or output data in the 24 hours ending 21 0056    Culture Results:  Pending    Ht Readings from Last 1 Encounters:   08/12/21 5' 3\" (1.6 m)        Wt Readings from Last 1 Encounters:   08/12/21 157 lb 6.5 oz (71.4 kg)         Estimated Creatinine Clearance: 32 mL/min (based on SCr of 1.2 mg/dL). Assessment/Plan:  Patient received vancomycin 1250 mg IV x 1 in the ER. Given patient's renal insufficiency, will order a random level for tomorrow at 1400. Thank you for the consult.      Elmer Houser, [facility]  8/12/2021 1:00 AM

## 2021-08-12 NOTE — CARE COORDINATION
Message left with Ana Emery with call back information to establish meeting time. Ted Walker RN, [address], [facility], [address] [place] [address]   O: [phone]  C: [phone]  F: [phone]   Main & Referrals: [phone]   [facility]. [facility]

## 2021-08-12 NOTE — TELEPHONE ENCOUNTER
----- Message from Zeus Benavidez sent at 8/12/2021  8:38 AM EDT -----  Subject: Message to Provider    QUESTIONS  Information for Provider? daughter called and said PT was admitted to   [facility] [place] last night 8/11/21. Daughter, Elbert Aggarwal would   like a call if possible. [phone]  ---------------------------------------------------------------------------  --------------  Eunice MCINTYRE  What is the best way for the office to contact you? OK to leave message on   voicemail  Preferred Call Back Phone Number? [phone]  ---------------------------------------------------------------------------  --------------  SCRIPT ANSWERS  Relationship to Patient?  Self

## 2021-08-12 NOTE — PROGRESS NOTES
Pt arrived via stretcher from ED to room 2780. Heart monitor and pulse oximeter connected and verified with [facility]. VS, assessment, and admission complete. 4 eyes assessment complete. Pt oriented to unit and room. Call light and bedside table in reach. All questions answered. Pt resting quietly in bed with no complaints or voiced needs at this time. Will continue to monitor.

## 2021-08-12 NOTE — RT PROTOCOL NOTE
RT Inhaler-Nebulizer Bronchodilator Protocol Note    There is a bronchodilator order in the chart from a provider indicating to follow the RT Bronchodilator Protocol and there is an Initiate RT Bronchodilator Protocol order as well (see protocol at bottom of note). The findings from the last RT Protocol Assessment were as follows:  Smoking: >15 Pack years  Surgical Status: No surgery  Xray: Multiple lobe infiltrates/atelectasis/pleural effusion/edema  Respiratory Pattern: Dyspnea with exertion  Mental Status: Alert and Oriented  Breath Sounds: Wheezing and/or rhonchi  Cough: Strong, spontaneous, non-productive  Activity Level: Walking unassisted  Oxygen Requirement: 61% - 100%  Indication for Bronchodilator Therapy:    Bronchodilator Assessment Score: 10    Aerosolized bronchodilator medication orders have been revised according to the RT Bronchodilator Protocol. RT Inhaler-Nebulizer Bronchodilator Protocol:    Respiratory Therapist to perform RT Therapy Protocol Assessment then follow the protocol. No Indications - adjust the frequency to every 6 hours PRN wheezing or bronchospasm, if no treatments needed after 48 hours then discontinue using Per Protocol order mode. If indication present, adjust the RT bronchodilator orders based on the Bronchodilator Assessment Score as follows:    0-6 - enter or revise RT bronchodilator order to Albuterol Inhaler order with frequency of every 2 hours PRN for wheezing or increased work of breathing using Per Protocol order mode. If Albuterol Inhaler not tolerated or not effective, then discontinue the Albuterol Inhaler order and enter Albuterol Nebulizer order with same frequency and PRN reasons. Repeat RT Therapy Protocol Assessment as needed.     7-10 - discontinue any other Inpatient aerosolized bronchodilator medication orders and enter or revise two Albuterol Inhaler orders, one with BID frequency and one with frequency of every 2 hours PRN wheezing or increased work of breathing using Per Protocol order mode. Repeat RT Therapy Protocol Assessment with second treatment then BID and as needed. If Albuterol Inhaler not tolerated or not effective, then discontinue the Albuterol Inhaler orders and enter two Albuterol Nebulizer orders with same frequencies and PRN reasons. 11-13 - discontinue any other Inpatient aerosolized bronchodilator medication orders and enter DuoNeb Nebulizer orders QID frequency and an Albuterol Nebulizer order every 2 hours PRN wheezing or increased work of breathing using Per Protocol order mode. Repeat RT Therapy Protocol Assessment with second treatment then QID and as needed. Greater than 13 - discontinue any other Inpatient bronchodilator aerosolized medication orders and enter DuoNeb Nebulizer order every 4 hours frequency and Albuterol Nebulizer every 2 hours PRN wheezing or increased work of breathing using Per Protocol order mode. Repeat RT Therapy Protocol Assessment with second treatment then every 4 hours and as needed. RT to enter RT Home Evaluation for COPD & MDI Assessment order using Per Protocol order mode.     Electronically signed by Maco Madison RCP on 8/12/2021 at 10:23 AM

## 2021-08-12 NOTE — CONSULTS
PATIENT IS SEEN AT THE REQUEST OF HAYES Ratliff for lung cancer, respiratory failure    CONSULTING PHYSICIAN: BINU Ratliff     HISTORY OF PRESENT ILLNESS:  This is a 80 y.o. female who presented to the ED on 8/11 with a CC of SOB. Per ED notes she has been coughing up white phlegm and more SOB than baseline. She had distress in the ED and was put on bpap. She was eventually weaned off of it. She was admitted for respiratory failure  She says she is breathing better today. Coughing with white phlegm but that is better. Said she used bpap for a short time. She usually uses 5-6 liters at home. Currently on 8 liters without distress     Established Pulmonologist:  None    PAST MEDICAL HISTORY:  Past Medical History:   Diagnosis Date    Abdominal pain, unspecified site     Adrenal hyperplasia ([address] 06/11/2013    Left - CT scan - 2011    Ankle Fracture, Right     Anxiety and depression     Aortic atherosclerosis ([address] 10/03/2015    CAD (coronary artery disease) 2018    PCI LAD 3.0 x 15 mm and 3.5 x 15 mm BMS; RCA 2.5 x 12 mm and 2.5 x 15 mm BMS. EF 65%    Contusion of unspecified site     COPD (chronic obstructive pulmonary disease) ([address] 03/01/2013    Coronary artery disease     Arteriosclerosis    DJD (degenerative joint disease)     Eczema 08/09/2013    Essential hypertension 09/09/2015    Hyperlipidemia     Hypertension     Hypoxia 07/16/2015    Insomnia     Leukocytosis     Malignant neoplasm of lower lobe of left lung (HCC)     high index of suspicion. Patient and daughter do not want work up.   No further CT's recommended    Memory loss     Nicotine addiction     Osteoarthritis     Pain in joint, pelvic region and thigh     Primary osteoarthritis involving multiple joints 09/09/2015    Pulmonary hypertension ([address] 10/03/2015    Senile reticular degeneration of peripheral retina     Syncope     Thoracic or lumbosacral neuritis or radiculitis, unspecified     Urinary incontinence     Visual hallucinations 09/16/2014    Wears glasses        PAST SURGICAL HISTORY:  Past Surgical History:   Procedure Laterality Date    CATARACT REMOVAL WITH IMPLANT Left December 5, 2012    Dr. Reny Feliciano  08/01/2018       FAMILY HISTORY:  family history includes Heart Attack in her brother and father. SOCIAL HISTORY:   reports that she quit smoking about 2 years ago. Her smoking use included cigarettes. She has a 65.00 pack-year smoking history. She has never used smokeless tobacco.    Scheduled Meds:   amLODIPine  5 mg Oral BID    aspirin EC  81 mg Oral Daily    atorvastatin  40 mg Oral Nightly    budesonide-formoterol  2 puff Inhalation BID    furosemide  20 mg Oral BID    LORazepam  0.5 mg Oral BID    metoprolol tartrate  25 mg Oral BID    PARoxetine  20 mg Oral Daily    QUEtiapine  50 mg Oral Daily    rivaroxaban  20 mg Oral Daily with breakfast    sodium chloride flush  5-40 mL Intravenous 2 times per day    ipratropium-albuterol  1 ampule Inhalation Q4H [place]    cefepime  2,000 mg Intravenous Q12H    lactobacillus  1 capsule Oral Daily with breakfast    famotidine (PEPCID) injection  20 mg Intravenous Daily    vancomycin (VANCOCIN) intermittent dosing (placeholder)   Other RX Placeholder       Continuous Infusions:   sodium chloride         PRN Meds:  HYDROcodone-acetaminophen, ipratropium-albuterol, meclizine, sodium chloride flush, sodium chloride, ondansetron **OR** ondansetron, polyethylene glycol, acetaminophen **OR** acetaminophen, guaiFENesin-dextromethorphan    ALLERGIES:  Patient is allergic to enalapril.     REVIEW OF SYSTEMS:  Constitutional: Repeated admissions   HENT: Negative for sore throat  Eyes: Negative for redness   Respiratory: SOB  Cardiovascular: Negative for chest pain  Gastrointestinal: Negative for vomiting, diarrhea   Genitourinary: Negative for hematuria, negative for dysuria  Musculoskeletal: Negative for arthralgias   Skin: Negative for rash  Neurological: Negative for syncope  Hematological: Negative for adenopathy  Extremities:  Negative for swelling    PHYSICAL EXAM:  Blood pressure (!) 152/73, pulse 80, temperature 97.8 °F (36.6 °C), temperature source Oral, resp. rate 26, height 5' 3\" (1.6 m), weight 157 lb 10.1 oz (71.5 kg), SpO2 90 %, not currently breastfeeding.'  Gen: Chronically ill, calm   Eyes: PERRL. No sclera icterus. No conjunctival injection. ENT: No discharge. Pharynx clear. Neck: Trachea midline. No obvious mass. Resp: Diminished with scattered rhonchi   CV: Regular rate. Regular rhythm. No murmur or rub. GI: Non-tender. Non-distended. No hernia. BS present. Skin: Warm and dry. No nodule on exposed extremities. Lymph: No cervical LAD. No supraclavicular LAD. M/S: No cyanosis. No joint deformity. Neuro: Awake. Alert. Moves all four extremities. EXT:   no edema, no clubbing    LABS:  CBC:   Recent Labs     21  0520   WBC 11.7* 10.6   HGB 12.7 12.1   HCT 39.8 37.1   MCV 85.2 84.7    175     BMP:   Recent Labs     21  0520    136   K 3.5 4.8    99   CO2 22 24   BUN 12 13   CREATININE 1.2 1.1     LIVER PROFILE:   Recent Labs     21   AST 20   ALT 26   BILITOT 0.3   ALKPHOS 63     PT/INR: No results for input(s): PROTIME, INR in the last 72 hours. APTT: No results for input(s): APTT in the last 72 hours. UA:No results for input(s): NITRITE, COLORU, PHUR, LABCAST, WBCUA, RBCUA, MUCUS, TRICHOMONAS, YEAST, BACTERIA, CLARITYU, SPECGRAV, LEUKOCYTESUR, UROBILINOGEN, BILIRUBINUR, BLOODU, GLUCOSEU, AMORPHOUS in the last 72 hours.     Invalid input(s): Keyona Sewell  Recent Labs     21   PHART 7.454*   [id number] 36.3   PO2ART 85.6       Cultures:  Pending      PFTs:  Moderate obstruction       AB.45/36/86    Chest X-ray:  Chest imaging was reviewed by me and showed LLL opacity with mass and likely effusions. Hyperinflated lungs       I reviewed all the above labs and studies pertaining to this visit. ASSESSMENT/PLAN:  · Acute on Chronic Hypoxic Respiratory Failure   Titrate oxygen for saturations greater than or equal to 90%  · Baseline requirements is 5-6 liters  · PRN BPAP  · Does not have enough hypercapnia to qualify for NIV/BPAP  · COPD Exacerbation with continued decline  · Scheduled duonebs  · Symbicort q 12 hours  · Add prednisone 40 mg for 5 days, then 5 mg daily  · Saline nebs for expectoration   · Check procal   · Cefepime/Vanco   · Lactic acidosis which my be precipitated by excessive beta agonist administration in the ED  · Lung Mass with adenopathy, presumed lung cancer   · She does not want anything for this (documentation placed in medical history tab)  · Needs palliative care for expectations moving forward. Will likely need to be made DNR moving forward. · Risk for effusion due to cancer.       DVT prophylaxis  Xarelto    Thanks    Chase Stoll DO  [facility]

## 2021-08-12 NOTE — CONSULTS
PALLIATIVE MEDICINE CONSULTATION     Patient name:Hermila De La Vega   HVK:[id number]    :1934  Room/Bed:O0R-9834/5254-01   LOS: 1 day         Date of consult:2021    Consult Information  Palliative Medicine Consult performed by: Merline Sham, APRN - CNP  CNP  Requested by: Dr Gregg Eugene  Reason for consult: [address], code status    Number of admissions past 12 months: 2    ASSESSMENT/RECOMMENDATIONS     80 y.o. female with Dyspnea and debility      Symptom Management:  1. Dyspnea- pt on 8L today her baseline is 5L. Pt has dyspnea with talking  2. Debility- pt and family report that she is bed/chair bound at home  3. Goals of Care- Pt and daughter both agree that pt wants comfort care only and they want Hospice support to keep her at home as long as possible. Pt had a meeting at home set up this pm with [facility]. [facility] and Hospice referral ordered    Patient/Family Goals of Care :    Pt and daughter both agree that pt wants comfort care only and they want Hospice support to keep her at home as long as possible. Pt had a meeting at home set up this pm with HOC. Disposition/Discharge Plan:   pending    Advance Directives:  · Surrogate Decision Maker: Daughter -Gloria Dalal  · Code status:  DNR-CC    Case discussed with: patient, floor RN, Dr Gregg Eugene  Thank you for allowing us to participate in the care of this patient. HISTORY     CC: Dyspnea  HPI: The patient is a 80 y.o. female with presents to [facility] with PMHx: Left lower lobe cancer, oxygen dependent, COPD, CAD, DJD, pulmonary hypertension, HTN, osteoarthritis, PE    Palliative Medicine SymptomScreening/ROS:  Review of Systems - History obtained from chart review and the patient  General ROS: positive for  - fatigue and sleep disturbance  Respiratory ROS: positive for - cough, shortness of breath and wheezing  Musculoskeletal ROS: positive for - muscular weakness    A complete 10 count ROS was obtained.  Pertinent positives mentioned above in HPI/ROS. All others if not mentioned are negative.        Pain:  Denies     Home med list and hospital medications reviewed in chart as of 8/12/2021     EXAM     Vitals:    08/12/21 1536   BP: (!) 171/70   Pulse: 99   Resp: 16   Temp: 97.7 °F (36.5 °C)   SpO2: 90%       Physical Examination: General appearance - chronically ill appearing  Mental status - alert, oriented to person, place, and time  Neck - supple, no significant adenopathy  Chest - wheezing noted   Abdomen - soft, nontender, nondistended, no masses or organomegaly  Musculoskeletal - osteoarthritic changes noted in both hands  Skin - normal coloration and turgor, no rashes, no suspicious skin lesions noted       Current labs in the epic chart reviewed as of 8/12/2021   Review of previous notes, admits, labs, radiology and testing relevant to this consult done in this chart today 8/12/2021    Signed By: Electronically signed by JAME Shultz CNP on 8/12/2021 at 3:39 PM  Palliative Medicine   [phone]    August 12, 2021

## 2021-08-12 NOTE — PLAN OF CARE
Problem: Pain:  Goal: Pain level will decrease  Description: Pain level will decrease  Outcome: Ongoing  Goal: Control of acute pain  Description: Control of acute pain  Outcome: Ongoing  Goal: Control of chronic pain  Description: Control of chronic pain  Outcome: Ongoing     Problem: Falls - Risk of:  Goal: Will remain free from falls  Description: Will remain free from falls  Outcome: Ongoing  Goal: Absence of physical injury  Description: Absence of physical injury  Outcome: Ongoing     Problem: Fluid Volume:  Goal: Hemodynamic stability will improve  Description: Hemodynamic stability will improve  8/12/2021 0645 by Devonte Gayle RN  Outcome: Ongoing  8/12/2021 0643 by Devonte Gayle RN  Outcome: Ongoing  Goal: Ability to maintain a balanced intake and output will improve  Description: Ability to maintain a balanced intake and output will improve  8/12/2021 0645 by Devonte Gayle RN  Outcome: Ongoing  8/12/2021 0643 by Devonte Gayle RN  Outcome: Ongoing     Problem: Health Behavior:  Goal: Identification of resources available to assist in meeting health care needs will improve  Description: Identification of resources available to assist in meeting health care needs will improve  8/12/2021 0645 by Devonte Gayle RN  Outcome: Ongoing  8/12/2021 0643 by Devonte Gayle RN  Outcome: Ongoing     Problem: Respiratory:  Goal: Respiratory status will improve  Description: Respiratory status will improve  8/12/2021 0645 by Devonte Gayle RN  Outcome: Ongoing  8/12/2021 0643 by Devonte Gayle RN  Outcome: Ongoing

## 2021-08-12 NOTE — H&P
Hospital Medicine History & Physical      PCP: Oumou Andersen MD    Date of Admission: 8/11/2021    Date of Service: Pt seen/examined on 8/11/2021 and Admitted to Inpatient       Chief Complaint: Shortness of breath      History Of Present Illness: The patient is a 80 y.o. female who presents to [facility] with PMHx: Left lower lobe cancer, oxygen dependent, COPD, CAD, DJD, pulmonary hypertension, HTN, osteoarthritis, PE    Anticoagulation therapy: Xarelto  Stop smoking, has not had a cigarette in a couple of weeks  Lives at home  [address] remains full    Covid vaccinated  Hospital admission: 7/26/2021  PCP visit: 8/2/2021    Patient presented to the emergency department from home with increased shortness of breath. She is typically on oxygen 6 L nasal cannula. Reports increased productive cough with white phlegm. Shortness of breath progressively got worse today. During the patient's hospital admission last week there is diagnosis of a left lower lobe cancer. Patient and daughter at that time did not want to proceed any particular treatment. Documentation from Dr. Harman Weber office visit on August 2 also indicates that the patient and daughter did not want to pursue any particular treatment. ED work-up patient was found to be hypoxic with increased work of breathing requiring BiPAP therapy. WBC 11.7. Covid is negative. Lactic acidosis 3.7. No evidence of fever. Mild tachycardia. Blood pressure stable. Chest x-ray indicating left perihilar opacity extending inferiorly and slightly prominent. A left pleural effusion with mild cardiomegaly. On my exam the patient is awake and alert. She is doing rather well on the BiPAP. Lung fields decreased in the base with scattered fine crackle noted in the left lower base. Patient can speak in full sentences. [facility].    COLONOSCOPY      HYSTERECTOMY      SIGMOIDOSCOPY  08/01/2018       Medications Prior to Admission:    Prior to Admission medications    Medication Sig Start Date End Date Taking? Authorizing Provider   HYDROcodone-acetaminophen (NORCO) 5-325 MG per tablet Take 1 tablet by mouth 2 times daily as needed for Pain for up to 30 days. Take lowest dose possible to manage pain 8/9/21 9/8/21 Yes Eloisa Blanco MD   rivaroxaban (XARELTO) 20 MG TABS tablet TAKE ONE TABLET BY MOUTH DAILY WITH BREAKFAST 8/2/21  Yes Eloisa Blanco MD   LORazepam (ATIVAN) 0.5 MG tablet TAKE ONE TABLET BY MOUTH TWICE A DAY 8/2/21 9/1/21 Yes Eloisa Blanco MD   budesonide-formoterol [facility]) 160-4.5 MCG/ACT AERO Inhale 2 puffs into the lungs 2 times daily 7/27/21  Yes Juliana Mae MD   metoprolol tartrate (LOPRESSOR) 25 MG tablet 1 tablet twice daily 7/21/21  Yes Eloisa Blanco MD   QUEtiapine (SEROQUEL) 50 MG tablet 1 tablet daily 7/2/21  Yes Eloisa Blanco MD   furosemide (LASIX) 20 MG tablet TAKE ONE TABLET BY MOUTH TWICE A DAY  Patient taking differently: Take 20 mg by mouth three times a week TAKE ONE TABLET BY MOUTH TWICE A DAY 7/2/21  Yes Eloisa Blanco MD   amLODIPine (NORVASC) 5 MG tablet TAKE ONE TABLET BY MOUTH TWICE A DAY 7/2/21  Yes Eloisa Blanco MD   meclizine (ANTIVERT) 12.5 MG tablet TAKE ONE TABLET BY MOUTH TWICE A DAY AS NEEDED FOR DIZZINESS 6/18/21  Yes Eloisa Blanco MD   atorvastatin (LIPITOR) 40 MG tablet TAKE ONE TABLET BY MOUTH ONCE NIGHTLY 6/4/21  Yes Eloisa Blanco MD   nystatin (MYCOSTATIN) 282451 UNIT/GM powder Apply topically 4 times daily. Patient taking differently: Apply topically 4 times daily.   Apply to anterior abdomen 4/5/21  Yes Eloisa Blanco MD   potassium chloride (KLOR-CON M) 20 MEQ extended release tablet Take 1 tablet by mouth 2 times daily 11/20/20 8/11/21 Yes Eloisa Blanco MD   PARoxetine (PAXIL) 20 MG tablet TAKE ONE TABLET BY MOUTH DAILY 10/16/20  Yes Keisha Mckee MD   ipratropium-albuterol (DUONEB) 0.5-2.5 (3) MG/3ML SOLN nebulizer solution Inhale 3 mLs into the lungs every 6 hours as needed for Shortness of Breath 8/25/20  Yes Keisha Mckee MD   albuterol sulfate HFA (VENTOLIN HFA) 108 (90 Base) MCG/ACT inhaler Inhale 2 puffs into the lungs 4 times daily as needed for Wheezing 7/13/20  Yes Keisha Mckee MD   aspirin EC 81 MG EC tablet Take 1 tablet by mouth daily 10/22/19  Yes Keisha Mckee MD   OXYGEN Inhale 6 L/min into the lungs continuous  8/19/15  Yes Talya Yeboah MD       Allergies:  Enalapril    Social History:  The patient currently lives at home. TOBACCO:   reports that she quit smoking about 2 years ago. Her smoking use included cigarettes. She has a 65.00 pack-year smoking history. She has never used smokeless tobacco.  ETOH:   reports current alcohol use. Family History:  Reviewed in detail and negative for DM, Early CAD, Cancer, CVA. Positive as follows:        Problem Relation Age of Onset    Heart Attack Father     Heart Attack Brother        REVIEW OF SYSTEMS:   Positive for SOB and as noted in the HPI. All other systems reviewed and negative. PHYSICAL EXAM:    BP (!) 141/62   Pulse 116   Temp 101.3 °F (38.5 °C) (Oral)   Resp 24   Ht 5' 3\" (1.6 m)   Wt 154 lb 8.7 oz (70.1 kg)   SpO2 92%   BMI 27.38 kg/m²     General appearance: No apparent distress appears stated age and cooperative. HEENT Normal cephalic, atraumatic without obvious deformity. Pupils equal, round, and reactive to light. Extra ocular muscles intact. Conjunctivae/corneas clear. Neck: Supple, No jugular venous distention/bruits. Trachea midline without thyromegaly or adenopathy with full range of motion. Lungs: Easy regular nonlabored, no hypoxia on BiPAP. Fine crackles left lower base. No wheezing. Speaks in full sentences.   Heart: Regular rate and rhythm with Normal S1/S2 without murmurs, rubs or gallops, point of maximum impulse non-displaced  Abdomen: Soft, non-tender or non-distended without rigidity or guarding and positive bowel sounds all four quadrants. Extremities: No clubbing, cyanosis, or edema bilaterally. Full range of motion without deformity and normal gait intact. Skin: Skin color, texture, turgor normal.  No rashes or lesions. Neurologic: Alert and oriented X 3, neurovascularly intact with sensory/motor intact upper extremities/lower extremities, bilaterally. Cranial nerves: II-XII intact, grossly non-focal.  Mental status: Alert, oriented, thought content appropriate. Capillary Refill: Acceptable  < 3 seconds  Peripheral Pulses: +3 Easily felt, not easily obliterated with pressure      CXR:  I have reviewed the CXR with the following interpretation: Cardiomegaly, resolving edema, left perihilar opacity  EKG:  I have reviewed the EKG with the following interpretation: Sinus tach rate 117, SC interval 150, QRS 72,   PAC noted    CBC   Recent Labs     08/11/21 2150   WBC 11.7*   HGB 12.7   HCT 39.8         RENAL  Recent Labs     08/11/21 2150      K 3.5      CO2 22   BUN 12   CREATININE 1.2     LFT'S  Recent Labs     08/11/21 2150   AST 20   ALT 26   BILITOT 0.3   ALKPHOS 63     COAG  No results for input(s): INR in the last 72 hours.   CARDIAC ENZYMES  Recent Labs     08/11/21 2150   TROPONINI <0.01       U/A:    Lab Results   Component Value Date    NITRITE neg. 02/06/2018    COLORU YELLOW 07/27/2021    WBCUA 16 07/27/2021    RBCUA 55 07/27/2021    BACTERIA 1+ 11/23/2020    CLARITYU Clear 07/27/2021    SPECGRAV 1.017 07/27/2021    LEUKOCYTESUR MODERATE 07/27/2021    BLOODU LARGE 07/27/2021    GLUCOSEU Negative 07/27/2021    GLUCOSEU Negative 05/27/2011       ABG    Lab Results   Component Value Date    SJM8UHI 25.4 08/11/2021    BEART 1.8 08/11/2021    K9GRTIDP 96.7 08/11/2021    PHART 7.454 08/11/2021    AYQ5EMC 36.3 08/11/2021    PO2ART 85.6 08/11/2021    SIE1NAN 26.6 08/11/2021 Active Hospital Problems    Diagnosis Date Noted    Acute on chronic respiratory failure with hypoxia [facility]) [J96.21] 08/11/2021         PHYSICIANS CERTIFICATION:    I certify that Bert Cedeno is expected to be hospitalized for more than 2 midnights based on the following assessment and plan:      ASSESSMENT/PLAN:  Acute on chronic respiratory failure with hypoxia: Oxygen dependent 6 L nasal cannula  2/2-> COPD, lung cancer,? Postobstructive pneumonia  Patient presented with increased work of breathing tachycardic tachypneic, required BiPAP therapy  Oxygen therapy titrate to maintain saturation greater than 90%, goal to be back at baseline 6 L nasal cannula  BiPAP therapy as needed  Covid negative  VBG: pH 7.4/36/85  Continue Symbicort    Sepsis (POA):? Postobstructive pneumonia 2/2 left lower lobe cancer  Progressive hypoxia increased from baseline, increased work of breathing from baseline, lactic acidosis 3.7, tachycardia, mild leukocytosis 11.7 with a left shift  No encephalopathy, no fever, no hypotension  BC x2 in process  Lactic acidosis may very well be secondary to progressive hypoxia as well as the lung cancer=> we will trend LA   vancomycin and cefepime  Strict I & O  IVF: LR 30 mill per kilo=> LR maintenance infusion    Lung cancer: Left lower lobe  Prior admission and recent PCP office visit patient and family did not want to pursue treatment  PCP notes indicating hospice to be initiated when family ready  Continue hydrocodone    ? Postobstructive pneumonia verus HAP: Continue cefepime and vancomycin  Recent hospitalization could also place patient at risk for HAP.   Probiotic  H2 blocker: Pepcid  Sputum culture, Legionella, strep, MRSA nasal    HTN: Continue metoprolol, Lasix and amlodipine per home regimen    DVT Prophylaxis: Xarelto  Diet: ADULT DIET; Easy to Chew  Adult Oral Nutrition Supplement; Standard High Calorie/High Protein Oral Supplement  Code Status: Full Code  PT/OT Tee Status:     Dispo - Admit, u       Isabel Lugo, APRN - CNP    Thank you Ann Cabello MD for the opportunity to be involved in this patient's care. If you have any questions or concerns please feel free to contact me at [phone].

## 2021-08-12 NOTE — CARE COORDINATION
08/12/21 1136   Readmission Assessment   Number of Days since last admission? 8-30 days   Previous Disposition Home with Family   Who is being Interviewed Patient   What was the patient's/caregiver's perception as to why they think they needed to return back to the hospital? Other (Comment)  (was short of breath)   Did you visit your Primary Care Physician after you left the hospital, before you returned this time? Yes   Did you see a specialist, such as Cardiac, Pulmonary, Orthopedic Physician, etc. after you left the hospital? No  (telephone call only)   Who advised the patient to return to the hospital? Caregiver;Self-referral   Does the patient report anything that got in the way of taking their medications? No   In our efforts to provide the best possible care to you and others like you, can you think of anything that we could have done to help you after you left the hospital the first time, so that you might not have needed to return so soon?  Other (Comment)  (n/a)

## 2021-08-12 NOTE — PROGRESS NOTES
4 Eyes Skin Assessment     NAME:  Tye Wing  YOB: 1934  MEDICAL RECORD NUMBER:  [id number]    The patient is being assess for  Admission    I agree that 2 RN's have performed a thorough Head to Toe Skin Assessment on the patient. ALL assessment sites listed below have been assessed. Areas assessed by both nurses:    Head, Face, Ears, Shoulders, Back, Chest, Arms, Elbows, Hands, Sacrum. Buttock, Coccyx, Ischium and Legs. Feet and Heels        Does the Patient have a Wound?  No noted wound(s)       Errol Prevention initiated:  No   Wound Care Orders initiated:  No    Pressure Injury (Stage 3,4, Unstageable, DTI, NWPT, and Complex wounds) if present place consult order under [de-identified] No    New and Established Ostomies if present place consult order under : No      Nurse 1 eSignature: Electronically signed by Dario Altman RN on 8/12/21 at 6:44 AM EDT    **SHARE this note so that the co-signing nurse is able to place an eSignature**    Nurse 2 eSignature: Electronically signed by Lucille Farley RN on 8/12/21 at 6:47 AM EDT

## 2021-08-13 LAB — MRSA SCREEN RT-PCR: NORMAL

## 2021-08-13 PROCEDURE — 6370000000 HC RX 637 (ALT 250 FOR IP): Performed by: NURSE PRACTITIONER

## 2021-08-13 PROCEDURE — 2060000000 HC ICU INTERMEDIATE R&B

## 2021-08-13 PROCEDURE — 6370000000 HC RX 637 (ALT 250 FOR IP): Performed by: CLINICAL NURSE SPECIALIST

## 2021-08-13 PROCEDURE — 6370000000 HC RX 637 (ALT 250 FOR IP): Performed by: INTERNAL MEDICINE

## 2021-08-13 PROCEDURE — 94640 AIRWAY INHALATION TREATMENT: CPT

## 2021-08-13 PROCEDURE — 2500000003 HC RX 250 WO HCPCS: Performed by: NURSE PRACTITIONER

## 2021-08-13 PROCEDURE — 94761 N-INVAS EAR/PLS OXIMETRY MLT: CPT

## 2021-08-13 PROCEDURE — 2580000003 HC RX 258: Performed by: NURSE PRACTITIONER

## 2021-08-13 PROCEDURE — 2700000000 HC OXYGEN THERAPY PER DAY

## 2021-08-13 PROCEDURE — 6360000002 HC RX W HCPCS: Performed by: NURSE PRACTITIONER

## 2021-08-13 RX ORDER — HALOPERIDOL 1 MG/1
0.5 TABLET ORAL 3 TIMES DAILY
Status: DISCONTINUED | OUTPATIENT
Start: 2021-08-13 | End: 2021-08-15 | Stop reason: HOSPADM

## 2021-08-13 RX ADMIN — QUETIAPINE FUMARATE 50 MG: 25 TABLET ORAL at 09:04

## 2021-08-13 RX ADMIN — SODIUM CHLORIDE SOLN NEBU 3% 15 ML: 3 NEBU SOLN at 19:43

## 2021-08-13 RX ADMIN — SODIUM CHLORIDE SOLN NEBU 3% 15 ML: 3 NEBU SOLN at 16:00

## 2021-08-13 RX ADMIN — Medication 1 CAPSULE: at 09:03

## 2021-08-13 RX ADMIN — SODIUM CHLORIDE SOLN NEBU 3% 15 ML: 3 NEBU SOLN at 11:49

## 2021-08-13 RX ADMIN — LORAZEPAM 0.5 MG: 0.5 TABLET ORAL at 09:03

## 2021-08-13 RX ADMIN — FUROSEMIDE 20 MG: 20 TABLET ORAL at 09:04

## 2021-08-13 RX ADMIN — AMLODIPINE BESYLATE 5 MG: 5 TABLET ORAL at 21:24

## 2021-08-13 RX ADMIN — ASPIRIN 81 MG: 81 TABLET ORAL at 09:03

## 2021-08-13 RX ADMIN — IPRATROPIUM BROMIDE AND ALBUTEROL SULFATE 1 AMPULE: .5; 3 SOLUTION RESPIRATORY (INHALATION) at 11:48

## 2021-08-13 RX ADMIN — METOPROLOL TARTRATE 25 MG: 25 TABLET, FILM COATED ORAL at 21:23

## 2021-08-13 RX ADMIN — RIVAROXABAN 20 MG: 20 TABLET, FILM COATED ORAL at 09:06

## 2021-08-13 RX ADMIN — Medication 9 MG: at 21:26

## 2021-08-13 RX ADMIN — BUDESONIDE AND FORMOTEROL FUMARATE DIHYDRATE 2 PUFF: 160; 4.5 AEROSOL RESPIRATORY (INHALATION) at 07:59

## 2021-08-13 RX ADMIN — Medication 10 ML: at 21:25

## 2021-08-13 RX ADMIN — PAROXETINE HYDROCHLORIDE 20 MG: 20 TABLET, FILM COATED ORAL at 09:03

## 2021-08-13 RX ADMIN — METOPROLOL TARTRATE 25 MG: 25 TABLET, FILM COATED ORAL at 09:03

## 2021-08-13 RX ADMIN — IPRATROPIUM BROMIDE AND ALBUTEROL SULFATE 1 AMPULE: .5; 3 SOLUTION RESPIRATORY (INHALATION) at 19:43

## 2021-08-13 RX ADMIN — PREDNISONE 40 MG: 20 TABLET ORAL at 09:04

## 2021-08-13 RX ADMIN — HALOPERIDOL 0.5 MG: 1 TABLET ORAL at 14:23

## 2021-08-13 RX ADMIN — HALOPERIDOL 0.5 MG: 1 TABLET ORAL at 21:24

## 2021-08-13 RX ADMIN — IPRATROPIUM BROMIDE AND ALBUTEROL SULFATE 1 AMPULE: .5; 3 SOLUTION RESPIRATORY (INHALATION) at 16:00

## 2021-08-13 RX ADMIN — FAMOTIDINE 20 MG: 10 INJECTION, SOLUTION INTRAVENOUS at 09:01

## 2021-08-13 RX ADMIN — Medication 10 ML: at 09:01

## 2021-08-13 RX ADMIN — AMLODIPINE BESYLATE 5 MG: 5 TABLET ORAL at 09:04

## 2021-08-13 RX ADMIN — BUDESONIDE AND FORMOTEROL FUMARATE DIHYDRATE 2 PUFF: 160; 4.5 AEROSOL RESPIRATORY (INHALATION) at 19:43

## 2021-08-13 RX ADMIN — IPRATROPIUM BROMIDE AND ALBUTEROL SULFATE 1 AMPULE: .5; 3 SOLUTION RESPIRATORY (INHALATION) at 07:59

## 2021-08-13 RX ADMIN — ATORVASTATIN CALCIUM 40 MG: 40 TABLET, FILM COATED ORAL at 21:24

## 2021-08-13 RX ADMIN — CEFEPIME HYDROCHLORIDE 2000 MG: 2 INJECTION, POWDER, FOR SOLUTION INTRAVENOUS at 12:06

## 2021-08-13 RX ADMIN — SODIUM CHLORIDE SOLN NEBU 3% 15 ML: 3 NEBU SOLN at 07:59

## 2021-08-13 ASSESSMENT — PAIN SCALES - GENERAL
PAINLEVEL_OUTOF10: 0

## 2021-08-13 NOTE — PROGRESS NOTES
Progress Note  Admit Date: 8/11/2021      PCP: America Mckeon MD     CC: F/U for COPD exacerbation    Days in hospital:  2    SUBJECTIVE / Interval History:    Patient seen and examined  Overnight event noted  She had episode of confusion requiring bedside sitter  Oxygen saturation reviewed on 6 to 7 L of O2  Discussed with daughter at bedside        Allergies  Enalapril    Medications    Scheduled Meds:   amLODIPine  5 mg Oral BID    aspirin EC  81 mg Oral Daily    atorvastatin  40 mg Oral Nightly    budesonide-formoterol  2 puff Inhalation BID    furosemide  20 mg Oral BID    LORazepam  0.5 mg Oral BID    metoprolol tartrate  25 mg Oral BID    PARoxetine  20 mg Oral Daily    QUEtiapine  50 mg Oral Daily    rivaroxaban  20 mg Oral Daily with breakfast    sodium chloride flush  5-40 mL Intravenous 2 times per day    ipratropium-albuterol  1 ampule Inhalation Q4H WA    cefepime  2,000 mg Intravenous Q12H    lactobacillus  1 capsule Oral Daily with breakfast    famotidine (PEPCID) injection  20 mg Intravenous Daily    vancomycin (VANCOCIN) intermittent dosing (placeholder)   Other RX Placeholder    vancomycin  1,000 mg Intravenous Q24H    predniSONE  40 mg Oral Daily    [START ON 8/17/2021] predniSONE  5 mg Oral Daily    sodium chloride (Inhalant)  15 mL Nebulization Q4H     Continuous Infusions:   sodium chloride         PRN Meds:  HYDROcodone-acetaminophen, ipratropium-albuterol, meclizine, sodium chloride flush, sodium chloride, ondansetron **OR** ondansetron, polyethylene glycol, acetaminophen **OR** acetaminophen, guaiFENesin-dextromethorphan, melatonin    Vitals    /65   Pulse 86   Temp 98.2 °F (36.8 °C) (Oral)   Resp 22   Ht 5' 3\" (1.6 m)   Wt 156 lb 8.4 oz (71 kg)   SpO2 90%   BMI 27.73 kg/m²     Exam:    Gen: Not in distress. Alert, impulsive, chronically ill, limited insight  Head: Normocephalic. Atraumatic. Eyes: Conjunctivae/corneas clear.   ENT: Oral mucosa moist  Neck: No JVD. No obvious thyromegaly. CVS: Nml S1S2, no murmur    Pulmomary: Reduced air entry with wheezing gastrointestinal: Soft, non tender, non distend, . Musculoskeletal: No edema. Warm  Neuro: No focal deficit. Moves extremity spontaneously. Psychiatry: Appropriate affect. Not agitated. Data    LABS  CBC:   Recent Labs     08/11/21 2150 08/12/21  0520   WBC 11.7* 10.6   HGB 12.7 12.1   HCT 39.8 37.1   MCV 85.2 84.7    175     BMP:   Recent Labs     08/11/21 2150 08/12/21  0520    136   K 3.5 4.8    99   CO2 22 24   BUN 12 13   CREATININE 1.2 1.1   GLUCOSE 192* 210*     POC GLUCOSE:  No results for input(s): POCGLU in the last 72 hours. LIVER PROFILE:   Recent Labs     08/11/21 2150   AST 20   ALT 26   LABALBU 3.1*   BILITOT 0.3   ALKPHOS 63     PT/INR: No results for input(s): PROTIME, INR in the last 72 hours. APTT: No results for input(s): APTT in the last 72 hours. UA:No results for input(s): NITRITE, COLORU, PHUR, LABCAST, WBCUA, RBCUA, MUCUS, TRICHOMONAS, YEAST, BACTERIA, CLARITYU, SPECGRAV, LEUKOCYTESUR, UROBILINOGEN, BILIRUBINUR, BLOODU, GLUCOSEU, KETUA, AMORPHOUS in the last 72 hours. Microbiology:  Wound Culture: No results for input(s): WNDABS, ORG in the last 72 hours. Invalid input(s):  LABGRAM  Nasal Culture:   Recent Labs     08/12/21  0240   MRSAPCR Negative  MRSA DNA not detected. Normal Range: Not detected       Blood Culture:   Recent Labs     08/11/21 2150   BC No Growth to date. Any change in status will be called. BLOODCULT2 No Growth to date. Any change in status will be called. Fungal Culture:   No results for input(s): FUNGSM in the last 72 hours. No results for input(s): FUNCXBLD in the last 72 hours. CSF Culture:  No results for input(s): COLORCSF, APPEARCSF, CFTUBE, CLOTCSF, WBCCSF, RBCCSF, NEUTCSF, NUMCELLSCSF, LYMPHSCSF, MONOCSF, GLUCCSF, VOLCSF in the last 72 hours.   Respiratory Culture:  Recent Labs     08/12/21  0320   LABGRAM 1+ Epithelial Cells  2+ Gram positive cocci       AFB:No results for input(s): AFBSMEAR in the last 72 hours. Urine Culture  No results for input(s): LABURIN in the last 72 hours. RADIOLOGY:    XR CHEST PORTABLE   Final Result   Mild cardiomegaly with slowly resolving or recurrent pulmonary edema. Hazy left perihilar opacity extending inferiorly which is slightly less   prominent and a probable small left pleural effusion which is increased      Mild right basilar opacity which is less prominent. CONSULTS:    IP CONSULT TO PHARMACY  IP CONSULT TO HOSPITALIST  IP CONSULT TO PULMONOLOGY  IP CONSULT TO PHARMACY  IP CONSULT TO PALLIATIVE CARE  IP CONSULT TO HOSPICE    ASSESSMENT AND PLAN:      Active Problems:    Lung mass    Acute on chronic respiratory failure with hypoxia (HCC)    Lactic acidosis  Resolved Problems:    * No resolved hospital problems. [facility] course Per prior hospitalist \"patient is 75-year-old female with a past medical history of lung mass, COPD with chronic respiratory failure, coronary artery disease, hypertension and PE who presented with worsening shortness of breath. At baseline patient uses 5 to 6 L of oxygen at home. In the ED she had some mild distress and so was placed on BiPAP for a short time. She was admitted with COPD exacerbation and acute on chronic hypoxic respiratory failure\"      Acute on chronic hypoxic respiratory failure (baseline home oxygen 5 to 6 L), multifactorial    -Wean off oxygen to home oxygen requirement, worsening hypoxia noted with exertion    Severe COPD with exacerbation    -Continue respiratory care protocol, bronchodilator, steroid therapy    Obstructive pneumonia with sepsis (evident by fever, tachypnea, leukocytosis), POA  Treating as gram-negative, MRSA screen was negative, discontinue vancomycin, continue cefepime.     Left perihilar wall opacity/mediastinal lymphadenopathy concerning for lung mass  -Seen by pulmonary team, patient and family declined further work-up and elected comfort measures only, hospice service consulted, plan to go home with home hospice    Acute toxic metabolic encephalopathy ? Underlying cognitive impairment  -Reduce Ativan dose, continue Seroquel. Frequent orientation, delirium order set activated.     Essential hypertension   -Blood pressure reading reviewed, continue monitoring, continue medication    Dyslipidemia  Continue statin    Remote history of PE  -Continue Xarelto      DVT Prophylaxis: lovenox  Diet: ADULT DIET; Easy to Chew  Adult Oral Nutrition Supplement; Standard High Calorie/High Protein Oral Supplement  Code Status: DNR-CC        Discharge plan -home tomorrow with hospice  Discussed with daughter at bedside,      Discussed in the multidisciplinary meeting           Fuentes Negron MD

## 2021-08-13 NOTE — PROGRESS NOTES
PALLIATIVE MEDICINE PROGRESS NOTE     Patient name:Hermila De La Vega    KTO:[id number] :1934  Room/Bed:G4S-5750/5254-01    LOS: 2 days        ASSESSMENT/RECOMMENDATIONS     80 y.o. female with Dyspnea and debility        Symptom Management:  1. Dyspnea- pt on 5L today her baseline is 5L. Pt dyspnea is improved  2. Debility- pt and family report that she is bed/chair bound at home  3. AMS- Pt with acute mental status change she has been getting increased dose of ativan from her home dosing suspect this is causing delirium. Switched to Haldol at lower dosing. 4. Goals of Care-  [facility] and [place] home with Hospice when stable     Patient/Family Goals of Care :    Pt and daughter both agree that pt wants comfort care only and they want Hospice support to keep her at home as long as possible. Pt had a meeting at home set up this pm with HOC.      Disposition/Discharge Plan:   pending     Advance Directives:  · Surrogate Decision Maker: Daughter -Cash Garzon  · Code status:  DNR-CC     Case discussed with: patient, floor RN, Dr Venus Young  Thank you for allowing us to participate in the care of this patient. SUBJECTIVE     Chief Complaint: AMS    Last 24 hours:   Pt hallucinating and confused overnight this continues this am. This is an acute change    ROS:  Review of Systems -   History obtained from chart review and the patient  General ROS: positive for  - fatigue and sleep disturbance  Respiratory ROS: positive for - cough, shortness of breath and wheezing  Musculoskeletal ROS: positive for - muscular weakness     A complete 10 count ROS was obtained. Pertinent positives mentioned above in HPI/ROS. All others if not mentioned are negative. OBJECTIVE   /65   Pulse 75   Temp 97.9 °F (36.6 °C) (Oral)   Resp 24   Ht 5' 3\" (1.6 m)   Wt 156 lb 8.4 oz (71 kg)   SpO2 93%   BMI 27.73 kg/m²   I/O last 3 completed shifts: In: 337.7 [P.O.:240; IV Piggyback:97.7]  Out: 8530 [Urine:1350]  I/O this shift:   In: 360 [P.O.:360]  Out: -       Physical Examination:   General appearance - chronically ill appearing  Mental status - alert, oriented to person, place, and time  Neck - supple, no significant adenopathy  Chest - wheezing noted   Abdomen - soft, nontender, nondistended, no masses or organomegaly  Musculoskeletal - osteoarthritic changes noted in both hands  Skin - normal coloration and turgor, no rashes, no suspicious skin lesions noted         Signed By: Electronically signed by JAME Waddell CNP on 8/13/2021 at 12:23 PM   Palliative Medicine   [phone]    August 13, 2021

## 2021-08-13 NOTE — PROGRESS NOTES
Physician Progress Note      PATIENT:               Taurus Foy  CSN #:                  [id number]  :                       1934  ADMIT DATE:       2021 9:20 PM  [address] [place] DATE:  RESPONDING  PROVIDER #:        Quintin Cabral MD          QUERY TEXT:    Dear Dr. Hemant Crump,  Pt admitted with Acute on chronic respiratory failure with hypoxia. Pt noted   to have postobstructive pneumonia vs HAP. If possible, please document in the   progress notes and discharge summary if you are evaluating and/or treating any   of the following:    Note: CAP and HCAP indicate where the pneumonia was acquired, not a specific   type. The medical record reflects the following:  Risk Factors: Hx COPD,presumed Lung cancer L lower lobe  Clinical Indicators:  ED per EMS for SOB, WBC=11.7, Neutrophils=9.4,   Lactic acid=3.7,Temp=99.1Ax, RZ=407, Resp=30, H and P notes \"scattered fine   crackle noted in the left lower base\" and \"Chest x-ray indicating left   perihilar opacity extending inferiorly and slightly prominent. A left pleural   effusion with mild cardiomegaly\"  Treatment: O2, Bipap,Cefepime, Vancomycin, Pulm consult,  Thank you,  Christina Chris RN, [email or web address]  Options provided:  -- Treating as Gram negative pneumonia  -- Other - I will add my own diagnosis  -- Disagree - Not applicable / Not valid  -- Disagree - Clinically unable to determine / Unknown  -- Refer to Clinical Documentation Reviewer    PROVIDER RESPONSE TEXT:    This patient has pneumonia treating as gram negative pneumonia. Query created by: Robert Valera on 2021 11:15 AM      QUERY TEXT:    Dear Dr. Hemant Crump,  Pt admitted with Sepsis, pneumonia and resp failure. Pt noted to have   confusion per RN note. If possible, please document in the progress notes and   discharge summary if you are evaluating and / or treating any of the   following:     The medical record reflects the following:  Risk Factors: Hx COPD, presumed Lung Ca, Current sepsis and pneumonia and   acute on chronic resp failure  Clinical Indicators: H and P notes \"Alert, oriented, thought content   appropriate\", 8/13 RN notes \"Pt with continued confusion this am. Pt unaware   of situation or place. Pt up out of bed multiple times\"  Treatment: O2 and O2 sat monitoring, abx Vanc and Cefepime, monitor labs, bed   alarm, caregiver at bedside  Thank you,  Jose Angel Tate RN, CDS  [email or web address]. [email or web address]  Options provided:  -- Metabolic encephalopathy  -- Septic encephalopathy  -- Toxic encephalopathy  -- Toxic metabolic encephalopathy  -- Delirium due to, Please specify cause. -- Delirium  -- Other - I will add my own diagnosis  -- Disagree - Not applicable / Not valid  -- Disagree - Clinically unable to determine / Unknown  -- Refer to Clinical Documentation Reviewer    PROVIDER RESPONSE TEXT:    This patient has toxic metabolic encephalopathy. Query created by:  [facility] on 8/13/2021 1:33 PM      Electronically signed by:  Krystian Lui MD 8/13/2021 2:04 PM

## 2021-08-13 NOTE — RT PROTOCOL NOTE
RT Inhaler-Nebulizer Bronchodilator Protocol Note    There is a bronchodilator order in the chart from a provider indicating to follow the RT Bronchodilator Protocol and there is an Initiate RT Bronchodilator Protocol order as well (see protocol at bottom of note). The findings from the last RT Protocol Assessment were as follows:  Smoking: >15 Pack years  Surgical Status: No surgery  Xray: Multiple lobe infiltrates/atelectasis/pleural effusion/edema  Respiratory Pattern: Dyspnea with exertion  Mental Status: Alert and Oriented  Breath Sounds: Scattered wheeze  Cough: Strong, spontaneous, non-productive  Activity Level: Walking unassisted  Oxygen Requirement: 41% or 6LNC - 60%  Indication for Bronchodilator Therapy: Decreased or absent breath sounds  Bronchodilator Assessment Score: 9    Aerosolized bronchodilator medication orders have been revised according to the RT Bronchodilator Protocol. RT Inhaler-Nebulizer Bronchodilator Protocol:    Respiratory Therapist to perform RT Therapy Protocol Assessment then follow the protocol. No Indications - adjust the frequency to every 6 hours PRN wheezing or bronchospasm, if no treatments needed after 48 hours then discontinue using Per Protocol order mode. If indication present, adjust the RT bronchodilator orders based on the Bronchodilator Assessment Score as follows:    0-6 - enter or revise RT bronchodilator order to Albuterol Inhaler order with frequency of every 2 hours PRN for wheezing or increased work of breathing using Per Protocol order mode. If Albuterol Inhaler not tolerated or not effective, then discontinue the Albuterol Inhaler order and enter Albuterol Nebulizer order with same frequency and PRN reasons. Repeat RT Therapy Protocol Assessment as needed.     7-10 - discontinue any other Inpatient aerosolized bronchodilator medication orders and enter or revise two Albuterol Inhaler orders, one with BID frequency and one with frequency of every 2 hours PRN wheezing or increased work of breathing using Per Protocol order mode. Repeat RT Therapy Protocol Assessment with second treatment then BID and as needed. If Albuterol Inhaler not tolerated or not effective, then discontinue the Albuterol Inhaler orders and enter two Albuterol Nebulizer orders with same frequencies and PRN reasons. 11-13 - discontinue any other Inpatient aerosolized bronchodilator medication orders and enter DuoNeb Nebulizer orders QID frequency and an Albuterol Nebulizer order every 2 hours PRN wheezing or increased work of breathing using Per Protocol order mode. Repeat RT Therapy Protocol Assessment with second treatment then QID and as needed. Greater than 13 - discontinue any other Inpatient bronchodilator aerosolized medication orders and enter DuoNeb Nebulizer order every 4 hours frequency and Albuterol Nebulizer every 2 hours PRN wheezing or increased work of breathing using Per Protocol order mode. Repeat RT Therapy Protocol Assessment with second treatment then every 4 hours and as needed. RT to enter RT Home Evaluation for COPD & MDI Assessment order using Per Protocol order mode.     Electronically signed by Yadiel Mckeon RCP on 8/13/2021 at 4:05 PM

## 2021-08-13 NOTE — PROGRESS NOTES
Pt with continued confusion this am. Pt unaware of situation or place. Pt up out of bed multiple times. Daughter at bedside with multiple questions. Spoke with Dr Dev Ochoa and palliative care NP.  Electronically signed by Mario Ochoa RN on 8/13/2021 at 10:38 AM

## 2021-08-13 NOTE — PLAN OF CARE
Problem: Pain:  Goal: Pain level will decrease  Description: Pain level will decrease  Outcome: Ongoing  Note: Pain/discomfort being managed with PRN analgesics per MD orders. Pt able to express presence and absence of pain and rate pain appropriately using numerical scale. Goal: Control of acute pain  Description: Control of acute pain  Outcome: Ongoing  Goal: Control of chronic pain  Description: Control of chronic pain  Outcome: Ongoing     Problem: Falls - Risk of:  Goal: Will remain free from falls  Description: Will remain free from falls  Outcome: Ongoing  Note: Fall risk assessment completed every shift. All precautions in place. Pt has call light within reach at all times. Room clear of clutter. Pt aware to call for assistance when getting up.     Goal: Absence of physical injury  Description: Absence of physical injury  Outcome: Ongoing     Problem: Fluid Volume:  Goal: Hemodynamic stability will improve  Description: Hemodynamic stability will improve  Outcome: Ongoing  Goal: Ability to maintain a balanced intake and output will improve  Description: Ability to maintain a balanced intake and output will improve  Outcome: Ongoing     Problem: Health Behavior:  Goal: Identification of resources available to assist in meeting health care needs will improve  Description: Identification of resources available to assist in meeting health care needs will improve  Outcome: Ongoing     Problem: Respiratory:  Goal: Respiratory status will improve  Description: Respiratory status will improve  Outcome: Ongoing

## 2021-08-13 NOTE — ED PROVIDER NOTES
Pulmonary hypertension ([facility] [address] 10/03/2015    Senile reticular degeneration of peripheral retina     Syncope     Thoracic or lumbosacral neuritis or radiculitis, unspecified     Urinary incontinence     Visual hallucinations 09/16/2014    Wears glasses        MDM:  Samira Alejandra is a 80 y.o. female who presents with shortness of breath that is been present for 5 to 7 days. She was discharged in the hospital a week and a half ago. She was admitted for hypoxia and COPD exacerbation at that time. She has a history of pulmonary embolism and is on Xarelto. She denies any fevers or chills. She denies any nausea vomiting. She has been weak. Physical exam showed generalized weakness. She is in moderate respiratory distress. She has tachycardia at 115. She has decreased breath sounds and wheezing bilaterally. Patient was placed on BiPAP and did well with that. CT scan showed haziness in the left perihilar area of unknown etiology. This could be pneumonia versus underlying tumor. Therefore, patient was admitted to the hospital for further evaluation and treatment. She remained stable throughout her emergency department stay. She did well on CPAP.     Vitals:    08/12/21 1935   BP: (!) 148/71   Pulse: 99   Resp: 24   Temp: 98 °F (36.7 °C)   SpO2: 92%       Lab results  Labs Reviewed   BLOOD GAS, ARTERIAL - Abnormal; Notable for the following components:       Result Value    pH, Arterial 7.454 (*)     All other components within normal limits    Narrative:     Performed at:  [facility]  [address] [place], [place] [address]   Phone [phone]   CBC WITH AUTO DIFFERENTIAL - Abnormal; Notable for the following components:    WBC 11.7 (*)     RDW 17.1 (*)     Neutrophils Absolute 9.4 (*)     All other components within normal limits    Narrative:     Performed at:  [facility]  [address] [place], [place] [address]   Phone [phone]   COMPREHENSIVE METABOLIC PANEL - Abnormal; Notable for the following components:    Glucose 192 (*)     GFR Non- 43 (*)     GFR  51 (*)     Albumin 3.1 (*)     Albumin/Globulin Ratio 0.9 (*)     All other components within normal limits    Narrative:     Performed at:  [address] [facility] [place]   Phone [phone]   LACTIC ACID, PLASMA - Abnormal; Notable for the following components:    Lactic Acid 3.7 (*)     All other components within normal limits    Narrative:     Performed at:  [address] [place] [address]   Phone [phone]   BASIC METABOLIC PANEL W/ REFLEX TO MG FOR LOW K - Abnormal; Notable for the following components:    Glucose 210 (*)     GFR Non- 47 (*)     GFR  57 (*)     All other components within normal limits    Narrative:     Performed at:  [address] [facility] [place] [address]   Phone [phone]   CBC WITH AUTO DIFFERENTIAL - Abnormal; Notable for the following components:    RDW 17.1 (*)     Neutrophils Absolute 10.0 (*)     Lymphocytes Absolute 0.4 (*)     All other components within normal limits    Narrative:     Performed at:  [address] [facility] [place] [address]   Phone [phone]   LACTIC ACID, PLASMA - Abnormal; Notable for the following components:    Lactic Acid 5.0 (*)     All other components within normal limits    Narrative:     Maribel Egan tel. [phone],  Chemistry results called to and read back by Radames Maier RN, 08/12/2021  02:48, by NORMA  Performed at:  [address] [facility] [place] [address]   Phone [phone]   LACTIC ACID, PLASMA - Abnormal; Notable for the following components:    Lactic Acid 3.8 (*) All other components within normal limits    Narrative:     Performed at:  [facility]  [address] [facility] [place] [address]   Phone [phone] RAPID    Narrative:     Performed at:  [facility]  [address]  [place] [address]   Phone [phone]    Narrative:     ORDER#: [id number]                          ORDERED BY: Olga Myers  SOURCE: Urine Clean Catch                  COLLECTED:  08/12/21 03:20  ANTIBIOTICS AT WICHO.:                      RECEIVED :  08/12/21 03:52  Performed at:  [facility]  [address]  [place] [address]   Phone [phone]   LEGIONELLA ANTIGEN, URINE    Narrative:     ORDER#: [id number]                          ORDERED BY: Kirk GOMEZ  SOURCE: Urine Clean Catch                  COLLECTED:  08/12/21 03:20  ANTIBIOTICS AT WICHO.:                      RECEIVED :  08/12/21 03:52  Performed at:  [facility]  [address]  [place] [address]   Phone [phone]   CULTURE, BLOOD 2   CULTURE, BLOOD 1   CULTURE, RESPIRATORY    Narrative:     ORDER#: [id number]                          ORDERED BY: MAKI GOMEZ  SOURCE: Sputum Expectorated                COLLECTED:  08/12/21 03:20  ANTIBIOTICS AT WICHO.:                      RECEIVED :  08/12/21 03:51  Performed at:  [facility]  [address]  [place], [place] [address]   Phone [phone]   MRSA DNA PROBE, NASAL   CULTURE, RESPIRATORY   TROPONIN    Narrative:     Performed at:  [facility]  [address] [facility] [place] [address]   Phone [phone]   BRAIN NATRIURETIC PEPTIDE    Narrative:     Performed at:  [facility]  [address] [facility] [place] [facility] [place] [address]   Phone [phone]   PROCALCITONIN    Narrative: Performed at:  [address] [place] [facility] [address]   Phone [phone]         Radiology results  XR CHEST PORTABLE   Final Result   Mild cardiomegaly with slowly resolving or recurrent pulmonary edema. Hazy left perihilar opacity extending inferiorly which is slightly less   prominent and a probable small left pleural effusion which is increased      Mild right basilar opacity which is less prominent.              Medications   amLODIPine (NORVASC) tablet 5 mg (5 mg Oral Given 8/12/21 0942)   aspirin EC tablet 81 mg (81 mg Oral Given 8/12/21 0942)   atorvastatin (LIPITOR) tablet 40 mg (40 mg Oral Given 8/12/21 0112)   budesonide-formoterol (SYMBICORT) 160-4.5 MCG/ACT inhaler 2 puff (2 puffs Inhalation Given 8/12/21 1927)   furosemide (LASIX) tablet 20 mg (20 mg Oral Given 8/12/21 0942)   HYDROcodone-acetaminophen (NORCO) 5-325 MG per tablet 1 tablet (1 tablet Oral Given 8/12/21 1423)   ipratropium-albuterol (DUONEB) nebulizer solution 3 mL (has no administration in time range)   LORazepam (ATIVAN) tablet 0.5 mg (0.5 mg Oral Given 8/12/21 0942)   meclizine (ANTIVERT) tablet 12.5 mg (has no administration in time range)   metoprolol tartrate (LOPRESSOR) tablet 25 mg (25 mg Oral Given 8/12/21 0942)   PARoxetine (PAXIL) tablet 20 mg (20 mg Oral Given 8/12/21 0942)   QUEtiapine (SEROQUEL) tablet 50 mg (50 mg Oral Given 8/12/21 0942)   rivaroxaban (XARELTO) tablet 20 mg (20 mg Oral Given 8/12/21 0953)   sodium chloride flush 0.9 % injection 5-40 mL (10 mLs Intravenous Given 8/12/21 0943)   sodium chloride flush 0.9 % injection 5-40 mL (has no administration in time range)   0.9 % sodium chloride infusion (has no administration in time range)   ondansetron (ZOFRAN-ODT) disintegrating tablet 4 mg (has no administration in time range)     Or   ondansetron (ZOFRAN) injection 4 mg (has no administration in time range)   polyethylene glycol (GLYCOLAX) packet 17 g (has no administration in time range)   acetaminophen (TYLENOL) tablet 650 mg (has no administration in time range)     Or   acetaminophen (TYLENOL) suppository 650 mg (has no administration in time range)   ipratropium-albuterol (DUONEB) nebulizer solution 1 ampule (1 ampule Inhalation Given 8/12/21 1927)   cefepime (MAXIPIME) 2000 mg IVPB minibag (0 mg Intravenous Stopped 8/12/21 1237)   lactobacillus (CULTURELLE) capsule 1 capsule (1 capsule Oral Given 8/12/21 0953)   famotidine (PEPCID) injection 20 mg (20 mg Intravenous Given 8/12/21 0943)   vancomycin (VANCOCIN) intermittent dosing (placeholder) ( Other Not Given 8/12/21 0344)   guaiFENesin-dextromethorphan (ROBITUSSIN DM) 100-10 MG/5ML syrup 10 mL (10 mLs Oral Given 8/12/21 0330)   vancomycin 1000 mg IVPB in 250 mL D5W addavial (0 mg Intravenous Stopped 8/12/21 1526)   predniSONE (DELTASONE) tablet 40 mg (40 mg Oral Given 8/12/21 0942)   predniSONE (DELTASONE) tablet 5 mg (has no administration in time range)   sodium chloride (Inhalant) 3 % nebulizer solution 15 mL (15 mLs Nebulization Given 8/12/21 1927)   melatonin tablet 9 mg (has no administration in time range)   methylPREDNISolone sodium (SOLU-MEDROL) injection 125 mg (125 mg Intravenous Given 8/11/21 2157)   ipratropium-albuterol (DUONEB) nebulizer solution 3 ampule (3 ampules Inhalation Given 8/11/21 2226)   cefepime (MAXIPIME) 2000 mg IVPB minibag (0 mg Intravenous Stopped 8/12/21 0004)   vancomycin (VANCOCIN) 1250 mg in dextrose 5 % 250 mL IVPB (0 mg Intravenous Stopped 8/12/21 0255)       Current Discharge Medication List          The patient's blood pressure was found to be elevated according to CMS/Medicare and the Affordable Care Act/ObamaCare criteria. Elevated blood pressure could occur because of pain or anxiety or other reasons and does not mean that they need to have their blood pressure treated or medications otherwise adjusted.  However, this could also be a sign that they will need to have their blood pressure treated or medications changed. The patient was instructed to follow up closely with their personal physician to have their blood pressure rechecked. The patient was instructed to take a list of recent blood pressure readings to their next visit with their personal physician. IMPRESSIONS:  1. Acute on chronic respiratory failure with hypoxia (HCC)    2. COPD exacerbation ([facility] [address]    3. Abnormal CT scan of lung    4.  Pneumonia of both lungs due to infectious organism, unspecified part of lung               Leila Queen,   08/12/21 3461

## 2021-08-14 LAB
CULTURE, RESPIRATORY: NORMAL
GRAM STAIN RESULT: NORMAL

## 2021-08-14 PROCEDURE — 6370000000 HC RX 637 (ALT 250 FOR IP): Performed by: INTERNAL MEDICINE

## 2021-08-14 PROCEDURE — 6370000000 HC RX 637 (ALT 250 FOR IP): Performed by: CLINICAL NURSE SPECIALIST

## 2021-08-14 PROCEDURE — 6370000000 HC RX 637 (ALT 250 FOR IP): Performed by: NURSE PRACTITIONER

## 2021-08-14 PROCEDURE — 6360000002 HC RX W HCPCS: Performed by: NURSE PRACTITIONER

## 2021-08-14 PROCEDURE — 2700000000 HC OXYGEN THERAPY PER DAY

## 2021-08-14 PROCEDURE — 94640 AIRWAY INHALATION TREATMENT: CPT

## 2021-08-14 PROCEDURE — 2060000000 HC ICU INTERMEDIATE R&B

## 2021-08-14 PROCEDURE — 2500000003 HC RX 250 WO HCPCS: Performed by: NURSE PRACTITIONER

## 2021-08-14 PROCEDURE — 2580000003 HC RX 258: Performed by: NURSE PRACTITIONER

## 2021-08-14 PROCEDURE — 94761 N-INVAS EAR/PLS OXIMETRY MLT: CPT

## 2021-08-14 RX ORDER — AMOXICILLIN AND CLAVULANATE POTASSIUM 875; 125 MG/1; MG/1
1 TABLET, FILM COATED ORAL 2 TIMES DAILY
Qty: 14 TABLET | Refills: 0 | Status: SHIPPED | OUTPATIENT
Start: 2021-08-14 | End: 2021-08-15 | Stop reason: SDUPTHER

## 2021-08-14 RX ORDER — PREDNISONE 1 MG/1
TABLET ORAL
Qty: 100 TABLET | Refills: 0 | Status: SHIPPED | OUTPATIENT
Start: 2021-08-14

## 2021-08-14 RX ADMIN — CEFEPIME HYDROCHLORIDE 2000 MG: 2 INJECTION, POWDER, FOR SOLUTION INTRAVENOUS at 01:02

## 2021-08-14 RX ADMIN — ASPIRIN 81 MG: 81 TABLET ORAL at 08:45

## 2021-08-14 RX ADMIN — IPRATROPIUM BROMIDE AND ALBUTEROL SULFATE 1 AMPULE: .5; 3 SOLUTION RESPIRATORY (INHALATION) at 19:12

## 2021-08-14 RX ADMIN — HALOPERIDOL 0.5 MG: 1 TABLET ORAL at 14:12

## 2021-08-14 RX ADMIN — FUROSEMIDE 20 MG: 20 TABLET ORAL at 08:46

## 2021-08-14 RX ADMIN — RIVAROXABAN 20 MG: 20 TABLET, FILM COATED ORAL at 08:45

## 2021-08-14 RX ADMIN — Medication 9 MG: at 20:52

## 2021-08-14 RX ADMIN — FAMOTIDINE 20 MG: 10 INJECTION, SOLUTION INTRAVENOUS at 08:46

## 2021-08-14 RX ADMIN — BUDESONIDE AND FORMOTEROL FUMARATE DIHYDRATE 2 PUFF: 160; 4.5 AEROSOL RESPIRATORY (INHALATION) at 07:43

## 2021-08-14 RX ADMIN — SODIUM CHLORIDE 25 ML: 9 INJECTION, SOLUTION INTRAVENOUS at 01:00

## 2021-08-14 RX ADMIN — SODIUM CHLORIDE SOLN NEBU 3% 15 ML: 3 NEBU SOLN at 19:12

## 2021-08-14 RX ADMIN — IPRATROPIUM BROMIDE AND ALBUTEROL SULFATE 1 AMPULE: .5; 3 SOLUTION RESPIRATORY (INHALATION) at 15:39

## 2021-08-14 RX ADMIN — SODIUM CHLORIDE SOLN NEBU 3% 15 ML: 3 NEBU SOLN at 15:43

## 2021-08-14 RX ADMIN — Medication 10 ML: at 09:21

## 2021-08-14 RX ADMIN — METOPROLOL TARTRATE 25 MG: 25 TABLET, FILM COATED ORAL at 08:45

## 2021-08-14 RX ADMIN — HALOPERIDOL 0.5 MG: 1 TABLET ORAL at 08:45

## 2021-08-14 RX ADMIN — BUDESONIDE AND FORMOTEROL FUMARATE DIHYDRATE 2 PUFF: 160; 4.5 AEROSOL RESPIRATORY (INHALATION) at 19:12

## 2021-08-14 RX ADMIN — AMLODIPINE BESYLATE 5 MG: 5 TABLET ORAL at 20:52

## 2021-08-14 RX ADMIN — SODIUM CHLORIDE SOLN NEBU 3% 15 ML: 3 NEBU SOLN at 07:43

## 2021-08-14 RX ADMIN — ATORVASTATIN CALCIUM 40 MG: 40 TABLET, FILM COATED ORAL at 20:52

## 2021-08-14 RX ADMIN — QUETIAPINE FUMARATE 50 MG: 25 TABLET ORAL at 08:45

## 2021-08-14 RX ADMIN — FUROSEMIDE 20 MG: 20 TABLET ORAL at 18:06

## 2021-08-14 RX ADMIN — CEFEPIME HYDROCHLORIDE 2000 MG: 2 INJECTION, POWDER, FOR SOLUTION INTRAVENOUS at 13:04

## 2021-08-14 RX ADMIN — AMLODIPINE BESYLATE 5 MG: 5 TABLET ORAL at 08:45

## 2021-08-14 RX ADMIN — PAROXETINE HYDROCHLORIDE 20 MG: 20 TABLET, FILM COATED ORAL at 08:45

## 2021-08-14 RX ADMIN — IPRATROPIUM BROMIDE AND ALBUTEROL SULFATE 1 AMPULE: .5; 3 SOLUTION RESPIRATORY (INHALATION) at 07:40

## 2021-08-14 RX ADMIN — IPRATROPIUM BROMIDE AND ALBUTEROL SULFATE 1 AMPULE: .5; 3 SOLUTION RESPIRATORY (INHALATION) at 12:19

## 2021-08-14 RX ADMIN — Medication 1 CAPSULE: at 08:44

## 2021-08-14 RX ADMIN — HALOPERIDOL 0.5 MG: 1 TABLET ORAL at 20:52

## 2021-08-14 RX ADMIN — Medication 10 ML: at 20:52

## 2021-08-14 RX ADMIN — METOPROLOL TARTRATE 25 MG: 25 TABLET, FILM COATED ORAL at 20:52

## 2021-08-14 RX ADMIN — PREDNISONE 40 MG: 20 TABLET ORAL at 08:45

## 2021-08-14 RX ADMIN — SODIUM CHLORIDE SOLN NEBU 3% 15 ML: 3 NEBU SOLN at 12:23

## 2021-08-14 NOTE — PLAN OF CARE
Problem: Pain:  Goal: Pain level will decrease  Description: Pain level will decrease  Outcome: Ongoing  Note: Patient denies any pain at this time. Will continue to monitor. Problem: Falls - Risk of:  Goal: Will remain free from falls  Description: Will remain free from falls  Outcome: Ongoing  Note: Patient is a high fall risk. Patient free of falls this shift. Bed low, locked and alarmed at all times. Call light and bedside table is within reach. Yellow arm band on wrist and fall sign posted in the room. Notified patient to ask for assistance when needed. Patient verbalized understanding. Problem: Respiratory:  Goal: Respiratory status will improve  Description: Respiratory status will improve  Outcome: Ongoing  Note: Patient remains dyspneic at rest and on exertion. O2 saturation remained above 90%, except on ambulation when O2 desats. Oxygen applied via hi-flow nasal cannula at 7 L. Will continue to monitor.

## 2021-08-14 NOTE — PROGRESS NOTES
Progress Note  Admit Date: 8/11/2021      PCP: Aminta Ram MD     CC: F/U for COPD exacerbation    Days in hospital:  3    SUBJECTIVE / Interval History:    Patient seen and examined  Overnight event noted  Much more awake today, conversant, she has no complaint to me. Daughter at the bedside, concerned that the new oxygen delivery (able to deliver 10 L of oxygen has not been delivered yet) she feels more comfortable of the patient discharge tomorrow. Allergies  Enalapril    Medications    Scheduled Meds:   haloperidol  0.5 mg Oral TID    amLODIPine  5 mg Oral BID    aspirin EC  81 mg Oral Daily    atorvastatin  40 mg Oral Nightly    budesonide-formoterol  2 puff Inhalation BID    furosemide  20 mg Oral BID    metoprolol tartrate  25 mg Oral BID    PARoxetine  20 mg Oral Daily    QUEtiapine  50 mg Oral Daily    rivaroxaban  20 mg Oral Daily with breakfast    sodium chloride flush  5-40 mL Intravenous 2 times per day    ipratropium-albuterol  1 ampule Inhalation Q4H WA    cefepime  2,000 mg Intravenous Q12H    lactobacillus  1 capsule Oral Daily with breakfast    famotidine (PEPCID) injection  20 mg Intravenous Daily    predniSONE  40 mg Oral Daily    [START ON 8/17/2021] predniSONE  5 mg Oral Daily    sodium chloride (Inhalant)  15 mL Nebulization Q4H     Continuous Infusions:   sodium chloride 25 mL (08/14/21 0100)       PRN Meds:  HYDROcodone-acetaminophen, ipratropium-albuterol, meclizine, sodium chloride flush, sodium chloride, ondansetron **OR** ondansetron, polyethylene glycol, acetaminophen **OR** acetaminophen, guaiFENesin-dextromethorphan, melatonin    Vitals    /71   Pulse 69   Temp 98.4 °F (36.9 °C) (Oral)   Resp 20   Ht 5' 3\" (1.6 m)   Wt 155 lb 3.3 oz (70.4 kg)   SpO2 (!) 87% Comment: increased liter flow to 7lpm  BMI 27.49 kg/m²     Exam:  Gen: Not in distress. Alert. Carry conversation, more awake, oriented  Head: Normocephalic. Atraumatic.   Eyes: Conjunctivae/corneas clear. ENT: Oral mucosa moist  Neck: No JVD. No obvious thyromegaly. CVS: Nml S1S2, no murmur  , RRR  Pulmomary: Improved air entry, less wheezing   gastrointestinal: Soft, non tender, non distend, . Musculoskeletal: No edema. Warm  Neuro: No focal deficit. Moves extremity spontaneously. Psychiatry: Appropriate affect. Not agitated. Data    LABS  CBC:   Recent Labs     08/11/21 2150 08/12/21  0520   WBC 11.7* 10.6   HGB 12.7 12.1   HCT 39.8 37.1   MCV 85.2 84.7    175     BMP:   Recent Labs     08/11/21 2150 08/12/21  0520    136   K 3.5 4.8    99   CO2 22 24   BUN 12 13   CREATININE 1.2 1.1   GLUCOSE 192* 210*     POC GLUCOSE:  No results for input(s): POCGLU in the last 72 hours. LIVER PROFILE:   Recent Labs     08/11/21 2150   AST 20   ALT 26   LABALBU 3.1*   BILITOT 0.3   ALKPHOS 63     PT/INR: No results for input(s): PROTIME, INR in the last 72 hours. APTT: No results for input(s): APTT in the last 72 hours. UA:No results for input(s): NITRITE, COLORU, PHUR, LABCAST, WBCUA, RBCUA, MUCUS, TRICHOMONAS, YEAST, BACTERIA, CLARITYU, SPECGRAV, LEUKOCYTESUR, UROBILINOGEN, BILIRUBINUR, BLOODU, GLUCOSEU, KETUA, AMORPHOUS in the last 72 hours. Microbiology:  Wound Culture: No results for input(s): WNDABS, ORG in the last 72 hours. Invalid input(s):  LABGRAM  Nasal Culture:   Recent Labs     08/12/21  0240   MRSAPCR Negative  MRSA DNA not detected. Normal Range: Not detected       Blood Culture:   Recent Labs     08/11/21 2150   BC No Growth to date. Any change in status will be called. BLOODCULT2 No Growth to date. Any change in status will be called. Fungal Culture:   No results for input(s): FUNGSM in the last 72 hours. No results for input(s): FUNCXBLD in the last 72 hours.   CSF Culture:  No results for input(s): COLORCSF, APPEARCSF, CFTUBE, CLOTCSF, WBCCSF, RBCCSF, NEUTCSF, NUMCELLSCSF, LYMPHSCSF, MONOCSF, GLUCCSF, VOLCSF in the last 72 hours.  Respiratory Culture:  Recent Labs     08/12/21  0320   CULTRESP Normal respiratory steven   LABGRAM 1+ Epithelial Cells  2+ Gram positive cocci       AFB:No results for input(s): AFBSMEAR in the last 72 hours. Urine Culture  No results for input(s): LABURIN in the last 72 hours. RADIOLOGY:    XR CHEST PORTABLE   Final Result   Mild cardiomegaly with slowly resolving or recurrent pulmonary edema. Hazy left perihilar opacity extending inferiorly which is slightly less   prominent and a probable small left pleural effusion which is increased      Mild right basilar opacity which is less prominent. CONSULTS:    IP CONSULT TO PHARMACY  IP CONSULT TO HOSPITALIST  IP CONSULT TO PULMONOLOGY  IP CONSULT TO PALLIATIVE CARE  IP CONSULT TO HOSPICE    ASSESSMENT AND PLAN:         80-year-old female patient presented with shortness of breath      Acute on chronic hypoxic respiratory failure (baseline home oxygen 5 to 6 L), multifactorial    -Oxygen requirement around 7 to maintain oxygen saturation above 88%, continue weaning off per protocol    Severe COPD with exacerbation    -Continue respiratory care protocol, bronchodilator, steroid therapy, will start slow prednisone taper, likely will benefit from continued small prednisone dose indefinitely    Obstructive pneumonia with sepsis (evident by fever, tachypnea, leukocytosis), POA  Treating as gram-negative, MRSA screen was negative, discontinue vancomycin, continue cefepime. Plan to discharge on Augmentin for total of 10 days. Left perihilar wall opacity/mediastinal lymphadenopathy concerning for lung mass  -Seen by pulmonary team, patient and family declined further work-up and elected comfort measures only,      Acute toxic metabolic encephalopathy ? Underlying cognitive impairment  Mental status improved  Continue home dose of Ativan twice daily,, continue Seroquel.   Schedule Haldol added by palliative care team.    Continue frequent orientation, delirium order set activated. Encourage family present    Essential hypertension   -Blood pressure reading reviewed, continue monitoring, continue medication    Dyslipidemia  Continue statin    Remote history of PE  -Continue Xarelto    Hyperglycemia, blood sugar reading reviewed, no plan for strict glycemic control in the light of the comfort approach    DVT Prophylaxis: lovenox  Diet: ADULT DIET; Easy to Chew  Adult Oral Nutrition Supplement; Standard High Calorie/High Protein Oral Supplement  Code Status: [facility]        Discharge plan   -Discussed with hospice nurse, patient will have the oxygen delivered at home today.  -I was asked to prescribe comfort measures including Roxanol and Ativan  . I did anticipate the need in the upcoming days, matter fact patient is very sensitive and developed severe encephalopathy with mind altering medication, she required 1 dose of Norco here. She developed encephalopathy with a increase of Ativan dosing. She appears comfortable at the moment. I defer prescription of comfort medication to the hospice medical director should the need arise. Hospice nurse liaison in agreement with plan of care.       Discussed in the multidisciplinary meeting           Zack Lemus MD

## 2021-08-14 NOTE — PLAN OF CARE
Problem: Pain:  Goal: Pain level will decrease  Description: Pain level will decrease  8/14/2021 1846 by Elba Maldonado RN  Outcome: Ongoing  8/14/2021 0600 by Nereida Puckett RN  Outcome: Ongoing  Note: Patient denies any pain at this time. Will continue to monitor. Goal: Control of acute pain  Description: Control of acute pain  Outcome: Ongoing  Goal: Control of chronic pain  Description: Control of chronic pain  Outcome: Ongoing     Problem: Falls - Risk of:  Goal: Will remain free from falls  Description: Will remain free from falls  8/14/2021 1846 by Elba Maldonado RN  Outcome: Ongoing  8/14/2021 0600 by Nereida Puckett RN  Outcome: Ongoing  Note: Patient is a high fall risk. Patient free of falls this shift. Bed low, locked and alarmed at all times. Call light and bedside table is within reach. Yellow arm band on wrist and fall sign posted in the room. Notified patient to ask for assistance when needed. Patient verbalized understanding. Goal: Absence of physical injury  Description: Absence of physical injury  Outcome: Ongoing     Problem: Fluid Volume:  Goal: Hemodynamic stability will improve  Description: Hemodynamic stability will improve  Outcome: Ongoing  Goal: Ability to maintain a balanced intake and output will improve  Description: Ability to maintain a balanced intake and output will improve  Outcome: Ongoing     Problem: Health Behavior:  Goal: Identification of resources available to assist in meeting health care needs will improve  Description: Identification of resources available to assist in meeting health care needs will improve  Outcome: Ongoing     Problem: Respiratory:  Goal: Respiratory status will improve  Description: Respiratory status will improve  8/14/2021 1846 by Elba Maldonado RN  Outcome: Ongoing  8/14/2021 0600 by Nereida Puckett RN  Outcome: Ongoing  Note: Patient remains dyspneic at rest and on exertion.  O2 saturation remained above 90%, except on ambulation when O2 desats. Oxygen applied via hi-flow nasal cannula at 7 L. Will continue to monitor.

## 2021-08-14 NOTE — CARE COORDINATION
ROSAW received call from Russ Miller with [facility] of [address]. Russ Miller states that patient will discharge home tomorrow 8/15 with [facility], transportation has been scheduled with [facility] for 10:00am. Per Russ Miller family is aware and agreeable to plan.    Electronically signed by Ching Portillo on 8/14/2021 at 1:08 PM

## 2021-08-15 VITALS
SYSTOLIC BLOOD PRESSURE: 170 MMHG | WEIGHT: 155.2 LBS | HEART RATE: 101 BPM | BODY MASS INDEX: 27.5 KG/M2 | RESPIRATION RATE: 20 BRPM | DIASTOLIC BLOOD PRESSURE: 79 MMHG | OXYGEN SATURATION: 92 % | TEMPERATURE: 97.9 F | HEIGHT: 63 IN

## 2021-08-15 LAB
BLOOD CULTURE, ROUTINE: NORMAL
CULTURE, BLOOD 2: NORMAL

## 2021-08-15 PROCEDURE — 2580000003 HC RX 258: Performed by: INTERNAL MEDICINE

## 2021-08-15 PROCEDURE — 2580000003 HC RX 258: Performed by: NURSE PRACTITIONER

## 2021-08-15 PROCEDURE — 6370000000 HC RX 637 (ALT 250 FOR IP): Performed by: NURSE PRACTITIONER

## 2021-08-15 PROCEDURE — 6370000000 HC RX 637 (ALT 250 FOR IP): Performed by: INTERNAL MEDICINE

## 2021-08-15 PROCEDURE — 2500000003 HC RX 250 WO HCPCS: Performed by: NURSE PRACTITIONER

## 2021-08-15 PROCEDURE — 6370000000 HC RX 637 (ALT 250 FOR IP): Performed by: CLINICAL NURSE SPECIALIST

## 2021-08-15 PROCEDURE — 6360000002 HC RX W HCPCS: Performed by: NURSE PRACTITIONER

## 2021-08-15 PROCEDURE — 94640 AIRWAY INHALATION TREATMENT: CPT

## 2021-08-15 PROCEDURE — 94761 N-INVAS EAR/PLS OXIMETRY MLT: CPT

## 2021-08-15 PROCEDURE — 2700000000 HC OXYGEN THERAPY PER DAY

## 2021-08-15 RX ORDER — FUROSEMIDE 20 MG/1
20 TABLET ORAL
COMMUNITY
Start: 2021-08-16

## 2021-08-15 RX ORDER — AMOXICILLIN AND CLAVULANATE POTASSIUM 875; 125 MG/1; MG/1
1 TABLET, FILM COATED ORAL 2 TIMES DAILY
Qty: 10 TABLET | Refills: 0 | Status: SHIPPED | OUTPATIENT
Start: 2021-08-15 | End: 2021-08-20

## 2021-08-15 RX ADMIN — FAMOTIDINE 20 MG: 10 INJECTION, SOLUTION INTRAVENOUS at 10:14

## 2021-08-15 RX ADMIN — METOPROLOL TARTRATE 25 MG: 25 TABLET, FILM COATED ORAL at 10:14

## 2021-08-15 RX ADMIN — HALOPERIDOL 0.5 MG: 1 TABLET ORAL at 10:14

## 2021-08-15 RX ADMIN — BUDESONIDE AND FORMOTEROL FUMARATE DIHYDRATE 2 PUFF: 160; 4.5 AEROSOL RESPIRATORY (INHALATION) at 07:30

## 2021-08-15 RX ADMIN — SODIUM CHLORIDE SOLN NEBU 3% 15 ML: 3 NEBU SOLN at 07:30

## 2021-08-15 RX ADMIN — IPRATROPIUM BROMIDE AND ALBUTEROL SULFATE 1 AMPULE: .5; 3 SOLUTION RESPIRATORY (INHALATION) at 07:29

## 2021-08-15 RX ADMIN — FUROSEMIDE 20 MG: 20 TABLET ORAL at 10:14

## 2021-08-15 RX ADMIN — CEFEPIME HYDROCHLORIDE 2000 MG: 2 INJECTION, POWDER, FOR SOLUTION INTRAVENOUS at 00:37

## 2021-08-15 RX ADMIN — SODIUM CHLORIDE 25 ML: 9 INJECTION, SOLUTION INTRAVENOUS at 00:35

## 2021-08-15 RX ADMIN — PAROXETINE HYDROCHLORIDE 20 MG: 20 TABLET, FILM COATED ORAL at 10:13

## 2021-08-15 RX ADMIN — PREDNISONE 40 MG: 20 TABLET ORAL at 10:13

## 2021-08-15 RX ADMIN — RIVAROXABAN 20 MG: 20 TABLET, FILM COATED ORAL at 10:13

## 2021-08-15 RX ADMIN — QUETIAPINE FUMARATE 50 MG: 25 TABLET ORAL at 10:14

## 2021-08-15 RX ADMIN — AMLODIPINE BESYLATE 5 MG: 5 TABLET ORAL at 10:14

## 2021-08-15 RX ADMIN — SODIUM CHLORIDE SOLN NEBU 3% 15 ML: 3 NEBU SOLN at 04:24

## 2021-08-15 RX ADMIN — Medication 10 ML: at 10:15

## 2021-08-15 RX ADMIN — ASPIRIN 81 MG: 81 TABLET ORAL at 10:13

## 2021-08-15 RX ADMIN — IPRATROPIUM BROMIDE AND ALBUTEROL SULFATE 3 ML: .5; 3 SOLUTION RESPIRATORY (INHALATION) at 05:51

## 2021-08-15 RX ADMIN — Medication 1 CAPSULE: at 10:13

## 2021-08-15 NOTE — DISCHARGE SUMMARY
Discharge summary      Admit Date: 8/11/2021      PCP: Keisha Mckee MD     Discharge Date: 8/15/2021      Admitting Physician: Berny Walker MD     Discharge Physician: Kristina Knight MD       Consults:     IP CONSULT TO [address] [place] [address] TO HOSPITALIST  IP CONSULT TO PULMONOLOGY  IP CONSULT TO PALLIATIVE CARE  IP CONSULT TO HOSPICE    Disposition: Home with hospice    Condition at Discharge: Terminal     Code Status:  DNR-CC           Patient Instructions:    Discharge lab/important testing/finding that need follow up : Ensuring comfort, work with hospice    Activity: activity as tolerated    Diet: ADULT DIET; Easy to Chew  Adult Oral Nutrition Supplement; Standard High Calorie/High Protein Oral Supplement      Follow-up visits:   No follow-up provider specified. Discharge Medications:      Nelta Stands   Home Medication Instructions [facility]:[id number]    Printed on:08/15/21 3261   Medication Information                      albuterol sulfate HFA (VENTOLIN HFA) 108 (90 Base) MCG/ACT inhaler  Inhale 2 puffs into the lungs 4 times daily as needed for Wheezing             amLODIPine (NORVASC) 5 MG tablet  TAKE ONE TABLET BY MOUTH TWICE A DAY             amoxicillin-clavulanate (AUGMENTIN) 875-125 MG per tablet  Take 1 tablet by mouth 2 times daily for 5 days             aspirin EC 81 MG EC tablet  Take 1 tablet by mouth daily             atorvastatin (LIPITOR) 40 MG tablet  TAKE ONE TABLET BY MOUTH ONCE NIGHTLY             budesonide-formoterol (SYMBICORT) 160-4.5 MCG/ACT AERO  Inhale 2 puffs into the lungs 2 times daily             furosemide (LASIX) 20 MG tablet  Take 1 tablet by mouth three times a week TAKE ONE TABLET BY MOUTH TWICE A DAY             HYDROcodone-acetaminophen (NORCO) 5-325 MG per tablet  Take 1 tablet by mouth 2 times daily as needed for Pain for up to 30 days.  Take lowest dose possible to manage pain             ipratropium-albuterol (DUONEB) 0.5-2.5 (3) MG/3ML SOLN nebulizer solution  Inhale 3 mLs into the lungs every 6 hours as needed for Shortness of Breath             LORazepam (ATIVAN) 0.5 MG tablet  TAKE ONE TABLET BY MOUTH TWICE A DAY             meclizine (ANTIVERT) 12.5 MG tablet  TAKE ONE TABLET BY MOUTH TWICE A DAY AS NEEDED FOR DIZZINESS             metoprolol tartrate (LOPRESSOR) 25 MG tablet  1 tablet twice daily             nystatin (MYCOSTATIN) 665686 UNIT/GM powder  Apply topically 4 times daily. OXYGEN  Inhale 6 L/min into the lungs continuous              PARoxetine (PAXIL) 20 MG tablet  TAKE ONE TABLET BY MOUTH DAILY             potassium chloride (KLOR-CON M) 20 MEQ extended release tablet  Take 1 tablet by mouth 2 times daily             predniSONE (DELTASONE) 5 MG tablet  Take 6 tab by mouth daily  for 3 days Take 4  tab by mouth daily for 3 days Then take one 2 tab by mouth daily for 3 days  Then one tab prednisone 5 mg indefinitely             QUEtiapine (SEROQUEL) 50 MG tablet  1 tablet daily             rivaroxaban (XARELTO) 20 MG TABS tablet  TAKE ONE TABLET BY MOUTH DAILY WITH BREAKFAST                   Patient seen and examined in the day of discharge. Vital signs reviewed. BP (!) 170/79   Pulse 101   Temp 97.9 °F (36.6 °C) (Axillary)   Resp 20   Ht 5' 3\" (1.6 m)   Wt 155 lb 3.3 oz (70.4 kg)   SpO2 92%   BMI 27.49 kg/m²      Patient reached the maximum benefit of this hospitalization. Patient  was hemodynamically stable on discharge   Available consultant are in agreement with discharge planning. Patient symptoms was controlled and in agreement with discharge planning. Time Spent on discharge is 45 minutes in the examination, evaluation, counseling and review of medications and discharge plan  CC: F/U for COPD exacerbation    Days in hospital:  4    SUBJECTIVE / Interval History:    Patient seen and examined  Overnight patient had intermittent agitation/confusion which cleared this morning.   No shortness of breath on 7 L of oxygen, appears comfortable,  Daughter at bedside      Allergies  Enalapril    Medications    Scheduled Meds:   haloperidol  0.5 mg Oral TID    amLODIPine  5 mg Oral BID    aspirin EC  81 mg Oral Daily    atorvastatin  40 mg Oral Nightly    budesonide-formoterol  2 puff Inhalation BID    furosemide  20 mg Oral BID    metoprolol tartrate  25 mg Oral BID    PARoxetine  20 mg Oral Daily    QUEtiapine  50 mg Oral Daily    rivaroxaban  20 mg Oral Daily with breakfast    sodium chloride flush  5-40 mL Intravenous 2 times per day    ipratropium-albuterol  1 ampule Inhalation Q4H WA    cefepime  2,000 mg Intravenous Q12H    lactobacillus  1 capsule Oral Daily with breakfast    famotidine (PEPCID) injection  20 mg Intravenous Daily    predniSONE  40 mg Oral Daily    [START ON 8/17/2021] predniSONE  5 mg Oral Daily    sodium chloride (Inhalant)  15 mL Nebulization Q4H     Continuous Infusions:   sodium chloride 25 mL (08/15/21 0035)       PRN Meds:  HYDROcodone-acetaminophen, ipratropium-albuterol, meclizine, sodium chloride flush, sodium chloride, ondansetron **OR** ondansetron, polyethylene glycol, acetaminophen **OR** acetaminophen, guaiFENesin-dextromethorphan, melatonin    Vitals    BP (!) 170/79   Pulse 101   Temp 97.9 °F (36.6 °C) (Axillary)   Resp 20   Ht 5' 3\" (1.6 m)   Wt 155 lb 3.3 oz (70.4 kg)   SpO2 92%   BMI 27.49 kg/m²     Gen: Not in distress. Alert. Chronically ill  Head: Normocephalic. Atraumatic. Eyes: Conjunctivae/corneas clear. ENT: Oral mucosa moist  Neck: No JVD. No obvious thyromegaly. CVS: Nml S1S2, no murmur  , RRR  Pulmomary: Overall improvement of air entry, end expiratory wheezing noted gastrointestinal: Soft, non tender, non distend, . Musculoskeletal: No edema. Warm  Neuro: No focal deficit. Moves extremity spontaneously. Psychiatry: Appropriate affect. Not agitated.         Data    LABS  CBC:   No results for input(s): WBC, HGB, HCT, MCV, PLT in the last 72 hours.  BMP:   No results for input(s): NA, K, CL, CO2, PHOS, BUN, CREATININE, GLUCOSE in the last 72 hours. Invalid input(s): CA  POC GLUCOSE:  No results for input(s): POCGLU in the last 72 hours. LIVER PROFILE:   No results for input(s): AST, ALT, LIPASE, AMYLASE, LABALBU, BILIDIR, BILITOT, ALKPHOS in the last 72 hours. PT/INR: No results for input(s): PROTIME, INR in the last 72 hours. APTT: No results for input(s): APTT in the last 72 hours. UA:No results for input(s): NITRITE, COLORU, PHUR, LABCAST, WBCUA, RBCUA, MUCUS, TRICHOMONAS, YEAST, BACTERIA, CLARITYU, SPECGRAV, LEUKOCYTESUR, UROBILINOGEN, BILIRUBINUR, BLOODU, GLUCOSEU, KETUA, AMORPHOUS in the last 72 hours. Microbiology:  Wound Culture: No results for input(s): WNDABS, ORG in the last 72 hours. Invalid input(s):  LABGRAM  Nasal Culture:   No results for input(s): ORG, MRSAPCR in the last 72 hours. Blood Culture:   No results for input(s): BC, BLOODCULT2 in the last 72 hours. Fungal Culture:   No results for input(s): FUNGSM in the last 72 hours. No results for input(s): FUNCXBLD in the last 72 hours. CSF Culture:  No results for input(s): COLORCSF, APPEARCSF, CFTUBE, CLOTCSF, WBCCSF, RBCCSF, NEUTCSF, NUMCELLSCSF, LYMPHSCSF, MONOCSF, GLUCCSF, VOLCSF in the last 72 hours. Respiratory Culture:  No results for input(s): Oval Paganini in the last 72 hours. AFB:No results for input(s): AFBSMEAR in the last 72 hours. Urine Culture  No results for input(s): LABURIN in the last 72 hours. RADIOLOGY:    XR CHEST PORTABLE   Final Result   Mild cardiomegaly with slowly resolving or recurrent pulmonary edema. Hazy left perihilar opacity extending inferiorly which is slightly less   prominent and a probable small left pleural effusion which is increased      Mild right basilar opacity which is less prominent.              CONSULTS:    IP CONSULT TO PHARMACY  IP CONSULT TO HOSPITALIST  IP CONSULT TO PULMONOLOGY  IP CONSULT TO PALLIATIVE CARE  IP CONSULT TO HOSPICE    Discharge summary and hospital course:         70-year-old female patient presented with shortness of breath      Acute on chronic hypoxic respiratory failure (baseline home oxygen 5 to 6 L), multifactorial    -Patient treated with respiratory care protocol oxygen therapy, oxygen requirement around 7 to maintain oxygen saturation above 88%, continue weaning off per protocol. She was discharged home with hospice with oxygen tank ability up to 10 L/min) currently on 7)    Severe COPD with exacerbation    -Continue respiratory care protocol, bronchodilator, steroid therapy, patient discharged on a slow prednisone taper    Obstructive pneumonia with sepsis (evident by fever, tachypnea, leukocytosis), POA  Broad-spectrum antibiotics initiated, patient discharged to complete additional 5 days of Augmentin. Left perihilar wall opacity/mediastinal lymphadenopathy concerning for lung mass  -Seen by pulmonary team, patient and family declined further work-up and elected comfort measures only. Hospice and palliative care team service was consulted, patient discharged home with home hospice. I discussed with the patient, daughter hospice philosophy/focus and ensuring that the patient remain comfortable during her terminal illness. Daughters verbalized understanding, appreciate the service. Acute toxic metabolic encephalopathy ? Underlying cognitive impairment  Underlying cognitive impairment, treated with reorientation, delirium order set activated, at the time of the discharge she was alert oriented.     Essential hypertension   -Blood pressure reading reviewed, continue monitoring, continue medication    Dyslipidemia  Continue statin    Remote history of PE  -Continue Xarelto    Hyperglycemia, blood sugar reading reviewed, no plan for strict glycemic control in the light of the comfort approach         Sebas Curran MD

## 2021-08-15 NOTE — CARE COORDINATION
DISCHARGE SUMMARY     DATE OF DISCHARGE: 8/15/21    DISCHARGE DESTINATION: home with [facility] [address]    Electronically signed by David Guo on 8/15/2021 at 11:40 AM'#[phone]

## 2021-08-15 NOTE — PROGRESS NOTES
I was made aware that this patient was put in 4 point soft restraints. Walter Arambula was made aware via e-mail.

## 2021-08-15 NOTE — DISCHARGE INSTR - COC
Continuity of Care Form    Patient Name: Ana Estimable   :  1934  MRN:  [id number]    Admit date:  2021  Discharge date:  ***    Code Status Order: DNR-CC   Advance Directives:     Admitting Physician:  Kayce Dupont MD  PCP: Sueann Skiff, MD    Discharging Nurse: [facility] Unit/Room#: O7S-8800/7707-58  Discharging Unit Phone Number: ***    Emergency Contact:   Extended Emergency Contact Information  Primary Emergency Contact: Diane Weeks   [address] Phone: [phone]  Mobile Phone: [phone]  Relation: Child  Secondary Emergency Contact: [facility])  Mobile Phone: [phone]  Relation: Child    Past Surgical History:  Past Surgical History:   Procedure Laterality Date    CATARACT REMOVAL WITH IMPLANT Left 2012    Dr. Lexx Alexander  2018       Immunization History:   Immunization History   Administered Date(s) Administered    COVID-19, Moderna, PF, 100mcg/0.5mL 2021, 2021    Influenza 10/18/2013    Influenza, High Dose (Fluzone 65 yrs and older) 10/05/2012, 2014, 2015, 10/06/2017, 2018    Influenza, Quadv, adjuvanted, 65 yrs +, IM, PF (Fluad) 2020    Influenza, Triv, inactivated, subunit, adjuvanted, IM (Fluad 65 yrs and older) 10/22/2019    Pneumococcal Conjugate 13-valent (Yjmbnmd22) 2015    Pneumococcal Polysaccharide (Qdzyikyfw41) 2012    Tetanus 2009       Active Problems:  Patient Active Problem List   Diagnosis Code    Contusion T14. 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    Syncope R55    Nicotine addiction F17.200    Skin rash R21    Abdominal pain R10.9    Ankle Fracture, Right S82.899A    DJD (degenerative joint disease) M19.90    Fatty liver K76.0    Osteoarthritis M19.90    Insomnia G47.00    Anxiety F41.9 Vital Signs: BP (!) 170/79   Pulse 101   Temp 97.9 °F (36.6 °C) (Axillary)   Resp 20   Ht 5' 3\" (1.6 m)   Wt 155 lb 3.3 oz (70.4 kg)   SpO2 92%   BMI 27.49 kg/m²     Last documented pain score (0-10 scale): Pain Level: 0  Last Weight:   Wt Readings from Last 1 Encounters:   21 155 lb 3.3 oz (70.4 kg)     Mental Status:  {IP PT MENTAL STATUS:}    IV Access:  { SG IV ACCESS:179319872}    Nursing Mobility/ADLs:  Walking   {CHP DME IYQQ:803463364}  Transfer  {CHP DME JSNH:331557155}  Bathing  {CHP DME WXG}  Dressing  {CHP DME QDZX:516515519}  Toileting  {CHP DME UWML:049964918}  Feeding  {CHP DME BSCJ:054331230}  Med Admin  {CHP DME JZXO:123129801}  Med Delivery   { SG MED Delivery:425982905}    Wound Care Documentation and Therapy:        Elimination:  Continence:   · Bowel: {YES / XB:28786}  · Bladder: {YES / ZL:35589}  Urinary Catheter: {Urinary Catheter:332932212}   Colostomy/Ileostomy/Ileal Conduit: {YES / UH:87870}       Date of Last BM: ***    Intake/Output Summary (Last 24 hours) at 8/15/2021 0941  Last data filed at 8/15/2021 0916  Gross per 24 hour   Intake 155.78 ml   Output 800 ml   Net -644.22 ml     I/O last 3 completed shifts:   In: 155.8 [I.V.:56.6; IV Piggyback:99.2]  Out: 750 [Urine:750]    Safety Concerns:     [address] Safety Concerns:733283979}    Impairments/Disabilities:      [address] Impairments/Disabilities:698026795}    Nutrition Therapy:  Current Nutrition Therapy:   [address] Diet List:282148350}    Routes of Feeding: {P DME Other Feedings:998269166}  Liquids: {Slp liquid thickness:58292}  Daily Fluid Restriction: {CHP DME Yes amt example:057129226}  Last Modified Barium Swallow with Video (Video Swallowing Test): {Done Not Done VFSW:956349157}    Treatments at the Time of Hospital Discharge:   Respiratory Treatments: ***  Oxygen Therapy:  {Therapy; copd oxygen:26728}  Ventilator:    {MH CC Vent QAQQ:688337990}    Rehab Therapies: {THERAPEUTIC INTERVENTION:8373758719}  Weight Bearing Status/Restrictions: 50Brittnee Frazier CC Weight Bearin}  Other Medical Equipment (for information only, NOT a DME order):  {EQUIPMENT:555432082}  Other Treatments: ***    Patient's personal belongings (please select all that are sent with patient):  {CHP DME Belongings:999592175}    RN SIGNATURE:  {Esignature:493789754}    CASE MANAGEMENT/SOCIAL WORK SECTION    Inpatient Status Date: ***    Readmission Risk Assessment Score:  Readmission Risk              Risk of Unplanned Readmission:  26           Discharging to Facility/ Agency   · Name:   · Address:  · Phone:  · Fax:    Dialysis Facility (if applicable)   · Name:  · Address:  · Dialysis Schedule:  · Phone:  · Fax:    / signature: {Esignature:379710292}    PHYSICIAN SECTION    Prognosis: {Prognosis:1884659781}    Condition at Discharge: 50[address]ier Patient Condition:496114263}    Rehab Potential (if transferring to Rehab): {Prognosis:1407267696}    Recommended Labs or Other Treatments After Discharge: ***    Physician Certification: I certify the above information and transfer of Ace Lopez  is necessary for the continuing treatment of the diagnosis listed and that she requires {Admit to Appropriate Level of Care:89833} for {GREATER/LESS:867385414} 30 days.      Update Admission H&P: {CHP DME Changes in JZE:875840666}    PHYSICIAN SIGNATURE:  {Esignature:289498924}

## 2021-08-15 NOTE — PLAN OF CARE
Problem: Pain:  Goal: Pain level will decrease  Description: Pain level will decrease  Outcome: Completed  Goal: Control of acute pain  Description: Control of acute pain  Outcome: Completed  Goal: Control of chronic pain  Description: Control of chronic pain  Outcome: Completed     Problem: Falls - Risk of:  Goal: Will remain free from falls  Description: Will remain free from falls  Outcome: Completed  Goal: Absence of physical injury  Description: Absence of physical injury  Outcome: Completed     Problem: Fluid Volume:  Goal: Hemodynamic stability will improve  Description: Hemodynamic stability will improve  Outcome: Completed  Goal: Ability to maintain a balanced intake and output will improve  Description: Ability to maintain a balanced intake and output will improve  Outcome: Completed     Problem: Health Behavior:  Goal: Identification of resources available to assist in meeting health care needs will improve  Description: Identification of resources available to assist in meeting health care needs will improve  Outcome: Completed     Problem: Respiratory:  Goal: Respiratory status will improve  Description: Respiratory status will improve  Outcome: Completed     Problem: Non-Violent Restraints  Goal: Removal from restraints as soon as assessed to be safe  Outcome: Completed  Goal: No harm/injury to patient while restraints in use  Outcome: Completed  Goal: Patient's dignity will be maintained  Outcome: Completed

## 2021-08-15 NOTE — PROGRESS NOTES
Pt discharged home at 1045. Discharge instructions given and explained. All questions explained. IV is removed. Dressing applied to site.  Transportation to home provided by [facility].     Electronically signed by Buddy Flores RN on 8/15/2021 at 10:44 AM

## 2021-08-16 ENCOUNTER — CARE COORDINATION (OUTPATIENT)
Dept: CASE MANAGEMENT | Age: 86
End: 2021-08-16

## 2021-08-16 NOTE — CARE COORDINATION
Spoke with [facility] liaison. He states they have all that they need for Hermila. No additional CTN follow up.

## 2021-08-22 NOTE — DISCHARGE SUMMARY
Hospital Medicine Discharge Summary    Patient ID: Bert Erps      Patient's PCP: Rayn Kwok MD    Admit Date: 7/26/2021     Discharge Date: 7/27/2021     Admitting Physician: Vandana Simental MD     Discharge Physician: Vandana Simental MD     Discharge Diagnoses: Active Hospital Problems    Diagnosis Date Noted    Hypoxia [R09.02] 07/16/2015     Priority: Low     Class: Chronic    Acute on chronic respiratory failure with hypoxia and hypercapnia (HCC) [J96.21, J96.22]     Abnormal CT of the chest [R93.89]     COPD exacerbation ([facility] [address] [J44.1] 11/23/2020       The patient was seen and examined on day of discharge and this discharge summary is in conjunction with any daily progress note from day of discharge. Condition at discharge - stable    Hospital Course: patient seen and evaluated on the day of discharge. Patient informed about following up with appointments. Patient verbalized understanding for follow-up appointments. The patient and / or the family were informed of the results of tests, a time was given to answer questions, a plan was proposed and they agreed with plan. Medical reconciliation performed. Patient discharged stable condition. Patient is a 60-year-old female with past medical history of anxiety, hypertension hyperlipidemia, COPD, CAD who presented to hospital for feeling shortness of breath. Patient mentions she is feeling short of breath past 2 weeks, getting worse, she had chills, denies fevers. She has cough producing mild amount of phlegm, cannot characterize color. Denied chest pain nausea vomiting diarrhea constipation dysuria.   Patient has chronic abdominal pain, \"from fissure in abdomen\" however it has been there since 2018.     Assessment  Acute on chronic hypoxic respiratory failure satting less than 90% on room air likely secondary to pneumonia, cannot rule out gram-positive pneumonia - improved  COPD exacerbation - improved  COPD-on 2 L nasal cannula at home  Acute multifocal small PEs on Xarelto at home  Chronic diastolic heart failure  CAD  History of mediastinal mass          Exam:     BP (!) 163/62   Pulse 79   Temp 97.7 °F (36.5 °C) (Oral)   Resp 20   Ht 5' 8\" (1.727 m)   Wt 156 lb 15.5 oz (71.2 kg)   SpO2 90%   BMI 23.87 kg/m²     General appearance: No apparent distress  HEENT:  Conjunctivae/corneas clear. Neck: Supple, No jugular venous distention. Respiratory:  Normal respiratory effort. Clear to auscultation, bilaterally without Rales/Wheezes/Rhonchi. Cardiovascular: Regular rate and rhythm with normal S1/S2 without murmurs, rubs or gallops. Abdomen: Soft, non-tender, non-distended, normal bowel sounds. Musculoskelatal: No clubbing, cyanosis or edema bilaterally. Skin: Skin color, texture, turgor normal.   Neurologic: no focal neurologic deficits. grossly non-focal.  Psychiatric: Alert and oriented, normal mood    Consults:     IP CONSULT TO HOSPITALIST  IP CONSULT TO PHARMACY  IP CONSULT TO PULMONOLOGY  IP CONSULT TO CASE MANAGEMENT      Code Status:  Prior    Activity: activity as tolerated    Labs:  For convenience and continuity at follow-up the following most recent labs are provided:      CBC:    Lab Results   Component Value Date    WBC 10.6 08/12/2021    HGB 12.1 08/12/2021    HCT 37.1 08/12/2021     08/12/2021       Renal:    Lab Results   Component Value Date     08/12/2021    K 4.8 08/12/2021    CL 99 08/12/2021    CO2 24 08/12/2021    BUN 13 08/12/2021    CREATININE 1.1 08/12/2021    CALCIUM 8.5 08/12/2021    PHOS 2.7 11/06/2017       Discharge Medications:     Discharge Medication List as of 7/27/2021  5:48 PM           Details   budesonide-formoterol (SYMBICORT) 160-4.5 MCG/ACT AERO Inhale 2 puffs into the lungs 2 times daily, Disp-1 Inhaler, R-3Normal      ciprofloxacin (CIPRO) 500 MG tablet Take 1 tablet by mouth 2 times daily for 7 days, Disp-14 tablet, R-0Normal      predniSONE (DELTASONE) 20 MG tablet Take 2 concerns please feel free to contact me at [phone].

## 2021-08-29 ENCOUNTER — HOSPITAL ENCOUNTER (EMERGENCY)
Age: 86
Discharge: HOME | End: 2021-08-30
Payer: MEDICARE

## 2021-08-29 ENCOUNTER — APPOINTMENT (OUTPATIENT)
Dept: GENERAL RADIOLOGY | Age: 86
End: 2021-08-29
Payer: MEDICARE

## 2021-08-29 DIAGNOSIS — R06.02 SHORTNESS OF BREATH: Primary | ICD-10-CM

## 2021-08-29 DIAGNOSIS — C34.90 MALIGNANT NEOPLASM OF LUNG, UNSPECIFIED LATERALITY, UNSPECIFIED PART OF LUNG (HCC): ICD-10-CM

## 2021-08-29 DIAGNOSIS — Z51.5 HOSPICE CARE PATIENT: ICD-10-CM

## 2021-08-29 PROCEDURE — 94640 AIRWAY INHALATION TREATMENT: CPT

## 2021-08-29 PROCEDURE — 71045 X-RAY EXAM CHEST 1 VIEW: CPT

## 2021-08-29 PROCEDURE — 99284 EMERGENCY DEPT VISIT MOD MDM: CPT

## 2021-08-29 PROCEDURE — 6370000000 HC RX 637 (ALT 250 FOR IP): Performed by: PHYSICIAN ASSISTANT

## 2021-08-29 RX ORDER — IPRATROPIUM BROMIDE AND ALBUTEROL SULFATE 2.5; .5 MG/3ML; MG/3ML
1 SOLUTION RESPIRATORY (INHALATION)
Status: COMPLETED | OUTPATIENT
Start: 2021-08-29 | End: 2021-08-29

## 2021-08-29 RX ADMIN — IPRATROPIUM BROMIDE AND ALBUTEROL SULFATE 1 AMPULE: .5; 3 SOLUTION RESPIRATORY (INHALATION) at 23:15

## 2021-08-29 RX ADMIN — IPRATROPIUM BROMIDE AND ALBUTEROL SULFATE 1 AMPULE: .5; 3 SOLUTION RESPIRATORY (INHALATION) at 22:15

## 2021-08-29 RX ADMIN — IPRATROPIUM BROMIDE AND ALBUTEROL SULFATE 1 AMPULE: .5; 3 SOLUTION RESPIRATORY (INHALATION) at 22:30

## 2021-08-30 VITALS
WEIGHT: 156.53 LBS | OXYGEN SATURATION: 95 % | HEART RATE: 82 BPM | RESPIRATION RATE: 30 BRPM | SYSTOLIC BLOOD PRESSURE: 109 MMHG | DIASTOLIC BLOOD PRESSURE: 43 MMHG | TEMPERATURE: 98.2 F | BODY MASS INDEX: 27.73 KG/M2

## 2021-08-30 NOTE — ED TRIAGE NOTES
Patient arrives to the ED with complaints of shortness of breath and rectal bleeding. Per the patient she has had normal colored stools, but bleeding when she wipes. Patient is short of breath, currently on 15L via non-rebreather mask at 93% oxygen saturation. 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 Patient is currently alert and oriented. VSS.

## 2021-08-30 NOTE — ED NOTES
D/C: Order noted for d/c. Pt confirmed d/c paperwork have correct name. Discharge and education instructions reviewed with patient. Teach-back successful. Pt verbalized understanding and signed d/c papers. Pt denied questions at this time. No acute distress noted. Patient instructed to follow-up as noted - return to emergency department if symptoms worsen. Patient verbalized understanding. Discharged per EDMD with discharge instructions. Pt discharged to private vehicle. Patient stable upon departure. Thanked patient for choosing [facility]) for care. Provider aware of patient pain at time of discharge.      Diana Vela RN  08/30/21 2363

## 2021-08-30 NOTE — ED NOTES

## 2021-08-30 NOTE — ED PROVIDER NOTES
[address]       Pt Name: Tyrone Mann  MRN: [id number]  [place] 1934  Date of evaluation: 8/29/2021  Provider: OMER Browning    The Attending Physician was available for consultation but did not see or evaluate this patient. CHIEF COMPLAINT       Chief Complaint   Patient presents with    Rectal Bleeding    Shortness of Breath       HISTORY OF PRESENT ILLNESS  (Location/Symptom, Timing/Onset, Context/Setting, Quality, Duration, Modifying Factors, Severity.)   Tyrone Mann is a 80 y.o. female who presents to the emergency department with complaint of shortness of breath. She is accompanied by her daughter. Patient is at home under hospice care, with history of lung mass, and she has home oxygen by nasal cannula. Patient's daughter says the patient was more short of breath than usual today, also may have had some rectal bleeding. Patient herself says she is having trouble breathing but wants to go home. She says there was no blood in her stool, just a little bit of blood on the toilet tissue when she wiped. No other complaints. Nursing Notes were reviewed and I agree. REVIEW OF SYSTEMS    (2-9 systems for level 4, 10 or more for level 5)     Constitutional:  Negative for fever, chills. Respiratory: Positive for shortness of breath, cough. Cardiovascular:  Negative for chest pain, palpitations. Gastrointestinal:  Negative for vomiting, abdominal pain. All other positives and pertinent negatives as per HPI. PAST MEDICAL HISTORY         Diagnosis Date    Abdominal pain, unspecified site     Adrenal hyperplasia ([address] 06/11/2013    Left - CT scan - 2011    Ankle Fracture, Right     Anxiety and depression     Aortic atherosclerosis ([address] 10/03/2015    CAD (coronary artery disease) 2018    PCI LAD 3.0 x 15 mm and 3.5 x 15 mm BMS; RCA 2.5 x 12 mm and 2.5 x 15 mm BMS.  EF 65%    Contusion of unspecified site     COPD (chronic obstructive pulmonary disease) ([facility] [address] 03/01/2013    Coronary artery disease     Arteriosclerosis    DJD (degenerative joint disease)     Eczema 08/09/2013    Essential hypertension 09/09/2015    Hyperlipidemia     Hypertension     Hypoxia 07/16/2015    Insomnia     Leukocytosis     Malignant neoplasm of lower lobe of left lung (HCC)     high index of suspicion. Patient and daughter do not want work up. No further CT's recommended    Memory loss     Nicotine addiction     Osteoarthritis     Pain in joint, pelvic region and thigh     Primary osteoarthritis involving multiple joints 09/09/2015    [facility] 10[facility]2015    Senile reticular degeneration of peripheral retina     Syncope     Thoracic or lumbosacral neuritis or radiculitis, unspecified     Urinary incontinence     Visual hallucinations 09/16/2014    Wears glasses        SURGICAL HISTORY           Procedure Laterality Date    CATARACT REMOVAL WITH IMPLANT Left December 5, 2012    Dr. Talha Montes  08/01/2018       CURRENT MEDICATIONS       Discharge Medication List as of 8/30/2021  2:17 AM      CONTINUE these medications which have NOT CHANGED    Details   furosemide (LASIX) 20 MG tablet Take 1 tablet by mouth three times a week TAKE ONE TABLET BY MOUTH TWICE A DAYHistorical Med      predniSONE (DELTASONE) 5 MG tablet Take 6 tab by mouth daily  for 3 days Take 4  tab by mouth daily for 3 days Then take one 2 tab by mouth daily for 3 days  Then one tab prednisone 5 mg indefinitely, Disp-100 tablet, R-0Normal      HYDROcodone-acetaminophen (NORCO) 5-325 MG per tablet Take 1 tablet by mouth 2 times daily as needed for Pain for up to 30 days.  Take lowest dose possible to manage pain, Disp-60 tablet, R-0Normal      rivaroxaban (XARELTO) 20 MG TABS tablet TAKE ONE TABLET BY MOUTH DAILY WITH BREAKFAST, Disp-15 tablet, R-3Normal LORazepam (ATIVAN) 0.5 MG tablet TAKE ONE TABLET BY MOUTH TWICE A DAY, Disp-60 tablet, R-0Normal      budesonide-formoterol (SYMBICORT) 160-4.5 MCG/ACT AERO Inhale 2 puffs into the lungs 2 times daily, Disp-1 Inhaler, R-3Normal      metoprolol tartrate (LOPRESSOR) 25 MG tablet 1 tablet twice daily, Disp-180 tablet, R-0Normal      QUEtiapine (SEROQUEL) 50 MG tablet 1 tablet daily, Disp-90 tablet, R-3Normal      amLODIPine (NORVASC) 5 MG tablet TAKE ONE TABLET BY MOUTH TWICE A DAY, Disp-180 tablet, R-3Normal      meclizine (ANTIVERT) 12.5 MG tablet TAKE ONE TABLET BY MOUTH TWICE A DAY AS NEEDED FOR DIZZINESS, Disp-60 tablet, R-1Normal      atorvastatin (LIPITOR) 40 MG tablet TAKE ONE TABLET BY MOUTH ONCE NIGHTLY, Disp-90 tablet, R-0Normal      nystatin (MYCOSTATIN) 613677 UNIT/GM powder Apply topically 4 times daily. , Disp-45 g, R-0, Normal      potassium chloride (KLOR-CON M) 20 MEQ extended release tablet Take 1 tablet by mouth 2 times daily, Disp-180 tablet, R-0Normal      PARoxetine (PAXIL) 20 MG tablet TAKE ONE TABLET BY MOUTH DAILY, Disp-90 tablet,R-3Normal      ipratropium-albuterol (DUONEB) 0.5-2.5 (3) MG/3ML SOLN nebulizer solution Inhale 3 mLs into the lungs every 6 hours as needed for Shortness of Breath, Disp-360 mL,R-3Normal      albuterol sulfate HFA (VENTOLIN HFA) 108 (90 Base) MCG/ACT inhaler Inhale 2 puffs into the lungs 4 times daily as needed for Wheezing, Disp-3 Inhaler,R-1Normal      aspirin EC 81 MG EC tablet Take 1 tablet by mouth daily, Disp-90 tablet, R-1Normal      OXYGEN Inhale 6 L/min into the lungs continuous OTC             ALLERGIES     Enalapril    FAMILY HISTORY           Problem Relation Age of Onset    Heart Attack Father     Heart Attack Brother      Family Status   Relation Name Status    Mother      Father      [address] Brother      Sister      Sister      Sister      Sister      Sister     Jacey  Alive    Son  Alive        SOCIAL HISTORY      reports that she quit smoking about 2 years ago. Her smoking use included cigarettes. She has a 65.00 pack-year smoking history. She has never used smokeless tobacco. She reports current alcohol use. She reports that she does not use drugs. PHYSICAL EXAM    (up to 7 for level 4, 8 or more for level 5)     ED Triage Vitals [21]   BP Temp Temp Source Pulse Resp SpO2 Height Weight   133/74 98.2 °F (36.8 °C) Oral 109 (!) 33 92 % -- 156 lb 8.4 oz (71 kg)       Constitutional:  Appearing well-developed and well-nourished. .   Cardiovascular:  Normal rate, regular rhythm, normal heart sounds and intact distal pulses. Pulmonary/Chest: Patient presents on a nonrebreather mask. Increased respiratory effort, with rhonchi in the lungs audible without auscultation. Musculoskeletal:  Normal range of motion. No edema exhibited. Neurological:  Oriented to person, place, and time. No cranial nerve deficit observed. DIAGNOSTIC RESULTS     When ordered, EKGs are interpreted by the ED physician in the absence of a cardiologist. Please the physician's note for interpretation of EKG. RADIOLOGY:   Interpretation per the Radiologist below, if available at the time of this note:    XR CHEST PORTABLE   Final Result   Cardiomegaly. Increased perihilar opacities likely reflecting pulmonary   edema. Small to moderate left pleural effusion. LABS:  Labs Reviewed - No data to display    All other labs were within normal range or not returned as of this dictation. EMERGENCY DEPARTMENT COURSE and DIFFERENTIAL DIAGNOSIS/MDM:   Vitals:    Vitals:    21 0016 21 0030 21 0100 21 0130   BP: (!) 127/45 (!) 104/48 (!) 143/56 (!) 109/43   Pulse: 92 83 90 82   Resp: (!) 34 29 (!) 35 30   Temp:       TempSrc:       SpO2: 94% 93% 93% 95%   Weight:           The patient's condition in the ED was stable.   Patient is already under hospice care. She was oxygenating in the 80s on her usual level of oxygen, but occasionally in the 70s, so she was kept on a nonrebreather mask. She was given breathing treatments in the ED. I discussed the situation with the patient's daughter and caregiver at bedside, and she wished to transfer the patient from home hospice care to inpatient hospice care. [facility]ospice in [place] was consulted, and they arranged for transfer to their [facility] facility. PROCEDURES:  None    FINAL IMPRESSION      1. Shortness of breath    2. Malignant neoplasm of lung, unspecified laterality, unspecified part of lung ([facility] [address]    3. Hospice care patient          DISPOSITION/PLAN   DISPOSITION Decision To Discharge 08/30/2021 02:16:55 AM      PATIENT REFERRED TO:  No follow-up provider specified.     DISCHARGE MEDICATIONS:  Discharge Medication List as of 8/30/2021  2:17 AM          (Please note that portions of this note were completed with a voice recognition program.  Efforts were made to edit the dictations but occasionally words are mis-transcribed.)    Joey Villaseñor, [address] [place], [place]  08/30/21 [phone]

## 2022-02-14 NOTE — TELEPHONE ENCOUNTER
OARRS REVIEWED  Norco last refilled 10/25/20  Lorazepam last refilled 10/18/20
Patient daughter called back stating she still has not heard from any one. She states she needs her mother needs her refills and also needs to speak to you about the blood in stool yesterday. Please call to advise.
Patients daughter Angella Fairchild calling stating patient has blood in her stool. She is requesting medication refill for Lorazepam, Atorvastatin 40 mg tablet, Norco 5-325 mg tablet, potassium chloride 20 MEQ tablet, Lopressor 25 mg tablet send to [facility]. She also has some concerns about the Wellbutrin  mg tablet, stating it is making the patient not feel good and she just sits in a daze. Please call daughter back in regards to this. Please Advise.
No - the patient is unable to be screened due to medical condition

## 2022-02-21 NOTE — DISCHARGE INSTR - DIET
Good nutrition is important when healing from an illness, injury, or surgery. Follow any nutrition recommendations given to you during your hospital stay. If you were given an oral nutrition supplement while in the hospital, continue to take this supplement at home. You can take it with meals, in-between meals, and/or before bedtime. These supplements can be purchased at most local grocery stores, pharmacies, and chain [employer]-stores. If you have any questions about your diet or nutrition, call the hospital and ask for the dietitian. Topical Sulfur Applications Counseling: Topical Sulfur Counseling: Patient counseled that this medication may cause skin irritation or allergic reactions.  In the event of skin irritation, the patient was advised to reduce the amount of the drug applied or use it less frequently.   The patient verbalized understanding of the proper use and possible adverse effects of topical sulfur application.  All of the patient's questions and concerns were addressed. Sarecycline Pregnancy And Lactation Text: This medication is Pregnancy Category D and not consider safe during pregnancy. It is also excreted in breast milk. Dapsone Counseling: I discussed with the patient the risks of dapsone including but not limited to hemolytic anemia, agranulocytosis, rashes, methemoglobinemia, kidney failure, peripheral neuropathy, headaches, GI upset, and liver toxicity.  Patients who start dapsone require monitoring including baseline LFTs and weekly CBCs for the first month, then every month thereafter.  The patient verbalized understanding of the proper use and possible adverse effects of dapsone.  All of the patient's questions and concerns were addressed. Erythromycin Counseling:  I discussed with the patient the risks of erythromycin including but not limited to GI upset, allergic reaction, drug rash, diarrhea, increase in liver enzymes, and yeast infections. Topical Retinoid Pregnancy And Lactation Text: This medication is Pregnancy Category C. It is unknown if this medication is excreted in breast milk. Azithromycin Pregnancy And Lactation Text: This medication is considered safe during pregnancy and is also secreted in breast milk. Topical Clindamycin Pregnancy And Lactation Text: This medication is Pregnancy Category B and is considered safe during pregnancy. It is unknown if it is excreted in breast milk. Sarecycline Counseling: Patient advised regarding possible photosensitivity and discoloration of the teeth, skin, lips, tongue and gums.  Patient instructed to avoid sunlight, if possible.  When exposed to sunlight, patients should wear protective clothing, sunglasses, and sunscreen.  The patient was instructed to call the office immediately if the following severe adverse effects occur:  hearing changes, easy bruising/bleeding, severe headache, or vision changes.  The patient verbalized understanding of the proper use and possible adverse effects of sarecycline.  All of the patient's questions and concerns were addressed. Birth Control Pills Pregnancy And Lactation Text: This medication should be avoided if pregnant and for the first 30 days post-partum. Dapsone Pregnancy And Lactation Text: This medication is Pregnancy Category C and is not considered safe during pregnancy or breast feeding. Use Enhanced Medication Counseling?: No Tazorac Pregnancy And Lactation Text: This medication is not safe during pregnancy. It is unknown if this medication is excreted in breast milk. Erythromycin Pregnancy And Lactation Text: This medication is Pregnancy Category B and is considered safe during pregnancy. It is also excreted in breast milk. Detail Level: Detailed Topical Sulfur Applications Pregnancy And Lactation Text: This medication is Pregnancy Category C and has an unknown safety profile during pregnancy. It is unknown if this topical medication is excreted in breast milk. Spironolactone Counseling: Patient advised regarding risks of diarrhea, abdominal pain, hyperkalemia, birth defects (for female patients), liver toxicity and renal toxicity. The patient may need blood work to monitor liver and kidney function and potassium levels while on therapy. The patient verbalized understanding of the proper use and possible adverse effects of spironolactone.  All of the patient's questions and concerns were addressed. Bactrim Counseling:  I discussed with the patient the risks of sulfa antibiotics including but not limited to GI upset, allergic reaction, drug rash, diarrhea, dizziness, photosensitivity, and yeast infections.  Rarely, more serious reactions can occur including but not limited to aplastic anemia, agranulocytosis, methemoglobinemia, blood dyscrasias, liver or kidney failure, lung infiltrates or desquamative/blistering drug rashes. Benzoyl Peroxide Counseling: Patient counseled that medicine may cause skin irritation and bleach clothing.  In the event of skin irritation, the patient was advised to reduce the amount of the drug applied or use it less frequently.   The patient verbalized understanding of the proper use and possible adverse effects of benzoyl peroxide.  All of the patient's questions and concerns were addressed. Tazorac Counseling:  Patient advised that medication is irritating and drying.  Patient may need to apply sparingly and wash off after an hour before eventually leaving it on overnight.  The patient verbalized understanding of the proper use and possible adverse effects of tazorac.  All of the patient's questions and concerns were addressed. High Dose Vitamin A Pregnancy And Lactation Text: High dose vitamin A therapy is contraindicated during pregnancy and breast feeding. Topical Clindamycin Counseling: Patient counseled that this medication may cause skin irritation or allergic reactions.  In the event of skin irritation, the patient was advised to reduce the amount of the drug applied or use it less frequently.   The patient verbalized understanding of the proper use and possible adverse effects of clindamycin.  All of the patient's questions and concerns were addressed. Birth Control Pills Counseling: Birth Control Pill Counseling: I discussed with the patient the potential side effects of OCPs including but not limited to increased risk of stroke, heart attack, thrombophlebitis, deep venous thrombosis, hepatic adenomas, breast changes, GI upset, headaches, and depression.  The patient verbalized understanding of the proper use and possible adverse effects of OCPs. All of the patient's questions and concerns were addressed. Winlevi Counseling:  I discussed with the patient the risks of topical clascoterone including but not limited to erythema, scaling, itching, and stinging. Patient voiced their understanding. Spironolactone Pregnancy And Lactation Text: This medication can cause feminization of the male fetus and should be avoided during pregnancy. The active metabolite is also found in breast milk. Doxycycline Counseling:  Patient counseled regarding possible photosensitivity and increased risk for sunburn.  Patient instructed to avoid sunlight, if possible.  When exposed to sunlight, patients should wear protective clothing, sunglasses, and sunscreen.  The patient was instructed to call the office immediately if the following severe adverse effects occur:  hearing changes, easy bruising/bleeding, severe headache, or vision changes.  The patient verbalized understanding of the proper use and possible adverse effects of doxycycline.  All of the patient's questions and concerns were addressed. Benzoyl Peroxide Pregnancy And Lactation Text: This medication is Pregnancy Category C. It is unknown if benzoyl peroxide is excreted in breast milk. Isotretinoin Counseling: Patient should get monthly blood tests, not donate blood, not drive at night if vision affected, not share medication, and not undergo elective surgery for 6 months after tx completed. Side effects reviewed, pt to contact office should one occur. Minocycline Counseling: Patient advised regarding possible photosensitivity and discoloration of the teeth, skin, lips, tongue and gums.  Patient instructed to avoid sunlight, if possible.  When exposed to sunlight, patients should wear protective clothing, sunglasses, and sunscreen.  The patient was instructed to call the office immediately if the following severe adverse effects occur:  hearing changes, easy bruising/bleeding, severe headache, or vision changes.  The patient verbalized understanding of the proper use and possible adverse effects of minocycline.  All of the patient's questions and concerns were addressed. Bactrim Pregnancy And Lactation Text: This medication is Pregnancy Category D and is known to cause fetal risk.  It is also excreted in breast milk. Tetracycline Counseling: Patient counseled regarding possible photosensitivity and increased risk for sunburn.  Patient instructed to avoid sunlight, if possible.  When exposed to sunlight, patients should wear protective clothing, sunglasses, and sunscreen.  The patient was instructed to call the office immediately if the following severe adverse effects occur:  hearing changes, easy bruising/bleeding, severe headache, or vision changes.  The patient verbalized understanding of the proper use and possible adverse effects of tetracycline.  All of the patient's questions and concerns were addressed. Patient understands to avoid pregnancy while on therapy due to potential birth defects. High Dose Vitamin A Counseling: Side effects reviewed, pt to contact office should one occur. Azithromycin Counseling:  I discussed with the patient the risks of azithromycin including but not limited to GI upset, allergic reaction, drug rash, diarrhea, and yeast infections. Winlevi Pregnancy And Lactation Text: This medication is considered safe during pregnancy and breastfeeding. Doxycycline Pregnancy And Lactation Text: This medication is Pregnancy Category D and not consider safe during pregnancy. It is also excreted in breast milk but is considered safe for shorter treatment courses. Topical Retinoid counseling:  Patient advised to apply a pea-sized amount only at bedtime and wait 30 minutes after washing their face before applying.  If too drying, patient may add a non-comedogenic moisturizer. The patient verbalized understanding of the proper use and possible adverse effects of retinoids.  All of the patient's questions and concerns were addressed. Isotretinoin Pregnancy And Lactation Text: This medication is Pregnancy Category X and is considered extremely dangerous during pregnancy. It is unknown if it is excreted in breast milk. Azelaic Acid Counseling: Patient counseled that medicine may cause skin irritation and to avoid applying near the eyes.  In the event of skin irritation, the patient was advised to reduce the amount of the drug applied or use it less frequently.   The patient verbalized understanding of the proper use and possible adverse effects of azelaic acid.  All of the patient's questions and concerns were addressed. Azelaic Acid Pregnancy And Lactation Text: This medication is considered safe during pregnancy and breast feeding.
